# Patient Record
Sex: FEMALE | Race: BLACK OR AFRICAN AMERICAN | NOT HISPANIC OR LATINO
[De-identification: names, ages, dates, MRNs, and addresses within clinical notes are randomized per-mention and may not be internally consistent; named-entity substitution may affect disease eponyms.]

---

## 2021-10-21 ENCOUNTER — APPOINTMENT (OUTPATIENT)
Dept: GASTROENTEROLOGY | Facility: CLINIC | Age: 66
End: 2021-10-21
Payer: MEDICARE

## 2021-10-21 VITALS — DIASTOLIC BLOOD PRESSURE: 82 MMHG | TEMPERATURE: 97.3 F | SYSTOLIC BLOOD PRESSURE: 128 MMHG | HEIGHT: 67 IN

## 2021-10-21 DIAGNOSIS — Z87.39 PERSONAL HISTORY OF OTHER DISEASES OF THE MUSCULOSKELETAL SYSTEM AND CONNECTIVE TISSUE: ICD-10-CM

## 2021-10-21 DIAGNOSIS — K21.9 GASTRO-ESOPHAGEAL REFLUX DISEASE W/OUT ESOPHAGITIS: ICD-10-CM

## 2021-10-21 DIAGNOSIS — Z78.9 OTHER SPECIFIED HEALTH STATUS: ICD-10-CM

## 2021-10-21 DIAGNOSIS — E03.9 HYPOTHYROIDISM, UNSPECIFIED: ICD-10-CM

## 2021-10-21 PROCEDURE — 99213 OFFICE O/P EST LOW 20 MIN: CPT

## 2021-10-21 RX ORDER — PANTOPRAZOLE 40 MG/1
40 TABLET, DELAYED RELEASE ORAL
Qty: 90 | Refills: 3 | Status: ACTIVE | COMMUNITY
Start: 2021-10-21 | End: 1900-01-01

## 2021-10-21 RX ORDER — IBANDRONATE SODIUM 150 MG/1
150 TABLET, FILM COATED ORAL
Refills: 0 | Status: ACTIVE | COMMUNITY

## 2021-10-21 RX ORDER — PANTOPRAZOLE 40 MG/1
40 TABLET, DELAYED RELEASE ORAL
Refills: 0 | Status: ACTIVE | COMMUNITY

## 2021-10-21 RX ORDER — LEVOTHYROXINE SODIUM 100 UG/1
100 TABLET ORAL
Refills: 0 | Status: ACTIVE | COMMUNITY

## 2021-10-21 NOTE — CONSULT LETTER
[Dear  ___] : Dear  [unfilled], [Consult Letter:] : I had the pleasure of evaluating your patient, [unfilled]. [( Thank you for referring [unfilled] for consultation for _____ )] : Thank you for referring [unfilled] for consultation for [unfilled] [Please see my note below.] : Please see my note below. [Consult Closing:] : Thank you very much for allowing me to participate in the care of this patient.  If you have any questions, please do not hesitate to contact me. [Sincerely,] : Sincerely, [FreeTextEntry3] : don\par \par Adelfo Swain MD\par

## 2021-10-21 NOTE — PHYSICAL EXAM
[FreeTextEntry1] : In a motorized wheelchair [Outer Ear] : the ears and nose were normal in appearance [Oropharynx] : the oropharynx was normal [Auscultation Breath Sounds / Voice Sounds] : lungs were clear to auscultation bilaterally [Heart Rate And Rhythm] : heart rate was normal and rhythm regular [Heart Sounds] : normal S1 and S2 [Heart Sounds Gallop] : no gallops [Murmurs] : no murmurs [Heart Sounds Pericardial Friction Rub] : no pericardial rub [Bowel Sounds] : normal bowel sounds [Abdomen Soft] : soft [Abdomen Tenderness] : non-tender [] : no hepato-splenomegaly [Abdomen Mass (___ Cm)] : no abdominal mass palpated

## 2021-10-21 NOTE — HISTORY OF PRESENT ILLNESS
[FreeTextEntry1] : She is a 66-year-old female who has been on pantoprazole for her heartburn with complete resolution of her symptoms.  When she skips a dose, her heartburn quickly recurs.  She is very low on her medications and just wants to come here for a checkup.  She denies abdominal pain. She denies difficulty swallowing. She denies NSAID use, Her last colonoscopy was in 2017 which was normal.

## 2021-10-21 NOTE — ASSESSMENT
[FreeTextEntry1] : Continue pantoprazole 40 mg daily 1 hour before breakfast\par \par \par Office follow-up as needed

## 2022-02-28 DIAGNOSIS — R14.0 ABDOMINAL DISTENSION (GASEOUS): ICD-10-CM

## 2022-03-23 ENCOUNTER — APPOINTMENT (OUTPATIENT)
Dept: GASTROENTEROLOGY | Facility: AMBULATORY SURGERY CENTER | Age: 67
End: 2022-03-23

## 2022-04-13 ENCOUNTER — APPOINTMENT (OUTPATIENT)
Dept: GASTROENTEROLOGY | Facility: CLINIC | Age: 67
End: 2022-04-13

## 2022-04-14 ENCOUNTER — NON-APPOINTMENT (OUTPATIENT)
Age: 67
End: 2022-04-14

## 2022-06-10 ENCOUNTER — APPOINTMENT (OUTPATIENT)
Dept: GASTROENTEROLOGY | Facility: AMBULATORY SURGERY CENTER | Age: 67
End: 2022-06-10

## 2022-06-10 ENCOUNTER — APPOINTMENT (OUTPATIENT)
Dept: GASTROENTEROLOGY | Facility: AMBULATORY SURGERY CENTER | Age: 67
End: 2022-06-10
Payer: MEDICARE

## 2022-06-10 PROCEDURE — 43239 EGD BIOPSY SINGLE/MULTIPLE: CPT

## 2022-06-11 RX ORDER — PANTOPRAZOLE 20 MG/1
20 TABLET, DELAYED RELEASE ORAL
Qty: 90 | Refills: 2 | Status: ACTIVE | COMMUNITY
Start: 2022-06-11 | End: 1900-01-01

## 2022-06-13 ENCOUNTER — RESULT REVIEW (OUTPATIENT)
Age: 67
End: 2022-06-13

## 2022-12-15 ENCOUNTER — INPATIENT (INPATIENT)
Facility: HOSPITAL | Age: 67
LOS: 7 days | Discharge: HOME HEALTH | End: 2022-12-23
Attending: INTERNAL MEDICINE | Admitting: INTERNAL MEDICINE
Payer: MEDICARE

## 2022-12-15 VITALS
OXYGEN SATURATION: 97 % | TEMPERATURE: 99 F | DIASTOLIC BLOOD PRESSURE: 70 MMHG | RESPIRATION RATE: 16 BRPM | SYSTOLIC BLOOD PRESSURE: 140 MMHG | HEART RATE: 90 BPM

## 2022-12-15 PROCEDURE — 99285 EMERGENCY DEPT VISIT HI MDM: CPT

## 2022-12-15 NOTE — ED ADULT TRIAGE NOTE - CHIEF COMPLAINT QUOTE
Pt w/ hx of MS hypothyroid frequent UTIs c/o constipation, abdominal bloating and mild nausea x 4 days

## 2022-12-16 ENCOUNTER — APPOINTMENT (OUTPATIENT)
Dept: GASTROENTEROLOGY | Facility: CLINIC | Age: 67
End: 2022-12-16

## 2022-12-16 DIAGNOSIS — A41.9 SEPSIS, UNSPECIFIED ORGANISM: ICD-10-CM

## 2022-12-16 DIAGNOSIS — R56.9 UNSPECIFIED CONVULSIONS: ICD-10-CM

## 2022-12-16 DIAGNOSIS — E03.9 HYPOTHYROIDISM, UNSPECIFIED: ICD-10-CM

## 2022-12-16 DIAGNOSIS — N39.0 URINARY TRACT INFECTION, SITE NOT SPECIFIED: ICD-10-CM

## 2022-12-16 DIAGNOSIS — G35 MULTIPLE SCLEROSIS: ICD-10-CM

## 2022-12-16 DIAGNOSIS — I10 ESSENTIAL (PRIMARY) HYPERTENSION: ICD-10-CM

## 2022-12-16 DIAGNOSIS — K59.00 CONSTIPATION, UNSPECIFIED: ICD-10-CM

## 2022-12-16 LAB
ALBUMIN SERPL ELPH-MCNC: 3.5 G/DL — SIGNIFICANT CHANGE UP (ref 3.3–5)
ALBUMIN SERPL ELPH-MCNC: 4.1 G/DL — SIGNIFICANT CHANGE UP (ref 3.3–5)
ALP SERPL-CCNC: 114 U/L — SIGNIFICANT CHANGE UP (ref 40–120)
ALP SERPL-CCNC: 128 U/L — HIGH (ref 40–120)
ALT FLD-CCNC: 40 U/L — HIGH (ref 4–33)
ALT FLD-CCNC: 49 U/L — HIGH (ref 4–33)
AMMONIA BLD-MCNC: 34 UMOL/L — SIGNIFICANT CHANGE UP (ref 11–55)
ANION GAP SERPL CALC-SCNC: 12 MMOL/L — SIGNIFICANT CHANGE UP (ref 7–14)
ANION GAP SERPL CALC-SCNC: 12 MMOL/L — SIGNIFICANT CHANGE UP (ref 7–14)
ANION GAP SERPL CALC-SCNC: 9 MMOL/L — SIGNIFICANT CHANGE UP (ref 7–14)
APPEARANCE UR: ABNORMAL
AST SERPL-CCNC: 49 U/L — HIGH (ref 4–32)
AST SERPL-CCNC: 55 U/L — HIGH (ref 4–32)
BACTERIA # UR AUTO: ABNORMAL
BASOPHILS # BLD AUTO: 0.01 K/UL — SIGNIFICANT CHANGE UP (ref 0–0.2)
BASOPHILS # BLD AUTO: 0.02 K/UL — SIGNIFICANT CHANGE UP (ref 0–0.2)
BASOPHILS NFR BLD AUTO: 0.2 % — SIGNIFICANT CHANGE UP (ref 0–2)
BASOPHILS NFR BLD AUTO: 0.3 % — SIGNIFICANT CHANGE UP (ref 0–2)
BILIRUB SERPL-MCNC: 0.2 MG/DL — SIGNIFICANT CHANGE UP (ref 0.2–1.2)
BILIRUB SERPL-MCNC: <0.2 MG/DL — SIGNIFICANT CHANGE UP (ref 0.2–1.2)
BILIRUB UR-MCNC: NEGATIVE — SIGNIFICANT CHANGE UP
BLOOD GAS VENOUS COMPREHENSIVE RESULT: SIGNIFICANT CHANGE UP
BUN SERPL-MCNC: 12 MG/DL — SIGNIFICANT CHANGE UP (ref 7–23)
BUN SERPL-MCNC: 12 MG/DL — SIGNIFICANT CHANGE UP (ref 7–23)
BUN SERPL-MCNC: 15 MG/DL — SIGNIFICANT CHANGE UP (ref 7–23)
CALCIUM SERPL-MCNC: 10.4 MG/DL — SIGNIFICANT CHANGE UP (ref 8.4–10.5)
CALCIUM SERPL-MCNC: 9.1 MG/DL — SIGNIFICANT CHANGE UP (ref 8.4–10.5)
CALCIUM SERPL-MCNC: 9.5 MG/DL — SIGNIFICANT CHANGE UP (ref 8.4–10.5)
CHLORIDE SERPL-SCNC: 92 MMOL/L — LOW (ref 98–107)
CHLORIDE SERPL-SCNC: 92 MMOL/L — LOW (ref 98–107)
CHLORIDE SERPL-SCNC: 96 MMOL/L — LOW (ref 98–107)
CK SERPL-CCNC: 158 U/L — SIGNIFICANT CHANGE UP (ref 25–170)
CO2 SERPL-SCNC: 26 MMOL/L — SIGNIFICANT CHANGE UP (ref 22–31)
CO2 SERPL-SCNC: 26 MMOL/L — SIGNIFICANT CHANGE UP (ref 22–31)
CO2 SERPL-SCNC: 28 MMOL/L — SIGNIFICANT CHANGE UP (ref 22–31)
COLOR SPEC: SIGNIFICANT CHANGE UP
CREAT SERPL-MCNC: 0.74 MG/DL — SIGNIFICANT CHANGE UP (ref 0.5–1.3)
CREAT SERPL-MCNC: 0.78 MG/DL — SIGNIFICANT CHANGE UP (ref 0.5–1.3)
CREAT SERPL-MCNC: 0.79 MG/DL — SIGNIFICANT CHANGE UP (ref 0.5–1.3)
DIFF PNL FLD: ABNORMAL
EGFR: 83 ML/MIN/1.73M2 — SIGNIFICANT CHANGE UP
EGFR: 84 ML/MIN/1.73M2 — SIGNIFICANT CHANGE UP
EGFR: 90 ML/MIN/1.73M2 — SIGNIFICANT CHANGE UP
EOSINOPHIL # BLD AUTO: 0.01 K/UL — SIGNIFICANT CHANGE UP (ref 0–0.5)
EOSINOPHIL # BLD AUTO: 0.01 K/UL — SIGNIFICANT CHANGE UP (ref 0–0.5)
EOSINOPHIL NFR BLD AUTO: 0.2 % — SIGNIFICANT CHANGE UP (ref 0–6)
EOSINOPHIL NFR BLD AUTO: 0.2 % — SIGNIFICANT CHANGE UP (ref 0–6)
EPI CELLS # UR: 3 /HPF — SIGNIFICANT CHANGE UP (ref 0–5)
FLUAV AG NPH QL: SIGNIFICANT CHANGE UP
FLUBV AG NPH QL: SIGNIFICANT CHANGE UP
GLUCOSE BLDC GLUCOMTR-MCNC: 104 MG/DL — HIGH (ref 70–99)
GLUCOSE BLDC GLUCOMTR-MCNC: 115 MG/DL — HIGH (ref 70–99)
GLUCOSE BLDC GLUCOMTR-MCNC: 233 MG/DL — HIGH (ref 70–99)
GLUCOSE BLDC GLUCOMTR-MCNC: 78 MG/DL — SIGNIFICANT CHANGE UP (ref 70–99)
GLUCOSE SERPL-MCNC: 117 MG/DL — HIGH (ref 70–99)
GLUCOSE SERPL-MCNC: 226 MG/DL — HIGH (ref 70–99)
GLUCOSE SERPL-MCNC: 69 MG/DL — LOW (ref 70–99)
GLUCOSE UR QL: NEGATIVE — SIGNIFICANT CHANGE UP
HCT VFR BLD CALC: 33.4 % — LOW (ref 34.5–45)
HCT VFR BLD CALC: 36.4 % — SIGNIFICANT CHANGE UP (ref 34.5–45)
HGB BLD-MCNC: 10.6 G/DL — LOW (ref 11.5–15.5)
HGB BLD-MCNC: 11.6 G/DL — SIGNIFICANT CHANGE UP (ref 11.5–15.5)
HYALINE CASTS # UR AUTO: 5 /LPF — SIGNIFICANT CHANGE UP (ref 0–7)
IANC: 3.25 K/UL — SIGNIFICANT CHANGE UP (ref 1.8–7.4)
IANC: 4.83 K/UL — SIGNIFICANT CHANGE UP (ref 1.8–7.4)
IMM GRANULOCYTES NFR BLD AUTO: 0.3 % — SIGNIFICANT CHANGE UP (ref 0–0.9)
IMM GRANULOCYTES NFR BLD AUTO: 0.5 % — SIGNIFICANT CHANGE UP (ref 0–0.9)
KETONES UR-MCNC: ABNORMAL
LEUKOCYTE ESTERASE UR-ACNC: ABNORMAL
LIDOCAIN IGE QN: 22 U/L — SIGNIFICANT CHANGE UP (ref 7–60)
LYMPHOCYTES # BLD AUTO: 0.91 K/UL — LOW (ref 1–3.3)
LYMPHOCYTES # BLD AUTO: 1.3 K/UL — SIGNIFICANT CHANGE UP (ref 1–3.3)
LYMPHOCYTES # BLD AUTO: 19.7 % — SIGNIFICANT CHANGE UP (ref 13–44)
LYMPHOCYTES # BLD AUTO: 21.2 % — SIGNIFICANT CHANGE UP (ref 13–44)
MAGNESIUM SERPL-MCNC: 1.5 MG/DL — LOW (ref 1.6–2.6)
MAGNESIUM SERPL-MCNC: 1.5 MG/DL — LOW (ref 1.6–2.6)
MCHC RBC-ENTMCNC: 28.5 PG — SIGNIFICANT CHANGE UP (ref 27–34)
MCHC RBC-ENTMCNC: 28.6 PG — SIGNIFICANT CHANGE UP (ref 27–34)
MCHC RBC-ENTMCNC: 31.7 GM/DL — LOW (ref 32–36)
MCHC RBC-ENTMCNC: 31.9 GM/DL — LOW (ref 32–36)
MCV RBC AUTO: 89.4 FL — SIGNIFICANT CHANGE UP (ref 80–100)
MCV RBC AUTO: 90 FL — SIGNIFICANT CHANGE UP (ref 80–100)
MONOCYTES # BLD AUTO: 0.1 K/UL — SIGNIFICANT CHANGE UP (ref 0–0.9)
MONOCYTES # BLD AUTO: 0.41 K/UL — SIGNIFICANT CHANGE UP (ref 0–0.9)
MONOCYTES NFR BLD AUTO: 2.3 % — SIGNIFICANT CHANGE UP (ref 2–14)
MONOCYTES NFR BLD AUTO: 6.2 % — SIGNIFICANT CHANGE UP (ref 2–14)
NEUTROPHILS # BLD AUTO: 3.25 K/UL — SIGNIFICANT CHANGE UP (ref 1.8–7.4)
NEUTROPHILS # BLD AUTO: 4.83 K/UL — SIGNIFICANT CHANGE UP (ref 1.8–7.4)
NEUTROPHILS NFR BLD AUTO: 73.3 % — SIGNIFICANT CHANGE UP (ref 43–77)
NEUTROPHILS NFR BLD AUTO: 75.6 % — SIGNIFICANT CHANGE UP (ref 43–77)
NITRITE UR-MCNC: POSITIVE
NRBC # BLD: 0 /100 WBCS — SIGNIFICANT CHANGE UP (ref 0–0)
NRBC # BLD: 0 /100 WBCS — SIGNIFICANT CHANGE UP (ref 0–0)
NRBC # FLD: 0 K/UL — SIGNIFICANT CHANGE UP (ref 0–0)
NRBC # FLD: 0 K/UL — SIGNIFICANT CHANGE UP (ref 0–0)
PH UR: 8 — SIGNIFICANT CHANGE UP (ref 5–8)
PHOSPHATE SERPL-MCNC: 3.3 MG/DL — SIGNIFICANT CHANGE UP (ref 2.5–4.5)
PHOSPHATE SERPL-MCNC: 3.8 MG/DL — SIGNIFICANT CHANGE UP (ref 2.5–4.5)
PLATELET # BLD AUTO: 164 K/UL — SIGNIFICANT CHANGE UP (ref 150–400)
PLATELET # BLD AUTO: 188 K/UL — SIGNIFICANT CHANGE UP (ref 150–400)
POTASSIUM SERPL-MCNC: 3.9 MMOL/L — SIGNIFICANT CHANGE UP (ref 3.5–5.3)
POTASSIUM SERPL-MCNC: 4.1 MMOL/L — SIGNIFICANT CHANGE UP (ref 3.5–5.3)
POTASSIUM SERPL-MCNC: 4.9 MMOL/L — SIGNIFICANT CHANGE UP (ref 3.5–5.3)
POTASSIUM SERPL-SCNC: 3.9 MMOL/L — SIGNIFICANT CHANGE UP (ref 3.5–5.3)
POTASSIUM SERPL-SCNC: 4.1 MMOL/L — SIGNIFICANT CHANGE UP (ref 3.5–5.3)
POTASSIUM SERPL-SCNC: 4.9 MMOL/L — SIGNIFICANT CHANGE UP (ref 3.5–5.3)
PROT SERPL-MCNC: 6.3 G/DL — SIGNIFICANT CHANGE UP (ref 6–8.3)
PROT SERPL-MCNC: 7.6 G/DL — SIGNIFICANT CHANGE UP (ref 6–8.3)
PROT UR-MCNC: ABNORMAL
RBC # BLD: 3.71 M/UL — LOW (ref 3.8–5.2)
RBC # BLD: 4.07 M/UL — SIGNIFICANT CHANGE UP (ref 3.8–5.2)
RBC # FLD: 17.3 % — HIGH (ref 10.3–14.5)
RBC # FLD: 17.4 % — HIGH (ref 10.3–14.5)
RBC CASTS # UR COMP ASSIST: 6 /HPF — HIGH (ref 0–4)
RSV RNA NPH QL NAA+NON-PROBE: SIGNIFICANT CHANGE UP
SARS-COV-2 RNA SPEC QL NAA+PROBE: SIGNIFICANT CHANGE UP
SODIUM SERPL-SCNC: 130 MMOL/L — LOW (ref 135–145)
SODIUM SERPL-SCNC: 130 MMOL/L — LOW (ref 135–145)
SODIUM SERPL-SCNC: 133 MMOL/L — LOW (ref 135–145)
SP GR SPEC: 1.01 — SIGNIFICANT CHANGE UP (ref 1.01–1.05)
TSH SERPL-MCNC: 2.37 UIU/ML — SIGNIFICANT CHANGE UP (ref 0.27–4.2)
UROBILINOGEN FLD QL: SIGNIFICANT CHANGE UP
WBC # BLD: 4.3 K/UL — SIGNIFICANT CHANGE UP (ref 3.8–10.5)
WBC # BLD: 6.59 K/UL — SIGNIFICANT CHANGE UP (ref 3.8–10.5)
WBC # FLD AUTO: 4.3 K/UL — SIGNIFICANT CHANGE UP (ref 3.8–10.5)
WBC # FLD AUTO: 6.59 K/UL — SIGNIFICANT CHANGE UP (ref 3.8–10.5)
WBC UR QL: >720 /HPF — HIGH (ref 0–5)

## 2022-12-16 PROCEDURE — 74177 CT ABD & PELVIS W/CONTRAST: CPT | Mod: 26,MA

## 2022-12-16 PROCEDURE — 70450 CT HEAD/BRAIN W/O DYE: CPT | Mod: 26

## 2022-12-16 PROCEDURE — 99223 1ST HOSP IP/OBS HIGH 75: CPT

## 2022-12-16 RX ORDER — SENNA PLUS 8.6 MG/1
2 TABLET ORAL AT BEDTIME
Refills: 0 | Status: DISCONTINUED | OUTPATIENT
Start: 2022-12-16 | End: 2022-12-23

## 2022-12-16 RX ORDER — ENOXAPARIN SODIUM 100 MG/ML
40 INJECTION SUBCUTANEOUS EVERY 24 HOURS
Refills: 0 | Status: DISCONTINUED | OUTPATIENT
Start: 2022-12-16 | End: 2022-12-23

## 2022-12-16 RX ORDER — SODIUM CHLORIDE 9 MG/ML
1000 INJECTION INTRAMUSCULAR; INTRAVENOUS; SUBCUTANEOUS ONCE
Refills: 0 | Status: COMPLETED | OUTPATIENT
Start: 2022-12-16 | End: 2022-12-16

## 2022-12-16 RX ORDER — SODIUM CHLORIDE 9 MG/ML
1000 INJECTION, SOLUTION INTRAVENOUS
Refills: 0 | Status: DISCONTINUED | OUTPATIENT
Start: 2022-12-16 | End: 2022-12-19

## 2022-12-16 RX ORDER — LEVOTHYROXINE SODIUM 125 MCG
75 TABLET ORAL DAILY
Refills: 0 | Status: DISCONTINUED | OUTPATIENT
Start: 2022-12-16 | End: 2022-12-18

## 2022-12-16 RX ORDER — MAGNESIUM SULFATE 500 MG/ML
1 VIAL (ML) INJECTION ONCE
Refills: 0 | Status: COMPLETED | OUTPATIENT
Start: 2022-12-16 | End: 2022-12-16

## 2022-12-16 RX ORDER — PIPERACILLIN AND TAZOBACTAM 4; .5 G/20ML; G/20ML
3.38 INJECTION, POWDER, LYOPHILIZED, FOR SOLUTION INTRAVENOUS ONCE
Refills: 0 | Status: COMPLETED | OUTPATIENT
Start: 2022-12-16 | End: 2022-12-16

## 2022-12-16 RX ORDER — ACETAMINOPHEN 500 MG
1000 TABLET ORAL ONCE
Refills: 0 | Status: COMPLETED | OUTPATIENT
Start: 2022-12-16 | End: 2022-12-16

## 2022-12-16 RX ORDER — ATORVASTATIN CALCIUM 80 MG/1
1 TABLET, FILM COATED ORAL
Qty: 0 | Refills: 0 | DISCHARGE

## 2022-12-16 RX ORDER — ONDANSETRON 8 MG/1
4 TABLET, FILM COATED ORAL EVERY 8 HOURS
Refills: 0 | Status: DISCONTINUED | OUTPATIENT
Start: 2022-12-16 | End: 2022-12-23

## 2022-12-16 RX ORDER — POLYETHYLENE GLYCOL 3350 17 G/17G
17 POWDER, FOR SOLUTION ORAL DAILY
Refills: 0 | Status: DISCONTINUED | OUTPATIENT
Start: 2022-12-16 | End: 2022-12-23

## 2022-12-16 RX ORDER — ZOLPIDEM TARTRATE 10 MG/1
5 TABLET ORAL AT BEDTIME
Refills: 0 | Status: DISCONTINUED | OUTPATIENT
Start: 2022-12-16 | End: 2022-12-22

## 2022-12-16 RX ORDER — AMLODIPINE BESYLATE 2.5 MG/1
1 TABLET ORAL
Qty: 0 | Refills: 0 | DISCHARGE

## 2022-12-16 RX ORDER — CEFTRIAXONE 500 MG/1
1000 INJECTION, POWDER, FOR SOLUTION INTRAMUSCULAR; INTRAVENOUS ONCE
Refills: 0 | Status: COMPLETED | OUTPATIENT
Start: 2022-12-16 | End: 2022-12-16

## 2022-12-16 RX ORDER — LEVETIRACETAM 250 MG/1
2000 TABLET, FILM COATED ORAL ONCE
Refills: 0 | Status: COMPLETED | OUTPATIENT
Start: 2022-12-16 | End: 2022-12-16

## 2022-12-16 RX ORDER — LEVETIRACETAM 250 MG/1
750 TABLET, FILM COATED ORAL EVERY 12 HOURS
Refills: 0 | Status: DISCONTINUED | OUTPATIENT
Start: 2022-12-17 | End: 2022-12-17

## 2022-12-16 RX ORDER — LANOLIN ALCOHOL/MO/W.PET/CERES
3 CREAM (GRAM) TOPICAL AT BEDTIME
Refills: 0 | Status: DISCONTINUED | OUTPATIENT
Start: 2022-12-16 | End: 2022-12-23

## 2022-12-16 RX ORDER — ATORVASTATIN CALCIUM 80 MG/1
10 TABLET, FILM COATED ORAL AT BEDTIME
Refills: 0 | Status: DISCONTINUED | OUTPATIENT
Start: 2022-12-16 | End: 2022-12-23

## 2022-12-16 RX ORDER — PANTOPRAZOLE SODIUM 20 MG/1
40 TABLET, DELAYED RELEASE ORAL
Refills: 0 | Status: DISCONTINUED | OUTPATIENT
Start: 2022-12-16 | End: 2022-12-18

## 2022-12-16 RX ORDER — ACETAMINOPHEN 500 MG
650 TABLET ORAL EVERY 6 HOURS
Refills: 0 | Status: DISCONTINUED | OUTPATIENT
Start: 2022-12-16 | End: 2022-12-23

## 2022-12-16 RX ORDER — PIPERACILLIN AND TAZOBACTAM 4; .5 G/20ML; G/20ML
3.38 INJECTION, POWDER, LYOPHILIZED, FOR SOLUTION INTRAVENOUS EVERY 8 HOURS
Refills: 0 | Status: DISCONTINUED | OUTPATIENT
Start: 2022-12-16 | End: 2022-12-18

## 2022-12-16 RX ADMIN — SODIUM CHLORIDE 1000 MILLILITER(S): 9 INJECTION INTRAMUSCULAR; INTRAVENOUS; SUBCUTANEOUS at 13:34

## 2022-12-16 RX ADMIN — CEFTRIAXONE 100 MILLIGRAM(S): 500 INJECTION, POWDER, FOR SOLUTION INTRAMUSCULAR; INTRAVENOUS at 02:34

## 2022-12-16 RX ADMIN — PIPERACILLIN AND TAZOBACTAM 25 GRAM(S): 4; .5 INJECTION, POWDER, LYOPHILIZED, FOR SOLUTION INTRAVENOUS at 16:54

## 2022-12-16 RX ADMIN — PIPERACILLIN AND TAZOBACTAM 200 GRAM(S): 4; .5 INJECTION, POWDER, LYOPHILIZED, FOR SOLUTION INTRAVENOUS at 13:34

## 2022-12-16 RX ADMIN — ENOXAPARIN SODIUM 40 MILLIGRAM(S): 100 INJECTION SUBCUTANEOUS at 16:59

## 2022-12-16 RX ADMIN — LEVETIRACETAM 600 MILLIGRAM(S): 250 TABLET, FILM COATED ORAL at 19:32

## 2022-12-16 RX ADMIN — Medication 400 MILLIGRAM(S): at 01:46

## 2022-12-16 RX ADMIN — Medication 100 GRAM(S): at 15:29

## 2022-12-16 RX ADMIN — SODIUM CHLORIDE 75 MILLILITER(S): 9 INJECTION, SOLUTION INTRAVENOUS at 16:57

## 2022-12-16 NOTE — H&P ADULT - PROBLEM SELECTOR PLAN 1
hypothermic, tachycardiac, ua positive. hypothermic, tachycardiac, ua positive. CT AP without acute pathology. nos tone or obstruction.,  -s/p 2L Bolus. one dose of ceftriaxone  -switch ceftriaxone to zosyn q8hr   -fu blood and urine culture  -monitor lactate  -cw D51/2NS at 75cc/hr given poor po intake   -monitor vitals, bear hugger as needed, trend wbc  -bladder scan to rule out retention

## 2022-12-16 NOTE — H&P ADULT - NSHPREVIEWOFSYSTEMS_GEN_ALL_CORE
ROS limited given AMS. Terbinafine Pregnancy And Lactation Text: This medication is Pregnancy Category B and is considered safe during pregnancy. It is also excreted in breast milk and breast feeding isn't recommended.

## 2022-12-16 NOTE — CHART NOTE - NSCHARTNOTEFT_GEN_A_CORE
RRT called by nursing staff 2/2 seizure activity.  Per nursing staff, patient had approximately 40 second seizure that terminated spontaneously, no medications administered.  Patient's O2 sat noted by staff to desaturate to 75%, ED Attending at bedside, nasal trumpet inserted.      Upon arrival to bedside, patient no longer with seizure activity, O2 sat 100%.  RRT team at bedside.  Dr. Redding updated.    Will closely monitor      Xiao Xavier NP-BC  Department of Medicine  In House Pager #[phone]

## 2022-12-16 NOTE — ED ADULT NURSE REASSESSMENT NOTE - NS ED NURSE REASSESS COMMENT FT1
While re-vitaling pt began having seizure like activity lasting approximately 40 seconds, no ativan given, pt immediately brought into room, airway protected, secretions suctioned. FS obtained (78). As per MD Caba  Dextrose 50 given. Pt oxygen desating to 75%, non-rebreather placed. tongue obstructing airway, manual de-obstruction provided by MD via nasal trumpet. Pt sO2 sat 100%. Repeat FS (233)  Rapid response called once pt was stable. Refer to rapid response team documentation for further information. Rectal temp obtained (97.1). While re-vitaling pt began having seizure like activity (Pt rigid, full body shaking, foaming at mouth) lasting approximately 40 seconds, no ativan given, pt immediately brought into room, unresponsive, airway protected, secretions suctioned. FS obtained (78). As per MD Rosales  Dextrose 50 given. Pt  hypoxic to 75%, non-rebreather placed. tongue obstructing airway, manual de-obstruction provided by MD via nasal trumpet. Pt sO2 sat 100%. Repeat FS (233)  Rapid response called once pt was stable. Refer to rapid response team documentation for further information. Rectal temp obtained (97.1).

## 2022-12-16 NOTE — CHART NOTE - NSCHARTNOTEFT_GEN_A_CORE
Baclofen pump interrogated    Concentration: 500 mcg/mL  Rate: 5.72 mcg/hr  Daily dose: 104.06 mcg/day Baclofen pump interrogated    Concentration: 500 mcg/mL  Rate: 5.72 mcg/hr  Daily dose: 104.06 mcg/day  Last refill date 12/5/2022  Alarm date 03/01/2023

## 2022-12-16 NOTE — CONSULT NOTE ADULT - SUBJECTIVE AND OBJECTIVE BOX
Neurology Consultation     HPI: Patient YS is a 68 y/o F hx MS (nonambulatory, has baclofen pump - refilled 3 weeks ago), frequent UTIs, hospitalized for ?urosepsis this month at [facility], brought in by family member for complaints of abd pain, constipation and lethargy x4 days per chart review. Of note, patient had recent hospitalization at [facility] for urosepsis with seizure like event that was witnessed. Was discharged on keppra 500mg BID but appears patient may not have been taking it. Patient here had RRT  for 40 seconds of seizure like activity described as stiffening of b/l UE, foaming at the mouth, hypoxic during event. Started zosyn and IVF for concern of urosepsis potentiating a seizure event. Also with baclofen pump.        PAST MEDICAL & SURGICAL HISTORY:  Hypothyroid    Multiple sclerosis    No significant past surgical history    FAMILY HISTORY:  No pertinent family history in first degree relatives    MEDICATIONS (HOME):  Home Medications:  acetaminophen 325 mg oral tablet: 2 tab(s) orally every 6 hours, As needed, For Temp over 38.3 C (100.94 F) (2015 13:43)  amLODIPine 10 mg oral tablet: 1 tab(s) orally once a day (16 Dec 2022 14:02)  atorvastatin 10 mg oral tablet: 1 tab(s) orally once a day (16 Dec 2022 14:03)  docusate sodium 100 mg oral capsule: 1 cap(s) orally 3 times a day (2015 13:34)  fluticasone 50 mcg/inh nasal spray: 1 spray(s) nasal 2 times a day (2015 13:34)  guaiFENesin 100 mg/5 mL oral liquid: 5 milliliter(s) orally every 6 hours, As needed, Cough (2015 13:34)  Multiple Vitamins oral tablet: 1 tab(s) orally once a day (2015 13:34)  senna oral tablet: 2 tab(s) orally once a day (at bedtime) (2015 13:34)  Synthroid 75 mcg (0.075 mg) oral tablet: 1 tab(s) orally once a day (2015 19:07)    MEDICATIONS  (STANDING):  atorvastatin 10 milliGRAM(s) Oral at bedtime  bisacodyl Suppository 10 milliGRAM(s) Rectal once  dextrose 5% + sodium chloride 0.45%. 1000 milliLiter(s) (75 mL/Hr) IV Continuous <Continuous>  levothyroxine 75 MICROGram(s) Oral daily  pantoprazole    Tablet 20 milliGRAM(s) Oral before breakfast  piperacillin/tazobactam IVPB.- 3.375 Gram(s) IV Intermittent once  piperacillin/tazobactam IVPB.. 3.375 Gram(s) IV Intermittent every 8 hours  polyethylene glycol 3350 17 Gram(s) Oral daily  senna 2 Tablet(s) Oral at bedtime    MEDICATIONS  (PRN):  acetaminophen     Tablet .. 650 milliGRAM(s) Oral every 6 hours PRN Temp greater or equal to 38C (100.4F), Mild Pain (1 - 3)  aluminum hydroxide/magnesium hydroxide/simethicone Suspension 30 milliLiter(s) Oral every 4 hours PRN Dyspepsia  melatonin 3 milliGRAM(s) Oral at bedtime PRN Insomnia  ondansetron Injectable 4 milliGRAM(s) IV Push every 8 hours PRN Nausea and/or Vomiting  zolpidem 5 milliGRAM(s) Oral at bedtime PRN Insomnia    ALLERGIES/INTOLERANCES:  Allergies  No Known Allergies    Intolerances    VITALS & EXAMINATION:  Vital Signs Last 24 Hrs  T(C): 36.2 (16 Dec 2022 12:25), Max: 37.1 (15 Dec 2022 22:35)  T(F): 97.1 (16 Dec 2022 12:25), Max: 98.8 (15 Dec 2022 22:35)  HR: 99 (16 Dec 2022 13:13) (81 - 103)  BP: 108/65 (16 Dec 2022 13:13) (94/50 - 152/61)  BP(mean): --  RR: 15 (16 Dec 2022 13:13) (10 - 18)  SpO2: 100% (16 Dec 2022 13:13) (72% - 100%)    Parameters below as of 16 Dec 2022 13:13  Patient On (Oxygen Delivery Method): Nasal trumpet      LABORATORY:  CBC                       10.6   4.30  )-----------( 164      ( 16 Dec 2022 13:27 )             33.4     Chem 12-16    130<L>  |  92<L>  |  12  ----------------------------<  226<H>  4.1   |  26  |  0.79    Ca    9.5      16 Dec 2022 13:27  Phos  3.8     12-16  Mg     1.50     12-    TPro  6.3  /  Alb  3.5  /  TBili  <0.2  /  DBili  x   /  AST  49<H>  /  ALT  40<H>  /  AlkPhos  114  12-16    LFTs LIVER FUNCTIONS - ( 16 Dec 2022 13:27 )  Alb: 3.5 g/dL / Pro: 6.3 g/dL / ALK PHOS: 114 U/L / ALT: 40 U/L / AST: 49 U/L / GGT: x           Coagulopathy   Lipid Panel   A1c   Cardiac enzymes CARDIAC MARKERS ( 16 Dec 2022 13:27 )  x     / x     / 158 U/L / x     / x          U/A Urinalysis Basic - ( 16 Dec 2022 01:26 )    Color: Light Yellow / Appearance: Turbid / S.013 / pH: x  Gluc: x / Ketone: Small  / Bili: Negative / Urobili: <2 mg/dL   Blood: x / Protein: 30 mg/dL / Nitrite: Positive   Leuk Esterase: Large / RBC: 6 /HPF / WBC >720 /HPF   Sq Epi: x / Non Sq Epi: 3 /HPF / Bacteria: Many      CSF  Other    STUDIES & IMAGING: (EEG, CT, MR, U/S, TTE/EDGAR): Neurology Consultation     HPI: Patient YS is a 66 y/o F hx MS (nonambulatory, has baclofen pump - refilled 3 weeks ago), frequent UTIs, hospitalized for ?urosepsis this month at [facility] hospital, brought in by family member for complaints of abd pain, constipation and lethargy x4 days per chart review. At baseline able to communicate, interact. Of note, patient had recent hospitalization at [facility] for urosepsis with seizure like event that was witnessed. Was discharged on keppra 500mg BID but appears patient may not have been consistently taking it. Patient here had RRT  for 40 seconds of seizure like activity described as stiffening of b/l UE, foaming at the mouth, hypoxic during event. Started zosyn and IVF for concern of urosepsis potentiating a seizure event. Also with baclofen pump. Patient on evaluation appears post ictal. Unable to obtain history.     PAST MEDICAL & SURGICAL HISTORY:  Hypothyroid    Multiple sclerosis    No significant past surgical history    FAMILY HISTORY:  No pertinent family history in first degree relatives    MEDICATIONS (HOME):  Home Medications:  acetaminophen 325 mg oral tablet: 2 tab(s) orally every 6 hours, As needed, For Temp over 38.3 C (100.94 F) (2015 13:43)  amLODIPine 10 mg oral tablet: 1 tab(s) orally once a day (16 Dec 2022 14:02)  atorvastatin 10 mg oral tablet: 1 tab(s) orally once a day (16 Dec 2022 14:03)  docusate sodium 100 mg oral capsule: 1 cap(s) orally 3 times a day (2015 13:34)  fluticasone 50 mcg/inh nasal spray: 1 spray(s) nasal 2 times a day (2015 13:34)  guaiFENesin 100 mg/5 mL oral liquid: 5 milliliter(s) orally every 6 hours, As needed, Cough (2015 13:34)  Multiple Vitamins oral tablet: 1 tab(s) orally once a day (2015 13:34)  senna oral tablet: 2 tab(s) orally once a day (at bedtime) (2015 13:34)  Synthroid 75 mcg (0.075 mg) oral tablet: 1 tab(s) orally once a day (2015 19:07)    MEDICATIONS  (STANDING):  atorvastatin 10 milliGRAM(s) Oral at bedtime  bisacodyl Suppository 10 milliGRAM(s) Rectal once  dextrose 5% + sodium chloride 0.45%. 1000 milliLiter(s) (75 mL/Hr) IV Continuous <Continuous>  levothyroxine 75 MICROGram(s) Oral daily  pantoprazole    Tablet 20 milliGRAM(s) Oral before breakfast  piperacillin/tazobactam IVPB.- 3.375 Gram(s) IV Intermittent once  piperacillin/tazobactam IVPB.. 3.375 Gram(s) IV Intermittent every 8 hours  polyethylene glycol 3350 17 Gram(s) Oral daily  senna 2 Tablet(s) Oral at bedtime    MEDICATIONS  (PRN):  acetaminophen     Tablet .. 650 milliGRAM(s) Oral every 6 hours PRN Temp greater or equal to 38C (100.4F), Mild Pain (1 - 3)  aluminum hydroxide/magnesium hydroxide/simethicone Suspension 30 milliLiter(s) Oral every 4 hours PRN Dyspepsia  melatonin 3 milliGRAM(s) Oral at bedtime PRN Insomnia  ondansetron Injectable 4 milliGRAM(s) IV Push every 8 hours PRN Nausea and/or Vomiting  zolpidem 5 milliGRAM(s) Oral at bedtime PRN Insomnia    ALLERGIES/INTOLERANCES:  Allergies  No Known Allergies    Intolerances    VITALS & EXAMINATION:  Vital Signs Last 24 Hrs  T(C): 36.2 (16 Dec 2022 12:25), Max: 37.1 (15 Dec 2022 22:35)  T(F): 97.1 (16 Dec 2022 12:25), Max: 98.8 (15 Dec 2022 22:35)  HR: 99 (16 Dec 2022 13:13) (81 - 103)  BP: 108/65 (16 Dec 2022 13:13) (94/50 - 152/61)  BP(mean): --  RR: 15 (16 Dec 2022 13:13) (10 - 18)  SpO2: 100% (16 Dec 2022 13:13) (72% - 100%)    Parameters below as of 16 Dec 2022 13:13  Patient On (Oxygen Delivery Method): Nasal trumpet    General: Nonresponsive patient, lying in bed, mouth open  Eyes: clear sclera  Resp: breathing regularly     Neurologic:  - Mental Status: briefly opens eyes to sternal rub but then closes. Not following verbal commands at all.   - Cranial Nerves II-XII:  pupils 4 mm, equal, round, nonreactive to light bilaterally. OCR intact. No BTT. No roopa facial asymmetry. Unable to test CNV, hearing, tongue protrusion.   - Motor: increased tone x4 extremities. Keeps b/l UE  closed, elbows flexed.   - Reflexes: decreased reflexes throughout  - upgoing toes bilaterally  - Sensory: Grimaces and withdraws to pain in all extremities.  - Coordination & Gait: Unable to obtain.    LABORATORY:  CBC                       10.6   4.30  )-----------( 164      ( 16 Dec 2022 13:27 )             33.4     Chem 12-16    130<L>  |  92<L>  |  12  ----------------------------<  226<H>  4.1   |  26  |  0.79    Ca    9.5      16 Dec 2022 13:27  Phos  3.8     12-16  Mg     1.50     12-16    TPro  6.3  /  Alb  3.5  /  TBili  <0.2  /  DBili  x   /  AST  49<H>  /  ALT  40<H>  /  AlkPhos  114  12-16    LFTs LIVER FUNCTIONS - ( 16 Dec 2022 13:27 )  Alb: 3.5 g/dL / Pro: 6.3 g/dL / ALK PHOS: 114 U/L / ALT: 40 U/L / AST: 49 U/L / GGT: x           Coagulopathy   Lipid Panel   A1c   Cardiac enzymes CARDIAC MARKERS ( 16 Dec 2022 13:27 )  x     / x     / 158 U/L / x     / x          U/A Urinalysis Basic - ( 16 Dec 2022 01:26 )    Color: Light Yellow / Appearance: Turbid / S.013 / pH: x  Gluc: x / Ketone: Small  / Bili: Negative / Urobili: <2 mg/dL   Blood: x / Protein: 30 mg/dL / Nitrite: Positive   Leuk Esterase: Large / RBC: 6 /HPF / WBC >720 /HPF   Sq Epi: x / Non Sq Epi: 3 /HPF / Bacteria: Many      CSF  Other    STUDIES & IMAGING: (EEG, CT, MR, U/S, TTE/EDGAR):

## 2022-12-16 NOTE — ED ADULT NURSE REASSESSMENT NOTE - NS ED NURSE REASSESS COMMENT FT1
Pt resting comfortably in bed, at baseline mental status A&Ox2. VS stable. RR equal and unlabored. Comfort measures provided. safety maintained. care plan continued.

## 2022-12-16 NOTE — ED PROVIDER NOTE - OBJECTIVE STATEMENT
66 y/o F hx MS (nonambulatory, has baclofen pump - refilled 3 weeks ago), frequent UTIs, hospitalized for ?urosepsis this month at [facility], presents now with abd pain and constipation x4 days, worse since yesterday, now not passing gas. No similar previous. No fevers, cp, sob, syncope. Is more confused than her baseline which aide states happens when she gets sick.

## 2022-12-16 NOTE — H&P ADULT - HISTORY OF PRESENT ILLNESS
66 y/o F hx MS (nonambulatory, has baclofen pump - refilled 3 weeks ago), frequent UTIs, hospitalized for ?urosepsis this month at [facility], presents now with abd pain and constipation x4 days, worse since yesterday, now not passing gas. No similar previous. No fevers, cp, sob, syncope. Is more confused than her baseline which aide states happens when she gets sick. 66 y/o F hx MS (nonambulatory, has baclofen pump - refilled 3 weeks ago), frequent UTIs, hospitalized for ?urosepsis this month at [facility], brought in by family member for complaints of abd pain, constipation and lethargy. Patient is lethargic and only answers in yes/no, history limited.   per chart, reports complaints of abd pain and constipation x4 days. Aide also reports worsening mental status than baseline.    68 y/o F hx MS (nonambulatory, has baclofen pump - refilled 3 weeks ago), frequent UTIs, hospitalized for ?urosepsis this month at [facility], brought in by aide for complaints of abd pain, constipation and lethargy. Patient is lethargic and only answers with yes/no, history limited. History obtained from patient's spouse and Aide. Patient had been complaining of abd pain, indigestion and constipation x3-4 days. was noted to have worsening mental status than baseline. at baseline ANO4, communicative and interactive. reports recent hx of UTI for which her PCP prescribed cefdinir which she completed for 5d but despite that urine appeared dark and foul smelling. Patient has multiple hx of UTI and was [facility]. Additionally reports hx of seizure at previous hospitalization and was prescribed keppra 500mg bid which pt only took one dose.     In the ed, initial vitals showed hypothermia 33.9, tachycardiac. satting well on room air. work up cf urosepsis. was given ceftriaxone once. course complicated by one episode of witnessed seizure described as nurse by bilateral arm rigidity with mouth foaming which self resolved.

## 2022-12-16 NOTE — CONSULT NOTE ADULT - ASSESSMENT
Vs: 97.5F, HR97, /66 > 94/50, RR15, 97% RA. NOw w/ nasal trumpet  lactate 1.9, , na 130, mag 1.5, wbc 4.3  UA >720 WBC, turbid, + LEC and nitrites  CT a/p: moderate stool, A few scattered high density foci within the large bowel may represent ingested tablets.   Started on zosyn for concern of urosepsis.    INCOMPLETE    [ ] replete mag, correct electrolytes (hyponatremia)   [ ]  Patient YS is a 68 y/o F hx MS (nonambulatory, has baclofen pump - refilled 3 weeks ago), frequent UTIs, hospitalized for ?urosepsis this month at [facility] hospital, brought in by family member for complaints of abd pain, constipation and lethargy x4 days per chart review. At baseline able to communicate, interact. Of note, patient had recent hospitalization at [facility] for urosepsis with seizure like event that was witnessed. Was discharged on keppra 500mg BID but appears patient may not have been consistently taking it. Patient here had RRT 12/16 for 40 seconds of seizure like activity described as stiffening of b/l UE, foaming at the mouth, hypoxic during event. Started zosyn and IVF for concern of urosepsis potentiating a seizure event. Also with baclofen pump. Patient on evaluation appears post ictal. Unable to obtain history.     Vs: 97.5F, HR97, /66 > 94/50, RR15, 97% RA. Now w/ nasal trumpet. Had an episode of hypothermia.   lactate 1.9, , na 130, mag 1.5, wbc 4.3  UA >720 WBC, turbid, + LEC and nitrites  CT a/p: moderate stool, A few scattered high density foci within the large bowel may represent ingested tablets.   Started on zosyn for concern of urosepsis.    Impression:  Suspicious for seizures secondary to infection ( source).      Recommendations:  [ ] obtain blood levels of keppra to determine if patient was taking keppra at home.    [ ] replete mag, correct electrolytes (hyponatremia) as needed  [ ] CBC, CMP, Mg, P, CpK, UA, Utox, ammonia, troponin, VBG/lactate, infectious workup per primary team  [ ] Head CT given unsure if contraindicated for MRI given baclofen pump. If able to obtain MRI, please obtain MRI brain w/ w/o con  [ ] Please find out status of baclofen pump (?functioning) for concern of withdrawal. May need to discuss with pain service vs neurosurgery?  [ ] Can hold off on seizure medications for now. If patient has another seizure event, will consider loading with keppra 2000mg x1 IV then maintenance 750mg BID. Please page neurology if another event is witnessed  [ ] video 24hr EEG    [ ] neuro checks, NPO until swallow evaluation/ improvement in mentation, seizure, fall & aspiration precautions  [ ] tele  [ ] DVT ppx as per primary team   [ ] Given concern for seizure, advise patient to not drive, operate heavy machinery, avoid heights, pools, bathtubs, locked doors until cleared by further follow up outpatient by neurology.   [ ] Please note: if patient has a convulsion, please document length of episode, specifically what patient was doing paying attention to eye opening vs closure, gaze deviation, shaking of extremities, tongue bite, urinary incontinence, any derangement of vital signs     To be discussed with neurology attending and seen on morning rounds. Recommendations finalized once signed by attending.

## 2022-12-16 NOTE — ED ADULT NURSE REASSESSMENT NOTE - NS ED NURSE REASSESS COMMENT FT1
Pt resting comfortably in bed, A&Ox4 RR equal and unlabored. HR slightly elevated. Comfort measures provided. Call bell in reach.

## 2022-12-16 NOTE — H&P ADULT - NSHPPHYSICALEXAM_GEN_ALL_CORE
VITAL SIGNS:  T(C): 36.4 (12-16-22 @ 10:28), Max: 37.1 (12-15-22 @ 22:35)  T(F): 97.5 (12-16-22 @ 10:28), Max: 98.8 (12-15-22 @ 22:35)  HR: 103 (12-16-22 @ 08:40) (81 - 103)  BP: 152/61 (12-16-22 @ 08:40) (140/62 - 152/61)  BP(mean): --  RR: 14 (12-16-22 @ 08:40) (14 - 18)  SpO2: 98% (12-16-22 @ 08:40) (97% - 100%)  Wt(kg): --    PHYSICAL EXAM:  Constitutional: WDWN resting comfortably in bed; NAD  Head: NC/AT  Eyes: PERRL, EOMI, anicteric sclera  ENT: no nasal discharge; MMM  Neck: supple; no JVD  Respiratory: CTA B/L; no W/R/R  Cardiac: +S1/S2; RRR; no M/R/G  Gastrointestinal: soft, NT/ND; no rebound or guarding; +BSx4  Extremities: WWP, no clubbing or cyanosis; no peripheral edema  Musculoskeletal: NROM x4; no joint swelling, tenderness or erythema  Vascular: 2+ radial, DP/PT pulses B/L  Dermatologic: skin warm, dry and intact; no rashes, wounds, or scars  Neurologic: AAOx3; CNII-XII grossly intact; no focal deficits  Psychiatric: affect and characteristics of appearance, verbalizations, behaviors are appropriate VITAL SIGNS:  T(C): 36.4 (12-16-22 @ 10:28), Max: 37.1 (12-15-22 @ 22:35)  T(F): 97.5 (12-16-22 @ 10:28), Max: 98.8 (12-15-22 @ 22:35)  HR: 103 (12-16-22 @ 08:40) (81 - 103)  BP: 152/61 (12-16-22 @ 08:40) (140/62 - 152/61)  BP(mean): --  RR: 14 (12-16-22 @ 08:40) (14 - 18)  SpO2: 98% (12-16-22 @ 08:40) (97% - 100%)  Wt(kg): --    PHYSICAL EXAM:  Constitutional: lethargic, ill-appearing   Head: NC/AT  Eyes: PERRL, anicteric sclera  Neck: supple; no JVD  Respiratory: CTA B/L; no W/R/R  Cardiac: +S1/S2; RRR; no M/R/G  Gastrointestinal: soft, tender to palpation, no rebound or guarding; +BSx4  Extremities: WWP, no clubbing or cyanosis; no peripheral edema  Musculoskeletal: NROM x4; no joint swelling, tenderness or erythema  Vascular: 2+ radial, DP/PT pulses B/L  Neurologic: AAOx1 to self;  no focal deficits

## 2022-12-16 NOTE — H&P ADULT - ASSESSMENT
66 y/o F hx MS (nonambulatory, has baclofen pump - refilled 3 weeks ago), frequent UTIs, hospitalized for ?urosepsis this month at [facility], brought in by family member for complaints of abd pain, constipation and lethargy. admitted for cf urosepsis course complicated by witnessed seizure in the ed.

## 2022-12-16 NOTE — PHARMACOTHERAPY INTERVENTION NOTE - COMMENTS
Medication history is complete. Medication list updated in Outpatient Medication Record ([facility]). Please call spectra [id number] if you have any questions.   [facility] home aid[facility]

## 2022-12-16 NOTE — ED ADULT NURSE REASSESSMENT NOTE - NS ED NURSE REASSESS COMMENT FT1
Pt off bear hugger for 1 hour. Pt able to maintain temperature at 97.5. Pt given warm blankets. comfort measures provided. safety maintained. care plan continued as ordered.

## 2022-12-16 NOTE — RAPID RESPONSE TEAM SUMMARY - NSSITUATIONBACKGROUNDRRT_GEN_ALL_CORE
Since last rapid, neuro was consulted, CTH negative, labs wnl including VBG and lactate   RRT called for possible postictal state, patient found with "tongue out of her mouth" and mildly hypoxic, which improved "sitting up".   Vitals wnl, afebrile, not hypoglycemic  Per neuro note, advised keppra load and maintenance keppra.   Neuro came to bedside and corroborated plan    Plan:   -  Since last rapid, neuro was consulted, CTH negative, labs wnl including VBG and lactate   Per NSG, patient on baclofen pump   RRT called for possible postictal state, patient found with "tongue out of her mouth" and mildly hypoxic, which improved "sitting up".   Vitals wnl, afebrile, not hypoglycemic  Per neuro note, advised keppra load and maintenance keppra.   Neuro came to bedside and corroborated plan    Plan:   - Keppra load 2g  - Maintenance Keppra 750mg bid   - video EEG   - primary team will continue to follow

## 2022-12-16 NOTE — ED ADULT NURSE REASSESSMENT NOTE - NS ED NURSE REASSESS COMMENT FT1
Received report from Day RN Vale De Leon: Pt appears to be resting comfortably, NAD, no complaints at this moment, CM in progress, VS noted, Safety precautions implemented as per protocol, awaiting further MD orders, will continue to monitor.

## 2022-12-16 NOTE — CONSULT NOTE ADULT - ATTENDING COMMENTS
Patient YS is a 68 y/o F hx MS (nonambulatory, has baclofen pump - refilled 3 weeks ago), frequent UTIs, hospitalized for ?urosepsis this month at [facility], brought in by family member for complaints of abd pain, constipation and lethargy x4 days per chart review.    keppra levels    will determine dose

## 2022-12-16 NOTE — ED PROVIDER NOTE - PROGRESS NOTE DETAILS
Karie Little, PGY-1 DO:  Patient signed out. Patient labs WNL, Patient UA concerning for UTI, Patient treated with 1g ceftriaxone. Patient pending CT abd and Pelvis to r/o SBO. Will admit patient pending CT scan read. Leo: pt has sz while lying in hallway. Patient shaking and then stopped. FS -70 given glucose IV, O2 sat dropped to 77%. airway repositioned and then nasal trumpet placed. O2 sat now 100%.

## 2022-12-16 NOTE — ED PROVIDER NOTE - CLINICAL SUMMARY MEDICAL DECISION MAKING FREE TEXT BOX
This is a 67-year-old female with a history of MS who is incontinent at baseline nonambulatory presenting with her home health aide who states that for the last 4 days she has been constipated, now obstipated since yesterday.  But this is normal for her whenever she gets sick.  She was hospitalized recently for urosepsis, though has no fevers today.  Her abdomen is generally tender, baclofen pump noted in left lower quadrant with no appearance of infection over the site.  Given her baseline incontinence will need straight cath check urine, CT abdomen to rule out SBO, toxic metabolic work-up dispo pending results.

## 2022-12-16 NOTE — H&P ADULT - PROBLEM SELECTOR PLAN 4
on baclofen pump  - fu with pain management regarding baclofen pump on baclofen pump  - fu with nsgy/pain management regarding baclofen pump interrogation

## 2022-12-16 NOTE — ED ADULT NURSE REASSESSMENT NOTE - NS ED NURSE REASSESS COMMENT FT1
Pt lethargic, post ictal, unresponsive, unable to swallow medications at this time. ACP made aware of pt status and will change some medications to IV.

## 2022-12-16 NOTE — RAPID RESPONSE TEAM SUMMARY - NSSITUATIONBACKGROUNDRRT_GEN_ALL_CORE
66 y/o F hx MS (nonambulatory, has baclofen pump - refilled 3 weeks ago), frequent UTIs, hospitalized for ?urosepsis this month at [facility], brought in by family member for complaints of abd pain, constipation and lethargy. Patient is lethargic and only answers in yes/no, history limited.   per chart, reports complaints of abd pain and constipation x4 days. Aide also reports worsening mental status than baseline.     Vitals were stable except hypothermia, labs significant for positive UA   Patient given a dose of Ceftriaxone and 1L     RRT called for 40s of seizure like activity, patient was tonic and then post ictal, during this time was hypoxic placed on NRB  On arrival, patient lethargic   Vitals show SBP~90s, MAP> 65, rectal temp 97, SatO2 100 % on NRB   Blood and urine cx already in lab   Patient given Zosyn and 1L IVF     Primary team at bedside   Concerned for baclofen pump, if active, maybe withdrawing if pump turned off     Differentials for seizures include withdrawal versus sepsis   - CBC, CMP, VBG lactate, CPK, and ammonia ordered  - [facility]   - neuro consult   - Primary team will follow up

## 2022-12-16 NOTE — ED ADULT NURSE REASSESSMENT NOTE - NS ED NURSE REASSESS COMMENT FT1
Spoke to ACP MD about trialing pt off nasal trumpet. Trumpet removed as per verbal order by MD. Pt sating at 100% NC 6L. RR equal and unlabored, airway patent. Pt more alert, mildly lethargic, more responsive to verbal stimuli.  Comfort measures provided. call bell in reach. safety maintained. awaiting bed assignment.

## 2022-12-16 NOTE — ED PROVIDER NOTE - ATTENDING CONTRIBUTION TO CARE
67-year-old female with history of multiple sclerosis (mostly wheelchair-bound), hypothyroidism, frequent UTIs, baclofen pump, prior tubal ligation, presenting with about 4 days of generalized abdominal discomfort and bloating, nausea, and constipation.  No vomiting or fevers.    Exam  Rectally hypothermic–93 Fahrenheit  Somnolent but arousable and able to follow commands  Lungs clear bilateral  Abdomen soft, mild LLQ tender, nondistended    Assessment/plan  Concern for UTI/other infectious or metabolic derangement, intra-abdominal pathology, hypothyroid  Will get CT abdomen/pelvis, UA, chest x-ray, labs, TSH  Will need admission pending results

## 2022-12-16 NOTE — CHART NOTE - NSCHARTNOTEFT_GEN_A_CORE
RRT called by nursing staff after patient appeared more lethargic, concern for unwitnessed seizure and post-ictal state, concern for ability to protect airway.  Per nursing staff, after prior witnessed seizure patient was post-ictal however had been returning towards baseline, able to answer yes/no questions, opening eyes.  Currently patient now unresponsive again.    Patient seen/examined at bedside with RRT Team.  Patient vitals stable, O2 100% on nasal cannula.   Neurology called, vEEG pending, Keppra load as recommended by Neurology.  Prior CTH negative      Xiao Xavier NP-BC  Department of Medicine  In House Pager #[phone]

## 2022-12-16 NOTE — ED ADULT NURSE REASSESSMENT NOTE - NS ED NURSE REASSESS COMMENT FT1
Pt believed to have unwitnessed seizure, when checking on patient, patient was obtunded and unresponsive to stimuli when she previously was. pt oxygen saturation began to decrease to 70% on NC 6L, tongue obstructing airway. pt repositioned, airway maintained on 6L NC. FS completed as per flowsheet. Rapid response called once pt was stable. Pt O2 saturation increased back to 100% on 6L NC. Awaiting further orders at this time.

## 2022-12-16 NOTE — H&P ADULT - PROBLEM SELECTOR PLAN 2
pt had seizure at previous hospitalization. was on keppra but stopped. witnessed one episode of seizure in the ed. broke without intervention. possible 2/2 sepsis, also on baclofen pump unclear if functioning.   - seizure precaution, suction precaution and continuous pulse ox for now  - ativan prn   - CT head non-con  - fu neuro consult recs  - fu urine tox   - fu lacate, CPK and prolactin pt had seizure at previous hospitalization. was on keppra but stopped. witnessed one episode of seizure in the ed. broke without intervention. possible 2/2 sepsis, also on baclofen pump unclear if functioning.   - seizure precaution, suction precaution and continuous pulse ox for now  - ativan prn   - CT head non-con  - fu neuro consult recs  - fu urine tox   - fu lactate, CPK and prolactin

## 2022-12-16 NOTE — H&P ADULT - NSHPLABSRESULTS_GEN_ALL_CORE
LABS:                        11.6   6.59  )-----------( 188      ( 16 Dec 2022 01:02 )             36.4     12-16    130<L>  |  92<L>  |  15  ----------------------------<  69<L>  4.9   |  26  |  0.74    Ca    10.4      16 Dec 2022 01:02    TPro  7.6  /  Alb  4.1  /  TBili  0.2  /  DBili  x   /  AST  55<H>  /  ALT  49<H>  /  AlkPhos  128<H>  12-16      Urinalysis Basic - ( 16 Dec 2022 01:26 )    Color: Light Yellow / Appearance: Turbid / S.013 / pH: x  Gluc: x / Ketone: Small  / Bili: Negative / Urobili: <2 mg/dL   Blood: x / Protein: 30 mg/dL / Nitrite: Positive   Leuk Esterase: Large / RBC: 6 /HPF / WBC >720 /HPF   Sq Epi: x / Non Sq Epi: 3 /HPF / Bacteria: Many      CAPILLARY BLOOD GLUCOSE          RADIOLOGY & ADDITIONAL TESTS: Reviewed. LABS:                        11.6   6.59  )-----------( 188      ( 16 Dec 2022 01:02 )             36.4     12-16    130<L>  |  92<L>  |  15  ----------------------------<  69<L>  4.9   |  26  |  0.74    Ca    10.4      16 Dec 2022 01:02    TPro  7.6  /  Alb  4.1  /  TBili  0.2  /  DBili  x   /  AST  55<H>  /  ALT  49<H>  /  AlkPhos  128<H>  12-16      Urinalysis Basic - ( 16 Dec 2022 01:26 )    Color: Light Yellow / Appearance: Turbid / S.013 / pH: x  Gluc: x / Ketone: Small  / Bili: Negative / Urobili: <2 mg/dL   Blood: x / Protein: 30 mg/dL / Nitrite: Positive   Leuk Esterase: Large / RBC: 6 /HPF / WBC >720 /HPF   Sq Epi: x / Non Sq Epi: 3 /HPF / Bacteria: Many      CAPILLARY BLOOD GLUCOSE          RADIOLOGY & ADDITIONAL TESTS: Reviewed.    CT ABD/PELVIS:   IMPRESSION:  Moderate stool. No acute abnormality in the abdomen and pelvis.

## 2022-12-16 NOTE — ED ADULT NURSE REASSESSMENT NOTE - NS ED NURSE REASSESS COMMENT FT1
Pt resting comfortably in bed, bear hugger on and in place, accompanied by sister at the bedside. Temperature has increased to 97.7 orally. Pt A&Ox3, answering questions with yes/no answers. Pt denies chest pain, SOB, abdominal pain, N/V at this time. RR equal and unlabored, Sinus tachycardia on the monitor, abdomen soft, non-tender, non-distended. Awaiting CT scan and results. Comfort measures provided. Call bell in reach. Safety maintained. Care plan continued.

## 2022-12-16 NOTE — ED PROVIDER NOTE - PHYSICAL EXAMINATION
Vitals: I have reviewed the patients vital signs  General: nontoxic appearing  HEENT: Atraumatic, normocephalic, airway patent  Eyes: EOMI, tracking appropriately  Neck: no tracheal deviation, no JVD  Chest/Lungs: no trauma, symmetric chest rise, speaking in complete sentences, no WOB  Heart: skin and extremities well perfused, regular rate and rhythm  Neuro: A+Ox2-3, slow to respond moving UE at baseline, LE paresis  MSK: flaccid LE  Skin: no cyanosis, no jaundice, no new emergent lesions   Abd: baclofen pump in LLQ noted, generally tender

## 2022-12-16 NOTE — ED ADULT NURSE NOTE - OBJECTIVE STATEMENT
Pt arrives to ED rm 14 A&Ox2 and nonambulatory c/o abd pain, and constipation. PMHx MS, and frequent UTI's. Pt is noted to be poor historian, but HHA at bedside. Pt abd has been bloating for the last 3 days and pt has not had a BM. Abd is soft, distended and tender in B/LQ on palpation. Pt was rectally hypothermic MD notified and placed on bearhugger warming blanket. Skin intact. Pt straight cath'd for urine, drawn and sent. Pt arrives to ED with 20G to LAC from EMS labs drawn and sent. Medicated as ordered.

## 2022-12-17 LAB
AMPHET UR-MCNC: NEGATIVE — SIGNIFICANT CHANGE UP
ANION GAP SERPL CALC-SCNC: 12 MMOL/L — SIGNIFICANT CHANGE UP (ref 7–14)
BARBITURATES UR SCN-MCNC: NEGATIVE — SIGNIFICANT CHANGE UP
BENZODIAZ UR-MCNC: NEGATIVE — SIGNIFICANT CHANGE UP
BUN SERPL-MCNC: 9 MG/DL — SIGNIFICANT CHANGE UP (ref 7–23)
CALCIUM SERPL-MCNC: 9.8 MG/DL — SIGNIFICANT CHANGE UP (ref 8.4–10.5)
CHLORIDE SERPL-SCNC: 96 MMOL/L — LOW (ref 98–107)
CO2 SERPL-SCNC: 30 MMOL/L — SIGNIFICANT CHANGE UP (ref 22–31)
COCAINE METAB.OTHER UR-MCNC: NEGATIVE — SIGNIFICANT CHANGE UP
CREAT SERPL-MCNC: 0.75 MG/DL — SIGNIFICANT CHANGE UP (ref 0.5–1.3)
CREATININE URINE RESULT, DAU: 12 MG/DL — SIGNIFICANT CHANGE UP
EGFR: 88 ML/MIN/1.73M2 — SIGNIFICANT CHANGE UP
GLUCOSE BLDC GLUCOMTR-MCNC: 70 MG/DL — SIGNIFICANT CHANGE UP (ref 70–99)
GLUCOSE BLDC GLUCOMTR-MCNC: 74 MG/DL — SIGNIFICANT CHANGE UP (ref 70–99)
GLUCOSE BLDC GLUCOMTR-MCNC: 83 MG/DL — SIGNIFICANT CHANGE UP (ref 70–99)
GLUCOSE BLDC GLUCOMTR-MCNC: 91 MG/DL — SIGNIFICANT CHANGE UP (ref 70–99)
GLUCOSE SERPL-MCNC: 197 MG/DL — HIGH (ref 70–99)
HCT VFR BLD CALC: 32.3 % — LOW (ref 34.5–45)
HCV AB S/CO SERPL IA: 0.11 S/CO — SIGNIFICANT CHANGE UP (ref 0–0.99)
HCV AB SERPL-IMP: SIGNIFICANT CHANGE UP
HGB BLD-MCNC: 10 G/DL — LOW (ref 11.5–15.5)
MAGNESIUM SERPL-MCNC: 2 MG/DL — SIGNIFICANT CHANGE UP (ref 1.6–2.6)
MCHC RBC-ENTMCNC: 28.2 PG — SIGNIFICANT CHANGE UP (ref 27–34)
MCHC RBC-ENTMCNC: 31 GM/DL — LOW (ref 32–36)
MCV RBC AUTO: 91 FL — SIGNIFICANT CHANGE UP (ref 80–100)
METHADONE UR-MCNC: NEGATIVE — SIGNIFICANT CHANGE UP
NRBC # BLD: 0 /100 WBCS — SIGNIFICANT CHANGE UP (ref 0–0)
NRBC # FLD: 0 K/UL — SIGNIFICANT CHANGE UP (ref 0–0)
OPIATES UR-MCNC: NEGATIVE — SIGNIFICANT CHANGE UP
OXYCODONE UR-MCNC: NEGATIVE — SIGNIFICANT CHANGE UP
PCP SPEC-MCNC: SIGNIFICANT CHANGE UP
PCP UR-MCNC: NEGATIVE — SIGNIFICANT CHANGE UP
PHOSPHATE SERPL-MCNC: 2.7 MG/DL — SIGNIFICANT CHANGE UP (ref 2.5–4.5)
PLATELET # BLD AUTO: 155 K/UL — SIGNIFICANT CHANGE UP (ref 150–400)
POTASSIUM SERPL-MCNC: 3.9 MMOL/L — SIGNIFICANT CHANGE UP (ref 3.5–5.3)
POTASSIUM SERPL-SCNC: 3.9 MMOL/L — SIGNIFICANT CHANGE UP (ref 3.5–5.3)
RBC # BLD: 3.55 M/UL — LOW (ref 3.8–5.2)
RBC # FLD: 17.8 % — HIGH (ref 10.3–14.5)
SODIUM SERPL-SCNC: 138 MMOL/L — SIGNIFICANT CHANGE UP (ref 135–145)
THC UR QL: NEGATIVE — SIGNIFICANT CHANGE UP
WBC # BLD: 11.9 K/UL — HIGH (ref 3.8–10.5)
WBC # FLD AUTO: 11.9 K/UL — HIGH (ref 3.8–10.5)

## 2022-12-17 PROCEDURE — 95720 EEG PHY/QHP EA INCR W/VEEG: CPT

## 2022-12-17 RX ORDER — SODIUM CHLORIDE 9 MG/ML
1000 INJECTION, SOLUTION INTRAVENOUS
Refills: 0 | Status: DISCONTINUED | OUTPATIENT
Start: 2022-12-17 | End: 2022-12-19

## 2022-12-17 RX ORDER — LACOSAMIDE 50 MG/1
100 TABLET ORAL EVERY 12 HOURS
Refills: 0 | Status: DISCONTINUED | OUTPATIENT
Start: 2022-12-17 | End: 2022-12-22

## 2022-12-17 RX ORDER — LACOSAMIDE 50 MG/1
100 TABLET ORAL
Refills: 0 | Status: DISCONTINUED | OUTPATIENT
Start: 2022-12-17 | End: 2022-12-17

## 2022-12-17 RX ADMIN — ENOXAPARIN SODIUM 40 MILLIGRAM(S): 100 INJECTION SUBCUTANEOUS at 16:42

## 2022-12-17 RX ADMIN — PIPERACILLIN AND TAZOBACTAM 25 GRAM(S): 4; .5 INJECTION, POWDER, LYOPHILIZED, FOR SOLUTION INTRAVENOUS at 08:42

## 2022-12-17 RX ADMIN — PIPERACILLIN AND TAZOBACTAM 25 GRAM(S): 4; .5 INJECTION, POWDER, LYOPHILIZED, FOR SOLUTION INTRAVENOUS at 16:42

## 2022-12-17 RX ADMIN — SODIUM CHLORIDE 75 MILLILITER(S): 9 INJECTION, SOLUTION INTRAVENOUS at 23:15

## 2022-12-17 RX ADMIN — LEVETIRACETAM 400 MILLIGRAM(S): 250 TABLET, FILM COATED ORAL at 06:13

## 2022-12-17 RX ADMIN — PIPERACILLIN AND TAZOBACTAM 25 GRAM(S): 4; .5 INJECTION, POWDER, LYOPHILIZED, FOR SOLUTION INTRAVENOUS at 01:49

## 2022-12-17 RX ADMIN — LACOSAMIDE 120 MILLIGRAM(S): 50 TABLET ORAL at 22:18

## 2022-12-17 NOTE — CHART NOTE - NSCHARTNOTEFT_GEN_A_CORE
Spoke with patient's sister over the phone.  As per patient's sister, patient stopped taking Keppra due to hallucinations. She was placed on another medication, but does not remember the name.  Patient's sister to find out the name of the medication and notify RN/medical team tonight. As of right now, refusing Keppra. Follow up with neurology once it is determined what patient currently takes for seizures

## 2022-12-17 NOTE — PATIENT PROFILE ADULT - FUNCTIONAL ASSESSMENT - BASIC MOBILITY 6.
1-calculated by average/Not able to assess (calculate score using [facility] averaging method)

## 2022-12-18 LAB
-  AMIKACIN: SIGNIFICANT CHANGE UP
-  AMOXICILLIN/CLAVULANIC ACID: SIGNIFICANT CHANGE UP
-  AMPICILLIN/SULBACTAM: SIGNIFICANT CHANGE UP
-  AMPICILLIN: SIGNIFICANT CHANGE UP
-  AZTREONAM: SIGNIFICANT CHANGE UP
-  CEFAZOLIN: SIGNIFICANT CHANGE UP
-  CEFEPIME: SIGNIFICANT CHANGE UP
-  CEFOXITIN: SIGNIFICANT CHANGE UP
-  CEFTRIAXONE: SIGNIFICANT CHANGE UP
-  CIPROFLOXACIN: SIGNIFICANT CHANGE UP
-  ERTAPENEM: SIGNIFICANT CHANGE UP
-  GENTAMICIN: SIGNIFICANT CHANGE UP
-  IMIPENEM: SIGNIFICANT CHANGE UP
-  LEVOFLOXACIN: SIGNIFICANT CHANGE UP
-  MEROPENEM: SIGNIFICANT CHANGE UP
-  NITROFURANTOIN: SIGNIFICANT CHANGE UP
-  PIPERACILLIN/TAZOBACTAM: SIGNIFICANT CHANGE UP
-  TOBRAMYCIN: SIGNIFICANT CHANGE UP
-  TRIMETHOPRIM/SULFAMETHOXAZOLE: SIGNIFICANT CHANGE UP
ALBUMIN SERPL ELPH-MCNC: 3.1 G/DL — LOW (ref 3.3–5)
ALP SERPL-CCNC: 95 U/L — SIGNIFICANT CHANGE UP (ref 40–120)
ALT FLD-CCNC: 31 U/L — SIGNIFICANT CHANGE UP (ref 4–33)
ANION GAP SERPL CALC-SCNC: 11 MMOL/L — SIGNIFICANT CHANGE UP (ref 7–14)
AST SERPL-CCNC: 38 U/L — HIGH (ref 4–32)
BILIRUB SERPL-MCNC: 0.3 MG/DL — SIGNIFICANT CHANGE UP (ref 0.2–1.2)
BUN SERPL-MCNC: 8 MG/DL — SIGNIFICANT CHANGE UP (ref 7–23)
CALCIUM SERPL-MCNC: 9.2 MG/DL — SIGNIFICANT CHANGE UP (ref 8.4–10.5)
CHLORIDE SERPL-SCNC: 105 MMOL/L — SIGNIFICANT CHANGE UP (ref 98–107)
CO2 SERPL-SCNC: 25 MMOL/L — SIGNIFICANT CHANGE UP (ref 22–31)
CREAT SERPL-MCNC: 0.85 MG/DL — SIGNIFICANT CHANGE UP (ref 0.5–1.3)
CULTURE RESULTS: SIGNIFICANT CHANGE UP
EGFR: 76 ML/MIN/1.73M2 — SIGNIFICANT CHANGE UP
GLUCOSE BLDC GLUCOMTR-MCNC: 75 MG/DL — SIGNIFICANT CHANGE UP (ref 70–99)
GLUCOSE BLDC GLUCOMTR-MCNC: 75 MG/DL — SIGNIFICANT CHANGE UP (ref 70–99)
GLUCOSE BLDC GLUCOMTR-MCNC: 76 MG/DL — SIGNIFICANT CHANGE UP (ref 70–99)
GLUCOSE BLDC GLUCOMTR-MCNC: 95 MG/DL — SIGNIFICANT CHANGE UP (ref 70–99)
GLUCOSE SERPL-MCNC: 78 MG/DL — SIGNIFICANT CHANGE UP (ref 70–99)
HCT VFR BLD CALC: 32.1 % — LOW (ref 34.5–45)
HGB BLD-MCNC: 9.8 G/DL — LOW (ref 11.5–15.5)
MAGNESIUM SERPL-MCNC: 1.9 MG/DL — SIGNIFICANT CHANGE UP (ref 1.6–2.6)
MCHC RBC-ENTMCNC: 28.5 PG — SIGNIFICANT CHANGE UP (ref 27–34)
MCHC RBC-ENTMCNC: 30.5 GM/DL — LOW (ref 32–36)
MCV RBC AUTO: 93.3 FL — SIGNIFICANT CHANGE UP (ref 80–100)
METHOD TYPE: SIGNIFICANT CHANGE UP
NRBC # BLD: 0 /100 WBCS — SIGNIFICANT CHANGE UP (ref 0–0)
NRBC # FLD: 0 K/UL — SIGNIFICANT CHANGE UP (ref 0–0)
ORGANISM # SPEC MICROSCOPIC CNT: SIGNIFICANT CHANGE UP
ORGANISM # SPEC MICROSCOPIC CNT: SIGNIFICANT CHANGE UP
PHOSPHATE SERPL-MCNC: 3 MG/DL — SIGNIFICANT CHANGE UP (ref 2.5–4.5)
PLATELET # BLD AUTO: 141 K/UL — LOW (ref 150–400)
POTASSIUM SERPL-MCNC: 3.3 MMOL/L — LOW (ref 3.5–5.3)
POTASSIUM SERPL-SCNC: 3.3 MMOL/L — LOW (ref 3.5–5.3)
PROT SERPL-MCNC: 6.3 G/DL — SIGNIFICANT CHANGE UP (ref 6–8.3)
RBC # BLD: 3.44 M/UL — LOW (ref 3.8–5.2)
RBC # FLD: 18.2 % — HIGH (ref 10.3–14.5)
SODIUM SERPL-SCNC: 141 MMOL/L — SIGNIFICANT CHANGE UP (ref 135–145)
SPECIMEN SOURCE: SIGNIFICANT CHANGE UP
WBC # BLD: 5.98 K/UL — SIGNIFICANT CHANGE UP (ref 3.8–10.5)
WBC # FLD AUTO: 5.98 K/UL — SIGNIFICANT CHANGE UP (ref 3.8–10.5)

## 2022-12-18 PROCEDURE — 95720 EEG PHY/QHP EA INCR W/VEEG: CPT

## 2022-12-18 PROCEDURE — 99222 1ST HOSP IP/OBS MODERATE 55: CPT

## 2022-12-18 RX ORDER — LEVOTHYROXINE SODIUM 125 MCG
75 TABLET ORAL AT BEDTIME
Refills: 0 | Status: DISCONTINUED | OUTPATIENT
Start: 2022-12-18 | End: 2022-12-18

## 2022-12-18 RX ORDER — SODIUM CHLORIDE 9 MG/ML
1000 INJECTION INTRAMUSCULAR; INTRAVENOUS; SUBCUTANEOUS ONCE
Refills: 0 | Status: COMPLETED | OUTPATIENT
Start: 2022-12-18 | End: 2022-12-18

## 2022-12-18 RX ORDER — CIPROFLOXACIN LACTATE 400MG/40ML
400 VIAL (ML) INTRAVENOUS EVERY 12 HOURS
Refills: 0 | Status: DISCONTINUED | OUTPATIENT
Start: 2022-12-18 | End: 2022-12-23

## 2022-12-18 RX ORDER — SODIUM CHLORIDE 9 MG/ML
1000 INJECTION, SOLUTION INTRAVENOUS
Refills: 0 | Status: DISCONTINUED | OUTPATIENT
Start: 2022-12-18 | End: 2022-12-19

## 2022-12-18 RX ORDER — POTASSIUM CHLORIDE 20 MEQ
10 PACKET (EA) ORAL
Refills: 0 | Status: COMPLETED | OUTPATIENT
Start: 2022-12-18 | End: 2022-12-18

## 2022-12-18 RX ORDER — PANTOPRAZOLE SODIUM 20 MG/1
40 TABLET, DELAYED RELEASE ORAL DAILY
Refills: 0 | Status: DISCONTINUED | OUTPATIENT
Start: 2022-12-18 | End: 2022-12-23

## 2022-12-18 RX ORDER — LEVOTHYROXINE SODIUM 125 MCG
60 TABLET ORAL AT BEDTIME
Refills: 0 | Status: DISCONTINUED | OUTPATIENT
Start: 2022-12-18 | End: 2022-12-23

## 2022-12-18 RX ADMIN — Medication 200 MILLIGRAM(S): at 17:54

## 2022-12-18 RX ADMIN — PIPERACILLIN AND TAZOBACTAM 25 GRAM(S): 4; .5 INJECTION, POWDER, LYOPHILIZED, FOR SOLUTION INTRAVENOUS at 01:05

## 2022-12-18 RX ADMIN — LACOSAMIDE 120 MILLIGRAM(S): 50 TABLET ORAL at 09:27

## 2022-12-18 RX ADMIN — Medication 60 MICROGRAM(S): at 21:18

## 2022-12-18 RX ADMIN — SODIUM CHLORIDE 1000 MILLILITER(S): 9 INJECTION INTRAMUSCULAR; INTRAVENOUS; SUBCUTANEOUS at 02:31

## 2022-12-18 RX ADMIN — ENOXAPARIN SODIUM 40 MILLIGRAM(S): 100 INJECTION SUBCUTANEOUS at 17:53

## 2022-12-18 RX ADMIN — Medication 100 MILLIEQUIVALENT(S): at 09:07

## 2022-12-18 RX ADMIN — LACOSAMIDE 120 MILLIGRAM(S): 50 TABLET ORAL at 20:21

## 2022-12-18 RX ADMIN — PIPERACILLIN AND TAZOBACTAM 25 GRAM(S): 4; .5 INJECTION, POWDER, LYOPHILIZED, FOR SOLUTION INTRAVENOUS at 09:28

## 2022-12-18 RX ADMIN — PANTOPRAZOLE SODIUM 40 MILLIGRAM(S): 20 TABLET, DELAYED RELEASE ORAL at 13:04

## 2022-12-18 NOTE — PROVIDER CONTACT NOTE (MEDICATION) - SITUATION
Patient unable to tolerate IV KCL Patient unable to tolerate IV KCL. States, "IV is hurting me." KCL IV was diluted with existing IV fluids and infused @50cc/hr rate.

## 2022-12-18 NOTE — CONSULT NOTE ADULT - SUBJECTIVE AND OBJECTIVE BOX
Patient is a 67y old  Female who presents with a chief complaint of Adnominal bloating/pain, constipation and UTI (17 Dec 2022 18:29)    HPI:  66 y/o F hx MS (nonambulatory, has baclofen pump - refilled 3 weeks ago), frequent UTIs, hospitalized for ?urosepsis this month at [facility], brought in by aide for complaints of abd pain, constipation and lethargy. On admission - Patient is lethargic and only answers with yes/no, history limited. History obtained from patient's spouse and Aide. Patient had been complaining of abd pain, indigestion and constipation x3-4 days. was noted to have worsening mental status than baseline. at baseline A&O4, communicative and interactive. reports recent hx of UTI for which her PCP prescribed cefdinir which she completed for 5d but despite that urine appeared dark and foul smelling. Patient has multiple hx of UTI and was recently hospitalized at [facility] for urose[facility]. Additionally reports hx of seizure at previous hospitalization and was prescribed keppra 500mg bid which pt only took one dose.     In the ed, initial vitals showed hypothermia, tachycardiac. satting well on room air. work up cf urosepsis. was given ceftriaxone once. course complicated by one episode of witnessed seizure described as nurse by bilateral arm rigidity with mouth foaming which self resolved.    ID asked to evaluate b/c urine culture with citrobacter resistant to zosyn .  Pt allergic to sulfa  causing rash.  EKG with normal QTc     other ROS negative         PAST MEDICAL & SURGICAL HISTORY:  Hypothyroid      Multiple sclerosis      No significant past surgical history          Social history:    FAMILY HISTORY:  No pertinent family history in first degree relatives      Allergies    sulfa drugs (Hives; Rash)    Intolerances        Antimicrobials:    ciprofloxacin   IVPB 400 milliGRAM(s) IV Intermittent every 12 hours        Vital Signs Last 24 Hrs  T(C): 33.9 (18 Dec 2022 14:50), Max: 36.3 (17 Dec 2022 21:14)  T(F): 93.1 (18 Dec 2022 14:50), Max: 97.4 (18 Dec 2022 03:48)  HR: 74 (18 Dec 2022 14:59) (53 - 76)  BP: 149/64 (18 Dec 2022 14:59) (79/42 - 149/64)  BP(mean): --  RR: 18 (18 Dec 2022 14:59) (18 - 18)  SpO2: 100% (18 Dec 2022 14:59) (96% - 100%)    Parameters below as of 18 Dec 2022 14:59  Patient On (Oxygen Delivery Method): room air      GEN: in bed, EEG ongoing  HEENT: EOMI, MMM  Neck: Supple  CV: S1S2 RRR  Resp: CTA b/l  ABD: + BS, Soft, NT/ND  : + moreno  Ext: no c/c/e                            9.8    5.98  )-----------( 141      ( 18 Dec 2022 05:51 )             32.1         12-18    141  |  105  |  8   ----------------------------<  78  3.3<L>   |  25  |  0.85    Ca    9.2      18 Dec 2022 05:51  Phos  3.0     12-18  Mg     1.90     12-18    TPro  6.3  /  Alb  3.1<L>  /  TBili  0.3  /  DBili  x   /  AST  38<H>  /  ALT  31  /  AlkPhos  95  12-18      RECENT CULTURES:    Culture - Blood (collected 16 Dec 2022 01:45)  Source: .Blood Blood-Peripheral  Preliminary Report (17 Dec 2022 07:02):    No growth to date.    Culture - Blood (collected 16 Dec 2022 01:30)  Source: .Blood Blood-Venous  Preliminary Report (17 Dec 2022 07:02):    No growth to date.    Culture - Urine (collected 16 Dec 2022 01:26)  Source: Clean Catch Clean Catch (Midstream)  Final Report (18 Dec 2022 15:31):    >100,000 CFU/ml Citrobacter freundii  Organism: Citrobacter freundii (18 Dec 2022 15:31)  Organism: Citrobacter freundii (18 Dec 2022 15:31)    < from: CT Head No Cont (12.16.22 @ 15:16) >  IMPRESSION:  No acute intracranial hemorrhage, mass effect or midline shift.    < end of copied text >  < from: CT Abdomen and Pelvis w/ IV Cont (12.16.22 @ 09:11) >    IMPRESSION:  Moderate stool. No acute abnormality in the abdomen and pelvis.    < end of copied text >      Radiology:  r

## 2022-12-18 NOTE — CONSULT NOTE ADULT - ASSESSMENT
66 y/o F hx MS (nonambulatory, has baclofen pump - refilled 3 weeks ago), frequent UTIs, hospitalized for ?urosepsis this month at [facility], brought in by family member for complaints of abd pain, constipation and lethargy, admitted for cf urosepsis course complicated by witnessed seizure in the ed.     UA positive and culture with >100,000 citrobacter resistant to zosyn, intermediate to cefepime, but sensitive to quinolones.  No leukocytosis  +hypothermia.    Will switch zosyn to cipro 400 mg IV q12.  Plan to transition to po cipro on discharge.  Pt has moreno but was placed in hospital for retention.   Plan for 5-7 days of antibiotic.  May benefit from hiprex/vit c on completion of antibiotic to prevent UTIs.    d/w NP and family at bedside.     Narda Guillen MD  [phone] (pager)  [phone] (office)

## 2022-12-18 NOTE — PROVIDER CONTACT NOTE (MEDICATION) - ASSESSMENT
AAOx2/3. NAD noted. EEG monitoring in progress. States, "IV is hurting me." KCL IV was diluted with existing IV fluids and infused @50cc/hr rate. AAOx2/3. NAD noted. EEG monitoring in progress.

## 2022-12-18 NOTE — EEG REPORT - NS EEG TEXT BOX
[facility]   COMPREHENSIVE EPILEPSY CENTER   REPORT OF LONG-TERM VIDEO EEG     [facility]: [address], [place] [address], Ph#: [phone]  [facility]: [address] [place] [address], Ph#: [phone]  [facility]: [address] [place] [address], Ph#: [phone]    Patient Name: RAMIRO CASTAÑEDA  Age and : 67y ([birth year])  MRN #: [id number]  Location: [address]  Referring Physician: Linda Veloz    Start Time/Date: 15:14 on 22  End Time/Date: 08:00 on 22  Duration: 16h 24m    _____________________________________________________________  STUDY INFORMATION    EEG Recording Technique:  The patient underwent continuous Video-EEG monitoring, using Telemetry System hardware on the XLTek Digital System. EEG and video data were stored on a computer hard drive with important events saved in digital archive files. The material was reviewed by a physician (electroencephalographer / epileptologist) on a daily basis. Rito and seizure detection algorithms were utilized and reviewed. An EEG Technician attended to the patient, and was available throughout daytime work hours.  The epilepsy center neurologist was available in person or on call 24-hours per day.    EEG Placement and Labeling of Electrodes:  The EEG was performed utilizing 20 channel referential EEG connections (coronal over temporal over parasagittal montage) using all standard 10-20 electrode placements with EKG, with additional electrodes placed in the inferior temporal region using the modified 10-10 montage electrode placements for elective admissions, or if deemed necessary. Recording was at a sampling rate of 256 samples per second per channel. Time synchronized digital video recording was done simultaneously with EEG recording. A low light infrared camera was used for low light recording.     _____________________________________________________________  HISTORY    Patient is a 67y old  Female who presents with a chief complaint of Adnominal bloating/pain, constipation and UTI (17 Dec 2022 18:29)      PERTINENT MEDICATION:  lacosamide IVPB 100 milliGRAM(s) IV Intermittent every 12 hours    _____________________________________________________________  STUDY INTERPRETATION    Findings: The background was continuous and reactive. During wakefulness, no posterior dominant rhythm seen.    Background Slowing:  Diffuse theta and polymorphic delta slowing.    Focal Slowing:   None were present.    Sleep Background:  Drowsiness was characterized by fragmentation, attenuation, and slowing of the background activity.    Sleep was characterized by the presence of rudimentary K-complexes.    Other Non-Epileptiform Findings:  None were present.    Interictal Epileptiform Activity:   Abundant, multifocal sharp wave discharges with triphasic morphology in all regions of the brain.    Events:  No event or seizure recorded.    Activation Procedures:   Hyperventilation was not performed.    Photic stimulation was not performed.     Artifacts:  Intermittent myogenic and movement artifacts were noted.    ECG:  The heart rate on single channel ECG was predominantly between 70-80 BPM.    _____________________________________________________________  EEG SUMMARY/CLASSIFICATION    bnormal EEG in the awake, drowsy and asleep states.  - Abundant, multifocal sharp wave discharges with triphasic morphology  - Moderate to severe generalized slowing.    _____________________________________________________________  EEG IMPRESSION/CLINICAL CORRELATE    Abnormal EEG study.  1. Multifocal sharp wave discharges with triphasic morphology, which are nonspecific in etiology and may be seen in diverse settings including toxic, metabolic, infectious and epileptic encephalopathies. Please correlate clinically.  2. Moderate to severe nonspecific diffuse or multifocal cerebral dysfunction.   3. No seizure seen.    _____________________________________________________________    Eva Spencer MD  Director, Epilepsy/EMU - [facility]  80F sent in by PCP for afib.  EKG here shows NSR w/o evidence of any arrhythmia.  Pt. feels well here.  Spoke w/ Dr. Bolivar Lomeli, who is agreeable to seeing pt. in his office so long as the INR is therapeutic.  Given recent comprehensive w/u, will obtain PTT to eval for INR level.  If wnl, will dc.

## 2022-12-19 LAB
ANION GAP SERPL CALC-SCNC: 10 MMOL/L — SIGNIFICANT CHANGE UP (ref 7–14)
BASOPHILS # BLD AUTO: 0.03 K/UL — SIGNIFICANT CHANGE UP (ref 0–0.2)
BASOPHILS NFR BLD AUTO: 0.5 % — SIGNIFICANT CHANGE UP (ref 0–2)
BUN SERPL-MCNC: 6 MG/DL — LOW (ref 7–23)
CALCIUM SERPL-MCNC: 9.5 MG/DL — SIGNIFICANT CHANGE UP (ref 8.4–10.5)
CHLORIDE SERPL-SCNC: 108 MMOL/L — HIGH (ref 98–107)
CO2 SERPL-SCNC: 26 MMOL/L — SIGNIFICANT CHANGE UP (ref 22–31)
CREAT SERPL-MCNC: 0.81 MG/DL — SIGNIFICANT CHANGE UP (ref 0.5–1.3)
EGFR: 81 ML/MIN/1.73M2 — SIGNIFICANT CHANGE UP
EOSINOPHIL # BLD AUTO: 0.03 K/UL — SIGNIFICANT CHANGE UP (ref 0–0.5)
EOSINOPHIL NFR BLD AUTO: 0.5 % — SIGNIFICANT CHANGE UP (ref 0–6)
GLUCOSE BLDC GLUCOMTR-MCNC: 103 MG/DL — HIGH (ref 70–99)
GLUCOSE BLDC GLUCOMTR-MCNC: 106 MG/DL — HIGH (ref 70–99)
GLUCOSE BLDC GLUCOMTR-MCNC: 85 MG/DL — SIGNIFICANT CHANGE UP (ref 70–99)
GLUCOSE BLDC GLUCOMTR-MCNC: 96 MG/DL — SIGNIFICANT CHANGE UP (ref 70–99)
GLUCOSE SERPL-MCNC: 79 MG/DL — SIGNIFICANT CHANGE UP (ref 70–99)
HCT VFR BLD CALC: 31 % — LOW (ref 34.5–45)
HGB BLD-MCNC: 9.4 G/DL — LOW (ref 11.5–15.5)
IANC: 3.9 K/UL — SIGNIFICANT CHANGE UP (ref 1.8–7.4)
IMM GRANULOCYTES NFR BLD AUTO: 0.3 % — SIGNIFICANT CHANGE UP (ref 0–0.9)
LYMPHOCYTES # BLD AUTO: 1.47 K/UL — SIGNIFICANT CHANGE UP (ref 1–3.3)
LYMPHOCYTES # BLD AUTO: 24.8 % — SIGNIFICANT CHANGE UP (ref 13–44)
MAGNESIUM SERPL-MCNC: 1.8 MG/DL — SIGNIFICANT CHANGE UP (ref 1.6–2.6)
MCHC RBC-ENTMCNC: 28.2 PG — SIGNIFICANT CHANGE UP (ref 27–34)
MCHC RBC-ENTMCNC: 30.3 GM/DL — LOW (ref 32–36)
MCV RBC AUTO: 93.1 FL — SIGNIFICANT CHANGE UP (ref 80–100)
MONOCYTES # BLD AUTO: 0.48 K/UL — SIGNIFICANT CHANGE UP (ref 0–0.9)
MONOCYTES NFR BLD AUTO: 8.1 % — SIGNIFICANT CHANGE UP (ref 2–14)
NEUTROPHILS # BLD AUTO: 3.9 K/UL — SIGNIFICANT CHANGE UP (ref 1.8–7.4)
NEUTROPHILS NFR BLD AUTO: 65.8 % — SIGNIFICANT CHANGE UP (ref 43–77)
NRBC # BLD: 0 /100 WBCS — SIGNIFICANT CHANGE UP (ref 0–0)
NRBC # FLD: 0 K/UL — SIGNIFICANT CHANGE UP (ref 0–0)
PHOSPHATE SERPL-MCNC: 2.7 MG/DL — SIGNIFICANT CHANGE UP (ref 2.5–4.5)
PLATELET # BLD AUTO: 155 K/UL — SIGNIFICANT CHANGE UP (ref 150–400)
POTASSIUM SERPL-MCNC: 3.6 MMOL/L — SIGNIFICANT CHANGE UP (ref 3.5–5.3)
POTASSIUM SERPL-SCNC: 3.6 MMOL/L — SIGNIFICANT CHANGE UP (ref 3.5–5.3)
RBC # BLD: 3.33 M/UL — LOW (ref 3.8–5.2)
RBC # FLD: 18.2 % — HIGH (ref 10.3–14.5)
SODIUM SERPL-SCNC: 144 MMOL/L — SIGNIFICANT CHANGE UP (ref 135–145)
WBC # BLD: 5.93 K/UL — SIGNIFICANT CHANGE UP (ref 3.8–10.5)
WBC # FLD AUTO: 5.93 K/UL — SIGNIFICANT CHANGE UP (ref 3.8–10.5)

## 2022-12-19 PROCEDURE — 99232 SBSQ HOSP IP/OBS MODERATE 35: CPT

## 2022-12-19 PROCEDURE — 95812 EEG 41-60 MINUTES: CPT | Mod: 26

## 2022-12-19 RX ORDER — SODIUM CHLORIDE 9 MG/ML
1000 INJECTION, SOLUTION INTRAVENOUS
Refills: 0 | Status: DISCONTINUED | OUTPATIENT
Start: 2022-12-19 | End: 2022-12-23

## 2022-12-19 RX ADMIN — ATORVASTATIN CALCIUM 10 MILLIGRAM(S): 80 TABLET, FILM COATED ORAL at 21:49

## 2022-12-19 RX ADMIN — SODIUM CHLORIDE 75 MILLILITER(S): 9 INJECTION, SOLUTION INTRAVENOUS at 08:07

## 2022-12-19 RX ADMIN — LACOSAMIDE 120 MILLIGRAM(S): 50 TABLET ORAL at 08:01

## 2022-12-19 RX ADMIN — Medication 200 MILLIGRAM(S): at 05:03

## 2022-12-19 RX ADMIN — Medication 200 MILLIGRAM(S): at 17:07

## 2022-12-19 RX ADMIN — ENOXAPARIN SODIUM 40 MILLIGRAM(S): 100 INJECTION SUBCUTANEOUS at 16:07

## 2022-12-19 RX ADMIN — PANTOPRAZOLE SODIUM 40 MILLIGRAM(S): 20 TABLET, DELAYED RELEASE ORAL at 11:26

## 2022-12-19 RX ADMIN — POLYETHYLENE GLYCOL 3350 17 GRAM(S): 17 POWDER, FOR SOLUTION ORAL at 11:25

## 2022-12-19 RX ADMIN — Medication 60 MICROGRAM(S): at 21:49

## 2022-12-19 RX ADMIN — SODIUM CHLORIDE 75 MILLILITER(S): 9 INJECTION, SOLUTION INTRAVENOUS at 02:04

## 2022-12-19 RX ADMIN — SENNA PLUS 2 TABLET(S): 8.6 TABLET ORAL at 21:48

## 2022-12-19 RX ADMIN — LACOSAMIDE 120 MILLIGRAM(S): 50 TABLET ORAL at 20:36

## 2022-12-19 NOTE — EEG REPORT - NS EEG TEXT BOX
[facility]   COMPREHENSIVE EPILEPSY CENTER   REPORT OF LONG-TERM VIDEO EEG     [facility]: [address], [place] [address], Ph#: [phone]  [facility]: [address] [place] [address], Ph#: [phone]  [facility]: [address] [place] [address], Ph#: [phone]    Patient Name: RAMIRO CASTAÑEDA  Age and : 67y ([birth year])  MRN #: [id number]  Location: [address]  Referring Physician: Linda Veloz    Start Time/Date: 0800 on 22  End Time/Date: 08:00 on 22  Duration: 24h    _____________________________________________________________  STUDY INFORMATION    EEG Recording Technique:  The patient underwent continuous Video-EEG monitoring, using Telemetry System hardware on the XLTek Digital System. EEG and video data were stored on a computer hard drive with important events saved in digital archive files. The material was reviewed by a physician (electroencephalographer / epileptologist) on a daily basis. Rito and seizure detection algorithms were utilized and reviewed. An EEG Technician attended to the patient, and was available throughout daytime work hours.  The epilepsy center neurologist was available in person or on call 24-hours per day.    EEG Placement and Labeling of Electrodes:  The EEG was performed utilizing 20 channel referential EEG connections (coronal over temporal over parasagittal montage) using all standard 10-20 electrode placements with EKG, with additional electrodes placed in the inferior temporal region using the modified 10-10 montage electrode placements for elective admissions, or if deemed necessary. Recording was at a sampling rate of 256 samples per second per channel. Time synchronized digital video recording was done simultaneously with EEG recording. A low light infrared camera was used for low light recording.     _____________________________________________________________  HISTORY    Patient is a 67y old  Female who presents with a chief complaint of Adnominal bloating/pain, constipation and UTI (17 Dec 2022 18:29)      PERTINENT MEDICATION:  lacosamide IVPB 100 milliGRAM(s) IV Intermittent every 12 hours    _____________________________________________________________  STUDY INTERPRETATION    Findings: The background was continuous and reactive. During wakefulness, no posterior dominant rhythm seen.    Background Slowing:  Diffuse theta and polymorphic delta slowing.    Focal Slowing:   None were present.    Sleep Background:  Drowsiness was characterized by fragmentation, attenuation, and slowing of the background activity.    Sleep was characterized by the presence of rudimentary K-complexes.    Other Non-Epileptiform Findings:  None were present.    Interictal Epileptiform Activity:   Generalized periodic discharges with triphasic morphology.    Events:  No event or seizure recorded.    Activation Procedures:   Hyperventilation was not performed.    Photic stimulation was not performed.     Artifacts:  Intermittent myogenic and movement artifacts were noted.    ECG:  The heart rate on single channel ECG was predominantly between 70-80 BPM.    _____________________________________________________________  EEG SUMMARY/CLASSIFICATION    bnormal EEG in the awake, drowsy and asleep states.  - Generalized periodic discharges with triphasic morphology (GPDs)  - Moderate generalized slowing.    _____________________________________________________________  EEG IMPRESSION/CLINICAL CORRELATE    Abnormal EEG study.  1. GPDs with triphasic morphology can be seen in diverse settings including toxic, metabolic, infectious and epileptic encephalopathies. Please correlate clinically.  2. Moderate nonspecific diffuse or multifocal cerebral dysfunction.   3. No seizure seen.    Please consider discontinuation of EEG unless clinical concern persists.    _____________________________________________________________  Kemal Brown MD  EEG/Epilepsy Attending  [facility]   COMPREHENSIVE EPILEPSY CENTER   REPORT OF LONG-TERM VIDEO EEG     [facility]: [address], [place] [address], Ph#: [phone]  [facility]: [address] [place] [address], Ph#: [phone]  [facility]: [address] [place] [address], Ph#: [phone]    Patient Name: RAMIRO CASTAÑEDA  Age and : 67y (59)  MRN #: [id number]  Location: [address]  Referring Physician: Linda Veloz    Start Time/Date: 0800 on 22  End Time/Date: 11:00 on 22  Duration: 27h    _____________________________________________________________  STUDY INFORMATION    EEG Recording Technique:  The patient underwent continuous Video-EEG monitoring, using Telemetry System hardware on the XLTek Digital System. EEG and video data were stored on a computer hard drive with important events saved in digital archive files. The material was reviewed by a physician (electroencephalographer / epileptologist) on a daily basis. Rito and seizure detection algorithms were utilized and reviewed. An EEG Technician attended to the patient, and was available throughout daytime work hours.  The epilepsy center neurologist was available in person or on call 24-hours per day.    EEG Placement and Labeling of Electrodes:  The EEG was performed utilizing 20 channel referential EEG connections (coronal over temporal over parasagittal montage) using all standard 10-20 electrode placements with EKG, with additional electrodes placed in the inferior temporal region using the modified 10-10 montage electrode placements for elective admissions, or if deemed necessary. Recording was at a sampling rate of 256 samples per second per channel. Time synchronized digital video recording was done simultaneously with EEG recording. A low light infrared camera was used for low light recording.     _____________________________________________________________  HISTORY    Patient is a 67y old  Female who presents with a chief complaint of Adnominal bloating/pain, constipation and UTI (17 Dec 2022 18:29)      PERTINENT MEDICATION:  lacosamide IVPB 100 milliGRAM(s) IV Intermittent every 12 hours    _____________________________________________________________  STUDY INTERPRETATION    Findings: The background was continuous and reactive. During wakefulness, no posterior dominant rhythm seen.    Background Slowing:  Diffuse theta and polymorphic delta slowing.    Focal Slowing:   None were present.    Sleep Background:  Drowsiness was characterized by fragmentation, attenuation, and slowing of the background activity.    Sleep was characterized by the presence of rudimentary K-complexes.    Other Non-Epileptiform Findings:  None were present.    Interictal Epileptiform Activity:   Generalized periodic discharges with triphasic morphology.    Events:  No event or seizure recorded.    Activation Procedures:   Hyperventilation was not performed.    Photic stimulation was not performed.     Artifacts:  Intermittent myogenic and movement artifacts were noted.    ECG:  The heart rate on single channel ECG was predominantly between 70-80 BPM.    _____________________________________________________________  EEG SUMMARY/CLASSIFICATION    bnormal EEG in the awake, drowsy and asleep states.  - Generalized periodic discharges with triphasic morphology (GPDs)  - Moderate generalized slowing.    _____________________________________________________________  EEG IMPRESSION/CLINICAL CORRELATE    Abnormal EEG study.  1. GPDs with triphasic morphology can be seen in diverse settings including toxic, metabolic, infectious and epileptic encephalopathies. Please correlate clinically.  2. Moderate nonspecific diffuse or multifocal cerebral dysfunction.   3. No seizure seen.    Please consider discontinuation of EEG unless clinical concern persists.    _____________________________________________________________  Kemal Brown MD  EEG/Epilepsy Attending

## 2022-12-20 LAB
GLUCOSE BLDC GLUCOMTR-MCNC: 103 MG/DL — HIGH (ref 70–99)
GLUCOSE BLDC GLUCOMTR-MCNC: 96 MG/DL — SIGNIFICANT CHANGE UP (ref 70–99)

## 2022-12-20 PROCEDURE — 99232 SBSQ HOSP IP/OBS MODERATE 35: CPT

## 2022-12-20 RX ADMIN — Medication 200 MILLIGRAM(S): at 05:35

## 2022-12-20 RX ADMIN — Medication 60 MICROGRAM(S): at 22:27

## 2022-12-20 RX ADMIN — Medication 200 MILLIGRAM(S): at 17:13

## 2022-12-20 RX ADMIN — ENOXAPARIN SODIUM 40 MILLIGRAM(S): 100 INJECTION SUBCUTANEOUS at 16:58

## 2022-12-20 RX ADMIN — PANTOPRAZOLE SODIUM 40 MILLIGRAM(S): 20 TABLET, DELAYED RELEASE ORAL at 11:30

## 2022-12-20 RX ADMIN — ATORVASTATIN CALCIUM 10 MILLIGRAM(S): 80 TABLET, FILM COATED ORAL at 22:28

## 2022-12-20 RX ADMIN — LACOSAMIDE 120 MILLIGRAM(S): 50 TABLET ORAL at 08:00

## 2022-12-20 RX ADMIN — LACOSAMIDE 120 MILLIGRAM(S): 50 TABLET ORAL at 20:25

## 2022-12-20 RX ADMIN — POLYETHYLENE GLYCOL 3350 17 GRAM(S): 17 POWDER, FOR SOLUTION ORAL at 11:31

## 2022-12-20 RX ADMIN — SENNA PLUS 2 TABLET(S): 8.6 TABLET ORAL at 22:28

## 2022-12-21 LAB
ANION GAP SERPL CALC-SCNC: 10 MMOL/L — SIGNIFICANT CHANGE UP (ref 7–14)
APPEARANCE UR: CLEAR — SIGNIFICANT CHANGE UP
BILIRUB UR-MCNC: NEGATIVE — SIGNIFICANT CHANGE UP
BUN SERPL-MCNC: 7 MG/DL — SIGNIFICANT CHANGE UP (ref 7–23)
CALCIUM SERPL-MCNC: 9.8 MG/DL — SIGNIFICANT CHANGE UP (ref 8.4–10.5)
CHLORIDE SERPL-SCNC: 106 MMOL/L — SIGNIFICANT CHANGE UP (ref 98–107)
CO2 SERPL-SCNC: 26 MMOL/L — SIGNIFICANT CHANGE UP (ref 22–31)
COLOR SPEC: COLORLESS — SIGNIFICANT CHANGE UP
CREAT SERPL-MCNC: 0.76 MG/DL — SIGNIFICANT CHANGE UP (ref 0.5–1.3)
CULTURE RESULTS: SIGNIFICANT CHANGE UP
CULTURE RESULTS: SIGNIFICANT CHANGE UP
DIFF PNL FLD: NEGATIVE — SIGNIFICANT CHANGE UP
EGFR: 87 ML/MIN/1.73M2 — SIGNIFICANT CHANGE UP
GLUCOSE BLDC GLUCOMTR-MCNC: 83 MG/DL — SIGNIFICANT CHANGE UP (ref 70–99)
GLUCOSE BLDC GLUCOMTR-MCNC: 87 MG/DL — SIGNIFICANT CHANGE UP (ref 70–99)
GLUCOSE BLDC GLUCOMTR-MCNC: 89 MG/DL — SIGNIFICANT CHANGE UP (ref 70–99)
GLUCOSE BLDC GLUCOMTR-MCNC: 96 MG/DL — SIGNIFICANT CHANGE UP (ref 70–99)
GLUCOSE SERPL-MCNC: 82 MG/DL — SIGNIFICANT CHANGE UP (ref 70–99)
GLUCOSE UR QL: NEGATIVE — SIGNIFICANT CHANGE UP
HCT VFR BLD CALC: 31.7 % — LOW (ref 34.5–45)
HGB BLD-MCNC: 9.9 G/DL — LOW (ref 11.5–15.5)
KETONES UR-MCNC: NEGATIVE — SIGNIFICANT CHANGE UP
LEUKOCYTE ESTERASE UR-ACNC: NEGATIVE — SIGNIFICANT CHANGE UP
MAGNESIUM SERPL-MCNC: 1.7 MG/DL — SIGNIFICANT CHANGE UP (ref 1.6–2.6)
MCHC RBC-ENTMCNC: 28.9 PG — SIGNIFICANT CHANGE UP (ref 27–34)
MCHC RBC-ENTMCNC: 31.2 GM/DL — LOW (ref 32–36)
MCV RBC AUTO: 92.7 FL — SIGNIFICANT CHANGE UP (ref 80–100)
NITRITE UR-MCNC: NEGATIVE — SIGNIFICANT CHANGE UP
NRBC # BLD: 0 /100 WBCS — SIGNIFICANT CHANGE UP (ref 0–0)
NRBC # FLD: 0 K/UL — SIGNIFICANT CHANGE UP (ref 0–0)
PH UR: 7 — SIGNIFICANT CHANGE UP (ref 5–8)
PHOSPHATE SERPL-MCNC: 3.5 MG/DL — SIGNIFICANT CHANGE UP (ref 2.5–4.5)
PLATELET # BLD AUTO: 186 K/UL — SIGNIFICANT CHANGE UP (ref 150–400)
POTASSIUM SERPL-MCNC: 3.8 MMOL/L — SIGNIFICANT CHANGE UP (ref 3.5–5.3)
POTASSIUM SERPL-SCNC: 3.8 MMOL/L — SIGNIFICANT CHANGE UP (ref 3.5–5.3)
PROT UR-MCNC: NEGATIVE — SIGNIFICANT CHANGE UP
RBC # BLD: 3.42 M/UL — LOW (ref 3.8–5.2)
RBC # FLD: 18 % — HIGH (ref 10.3–14.5)
SODIUM SERPL-SCNC: 142 MMOL/L — SIGNIFICANT CHANGE UP (ref 135–145)
SP GR SPEC: 1.01 — LOW (ref 1.01–1.05)
SPECIMEN SOURCE: SIGNIFICANT CHANGE UP
SPECIMEN SOURCE: SIGNIFICANT CHANGE UP
UROBILINOGEN FLD QL: SIGNIFICANT CHANGE UP
WBC # BLD: 7.17 K/UL — SIGNIFICANT CHANGE UP (ref 3.8–10.5)
WBC # FLD AUTO: 7.17 K/UL — SIGNIFICANT CHANGE UP (ref 3.8–10.5)

## 2022-12-21 PROCEDURE — 99232 SBSQ HOSP IP/OBS MODERATE 35: CPT

## 2022-12-21 PROCEDURE — 71045 X-RAY EXAM CHEST 1 VIEW: CPT | Mod: 26

## 2022-12-21 RX ADMIN — Medication 200 MILLIGRAM(S): at 06:00

## 2022-12-21 RX ADMIN — Medication 10 MILLIGRAM(S): at 16:39

## 2022-12-21 RX ADMIN — Medication 60 MICROGRAM(S): at 22:02

## 2022-12-21 RX ADMIN — ENOXAPARIN SODIUM 40 MILLIGRAM(S): 100 INJECTION SUBCUTANEOUS at 18:13

## 2022-12-21 RX ADMIN — PANTOPRAZOLE SODIUM 40 MILLIGRAM(S): 20 TABLET, DELAYED RELEASE ORAL at 08:53

## 2022-12-21 RX ADMIN — ATORVASTATIN CALCIUM 10 MILLIGRAM(S): 80 TABLET, FILM COATED ORAL at 21:41

## 2022-12-21 RX ADMIN — SENNA PLUS 2 TABLET(S): 8.6 TABLET ORAL at 21:41

## 2022-12-21 RX ADMIN — LACOSAMIDE 120 MILLIGRAM(S): 50 TABLET ORAL at 20:08

## 2022-12-21 RX ADMIN — POLYETHYLENE GLYCOL 3350 17 GRAM(S): 17 POWDER, FOR SOLUTION ORAL at 08:53

## 2022-12-21 RX ADMIN — Medication 200 MILLIGRAM(S): at 18:13

## 2022-12-21 RX ADMIN — LACOSAMIDE 120 MILLIGRAM(S): 50 TABLET ORAL at 08:52

## 2022-12-21 NOTE — PROVIDER CONTACT NOTE (OTHER) - SITUATION
Rectal temp of 94.1F after initiating bear hugger warming blanket @1630
Bladder scan q8hrs done showing retention greater than 495ml. Pt was previously straight cath three times; as per protocol, Slater to be inserted.
Rectal temp of 93.1F; other VSS
Pt voided approx 450ml, post void bladder scan shows 319ml retention.
BP 79/42, HR 53, O2 sat 97% on RA.
Family is starting that the pt no longer takes keppra; pt takes another seizure medication but they can not recall the name
Pt /47
Temp 94.5  repeat 97.5 after b ear hugger use

## 2022-12-21 NOTE — PROVIDER CONTACT NOTE (OTHER) - REASON
Family is starting that the pt no longer takes keppra
Rectal temp of 94.1F
Bladder scan q8hrs done showing retention greater than 495ml
Pt /47
Pt voided, post void bladder scan shows 319ml retention.
Temp 94.5
BP 79/42, HR 53, O2 sat 97% on RA.
Rectal temp of 93.1F

## 2022-12-21 NOTE — PROVIDER CONTACT NOTE (OTHER) - ASSESSMENT
AA&Ox3-4
AAOx2/3. NAD noted. Patient states she, "does not feel cold." Resting comfortably in bed
Patient skin is cool
Sleeping, arousable to tactile stimuli
Pt responds to pain and touch but is lethargic. Pt agitated easily. No acute signs of distress noted.
Pt is calm and sleeping. EEG in progress; Pt sister understands what the medication is for and states that the pt no longer takes that medication because it causes hallucinations. She stated she has the name at home and will call to provide the name when she gets home.
AAOx2/3. NAD noted. Resting comfortably in bed
Arousable to tactile stimuli, oriented to self only.

## 2022-12-21 NOTE — PHYSICAL THERAPY INITIAL EVALUATION ADULT - ADDITIONAL COMMENTS
Pt states she lives in a house with family with 14 steps to enter, has elevator access and a chair lift. Pt is nonambulatory, uses a motorized wheelchair, requires assistance and a sliding board for transfers, requires assistance for ADLs. Pt owns a hospital bed. Pt sister is 24/7 home health aide.    Following evaluation, pt was left semireclined in bed in no distress, all lines in tact, call hung in reach. KIKI ratliff.

## 2022-12-21 NOTE — PHYSICAL THERAPY INITIAL EVALUATION ADULT - NSPTDISCHREC_GEN_A_CORE
to optimize safety in the home environment and promote improvement of strength and functional deficits./Home PT

## 2022-12-21 NOTE — PROVIDER CONTACT NOTE (OTHER) - ACTION/TREATMENT ORDERED:
Slater 16Fr placed under sterile technique. Clear yellow urine noted.
Will speak with neurology and see if there is an alternative
Patient is kept warm  all labs juan
Bolus 1L NS over 60mins ordered and administered. S/p Bolus, /67, HR 73. Continue to monitor.
Provider order straight cath as per protocol. Pt straight cath under sterile technique, 400ml urine discarded. Continue TOV.

## 2022-12-21 NOTE — PHYSICAL THERAPY INITIAL EVALUATION ADULT - PERTINENT HX OF CURRENT PROBLEM, REHAB EVAL
67 year old female brought in by family member for complaints of abdominal pain, constipation and lethargy, admitted for cf urosepsis course complicated by witnessed seizure in the ED. CT Head - No acute intracranial hemorrhage, mass effect or midline shift.

## 2022-12-21 NOTE — PROVIDER CONTACT NOTE (OTHER) - RECOMMENDATIONS
Continue to monitor
Initiate bear hugger warming blanket and apply warm packs to the body
UA & Blood culture  Keep bear hugger on patient
Will continue to monitor.

## 2022-12-22 LAB
ANION GAP SERPL CALC-SCNC: 10 MMOL/L — SIGNIFICANT CHANGE UP (ref 7–14)
APPEARANCE UR: CLEAR — SIGNIFICANT CHANGE UP
BILIRUB UR-MCNC: NEGATIVE — SIGNIFICANT CHANGE UP
BUN SERPL-MCNC: 10 MG/DL — SIGNIFICANT CHANGE UP (ref 7–23)
CALCIUM SERPL-MCNC: 9.7 MG/DL — SIGNIFICANT CHANGE UP (ref 8.4–10.5)
CHLORIDE SERPL-SCNC: 102 MMOL/L — SIGNIFICANT CHANGE UP (ref 98–107)
CO2 SERPL-SCNC: 27 MMOL/L — SIGNIFICANT CHANGE UP (ref 22–31)
COLOR SPEC: COLORLESS — SIGNIFICANT CHANGE UP
CREAT SERPL-MCNC: 0.76 MG/DL — SIGNIFICANT CHANGE UP (ref 0.5–1.3)
DIFF PNL FLD: NEGATIVE — SIGNIFICANT CHANGE UP
EGFR: 87 ML/MIN/1.73M2 — SIGNIFICANT CHANGE UP
GLUCOSE SERPL-MCNC: 108 MG/DL — HIGH (ref 70–99)
GLUCOSE UR QL: NEGATIVE — SIGNIFICANT CHANGE UP
HCT VFR BLD CALC: 31.2 % — LOW (ref 34.5–45)
HGB BLD-MCNC: 9.8 G/DL — LOW (ref 11.5–15.5)
KETONES UR-MCNC: NEGATIVE — SIGNIFICANT CHANGE UP
LEUKOCYTE ESTERASE UR-ACNC: NEGATIVE — SIGNIFICANT CHANGE UP
MAGNESIUM SERPL-MCNC: 1.6 MG/DL — SIGNIFICANT CHANGE UP (ref 1.6–2.6)
MCHC RBC-ENTMCNC: 28.5 PG — SIGNIFICANT CHANGE UP (ref 27–34)
MCHC RBC-ENTMCNC: 31.4 GM/DL — LOW (ref 32–36)
MCV RBC AUTO: 90.7 FL — SIGNIFICANT CHANGE UP (ref 80–100)
NITRITE UR-MCNC: NEGATIVE — SIGNIFICANT CHANGE UP
NRBC # BLD: 0 /100 WBCS — SIGNIFICANT CHANGE UP (ref 0–0)
NRBC # FLD: 0 K/UL — SIGNIFICANT CHANGE UP (ref 0–0)
PH UR: 7.5 — SIGNIFICANT CHANGE UP (ref 5–8)
PHOSPHATE SERPL-MCNC: 3.4 MG/DL — SIGNIFICANT CHANGE UP (ref 2.5–4.5)
PLATELET # BLD AUTO: 189 K/UL — SIGNIFICANT CHANGE UP (ref 150–400)
POTASSIUM SERPL-MCNC: 3.5 MMOL/L — SIGNIFICANT CHANGE UP (ref 3.5–5.3)
POTASSIUM SERPL-SCNC: 3.5 MMOL/L — SIGNIFICANT CHANGE UP (ref 3.5–5.3)
PROT UR-MCNC: NEGATIVE — SIGNIFICANT CHANGE UP
RBC # BLD: 3.44 M/UL — LOW (ref 3.8–5.2)
RBC # FLD: 17.6 % — HIGH (ref 10.3–14.5)
SODIUM SERPL-SCNC: 139 MMOL/L — SIGNIFICANT CHANGE UP (ref 135–145)
SP GR SPEC: 1 — LOW (ref 1.01–1.05)
UROBILINOGEN FLD QL: SIGNIFICANT CHANGE UP
WBC # BLD: 6.54 K/UL — SIGNIFICANT CHANGE UP (ref 3.8–10.5)
WBC # FLD AUTO: 6.54 K/UL — SIGNIFICANT CHANGE UP (ref 3.8–10.5)

## 2022-12-22 RX ORDER — LACOSAMIDE 50 MG/1
100 TABLET ORAL EVERY 12 HOURS
Refills: 0 | Status: DISCONTINUED | OUTPATIENT
Start: 2022-12-22 | End: 2022-12-23

## 2022-12-22 RX ORDER — ZOLPIDEM TARTRATE 10 MG/1
5 TABLET ORAL AT BEDTIME
Refills: 0 | Status: DISCONTINUED | OUTPATIENT
Start: 2022-12-22 | End: 2022-12-23

## 2022-12-22 RX ADMIN — PANTOPRAZOLE SODIUM 40 MILLIGRAM(S): 20 TABLET, DELAYED RELEASE ORAL at 16:38

## 2022-12-22 RX ADMIN — Medication 200 MILLIGRAM(S): at 05:41

## 2022-12-22 RX ADMIN — SENNA PLUS 2 TABLET(S): 8.6 TABLET ORAL at 22:11

## 2022-12-22 RX ADMIN — LACOSAMIDE 120 MILLIGRAM(S): 50 TABLET ORAL at 17:20

## 2022-12-22 RX ADMIN — Medication 200 MILLIGRAM(S): at 17:21

## 2022-12-22 RX ADMIN — ENOXAPARIN SODIUM 40 MILLIGRAM(S): 100 INJECTION SUBCUTANEOUS at 16:38

## 2022-12-22 RX ADMIN — Medication 60 MICROGRAM(S): at 23:12

## 2022-12-22 RX ADMIN — ATORVASTATIN CALCIUM 10 MILLIGRAM(S): 80 TABLET, FILM COATED ORAL at 22:11

## 2022-12-22 RX ADMIN — POLYETHYLENE GLYCOL 3350 17 GRAM(S): 17 POWDER, FOR SOLUTION ORAL at 16:37

## 2022-12-22 NOTE — SWALLOW BEDSIDE ASSESSMENT ADULT - SWALLOW EVAL: RECOMMENDED DIET
soft and bite sized and mildly thick liquids with aspiration precautions and full assistance during meals
regular/thin liquids

## 2022-12-22 NOTE — PROGRESS NOTE ADULT - PROVIDER SPECIALTY LIST ADULT
Infectious Disease
Hospitalist
Infectious Disease
Infectious Disease
Hospitalist
Neurology

## 2022-12-22 NOTE — PROGRESS NOTE ADULT - NUTRITIONAL ASSESSMENT
This patient has been assessed with a concern for Malnutrition and has been determined to have a diagnosis/diagnoses of Severe protein-calorie malnutrition.    This patient is being managed with:   Diet Soft and Bite Sized-  Mildly Thick Liquids (MILDTHICKLIQS)  Entered: Dec 19 2022  9:37AM

## 2022-12-22 NOTE — SWALLOW BEDSIDE ASSESSMENT ADULT - SWALLOW EVAL: DIAGNOSIS
THe patient demonstrates functional oropharyngeal swallow for all textures presented. Oral stage marked by adequate bolus acceptance/containment, slightly prolonged but functional mastication of solids, present and functional posterior transfer with functional oral clearance. Pharyngeal stage marked by appearance of timely swallow initiation, present and suspected adequate hyolaryngeal movement and no clinical signs of aspiration/penetration.
1. Functional oral phase for pureed, mildly thick, and thin liquids marked by adequate oral acceptance and timely collection and transport. 2. Mild oral dysphagia for regular solids marked by prolonged mastication resulting in delayed collection and transport with trace stasis observed post swallow, which reduced with liquid wash. 3. Mild pharyngeal dysphagia for pureed, solids, and mildly thick liquids marked by suspected delayed pharyngeal swallow trigger and hyolaryngeal elevation noted by digital palpation without evidence of airway penetration/aspiration. 4. Moderate-severe pharyngeal dysphagia for thin liquids marked by suspected delayed pharyngeal swallow trigger and reflexive coughing observed post swallow. 5. Recommend soft & bite-sized and mildly thick liquids with aspiration precautions and full assistance during meals. Slow pacing, single/small bites/sips, and alternate solids with liquids. Continue to monitor and notify SLP if changes occur.

## 2022-12-22 NOTE — DIETITIAN INITIAL EVALUATION ADULT - ADD RECOMMEND
1) Recommend Swallow re-eval for possible diet advancement to regular as pt dislike current food consistency noted with poor PO intake. Will provide Magic Cup 2x daily (580kcal, 18gm pro) via kitchen to promote optimal nutrition.   2) Monitor PO intake, Labs, weights, BMs, and skin integrity.   3) RD to remain available for further nutritional interventions as indicated.

## 2022-12-22 NOTE — CHART NOTE - NSCHARTNOTEFT_GEN_A_CORE
Contacted by RN due to patient experiencing repeated episode of hypothermia. Per ID recommendations blood cultures, urinalysis ordered STAT. Patient placed on warming blanket. Will continue to monitor. Contacted by RN due to patient experiencing repeated episode of hypothermia. Per ID recommendations blood cultures, urinalysis ordered STAT. Patient placed on warming blanket. AM cortisol level ordered. Will continue to monitor.

## 2022-12-22 NOTE — SWALLOW BEDSIDE ASSESSMENT ADULT - SWALLOW EVAL: RECOMMENDED FEEDING/EATING TECHNIQUES
position upright (90 degrees)/small sips/bites
allow for swallow between intakes/alternate food with liquid/check mouth frequently for oral residue/pocketing/maintain upright posture during/after eating for 30 mins/no straws/oral hygiene/position upright (90 degrees)/small sips/bites

## 2022-12-22 NOTE — PROGRESS NOTE ADULT - ASSESSMENT
66 y/o F hx MS (nonambulatory, has baclofen pump - refilled 3 weeks ago), frequent UTIs, hospitalized for ?urosepsis this month at [facility], brought in by family member for complaints of abd pain, constipation and lethargy, admitted for cf urosepsis course complicated by witnessed seizure in the ed.     UA positive and culture with >100,000 citrobacter resistant to zosyn, intermediate to cefepime, but sensitive to quinolones.  No leukocytosis  +hypothermia.    Continue cipro 400 mg IV q12 while in hospital.  Plan to transition to po cipro 500 mg po bid bid on discharge. Can complete 7 days of cipro. Today is day 4/7.  Slater now removed.  May benefit from hiprex 1  gram po BID plus vit c 500 mg po daily on completion of antibiotic to prevent UTIs if no contraindication.  To see urology as outpt.  Had episode of hypothermia but pt feeling fine now.  Xray w/o pneumonia.  Blood cultures not sent .  Pt seems nontoxic and now is off warming blanket. Seems reasonable to observe for now. If hypothermic again or has fever, please check ua, urine culture, blood culture x 2.    Possible d/c tomorrow if pt stable.  ID service available if needed.   ID will sign off at this time but please reconsult if change in status or questions.    d/w medical team.     Narda Guillen MD  [phone] (pager)  [phone] (office)     
6 y/o F hx MS (nonambulatory, has baclofen pump - refilled 3 weeks ago), frequent UTIs, hospitalized for ?urosepsis this month at [facility], brought in by aide for complaints of abd pain, constipation and lethargy.   check levels
66 y/o F hx MS (nonambulatory, has baclofen pump - refilled 3 weeks ago), frequent UTIs, hospitalized for ?urosepsis this month at [facility], brought in by family member for complaints of abd pain, constipation and lethargy. admitted for cf urosepsis course complicated by witnessed seizure in the ed.     Problem/Plan - 1:  ·  Problem: Sepsis secondary to UTI.   ·  Plan: hypothermic, tachycardiac, ua positive. CT AP without acute pathology. nos tone or obstruction.,  -s/p 2L Bolus.  - cw abx as per ID   - id consult     Problem/Plan - 2:  ·  Problem: Seizure.   ·  Plan: pt had seizure at previous hospitalization. was on keppra but stopped. witnessed one episode of seizure in the ed. broke without intervention. possible 2/2 sepsis, also on baclofen pump unclear if functioning.   - seizure precaution, suction precaution and continuous pulse ox for now  - ativan prn   - CT head non-con  - fu neuro consult recs  - sp  EEG     Problem/Plan - 3:  ·  Problem: Acute constipation.   ·  Plan: -senna, miralax and dulcolax suppository once.    Problem/Plan - 4:  ·  Problem: Multiple sclerosis.   ·  Plan: on baclofen pump  - fu with nsgy/pain management regarding baclofen pump interrogation.    Problem/Plan - 5:  ·  Problem: Hypothyroidism.   ·  Plan: - cont with synthroid.    Problem/Plan - 6:  ·  Problem: Hypertension.   ·  Plan: hold amlodipine given soft bp.  
68 y/o F hx MS (nonambulatory, has baclofen pump - refilled 3 weeks ago), frequent UTIs, hospitalized for ?urosepsis this month at [facility], brought in by family member for complaints of abd pain, constipation and lethargy. admitted for cf urosepsis course complicated by witnessed seizure in the ed.     Problem/Plan - 1:  ·  Problem: Sepsis secondary to UTI.   ·  Plan: hypothermic, tachycardiac, ua positive. CT AP without acute pathology. nos tone or obstruction.,  -s/p 2L Bolus.  - cw abx  - id consult     Problem/Plan - 2:  ·  Problem: Seizure.   ·  Plan: pt had seizure at previous hospitalization. was on keppra but stopped. witnessed one episode of seizure in the ed. broke without intervention. possible 2/2 sepsis, also on baclofen pump unclear if functioning.   - seizure precaution, suction precaution and continuous pulse ox for now  - ativan prn   - CT head non-con  - fu neuro consult recs      Problem/Plan - 3:  ·  Problem: Acute constipation.   ·  Plan: -senna, miralax and dulcolax suppository once.    Problem/Plan - 4:  ·  Problem: Multiple sclerosis.   ·  Plan: on baclofen pump  - fu with nsgy/pain management regarding baclofen pump interrogation.    Problem/Plan - 5:  ·  Problem: Hypothyroidism.   ·  Plan: TSH wnl  - cont with synthroid.    Problem/Plan - 6:  ·  Problem: Hypertension.   ·  Plan: hold amlodipine given soft bp.    
68 y/o F hx MS (nonambulatory, has baclofen pump - refilled 3 weeks ago), frequent UTIs, hospitalized for ?urosepsis this month at [facility], brought in by family member for complaints of abd pain, constipation and lethargy. admitted for cf urosepsis course complicated by witnessed seizure in the ed.     Problem/Plan - 1:  ·  Problem: Sepsis secondary to UTI, now with fever again  ·  Plan: - cw abx as per ID   - id reconsult   - fu cultures    Problem/Plan - 2:  ·  Problem: Seizure.   ·  Plan: pt had seizure at previous hospitalization. was on keppra but stopped. witnessed one episode of seizure in the ed. broke without intervention. possible 2/2 sepsis, also on baclofen pump unclear if functioning.   - seizure precaution, suction precaution and continuous pulse ox for now  - ativan prn   - CT head non-con  - fu neuro consult recs  - sp  EEG     Problem/Plan - 3:  ·  Problem: Acute constipation.   ·  Plan: -senna, miralax and dulcolax suppository once.    Problem/Plan - 4:  ·  Problem: Multiple sclerosis.   ·  Plan: on baclofen pump  - fu with nsgy/pain management regarding baclofen pump interrogation.    Problem/Plan - 5:  ·  Problem: Hypothyroidism.   ·  Plan: - cont with synthroid.    Problem/Plan - 6:  ·  Problem: Hypertension.   ·  Plan: hold amlodipine given soft bp.        
68 y/o F hx MS (nonambulatory, has baclofen pump - refilled 3 weeks ago), frequent UTIs, hospitalized for ?urosepsis this month at [facility], brought in by family member for complaints of abd pain, constipation and lethargy. admitted for cf urosepsis course complicated by witnessed seizure in the ed.     Problem/Plan - 1:  ·  Problem: Sepsis secondary to UTI.   ·  Plan: hypothermic, tachycardiac, ua positive. CT AP without acute pathology. nos tone or obstruction.,  -s/p 2L Bolus.  - cw abx as per ID   - id consult     Problem/Plan - 2:  ·  Problem: Seizure.   ·  Plan: pt had seizure at previous hospitalization. was on keppra but stopped. witnessed one episode of seizure in the ed. broke without intervention. possible 2/2 sepsis, also on baclofen pump unclear if functioning.   - seizure precaution, suction precaution and continuous pulse ox for now  - ativan prn   - CT head non-con  - fu neuro consult recs  - sp  EEG     Problem/Plan - 3:  ·  Problem: Acute constipation.   ·  Plan: -senna, miralax and dulcolax suppository once.    Problem/Plan - 4:  ·  Problem: Multiple sclerosis.   ·  Plan: on baclofen pump  - fu with nsgy/pain management regarding baclofen pump interrogation.    Problem/Plan - 5:  ·  Problem: Hypothyroidism.   ·  Plan: - cont with synthroid.    Problem/Plan - 6:  ·  Problem: Hypertension.   ·  Plan: hold amlodipine given soft bp.      
68 y/o F hx MS (nonambulatory, has baclofen pump - refilled 3 weeks ago), frequent UTIs, hospitalized for ?urosepsis this month at [facility], brought in by family member for complaints of abd pain, constipation and lethargy. admitted for cf urosepsis course complicated by witnessed seizure in the ed.     Problem/Plan - 1:  ·  Problem: Sepsis secondary to UTI.   ·  Plan: hypothermic, tachycardiac, ua positive. CT AP without acute pathology. nos tone or obstruction.,  -s/p 2L Bolus.  - cw abx  - id consult     Problem/Plan - 2:  ·  Problem: Seizure.   ·  Plan: pt had seizure at previous hospitalization. was on keppra but stopped. witnessed one episode of seizure in the ed. broke without intervention. possible 2/2 sepsis, also on baclofen pump unclear if functioning.   - seizure precaution, suction precaution and continuous pulse ox for now  - ativan prn   - CT head non-con  - fu neuro consult recs  - EEG on going     Problem/Plan - 3:  ·  Problem: Acute constipation.   ·  Plan: -senna, miralax and dulcolax suppository once.    Problem/Plan - 4:  ·  Problem: Multiple sclerosis.   ·  Plan: on baclofen pump  - fu with nsgy/pain management regarding baclofen pump interrogation.    Problem/Plan - 5:  ·  Problem: Hypothyroidism.   ·  Plan: TSH wnl  - cont with synthroid.    Problem/Plan - 6:  ·  Problem: Hypertension.   ·  Plan: hold amlodipine given soft bp.  
	  66 y/o F hx MS (nonambulatory, has baclofen pump - refilled 3 weeks ago), frequent UTIs, hospitalized for ?urosepsis this month at [facility], brought in by family member for complaints of abd pain, constipation and lethargy, admitted for cf urosepsis course complicated by witnessed seizure in the ed.     UA positive and culture with >100,000 citrobacter resistant to zosyn, intermediate to cefepime, but sensitive to quinolones.  No leukocytosis  +hypothermia.    Continue cipro 400 mg IV q12 while in hospital.  Plan to transition to po cipro 500 mg po bid bid on discharge. Can complete 7 days of cipro. Today is day 3/7.  Slater now removed.  May benefit from hiprex 1  gram po BID plus vit c 500 mg po daily on completion of antibiotic to prevent UTIs if no contraindication.    ID will sign off at this time but please reconsult if change in status or questions.    Narda Guillen MD  [phone] (pager)  [phone] (office)     
66 y/o F hx MS (nonambulatory, has baclofen pump - refilled 3 weeks ago), frequent UTIs, hospitalized for ?urosepsis this month at [facility], brought in by family member for complaints of abd pain, constipation and lethargy. admitted for cf urosepsis course complicated by witnessed seizure in the ed.     Problem/Plan - 1:  ·  Problem: Sepsis secondary to UTI.   ·  Plan: hypothermic, tachycardiac, ua positive. CT AP without acute pathology. nos tone or obstruction.,  -s/p 2L Bolus.  - cw abx  - id consult     Problem/Plan - 2:  ·  Problem: Seizure.   ·  Plan: pt had seizure at previous hospitalization. was on keppra but stopped. witnessed one episode of seizure in the ed. broke without intervention. possible 2/2 sepsis, also on baclofen pump unclear if functioning.   - seizure precaution, suction precaution and continuous pulse ox for now  - ativan prn   - CT head non-con  - fu neuro consult recs  - fu urine tox   - fu lactate, CPK and prolactin.    Problem/Plan - 3:  ·  Problem: Acute constipation.   ·  Plan: -senna, miralax and dulcolax suppository once.    Problem/Plan - 4:  ·  Problem: Multiple sclerosis.   ·  Plan: on baclofen pump  - fu with nsgy/pain management regarding baclofen pump interrogation.    Problem/Plan - 5:  ·  Problem: Hypothyroidism.   ·  Plan: TSH wnl  - cont with synthroid.    Problem/Plan - 6:  ·  Problem: Hypertension.   ·  Plan: hold amlodipine given soft bp.    
	  66 y/o F hx MS (nonambulatory, has baclofen pump - refilled 3 weeks ago), frequent UTIs, hospitalized for ?urosepsis this month at [facility], brought in by family member for complaints of abd pain, constipation and lethargy, admitted for cf urosepsis course complicated by witnessed seizure in the ed.     UA positive and culture with >100,000 citrobacter resistant to zosyn, intermediate to cefepime, but sensitive to quinolones.  No leukocytosis  +hypothermia.    Continue cipro 400 mg IV q12.  Plan to transition to po cipro 500 mg po bid bid on discharge. Can complete 7 days of cipro. Today is day 2.   Pt has moreno but was placed in hospital for retention.   May benefit from hiprex/vit c on completion of antibiotic to prevent UTIs if no contraindication.    Narda Guillen MD  [phone] (pager)  [phone] (office)

## 2022-12-22 NOTE — PROGRESS NOTE ADULT - SUBJECTIVE AND OBJECTIVE BOX
Patient is a 67y old  Female who presents with a chief complaint of Adnominal bloating/pain, constipation and UTI (19 Dec 2022 13:51)    Date of servie : 12-19-22 @ 15:30  INTERVAL HPI/OVERNIGHT EVENTS:  T(C): 36.7 (12-19-22 @ 05:00), Max: 36.7 (12-19-22 @ 01:03)  HR: 91 (12-19-22 @ 05:00) (73 - 91)  BP: 125/59 (12-19-22 @ 05:00) (125/59 - 141/65)  RR: 18 (12-19-22 @ 05:00) (18 - 18)  SpO2: 94% (12-19-22 @ 05:00) (94% - 97%)  Wt(kg): --  I&O's Summary    18 Dec 2022 07:01  -  19 Dec 2022 07:00  --------------------------------------------------------  IN: 0 mL / OUT: 1800 mL / NET: -1800 mL        LABS:                        9.4    5.93  )-----------( 155      ( 19 Dec 2022 04:50 )             31.0     12-19    144  |  108<H>  |  6<L>  ----------------------------<  79  3.6   |  26  |  0.81    Ca    9.5      19 Dec 2022 04:50  Phos  2.7     12-19  Mg     1.80     12-19    TPro  6.3  /  Alb  3.1<L>  /  TBili  0.3  /  DBili  x   /  AST  38<H>  /  ALT  31  /  AlkPhos  95  12-18        CAPILLARY BLOOD GLUCOSE      POCT Blood Glucose.: 96 mg/dL (19 Dec 2022 12:17)  POCT Blood Glucose.: 85 mg/dL (19 Dec 2022 06:26)  POCT Blood Glucose.: 76 mg/dL (18 Dec 2022 23:52)  POCT Blood Glucose.: 95 mg/dL (18 Dec 2022 17:57)            MEDICATIONS  (STANDING):  atorvastatin 10 milliGRAM(s) Oral at bedtime  ciprofloxacin   IVPB 400 milliGRAM(s) IV Intermittent every 12 hours  dextrose 5% + sodium chloride 0.45%. 1000 milliLiter(s) (75 mL/Hr) IV Continuous <Continuous>  dextrose 5% + sodium chloride 0.45%. 1000 milliLiter(s) (75 mL/Hr) IV Continuous <Continuous>  enoxaparin Injectable 40 milliGRAM(s) SubCutaneous every 24 hours  lacosamide IVPB 100 milliGRAM(s) IV Intermittent every 12 hours  levothyroxine Injectable 60 MICROGram(s) IV Push at bedtime  pantoprazole  Injectable 40 milliGRAM(s) IV Push daily  polyethylene glycol 3350 17 Gram(s) Oral daily  senna 2 Tablet(s) Oral at bedtime    MEDICATIONS  (PRN):  acetaminophen     Tablet .. 650 milliGRAM(s) Oral every 6 hours PRN Temp greater or equal to 38C (100.4F), Mild Pain (1 - 3)  aluminum hydroxide/magnesium hydroxide/simethicone Suspension 30 milliLiter(s) Oral every 4 hours PRN Dyspepsia  bisacodyl Suppository 10 milliGRAM(s) Rectal at bedtime PRN Constipation  LORazepam   Injectable 2 milliGRAM(s) IV Push every 2 hours PRN seizure  melatonin 3 milliGRAM(s) Oral at bedtime PRN Insomnia  ondansetron Injectable 4 milliGRAM(s) IV Push every 8 hours PRN Nausea and/or Vomiting  zolpidem 5 milliGRAM(s) Oral at bedtime PRN Insomnia          PHYSICAL EXAM:  GENERAL: NAD, well-groomed, well-developed  HEAD:  Atraumatic, Normocephalic  CHEST/LUNG: Clear to percussion bilaterally; No rales, rhonchi, wheezing, or rubs  HEART: Regular rate and rhythm; No murmurs, rubs, or gallops  ABDOMEN: Soft, Nontender, Nondistended; Bowel sounds present  EXTREMITIES:  2+ Peripheral Pulses, No clubbing, cyanosis, or edema  LYMPH: No lymphadenopathy noted  SKIN: No rashes or lesions    Care Discussed with Consultants/Other Providers [ ] YES  [ ] NO
67yPatient is a 67y old  Female who presents with a chief complaint of Adnominal bloating/pain, constipation and UTI (18 Dec 2022 17:14)      Interval history:  No complaints.  No new events.      Antimicrobials:    ciprofloxacin   IVPB 400 milliGRAM(s) IV Intermittent every 12 hours      Vital Signs Last 24 Hrs  T(C): 36.7 (12-19-22 @ 05:00), Max: 36.7 (12-19-22 @ 01:03)  T(F): 98.1 (12-19-22 @ 05:00), Max: 98.1 (12-19-22 @ 01:03)  HR: 91 (12-19-22 @ 05:00) (73 - 91)  BP: 125/59 (12-19-22 @ 05:00) (125/59 - 149/64)  BP(mean): --  RR: 18 (12-19-22 @ 05:00) (18 - 18)  SpO2: 94% (12-19-22 @ 05:00) (94% - 100%)    GEN: in bed. awake and alert  HEENT: EOMI, MMM  Neck: Supple  CV: S1S2 RRR  Resp: CTA b/l  ABD: + BS, Soft, NT/ND  : + moreno  Ext: no c/c/e                            9.4    5.93  )-----------( 155      ( 19 Dec 2022 04:50 )             31.0   12-19    144  |  108<H>  |  6<L>  ----------------------------<  79  3.6   |  26  |  0.81    Ca    9.5      19 Dec 2022 04:50  Phos  2.7     12-19  Mg     1.80     12-19    TPro  6.3  /  Alb  3.1<L>  /  TBili  0.3  /  DBili  x   /  AST  38<H>  /  ALT  31  /  AlkPhos  95  12-18      LIVER FUNCTIONS - ( 18 Dec 2022 05:51 )  Alb: 3.1 g/dL / Pro: 6.3 g/dL / ALK PHOS: 95 U/L / ALT: 31 U/L / AST: 38 U/L / GGT: x             RECENT CULTURES:      12-16 @ 01:45  .Blood Blood-Peripheral  --  --  --    No growth to date.  --  12-16 @ 01:30  .Blood Blood-Venous  --  --  --    No growth to date.  --  12-16 @ 01:26  Clean Catch Clean Catch (Midstream)  Citrobacter freundii  Citrobacter freundii  KIRK    >100,000 CFU/ml Citrobacter freundii  --      Radiology:  < from: CT Head No Cont (12.16.22 @ 15:16) >  IMPRESSION:  No acute intracranial hemorrhage, mass effect or midline shift.    < end of copied text >      < from: CT Abdomen and Pelvis w/ IV Cont (12.16.22 @ 09:11) >  IMPRESSION:  Moderate stool. No acute abnormality in the abdomen and pelvis.    < end of copied text >  
67yPatient is a 67y old  Female who presents with a chief complaint of Adnominal bloating/pain, constipation and UTI (18 Dec 2022 17:14)      Interval history:  No complaints.  No new events.  Slater removed      Antimicrobials:    ciprofloxacin   IVPB 400 every 12 hours (12/18-->)      Vital Signs Last 24 Hrs  T(F): 97.5 (12-20-22 @ 12:53), Max: 98.2 (12-19-22 @ 15:30)  HR: 57 (12-20-22 @ 12:53)  BP: 151/76 (12-20-22 @ 12:53)  RR: 18 (12-20-22 @ 12:53)  SpO2: 100% (12-20-22 @ 12:53) (94% - 100%)  Wt(kg): --      GEN: in bed. awake and alert  HEENT: EOMI, MMM  Neck: Supple  CV: S1S2 RRR  Resp: CTA b/l  ABD: + BS, Soft, NT/ND  Ext: no c/c/e                          9.4    5.93  )-----------( 155      ( 19 Dec 2022 04:50 )             31.0 12-19    144  |  108  |  6   ----------------------------<  79  3.6   |  26  |  0.81  Ca    9.5      19 Dec 2022 04:50Phos  2.7     12-19Mg     1.80     12-19                  RECENT CULTURES:        12-16 @ 01:45  .Blood Blood-Peripheral  --  --  --    No growth to date.  --  12-16 @ 01:30  .Blood Blood-Venous  --  --  --    No growth to date.  --  12-16 @ 01:26  Clean Catch Clean Catch (Midstream)  Citrobacter freundii  Citrobacter freundii  KIRK    >100,000 CFU/ml Citrobacter freundii  --      Radiology:  < from: CT Head No Cont (12.16.22 @ 15:16) >  IMPRESSION:  No acute intracranial hemorrhage, mass effect or midline shift.    < end of copied text >      < from: CT Abdomen and Pelvis w/ IV Cont (12.16.22 @ 09:11) >  IMPRESSION:  Moderate stool. No acute abnormality in the abdomen and pelvis.    < end of copied text >  
Neurology Progress    RAMIRO [id number]    HPI:  68 y/o F hx MS (nonambulatory, has baclofen pump - refilled 3 weeks ago), frequent UTIs, hospitalized for ?urosepsis this month at [facility], brought in by aide for complaints of abd pain, constipation and lethargy. Patient is lethargic and only answers with yes/no, history limited. History obtained from patient's spouse and Aide. Patient had been complaining of abd pain, indigestion and constipation x3-4 days. was noted to have worsening mental status than baseline. at baseline ANO4, communicative and interactive. reports recent hx of UTI for which her PCP prescribed cefdinir which she completed for 5d but despite that urine appeared dark and foul smelling. Patient has multiple hx of UTI and was recently hospitalized at [facility] for urosepsis. Additionally reports hx of seizure at previous hospitalization and was prescribed keppra 500mg bid which pt only took one dose.     In the ed, initial vitals showed hypothermia 33.9, tachycardiac. satting well on room air. work up cf urosepsis. was given ceftriaxone once. course complicated by one episode of witnessed seizure described as nurse by bilateral arm rigidity with mouth foaming which self resolved.         (16 Dec 2022 11:34)      Past Medical History  Hypothyroid    Multiple sclerosis        Past Surgical History  No significant past surgical history        MEDICATIONS    acetaminophen     Tablet .. 650 milliGRAM(s) Oral every 6 hours PRN  aluminum hydroxide/magnesium hydroxide/simethicone Suspension 30 milliLiter(s) Oral every 4 hours PRN  atorvastatin 10 milliGRAM(s) Oral at bedtime  bisacodyl Suppository 10 milliGRAM(s) Rectal once  dextrose 5% + sodium chloride 0.45%. 1000 milliLiter(s) IV Continuous <Continuous>  enoxaparin Injectable 40 milliGRAM(s) SubCutaneous every 24 hours  levETIRAcetam  IVPB 750 milliGRAM(s) IV Intermittent every 12 hours  levothyroxine 75 MICROGram(s) Oral daily  LORazepam   Injectable 2 milliGRAM(s) IV Push every 2 hours PRN  melatonin 3 milliGRAM(s) Oral at bedtime PRN  ondansetron Injectable 4 milliGRAM(s) IV Push every 8 hours PRN  pantoprazole    Tablet 40 milliGRAM(s) Oral before breakfast  piperacillin/tazobactam IVPB.. 3.375 Gram(s) IV Intermittent every 8 hours  polyethylene glycol 3350 17 Gram(s) Oral daily  senna 2 Tablet(s) Oral at bedtime  zolpidem 5 milliGRAM(s) Oral at bedtime PRN         Family history: No history of dementia, strokes, or seizures   FAMILY HISTORY:  No pertinent family history in first degree relatives      SOCIAL HISTORY -- No history of tobacco or alcohol use     Allergies    No Known Allergies    Intolerances        Height (cm): 167.6 ( @ 23:20)  Weight (kg): 68.5 (- @ 23:20)  BMI (kg/m2): 24.4 (- @ 23:20)    Vital Signs Last 24 Hrs  T(C): 36.3 (17 Dec 2022 05:57), Max: 36.4 (16 Dec 2022 12:00)  T(F): 97.4 (17 Dec 2022 05:57), Max: 97.6 (16 Dec 2022 12:00)  HR: 78 (17 Dec 2022 05:57) (71 - 105)  BP: 108/47 (17 Dec 2022 05:57) (93/60 - 144/87)  BP(mean): --  RR: 15 (17 Dec 2022 05:57) (10 - 16)  SpO2: 99% (17 Dec 2022 05:57) (72% - 100%)    Parameters below as of 17 Dec 2022 05:57  Patient On (Oxygen Delivery Method): room air            On Neurological Examination:    Mental Status - Patient is alert, awake, oriented X3. .   Follows commands well and able to answer questions appropriately. Mood and affect  normal  Follow simple commands able to repeat  able to name.  Speech - Fluent no Dysarthria  no  Aphasia                              Cranial Nerves - Extraocular muscle intact  KARLEY Facial symmetry Tongue midline, CnV1to V3 intact gross hearing intact      Motor Exam -   Right upper  5/5 throughout  Left upper 5/5 throughtout  Right lower- 5/5 throughout  Left lower 5/5 throughout  Coordination -finger to nose intact  Muscle tone - is normal all over. No asymmetry is seen.      Sensory    Bilateral intact to light touch    Gait -  normal  no ataxia     GENERAL Exam:     Nontoxic , No Acute Distress   	  HEENT:  normocephalic, atraumatic  		  LUNGS:	Clear bilaterally  No Wheeze      VASCULAR: no carotid brui  	  HEART:	 Normal S1S2   No murmur RRR        	  MUSCULOSKELETAL: Normal Range of Motion  	   SKIN:      Normal   No Ecchymosis               LABS:  CBC Full  -  ( 17 Dec 2022 05:30 )  WBC Count : 11.90 K/uL  RBC Count : 3.55 M/uL  Hemoglobin : 10.0 g/dL  Hematocrit : 32.3 %  Platelet Count - Automated : 155 K/uL  Mean Cell Volume : 91.0 fL  Mean Cell Hemoglobin : 28.2 pg  Mean Cell Hemoglobin Concentration : 31.0 gm/dL  Auto Neutrophil # : x  Auto Lymphocyte # : x  Auto Monocyte # : x  Auto Eosinophil # : x  Auto Basophil # : x  Auto Neutrophil % : x  Auto Lymphocyte % : x  Auto Monocyte % : x  Auto Eosinophil % : x  Auto Basophil % : x    Urinalysis Basic - ( 16 Dec 2022 01:26 )    Color: Light Yellow / Appearance: Turbid / S.013 / pH: x  Gluc: x / Ketone: Small  / Bili: Negative / Urobili: <2 mg/dL   Blood: x / Protein: 30 mg/dL / Nitrite: Positive   Leuk Esterase: Large / RBC: 6 /HPF / WBC >720 /HPF   Sq Epi: x / Non Sq Epi: 3 /HPF / Bacteria: Many          138  |  96<L>  |  9   ----------------------------<  197<H>  3.9   |  30  |  0.75    Ca    9.8      17 Dec 2022 05:30  Phos  2.7       Mg     2.00         TPro  6.3  /  Alb  3.5  /  TBili  <0.2  /  DBili  x   /  AST  49<H>  /  ALT  40<H>  /  AlkPhos  114  -    Hemoglobin A1C:     LIVER FUNCTIONS - ( 16 Dec 2022 13:27 )  Alb: 3.5 g/dL / Pro: 6.3 g/dL / ALK PHOS: 114 U/L / ALT: 40 U/L / AST: 49 U/L / GGT: x           Vitamin B12         RADIOLOGY    EKG      IMPRESSION  This is a  year old           schoenberg 
Patient is a 67y old  Female who presents with a chief complaint of Adnominal bloating/pain, constipation and UTI (17 Dec 2022 11:39)    Date of servie : 22 @ 18:30  INTERVAL HPI/OVERNIGHT EVENTS:  T(C): 36.3 (22 @ 05:57), Max: 36.4 (22 @ 22:02)  HR: 62 (22 @ 15:04) (62 - 85)  BP: 133/59 (22 @ 15:04) (108/47 - 141/79)  RR: 18 (22 @ 15:04) (12 - 18)  SpO2: 99% (22 @ 15:04) (97% - 100%)  Wt(kg): --  I&O's Summary      LABS:                        10.0   11.90 )-----------( 155      ( 17 Dec 2022 05:30 )             32.3         138  |  96<L>  |  9   ----------------------------<  197<H>  3.9   |  30  |  0.75    Ca    9.8      17 Dec 2022 05:30  Phos  2.7       Mg     2.00         TPro  6.3  /  Alb  3.5  /  TBili  <0.2  /  DBili  x   /  AST  49<H>  /  ALT  40<H>  /  AlkPhos  114        Urinalysis Basic - ( 16 Dec 2022 01:26 )    Color: Light Yellow / Appearance: Turbid / S.013 / pH: x  Gluc: x / Ketone: Small  / Bili: Negative / Urobili: <2 mg/dL   Blood: x / Protein: 30 mg/dL / Nitrite: Positive   Leuk Esterase: Large / RBC: 6 /HPF / WBC >720 /HPF   Sq Epi: x / Non Sq Epi: 3 /HPF / Bacteria: Many      CAPILLARY BLOOD GLUCOSE      POCT Blood Glucose.: 83 mg/dL (17 Dec 2022 16:52)  POCT Blood Glucose.: 91 mg/dL (17 Dec 2022 12:07)  POCT Blood Glucose.: 74 mg/dL (17 Dec 2022 08:42)  POCT Blood Glucose.: 104 mg/dL (16 Dec 2022 22:59)        Urinalysis Basic - ( 16 Dec 2022 01:26 )    Color: Light Yellow / Appearance: Turbid / S.013 / pH: x  Gluc: x / Ketone: Small  / Bili: Negative / Urobili: <2 mg/dL   Blood: x / Protein: 30 mg/dL / Nitrite: Positive   Leuk Esterase: Large / RBC: 6 /HPF / WBC >720 /HPF   Sq Epi: x / Non Sq Epi: 3 /HPF / Bacteria: Many        MEDICATIONS  (STANDING):  atorvastatin 10 milliGRAM(s) Oral at bedtime  bisacodyl Suppository 10 milliGRAM(s) Rectal once  dextrose 5% + sodium chloride 0.45%. 1000 milliLiter(s) (75 mL/Hr) IV Continuous <Continuous>  enoxaparin Injectable 40 milliGRAM(s) SubCutaneous every 24 hours  levETIRAcetam  IVPB 750 milliGRAM(s) IV Intermittent every 12 hours  levothyroxine 75 MICROGram(s) Oral daily  pantoprazole    Tablet 40 milliGRAM(s) Oral before breakfast  piperacillin/tazobactam IVPB.. 3.375 Gram(s) IV Intermittent every 8 hours  polyethylene glycol 3350 17 Gram(s) Oral daily  senna 2 Tablet(s) Oral at bedtime    MEDICATIONS  (PRN):  acetaminophen     Tablet .. 650 milliGRAM(s) Oral every 6 hours PRN Temp greater or equal to 38C (100.4F), Mild Pain (1 - 3)  aluminum hydroxide/magnesium hydroxide/simethicone Suspension 30 milliLiter(s) Oral every 4 hours PRN Dyspepsia  LORazepam   Injectable 2 milliGRAM(s) IV Push every 2 hours PRN seizure  melatonin 3 milliGRAM(s) Oral at bedtime PRN Insomnia  ondansetron Injectable 4 milliGRAM(s) IV Push every 8 hours PRN Nausea and/or Vomiting  zolpidem 5 milliGRAM(s) Oral at bedtime PRN Insomnia          PHYSICAL EXAM:  GENERAL: frail  CHEST/LUNG: Clear to percussion bilaterally; No rales, rhonchi, wheezing, or rubs  HEART: Regular rate and rhythm; No murmurs, rubs, or gallops  ABDOMEN: Soft, Nontender, Nondistended; Bowel sounds present  EXTREMITIES: contracted     Care Discussed with Consultants/Other Providers [ ] YES  [ ] NO
Patient is a 67y old  Female who presents with a chief complaint of Urinary tract infection     (22 Dec 2022 12:16)    Date of servie : 22 @ 15:12  INTERVAL HPI/OVERNIGHT EVENTS:  T(C): 36.6 (22 @ 05:46), Max: 36.6 (22 @ 05:46)  HR: 89 (22 @ 05:46) (84 - 89)  BP: 153/79 (22 @ 05:46) (153/79 - 157/66)  RR: 18 (22 @ 05:46) (18 - 18)  SpO2: 95% (22 @ 05:46) (95% - 96%)  Wt(kg): --  I&O's Summary      LABS:                        9.8    6.54  )-----------( 189      ( 22 Dec 2022 05:58 )             31.2         139  |  102  |  10  ----------------------------<  108<H>  3.5   |  27  |  0.76    Ca    9.7      22 Dec 2022 05:58  Phos  3.4       Mg     1.60             Urinalysis Basic - ( 22 Dec 2022 00:50 )    Color: Colorless / Appearance: Clear / S.005 / pH: x  Gluc: x / Ketone: Negative  / Bili: Negative / Urobili: <2 mg/dL   Blood: x / Protein: Negative / Nitrite: Negative   Leuk Esterase: Negative / RBC: x / WBC x   Sq Epi: x / Non Sq Epi: x / Bacteria: x      CAPILLARY BLOOD GLUCOSE      POCT Blood Glucose.: 87 mg/dL (21 Dec 2022 22:52)  POCT Blood Glucose.: 89 mg/dL (21 Dec 2022 17:30)        Urinalysis Basic - ( 22 Dec 2022 00:50 )    Color: Colorless / Appearance: Clear / S.005 / pH: x  Gluc: x / Ketone: Negative  / Bili: Negative / Urobili: <2 mg/dL   Blood: x / Protein: Negative / Nitrite: Negative   Leuk Esterase: Negative / RBC: x / WBC x   Sq Epi: x / Non Sq Epi: x / Bacteria: x        MEDICATIONS  (STANDING):  atorvastatin 10 milliGRAM(s) Oral at bedtime  ciprofloxacin   IVPB 400 milliGRAM(s) IV Intermittent every 12 hours  dextrose 5% + sodium chloride 0.45%. 1000 milliLiter(s) (75 mL/Hr) IV Continuous <Continuous>  dextrose 5% + sodium chloride 0.45%. 1000 milliLiter(s) (75 mL/Hr) IV Continuous <Continuous>  enoxaparin Injectable 40 milliGRAM(s) SubCutaneous every 24 hours  lacosamide IVPB 100 milliGRAM(s) IV Intermittent every 12 hours  levothyroxine Injectable 60 MICROGram(s) IV Push at bedtime  pantoprazole  Injectable 40 milliGRAM(s) IV Push daily  polyethylene glycol 3350 17 Gram(s) Oral daily  senna 2 Tablet(s) Oral at bedtime    MEDICATIONS  (PRN):  acetaminophen     Tablet .. 650 milliGRAM(s) Oral every 6 hours PRN Temp greater or equal to 38C (100.4F), Mild Pain (1 - 3)  aluminum hydroxide/magnesium hydroxide/simethicone Suspension 30 milliLiter(s) Oral every 4 hours PRN Dyspepsia  bisacodyl Suppository 10 milliGRAM(s) Rectal at bedtime PRN Constipation  LORazepam   Injectable 2 milliGRAM(s) IV Push every 2 hours PRN seizure  melatonin 3 milliGRAM(s) Oral at bedtime PRN Insomnia  ondansetron Injectable 4 milliGRAM(s) IV Push every 8 hours PRN Nausea and/or Vomiting  zolpidem 5 milliGRAM(s) Oral at bedtime PRN Insomnia          PHYSICAL EXAM:  GENERAL: alert  CHEST/LUNG: Clear to percussion bilaterally; No rales, rhonchi, wheezing, or rubs  HEART: Regular rate and rhythm; No murmurs, rubs, or gallops  ABDOMEN: Soft, Nontender, Nondistended; Bowel sounds present  EXTREMITIES: edema +    Care Discussed with Consultants/Other Providers [ ] YES  [ ] NO
67yPatient is a 67y old  Female who presents with a chief complaint of Adnominal bloating/pain, constipation and UTI (18 Dec 2022 17:14)      Interval history:  No complaints.  Slater removed  Had episode of hypothermia last night. Pt says that this happens at times. Warming blanket placed and she feels fine now. Wants to go home. No blanket in place now.  Chest xray done and w/o pna.        Antimicrobials:    ciprofloxacin   IVPB 400 every 12 hours (12/18-->)      Vital Signs Last 24 Hrs  T(F): 97.5 (12-21-22 @ 05:11), Max: 97.5 (12-21-22 @ 00:30)  HR: 85 (12-21-22 @ 05:11)  BP: 145/61 (12-21-22 @ 05:11)  RR: 18 (12-21-22 @ 05:11)  SpO2: 95% (12-21-22 @ 05:11) (95% - 97%)  Wt(kg): --      GEN: in bed. awake and alert  HEENT: EOMI, MMM  Neck: Supple  CV: S1S2 RRR  Resp: CTA b/l  ABD: + BS, Soft, NT/ND  Ext: no c/c/e                          9.9    7.17  )-----------( 186      ( 21 Dec 2022 06:20 )             31.7 12-21    142  |  106  |  7   ----------------------------<  82  3.8   |  26  |  0.76  Ca    9.8      21 Dec 2022 06:20Phos  3.5     12-21Mg     1.70     12-21                          9.4    5.93  )-----------( 155      ( 19 Dec 2022 04:50 )             31.0 12-19    144  |  108  |  6   ----------------------------<  79  3.6   |  26  |  0.81  Ca    9.5      19 Dec 2022 04:50Phos  2.7     12-19Mg     1.80     12-19                  RECENT CULTURES:    Culture - Blood (collected 19 Dec 2022 22:39)  Source: .Blood Blood-Venous  Preliminary Report (21 Dec 2022 03:02):    No growth to date.    Culture - Blood (collected 19 Dec 2022 22:30)  Source: .Blood Blood-Peripheral  Preliminary Report (21 Dec 2022 03:02):    No growth to date.          12-16 @ 01:45  .Blood Blood-Peripheral  --  --  --    No growth to date.  --  12-16 @ 01:30  .Blood Blood-Venous  --  --  --    No growth to date.  --  12-16 @ 01:26  Clean Catch Clean Catch (Midstream)  Citrobacter freundii  Citrobacter freundii  KIRK    >100,000 CFU/ml Citrobacter freundii  --      Radiology:  < from: CT Head No Cont (12.16.22 @ 15:16) >  IMPRESSION:  No acute intracranial hemorrhage, mass effect or midline shift.    < end of copied text >      < from: CT Abdomen and Pelvis w/ IV Cont (12.16.22 @ 09:11) >  IMPRESSION:  Moderate stool. No acute abnormality in the abdomen and pelvis.    < end of copied text >      < from: Xray Chest 1 View- PORTABLE-Routine (Xray Chest 1 View- PORTABLE-Routine .) (12.21.22 @ 11:17) >  FINDINGS:  Small layering effusions. Visible lungs are clear. The heart is not   enlarged and there is no pneumothorax.    < end of copied text >  
Patient is a 67y old  Female who presents with a chief complaint of Adnominal bloating/pain, constipation and UTI (18 Dec 2022 16:50)    Date of servie : 12-18-22 @ 17:14  INTERVAL HPI/OVERNIGHT EVENTS:  T(C): 33.9 (12-18-22 @ 14:50), Max: 36.3 (12-17-22 @ 21:14)  HR: 74 (12-18-22 @ 14:59) (53 - 76)  BP: 149/64 (12-18-22 @ 14:59) (79/42 - 149/64)  RR: 18 (12-18-22 @ 14:59) (18 - 18)  SpO2: 100% (12-18-22 @ 14:59) (96% - 100%)  Wt(kg): --  I&O's Summary    17 Dec 2022 07:01  -  18 Dec 2022 07:00  --------------------------------------------------------  IN: 0 mL / OUT: 1100 mL / NET: -1100 mL        LABS:                        9.8    5.98  )-----------( 141      ( 18 Dec 2022 05:51 )             32.1     12-18    141  |  105  |  8   ----------------------------<  78  3.3<L>   |  25  |  0.85    Ca    9.2      18 Dec 2022 05:51  Phos  3.0     12-18  Mg     1.90     12-18    TPro  6.3  /  Alb  3.1<L>  /  TBili  0.3  /  DBili  x   /  AST  38<H>  /  ALT  31  /  AlkPhos  95  12-18        CAPILLARY BLOOD GLUCOSE      POCT Blood Glucose.: 75 mg/dL (18 Dec 2022 12:10)  POCT Blood Glucose.: 75 mg/dL (18 Dec 2022 05:58)  POCT Blood Glucose.: 70 mg/dL (17 Dec 2022 23:43)            MEDICATIONS  (STANDING):  atorvastatin 10 milliGRAM(s) Oral at bedtime  bisacodyl Suppository 10 milliGRAM(s) Rectal once  ciprofloxacin   IVPB 400 milliGRAM(s) IV Intermittent every 12 hours  dextrose 5% + sodium chloride 0.45%. 1000 milliLiter(s) (75 mL/Hr) IV Continuous <Continuous>  dextrose 5% + sodium chloride 0.45%. 1000 milliLiter(s) (75 mL/Hr) IV Continuous <Continuous>  dextrose 5% + sodium chloride 0.45%. 1000 milliLiter(s) (75 mL/Hr) IV Continuous <Continuous>  enoxaparin Injectable 40 milliGRAM(s) SubCutaneous every 24 hours  lacosamide IVPB 100 milliGRAM(s) IV Intermittent every 12 hours  levothyroxine Injectable 60 MICROGram(s) IV Push at bedtime  pantoprazole  Injectable 40 milliGRAM(s) IV Push daily  polyethylene glycol 3350 17 Gram(s) Oral daily  senna 2 Tablet(s) Oral at bedtime    MEDICATIONS  (PRN):  acetaminophen     Tablet .. 650 milliGRAM(s) Oral every 6 hours PRN Temp greater or equal to 38C (100.4F), Mild Pain (1 - 3)  aluminum hydroxide/magnesium hydroxide/simethicone Suspension 30 milliLiter(s) Oral every 4 hours PRN Dyspepsia  LORazepam   Injectable 2 milliGRAM(s) IV Push every 2 hours PRN seizure  melatonin 3 milliGRAM(s) Oral at bedtime PRN Insomnia  ondansetron Injectable 4 milliGRAM(s) IV Push every 8 hours PRN Nausea and/or Vomiting  zolpidem 5 milliGRAM(s) Oral at bedtime PRN Insomnia          PHYSICAL EXAM:  GENERAL: NAD, well-groomed, well-developed  HEAD:  Atraumatic, Normocephalic  CHEST/LUNG: Clear to percussion bilaterally; No rales, rhonchi, wheezing, or rubs  HEART: Regular rate and rhythm; No murmurs, rubs, or gallops  ABDOMEN: Soft, Nontender, Nondistended; Bowel sounds present  EXTREMITIES:  2+ Peripheral Pulses, No clubbing, cyanosis, or edema  LYMPH: No lymphadenopathy noted  SKIN: No rashes or lesions    Care Discussed with Consultants/Other Providers [ ] YES  [ ] NO
Patient is a 67y old  Female who presents with a chief complaint of Adnominal bloating/pain, constipation and UTI (20 Dec 2022 15:15)    Date of servie : 12-20-22 @ 18:26  INTERVAL HPI/OVERNIGHT EVENTS:  T(C): 36.4 (12-20-22 @ 12:53), Max: 36.6 (12-20-22 @ 02:11)  HR: 57 (12-20-22 @ 12:53) (57 - 84)  BP: 151/76 (12-20-22 @ 12:53) (138/77 - 156/83)  RR: 18 (12-20-22 @ 12:53) (18 - 18)  SpO2: 100% (12-20-22 @ 12:53) (94% - 100%)  Wt(kg): --  I&O's Summary    19 Dec 2022 07:01  -  20 Dec 2022 07:00  --------------------------------------------------------  IN: 0 mL / OUT: 2050 mL / NET: -2050 mL    20 Dec 2022 07:01  -  20 Dec 2022 18:26  --------------------------------------------------------  IN: 320 mL / OUT: 450 mL / NET: -130 mL        LABS:                        9.4    5.93  )-----------( 155      ( 19 Dec 2022 04:50 )             31.0     12-19    144  |  108<H>  |  6<L>  ----------------------------<  79  3.6   |  26  |  0.81    Ca    9.5      19 Dec 2022 04:50  Phos  2.7     12-19  Mg     1.80     12-19          CAPILLARY BLOOD GLUCOSE      POCT Blood Glucose.: 96 mg/dL (20 Dec 2022 17:59)  POCT Blood Glucose.: 103 mg/dL (20 Dec 2022 12:19)  POCT Blood Glucose.: 106 mg/dL (19 Dec 2022 22:08)            MEDICATIONS  (STANDING):  atorvastatin 10 milliGRAM(s) Oral at bedtime  ciprofloxacin   IVPB 400 milliGRAM(s) IV Intermittent every 12 hours  dextrose 5% + sodium chloride 0.45%. 1000 milliLiter(s) (75 mL/Hr) IV Continuous <Continuous>  dextrose 5% + sodium chloride 0.45%. 1000 milliLiter(s) (75 mL/Hr) IV Continuous <Continuous>  enoxaparin Injectable 40 milliGRAM(s) SubCutaneous every 24 hours  lacosamide IVPB 100 milliGRAM(s) IV Intermittent every 12 hours  levothyroxine Injectable 60 MICROGram(s) IV Push at bedtime  pantoprazole  Injectable 40 milliGRAM(s) IV Push daily  polyethylene glycol 3350 17 Gram(s) Oral daily  senna 2 Tablet(s) Oral at bedtime    MEDICATIONS  (PRN):  acetaminophen     Tablet .. 650 milliGRAM(s) Oral every 6 hours PRN Temp greater or equal to 38C (100.4F), Mild Pain (1 - 3)  aluminum hydroxide/magnesium hydroxide/simethicone Suspension 30 milliLiter(s) Oral every 4 hours PRN Dyspepsia  bisacodyl Suppository 10 milliGRAM(s) Rectal at bedtime PRN Constipation  LORazepam   Injectable 2 milliGRAM(s) IV Push every 2 hours PRN seizure  melatonin 3 milliGRAM(s) Oral at bedtime PRN Insomnia  ondansetron Injectable 4 milliGRAM(s) IV Push every 8 hours PRN Nausea and/or Vomiting  zolpidem 5 milliGRAM(s) Oral at bedtime PRN Insomnia          PHYSICAL EXAM:  GENERAL: frail  CHEST/LUNG: Clear to percussion bilaterally; No rales, rhonchi, wheezing, or rubs  HEART: Regular rate and rhythm; No murmurs, rubs, or gallops  ABDOMEN: Soft, Nontender, Nondistended; Bowel sounds present  EXTREMITIES:  edema +    Care Discussed with Consultants/Other Providers [x ] YES  [ ] NO
Patient is a 67y old  Female who presents with a chief complaint of Adnominal bloating/pain, constipation and UTI (21 Dec 2022 13:44)    Date of servie : 22 @ 15:16  INTERVAL HPI/OVERNIGHT EVENTS:  T(C): 36.4 (22 @ 05:11), Max: 36.4 (22 @ 00:30)  HR: 85 (22 @ 05:11) (66 - 85)  BP: 145/61 (22 @ 05:11) (139/74 - 145/61)  RR: 18 (22 @ 05:11) (18 - 18)  SpO2: 95% (22 @ 05:11) (95% - 97%)  Wt(kg): --  I&O's Summary    20 Dec 2022 07:01  -  21 Dec 2022 07:00  --------------------------------------------------------  IN: 320 mL / OUT: 450 mL / NET: -130 mL        LABS:                        9.9    7.17  )-----------( 186      ( 21 Dec 2022 06:20 )             31.7         142  |  106  |  7   ----------------------------<  82  3.8   |  26  |  0.76    Ca    9.8      21 Dec 2022 06:20  Phos  3.5       Mg     1.70             Urinalysis Basic - ( 21 Dec 2022 06:00 )    Color: Colorless / Appearance: Clear / S.009 / pH: x  Gluc: x / Ketone: Negative  / Bili: Negative / Urobili: <2 mg/dL   Blood: x / Protein: Negative / Nitrite: Negative   Leuk Esterase: Negative / RBC: x / WBC x   Sq Epi: x / Non Sq Epi: x / Bacteria: x      CAPILLARY BLOOD GLUCOSE      POCT Blood Glucose.: 96 mg/dL (21 Dec 2022 12:20)  POCT Blood Glucose.: 83 mg/dL (21 Dec 2022 08:28)  POCT Blood Glucose.: 96 mg/dL (20 Dec 2022 17:59)        Urinalysis Basic - ( 21 Dec 2022 06:00 )    Color: Colorless / Appearance: Clear / S.009 / pH: x  Gluc: x / Ketone: Negative  / Bili: Negative / Urobili: <2 mg/dL   Blood: x / Protein: Negative / Nitrite: Negative   Leuk Esterase: Negative / RBC: x / WBC x   Sq Epi: x / Non Sq Epi: x / Bacteria: x        MEDICATIONS  (STANDING):  atorvastatin 10 milliGRAM(s) Oral at bedtime  ciprofloxacin   IVPB 400 milliGRAM(s) IV Intermittent every 12 hours  dextrose 5% + sodium chloride 0.45%. 1000 milliLiter(s) (75 mL/Hr) IV Continuous <Continuous>  dextrose 5% + sodium chloride 0.45%. 1000 milliLiter(s) (75 mL/Hr) IV Continuous <Continuous>  enoxaparin Injectable 40 milliGRAM(s) SubCutaneous every 24 hours  lacosamide IVPB 100 milliGRAM(s) IV Intermittent every 12 hours  levothyroxine Injectable 60 MICROGram(s) IV Push at bedtime  pantoprazole  Injectable 40 milliGRAM(s) IV Push daily  polyethylene glycol 3350 17 Gram(s) Oral daily  senna 2 Tablet(s) Oral at bedtime    MEDICATIONS  (PRN):  acetaminophen     Tablet .. 650 milliGRAM(s) Oral every 6 hours PRN Temp greater or equal to 38C (100.4F), Mild Pain (1 - 3)  aluminum hydroxide/magnesium hydroxide/simethicone Suspension 30 milliLiter(s) Oral every 4 hours PRN Dyspepsia  bisacodyl Suppository 10 milliGRAM(s) Rectal at bedtime PRN Constipation  LORazepam   Injectable 2 milliGRAM(s) IV Push every 2 hours PRN seizure  melatonin 3 milliGRAM(s) Oral at bedtime PRN Insomnia  ondansetron Injectable 4 milliGRAM(s) IV Push every 8 hours PRN Nausea and/or Vomiting  zolpidem 5 milliGRAM(s) Oral at bedtime PRN Insomnia          PHYSICAL EXAM:  GENERAL: NAD, well-groomed, well-developed  HEAD:  Atraumatic, Normocephalic  CHEST/LUNG: Clear to percussion bilaterally; No rales, rhonchi, wheezing, or rubs  HEART: Regular rate and rhythm; No murmurs, rubs, or gallops  ABDOMEN: Soft, Nontender, Nondistended; Bowel sounds present  EXTREMITIES:  2+ Peripheral Pulses, No clubbing, cyanosis, or edema  LYMPH: No lymphadenopathy noted  SKIN: No rashes or lesions    Care Discussed with Consultants/Other Providers [ ] YES  [ ] NO

## 2022-12-22 NOTE — DIETITIAN INITIAL EVALUATION ADULT - OTHER INFO
Per chart review 66 y/o F hx MS (nonambulatory, has baclofen pump - refilled 3 weeks ago), frequent UTIs, hospitalized for ?urosepsis this month at [facility], brought in by family member for complaints of abd pain, constipation and lethargy. admitted for cf urosepsis course complicated by witnessed seizure in the ed.    Patient seen at bedside. Reports poor PO intake in setting of lack of appetite and dislike Soft and Bite Sized food items. Food preferences explored and honored to encourage PO intake. Patient is amenable to Magic Cup 2x daily (580kcal, 18gm pro). Offered nutrition supplement, patient is not interested at this time.  Patient previously noted on regular diet, s/p Swallow Bedside Assessment 12/19, recommended Soft and Bite Sized with mildly thick liquids. Patient would like her diet advance to regular. Recommend Swallow re-evaul for possible diet advancement to maximize food choices. Denies chewing and swallowing difficulties. Pt states she hasn't had any BM since admission. Denies any other GI distress (nausea/vomiting/diarrhea). Ordered bowel regimen.

## 2022-12-22 NOTE — SWALLOW BEDSIDE ASSESSMENT ADULT - ASR SWALLOW RECOMMEND DIAG
objective testing not indicated at this time due to overt signs noted on thin liquids; will continue to follow at bedside for diet tolerance/advancement and need for objective testing
Not indicated as patient demonstrates functional pharyngeal swallow per this assessment

## 2022-12-22 NOTE — SWALLOW BEDSIDE ASSESSMENT ADULT - ORAL PREPARATORY PHASE
Within functional limits mildly increased time required for complete mastication, however judged to be functional/Within functional limits

## 2022-12-22 NOTE — SWALLOW BEDSIDE ASSESSMENT ADULT - ADDITIONAL RECOMMENDATIONS
1. Continue to monitor patient and reconsult SLP as indicated  2. SLP follow up not warranted at this time as the patient is demonstrating functional oropharyngeal swallow for regular diet/thin liquids
This department will continue to follow the patient for diet tolerance/advancement during this admission, as schedule permits

## 2022-12-22 NOTE — PROGRESS NOTE ADULT - REASON FOR ADMISSION
Adnominal bloating/pain, constipation and UTI

## 2022-12-22 NOTE — DIETITIAN INITIAL EVALUATION ADULT - PERTINENT MEDS FT
MEDICATIONS  (STANDING):  atorvastatin 10 milliGRAM(s) Oral at bedtime  ciprofloxacin   IVPB 400 milliGRAM(s) IV Intermittent every 12 hours  dextrose 5% + sodium chloride 0.45%. 1000 milliLiter(s) (75 mL/Hr) IV Continuous <Continuous>  dextrose 5% + sodium chloride 0.45%. 1000 milliLiter(s) (75 mL/Hr) IV Continuous <Continuous>  enoxaparin Injectable 40 milliGRAM(s) SubCutaneous every 24 hours  lacosamide IVPB 100 milliGRAM(s) IV Intermittent every 12 hours  levothyroxine Injectable 60 MICROGram(s) IV Push at bedtime  pantoprazole  Injectable 40 milliGRAM(s) IV Push daily  polyethylene glycol 3350 17 Gram(s) Oral daily  senna 2 Tablet(s) Oral at bedtime    MEDICATIONS  (PRN):  acetaminophen     Tablet .. 650 milliGRAM(s) Oral every 6 hours PRN Temp greater or equal to 38C (100.4F), Mild Pain (1 - 3)  aluminum hydroxide/magnesium hydroxide/simethicone Suspension 30 milliLiter(s) Oral every 4 hours PRN Dyspepsia  bisacodyl Suppository 10 milliGRAM(s) Rectal at bedtime PRN Constipation  LORazepam   Injectable 2 milliGRAM(s) IV Push every 2 hours PRN seizure  melatonin 3 milliGRAM(s) Oral at bedtime PRN Insomnia  ondansetron Injectable 4 milliGRAM(s) IV Push every 8 hours PRN Nausea and/or Vomiting  zolpidem 5 milliGRAM(s) Oral at bedtime PRN Insomnia

## 2022-12-22 NOTE — DIETITIAN INITIAL EVALUATION ADULT - NS FNS WEIGHT CHANGE REASON
Patient unable to provide her UBW, however she believes she has lost some weight. Pt unable to quantify the amount./unintentional

## 2022-12-22 NOTE — DIETITIAN INITIAL EVALUATION ADULT - PERTINENT LABORATORY DATA
12-22    139  |  102  |  10  ----------------------------<  108<H>  3.5   |  27  |  0.76    Ca    9.7      22 Dec 2022 05:58  Phos  3.4     12-22  Mg     1.60     12-22    POCT Blood Glucose.: 87 mg/dL (12-21-22 @ 22:52)

## 2022-12-22 NOTE — DIETITIAN INITIAL EVALUATION ADULT - ORAL INTAKE PTA/DIET HISTORY
Patient reports appetite has been decreasing 1 wk PTA due to pt was too lethargic, and lack of appetite. Per diet recall, patient's diet lacks of iron. Patient only eats chicken and fish few times a week. Patient reports of having difficulty chewing her food if she is in a rush, pt states she is taking her "time to chew food". Patient denies constipation PTA, reports of having BM every other day. 
WDL

## 2022-12-22 NOTE — SWALLOW BEDSIDE ASSESSMENT ADULT - COMMENTS
Consult received and chart reviewed. Patient seen at bedside this AM for initial assessment of swallow function, at which time she was alert, oriented x2, cooperative, and denied pain. Patient on supplemental O2 via NC, .5 L. Patient with EEG monitor in place throughout assessment. Patient demonstrated ability to follow low level commands, with prompting. Vocal quality and speech production WFL.    Per charting, the patient is a "66 y/o F hx MS (nonambulatory, has baclofen pump - refilled 3 weeks ago), frequent UTIs, hospitalized for ?urosepsis this month at [facility], brought in by family member for complaints of abd pain, constipation and lethargy. admitted for cf urosepsis course complicated by witnessed seizure in the ed. "    WBC WFL.  No CXR administered  CT Head 12/16/22 revealed "No acute intracranial hemorrhage, mass effect or midline shift."    Discussed results and recommendations with the patient, RN, and called out to ACP.
per charting - 68 y/o F hx MS (nonambulatory, has baclofen pump - refilled 3 weeks ago), frequent UTIs, hospitalized for ?urosepsis this month at [facility], brought in by family member for complaints of abd pain, constipation and lethargy. admitted for cf urosepsis course complicated by witnessed seizure in the ed.   WBC WFL.  CXR IMPRESSION: Small bilateral pleural effusions  CT Head 12/16/22 revealed "No acute intracranial hemorrhage, mass effect or midline shift."    Orders for repeat evaluation received as patient is unhappy with currently orderd diet. Pt is known to this department and was seen for swallow evaluation on 12/19 at which time soft and bite sized with mildly thick liquid was recommended. Please see full report for details. Pt is received in bed, AAOX3, follows all commands and is cooperative with assessment.  She reports intermittent difficulty chewing at home, but compensatory strategies such as small bites and slow rate of intake are effective. Speech is intelligible and vocal quality is WFL.

## 2022-12-23 ENCOUNTER — TRANSCRIPTION ENCOUNTER (OUTPATIENT)
Age: 67
End: 2022-12-23

## 2022-12-23 VITALS
DIASTOLIC BLOOD PRESSURE: 69 MMHG | RESPIRATION RATE: 18 BRPM | TEMPERATURE: 98 F | OXYGEN SATURATION: 95 % | HEART RATE: 81 BPM | SYSTOLIC BLOOD PRESSURE: 144 MMHG

## 2022-12-23 LAB
ANION GAP SERPL CALC-SCNC: 10 MMOL/L — SIGNIFICANT CHANGE UP (ref 7–14)
BUN SERPL-MCNC: 16 MG/DL — SIGNIFICANT CHANGE UP (ref 7–23)
CALCIUM SERPL-MCNC: 9.8 MG/DL — SIGNIFICANT CHANGE UP (ref 8.4–10.5)
CHLORIDE SERPL-SCNC: 101 MMOL/L — SIGNIFICANT CHANGE UP (ref 98–107)
CO2 SERPL-SCNC: 27 MMOL/L — SIGNIFICANT CHANGE UP (ref 22–31)
CREAT SERPL-MCNC: 0.77 MG/DL — SIGNIFICANT CHANGE UP (ref 0.5–1.3)
EGFR: 86 ML/MIN/1.73M2 — SIGNIFICANT CHANGE UP
GLUCOSE SERPL-MCNC: 72 MG/DL — SIGNIFICANT CHANGE UP (ref 70–99)
HCT VFR BLD CALC: 32.5 % — LOW (ref 34.5–45)
HGB BLD-MCNC: 10.1 G/DL — LOW (ref 11.5–15.5)
MAGNESIUM SERPL-MCNC: 1.8 MG/DL — SIGNIFICANT CHANGE UP (ref 1.6–2.6)
MCHC RBC-ENTMCNC: 28.5 PG — SIGNIFICANT CHANGE UP (ref 27–34)
MCHC RBC-ENTMCNC: 31.1 GM/DL — LOW (ref 32–36)
MCV RBC AUTO: 91.8 FL — SIGNIFICANT CHANGE UP (ref 80–100)
NRBC # BLD: 0 /100 WBCS — SIGNIFICANT CHANGE UP (ref 0–0)
NRBC # FLD: 0 K/UL — SIGNIFICANT CHANGE UP (ref 0–0)
PHOSPHATE SERPL-MCNC: 4.1 MG/DL — SIGNIFICANT CHANGE UP (ref 2.5–4.5)
PLATELET # BLD AUTO: 183 K/UL — SIGNIFICANT CHANGE UP (ref 150–400)
POTASSIUM SERPL-MCNC: 4.2 MMOL/L — SIGNIFICANT CHANGE UP (ref 3.5–5.3)
POTASSIUM SERPL-SCNC: 4.2 MMOL/L — SIGNIFICANT CHANGE UP (ref 3.5–5.3)
RBC # BLD: 3.54 M/UL — LOW (ref 3.8–5.2)
RBC # FLD: 17.6 % — HIGH (ref 10.3–14.5)
SODIUM SERPL-SCNC: 138 MMOL/L — SIGNIFICANT CHANGE UP (ref 135–145)
WBC # BLD: 7.03 K/UL — SIGNIFICANT CHANGE UP (ref 3.8–10.5)
WBC # FLD AUTO: 7.03 K/UL — SIGNIFICANT CHANGE UP (ref 3.8–10.5)

## 2022-12-23 RX ORDER — ACETAMINOPHEN 500 MG
2 TABLET ORAL
Qty: 0 | Refills: 0 | DISCHARGE
Start: 2022-12-23

## 2022-12-23 RX ORDER — CIPROFLOXACIN LACTATE 400MG/40ML
1 VIAL (ML) INTRAVENOUS
Qty: 2 | Refills: 0
Start: 2022-12-23 | End: 2022-12-23

## 2022-12-23 RX ORDER — LACOSAMIDE 50 MG/1
1 TABLET ORAL
Qty: 60 | Refills: 0
Start: 2022-12-23 | End: 2023-01-21

## 2022-12-23 RX ORDER — TRAZODONE HCL 50 MG
0.5 TABLET ORAL
Qty: 0 | Refills: 0 | DISCHARGE

## 2022-12-23 RX ORDER — LACOSAMIDE 50 MG/1
1 TABLET ORAL
Qty: 0 | Refills: 0 | DISCHARGE

## 2022-12-23 RX ORDER — TRIAMTERENE/HYDROCHLOROTHIAZID 75 MG-50MG
1 TABLET ORAL
Qty: 0 | Refills: 0 | DISCHARGE

## 2022-12-23 RX ORDER — LEVETIRACETAM 250 MG/1
1 TABLET, FILM COATED ORAL
Qty: 0 | Refills: 0 | DISCHARGE

## 2022-12-23 RX ADMIN — Medication 200 MILLIGRAM(S): at 05:36

## 2022-12-23 RX ADMIN — LACOSAMIDE 120 MILLIGRAM(S): 50 TABLET ORAL at 05:38

## 2022-12-23 NOTE — CHART NOTE - NSCHARTNOTESELECT_GEN_ALL_CORE
Event Note
Hypothermia/Event Note
Event Note
Event Note
Neurology/Event Note
Neurosurgery/Event Note

## 2022-12-23 NOTE — DISCHARGE NOTE PROVIDER - NSDCCPCAREPLAN_GEN_ALL_CORE_FT
PRINCIPAL DISCHARGE DIAGNOSIS  Diagnosis: Acute UTI  Assessment and Plan of Treatment: You were found to have systemic inflammatory reaction due to a bacterial infection of your bladder. You were treated with antibiotics, which improved and ultiumately resolved your infection. please continue one more day of antibiotics after discharge from the hospital, as prescribed. Please follow up with your primary care provider in 1-2 weeks following discharge from the hospital for continued monitoring and management.      SECONDARY DISCHARGE DIAGNOSES  Diagnosis: Seizure  Assessment and Plan of Treatment: You had a seizure in the emergency room. You were evaluated by neurology and found to have no life threatening cause of your seizure. It is very likely you had this seizure as a result of infection. Please continue to take Vimpat 100mg twice a day as directed. Please follow up wiht your neurologist as an outpatient. If you do not have a neurologist, please call the number listed under the follow up section to schedule an appointment for follow up.    Diagnosis: Multiple sclerosis  Assessment and Plan of Treatment: Please continue your current medication regimen and follow up with your primary care provider for continued monitoring and care.    Diagnosis: Hypertension  Assessment and Plan of Treatment: Continue blood pressure medication regimen as directed. Monitor for any visual changes, headaches or dizziness.  Monitor blood pressure regularly.  Follow up with your PCP for further management for high blood pressure.    Diagnosis: Hypothyroidism  Assessment and Plan of Treatment: Please continue your current medication regimen and follow up with your primary care provider for continued monitoring and care.

## 2022-12-23 NOTE — DISCHARGE NOTE NURSING/CASE MANAGEMENT/SOCIAL WORK - PATIENT PORTAL LINK FT
You can access the FollowMyHealth Patient Portal offered by [facility] by registering at the following website: [email or web address]. By joining [facility]’s FollowMyHealth portal, you will also be able to view your health information using other applications (apps) compatible with our system.

## 2022-12-23 NOTE — DISCHARGE NOTE NURSING/CASE MANAGEMENT/SOCIAL WORK - NSDCPEFALRISK_GEN_ALL_CORE
For information on Fall & Injury Prevention, visit: [email or web address] OR  [email or web address] OR  https://www.cdc.gov/steadi/patient.html

## 2022-12-23 NOTE — DISCHARGE NOTE NURSING/CASE MANAGEMENT/SOCIAL WORK - NSDCFUADDAPPT_GEN_ALL_CORE_FT
Please follow up with Dr Magaly Hopson ([phone]) within 1-2 weeks of discharge from the hospital for continued monitoring and care. Please follow up with your neurologist in 1-2 weeks following discharge from the hospital for continued monitoring and management. If you do not have one please refer to the information for the neurology clinic to establish yourself as a patient. ([address] [place] [address] [phone])

## 2022-12-23 NOTE — DISCHARGE NOTE PROVIDER - HOSPITAL COURSE
68 y/o F hx MS (nonambulatory, has baclofen pump - refilled 3 weeks ago), frequent UTIs, hospitalized for ?urosepsis this month at [facility], brought in by family member for complaints of abd pain, constipation and lethargy. Admitted for Urosepsis c/b witnessed seizure in ED.    Sepsis secondary to UTI.   - Hypothermic, tachycardiac, ua positive. CT AP without acute pathology. no stone or obstruction.  - s/p CTX + zosyn; transitioned to Cipro 400mg IV q12 x 7d   - Passed TOV 12/19   - Discharge on Cipro PO 500mg BID   - Hypothermic event 12/20 +12/21 - improved s/p bear hugger   - Repeat urine culture NGTD / UA clear    Seizure  - History seizure during previous hospitalization  - previously on Keppra, stopped outpaitnet  - One episode of seizure witnessed in the ED --> terminated without intervention  - seizure precaution, suction precaution and continuous pulse ox for now  - NeuroSx Baclofen pump interrogation 12/16 -->  - CT Head neg  - EEG- Moderate nonspecific diffuse or multifocal cerebral dysfunction. No seizure seen.   - Cleared for discharge    Multiple sclerosis  - on baclofen pump    Hypothyroidism  - TSH wnl, c/w Synthroid    Hypertension  - Hold amlodipine given soft bp    Dispo: Home with Home PT    On 12/23/22, discussed with Dr. Veloz, patient is medically cleared and optimized for discharge today. All medications were reviewed with attending, and sent to mutually agreed upon pharmacy.  Reviewed discharge medications with patient; All new medications requiring new prescription sent to pharmacy of patients choice. Reviewed need for prescription for previous home medications and new prescriptions sent if requested. Patient in agreement and understands.        66 y/o F hx MS (nonambulatory, has baclofen pump - refilled 3 weeks ago), frequent UTIs, hospitalized for ?urosepsis this month at [facility], brought in by family member for complaints of abd pain, constipation and lethargy. Admitted for Urosepsis c/b witnessed seizure in ED.    Sepsis secondary to UTI.   - Hypothermic, tachycardiac, ua positive. CT AP without acute pathology. no stone or obstruction.  - s/p CTX + zosyn; transitioned to Cipro 400mg IV q12 x 7d   - Passed TOV 12/19   - Discharge on Cipro PO 500mg BID   - Hypothermic event 12/20 +12/21 - improved s/p bear hugger   - Repeat urine culture NGTD / UA clear - ID cleared     Seizure  - History seizure during previous hospitalization  - previously on Keppra, stopped outpaitnet  - One episode of seizure witnessed in the ED --> terminated without intervention  - seizure precaution, suction precaution and continuous pulse ox for now  - NeuroSx Baclofen pump interrogation 12/16 -->  - CT Head neg  - EEG- Moderate nonspecific diffuse or multifocal cerebral dysfunction. No seizure seen.   - Cleared for discharge    Multiple sclerosis  - on baclofen pump    Hypothyroidism  - TSH wnl, c/w Synthroid    Hypertension  - Hold amlodipine given soft bp    Dispo: Home with Home PT    On 12/23/22, discussed with Dr. Veloz, patient is medically cleared and optimized for discharge today. All medications were reviewed with attending, and sent to mutually agreed upon pharmacy.  Reviewed discharge medications with patient; All new medications requiring new prescription sent to pharmacy of patients choice. Reviewed need for prescription for previous home medications and new prescriptions sent if requested. Patient in agreement and understands.

## 2022-12-23 NOTE — DISCHARGE NOTE PROVIDER - NSFOLLOWUPCLINICS_GEN_ALL_ED_FT
Neurology Epilepsy Clinic  Neurology Epilepsy  [address] [place] [address], [address] [place] [id number]  Phone: [phone]  Fax: [phone]

## 2022-12-23 NOTE — CHART NOTE - NSCHARTNOTEFT_GEN_A_CORE
Discussed with primary team, seizure likely in the setting of sepsis and UTI. Continue Vimpat 100mg BID. Continue rest of care per primary team. Patient should follow up with neurology outpatient, either her own neurologist or we can recommend: [facility] [phone]. Neurology to sign off, please call as needed Discussed with primary team, seizure likely in the setting of sepsis and UTI. Continue Vimpat 100mg BID. Continue rest of care per primary team. Patient should follow up with neurology outpatient, either her own neurologist or we can recommend: [facility] [phone]. Neurology to sign off, please call as needed.  Thank you

## 2022-12-23 NOTE — DISCHARGE NOTE PROVIDER - NSDCMRMEDTOKEN_GEN_ALL_CORE_FT
ammonium lactate 12% topical cream: Apply topically to affected area once a day, As Needed  atorvastatin 10 mg oral tablet: 1 tab(s) orally once a day  clonazePAM 0.5 mg oral tablet: 1 tab(s) orally once a day (at bedtime), As Needed - for anxiety    ISTOP Reference #: [id number]  Quantity of 30 tablets for a 30-day supply dispensed 11/27/2022  Fleet Enema 19 g-7 g rectal enema: 133 milliliter(s) rectal every other day, to go to the bathroom  fluticasone 50 mcg/inh nasal spray: 1 spray(s) in each nostril once a day  ibandronate 150 mg oral tablet: 1 tab(s) orally once a month  lacosamide 100 mg oral tablet: 1 tab(s) orally 2 times a day    Patient&#x27;s aid states that patient stopped taking this medication  ISTOP Reference #: [id number]  Quantity of 60 tablets for a 30-day supply dispensed 11/11/2022  levETIRAcetam 500 mg oral tablet: 1 tab(s) orally 2 times a day    Patient&#x27;s aid states that patient stopped taking this medication  Per pharmacy, 90-day supply last dispensed 10/28/22  Magnesium OTC pain relief spray: Apply topically to affected area (knee) once a day, As Needed  pantoprazole 40 mg oral delayed release tablet: 1 tab(s) orally once a day  Synthroid 75 mcg (0.075 mg) oral tablet: 1 tab(s) orally once a day  traZODone 50 mg oral tablet: 0.5 tab(s) orally once a day (at bedtime)    Patient&#x27;s aid unable to verify if this is a current medication  triamterene-hydrochlorothiazide 37.5 mg-25 mg oral capsule: 1 cap(s) orally once a day  zolpidem 5 mg oral tablet: 1 tab(s) orally once a day (at bedtime)    ISTOP Reference #: [id number]  Quantity of 30 tablets for a 30-day supply dispensed 11/27/2022   acetaminophen 325 mg oral tablet: 2 tab(s) orally every 6 hours, As needed, Temp greater or equal to 38C (100.4F), Mild Pain (1 - 3)  ammonium lactate 12% topical cream: Apply topically to affected area once a day, As Needed  atorvastatin 10 mg oral tablet: 1 tab(s) orally once a day  ciprofloxacin 500 mg oral tablet: 1 tab(s) orally 2 times a day ( Dosin tab in PM , 1 tab AM , last dose  PM) MDD:2 tablets - 1000mg  clonazePAM 0.5 mg oral tablet: 1 tab(s) orally once a day (at bedtime), As Needed - for anxiety    ISTOP Reference #: [id number]  Quantity of 30 tablets for a 30-day supply dispensed 2022  Fleet Enema 19 g-7 g rectal enema: 133 milliliter(s) rectal every other day, to go to the bathroom  fluticasone 50 mcg/inh nasal spray: 1 spray(s) in each nostril once a day  ibandronate 150 mg oral tablet: 1 tab(s) orally once a month  lacosamide 100 mg oral tablet: 1 tab(s) orally 2 times a day MDD:2 tabs (200mg)  Magnesium OTC pain relief spray: Apply topically to affected area (knee) once a day, As Needed  pantoprazole 40 mg oral delayed release tablet: 1 tab(s) orally once a day  Synthroid 75 mcg (0.075 mg) oral tablet: 1 tab(s) orally once a day  zolpidem 5 mg oral tablet: 1 tab(s) orally once a day (at bedtime)    ISTOP Reference #: [id number]  Quantity of 30 tablets for a 30-day supply dispensed 2022

## 2022-12-23 NOTE — DISCHARGE NOTE PROVIDER - NSDCFUADDAPPT_GEN_ALL_CORE_FT
Please follow up with Dr Magaly Hopson ([phone]) within 1-2 weeks of discharge from the hospital for continued monitoring and care. Please follow up with your neurologist in 1-2 weeks following discharge from the hospital for continued monitoring and management. If you do not have one please refer to the information for the neurology clinic to establish yourself as a patient. ([address] [place] [address] [phone])

## 2022-12-23 NOTE — DISCHARGE NOTE PROVIDER - CARE PROVIDER_API CALL
Magaly Hopson  INTERNAL MEDICINE  [address] [place] [address]  Phone: [phone]  Fax: [phone]  Follow Up Time:

## 2022-12-25 LAB
CULTURE RESULTS: SIGNIFICANT CHANGE UP
CULTURE RESULTS: SIGNIFICANT CHANGE UP
SPECIMEN SOURCE: SIGNIFICANT CHANGE UP
SPECIMEN SOURCE: SIGNIFICANT CHANGE UP

## 2023-02-13 ENCOUNTER — INPATIENT (INPATIENT)
Facility: HOSPITAL | Age: 68
LOS: 5 days | Discharge: HOME | End: 2023-02-19
Attending: INTERNAL MEDICINE | Admitting: INTERNAL MEDICINE
Payer: MEDICARE

## 2023-02-13 VITALS
HEART RATE: 88 BPM | DIASTOLIC BLOOD PRESSURE: 88 MMHG | OXYGEN SATURATION: 97 % | TEMPERATURE: 97 F | SYSTOLIC BLOOD PRESSURE: 173 MMHG | HEIGHT: 66 IN | RESPIRATION RATE: 18 BRPM

## 2023-02-13 LAB
ALBUMIN SERPL ELPH-MCNC: 4 G/DL — SIGNIFICANT CHANGE UP (ref 3.3–5)
ALP SERPL-CCNC: 137 U/L — HIGH (ref 40–120)
ALT FLD-CCNC: 37 U/L — HIGH (ref 4–33)
ANION GAP SERPL CALC-SCNC: 10 MMOL/L — SIGNIFICANT CHANGE UP (ref 7–14)
APPEARANCE UR: CLEAR — SIGNIFICANT CHANGE UP
APTT BLD: 43.1 SEC — HIGH (ref 27–36.3)
AST SERPL-CCNC: 40 U/L — HIGH (ref 4–32)
BASOPHILS # BLD AUTO: 0.05 K/UL — SIGNIFICANT CHANGE UP (ref 0–0.2)
BASOPHILS NFR BLD AUTO: 0.8 % — SIGNIFICANT CHANGE UP (ref 0–2)
BILIRUB SERPL-MCNC: 0.2 MG/DL — SIGNIFICANT CHANGE UP (ref 0.2–1.2)
BILIRUB UR-MCNC: NEGATIVE — SIGNIFICANT CHANGE UP
BLOOD GAS VENOUS COMPREHENSIVE RESULT: SIGNIFICANT CHANGE UP
BUN SERPL-MCNC: 14 MG/DL — SIGNIFICANT CHANGE UP (ref 7–23)
CALCIUM SERPL-MCNC: 10.3 MG/DL — SIGNIFICANT CHANGE UP (ref 8.4–10.5)
CHLORIDE SERPL-SCNC: 101 MMOL/L — SIGNIFICANT CHANGE UP (ref 98–107)
CO2 SERPL-SCNC: 29 MMOL/L — SIGNIFICANT CHANGE UP (ref 22–31)
COLOR SPEC: SIGNIFICANT CHANGE UP
CREAT SERPL-MCNC: 0.74 MG/DL — SIGNIFICANT CHANGE UP (ref 0.5–1.3)
DIFF PNL FLD: NEGATIVE — SIGNIFICANT CHANGE UP
EGFR: 90 ML/MIN/1.73M2 — SIGNIFICANT CHANGE UP
EOSINOPHIL # BLD AUTO: 0.04 K/UL — SIGNIFICANT CHANGE UP (ref 0–0.5)
EOSINOPHIL NFR BLD AUTO: 0.7 % — SIGNIFICANT CHANGE UP (ref 0–6)
FLUAV AG NPH QL: SIGNIFICANT CHANGE UP
FLUBV AG NPH QL: SIGNIFICANT CHANGE UP
GLUCOSE SERPL-MCNC: 80 MG/DL — SIGNIFICANT CHANGE UP (ref 70–99)
GLUCOSE UR QL: NEGATIVE — SIGNIFICANT CHANGE UP
HCT VFR BLD CALC: 36.8 % — SIGNIFICANT CHANGE UP (ref 34.5–45)
HGB BLD-MCNC: 11.3 G/DL — LOW (ref 11.5–15.5)
IANC: 3.78 K/UL — SIGNIFICANT CHANGE UP (ref 1.8–7.4)
IMM GRANULOCYTES NFR BLD AUTO: 0.3 % — SIGNIFICANT CHANGE UP (ref 0–0.9)
INR BLD: 0.96 RATIO — SIGNIFICANT CHANGE UP (ref 0.88–1.16)
KETONES UR-MCNC: NEGATIVE — SIGNIFICANT CHANGE UP
LEUKOCYTE ESTERASE UR-ACNC: NEGATIVE — SIGNIFICANT CHANGE UP
LYMPHOCYTES # BLD AUTO: 1.62 K/UL — SIGNIFICANT CHANGE UP (ref 1–3.3)
LYMPHOCYTES # BLD AUTO: 27 % — SIGNIFICANT CHANGE UP (ref 13–44)
MAGNESIUM SERPL-MCNC: 1.7 MG/DL — SIGNIFICANT CHANGE UP (ref 1.6–2.6)
MCHC RBC-ENTMCNC: 28.8 PG — SIGNIFICANT CHANGE UP (ref 27–34)
MCHC RBC-ENTMCNC: 30.7 GM/DL — LOW (ref 32–36)
MCV RBC AUTO: 93.6 FL — SIGNIFICANT CHANGE UP (ref 80–100)
MONOCYTES # BLD AUTO: 0.5 K/UL — SIGNIFICANT CHANGE UP (ref 0–0.9)
MONOCYTES NFR BLD AUTO: 8.3 % — SIGNIFICANT CHANGE UP (ref 2–14)
NEUTROPHILS # BLD AUTO: 3.78 K/UL — SIGNIFICANT CHANGE UP (ref 1.8–7.4)
NEUTROPHILS NFR BLD AUTO: 62.9 % — SIGNIFICANT CHANGE UP (ref 43–77)
NITRITE UR-MCNC: NEGATIVE — SIGNIFICANT CHANGE UP
NRBC # BLD: 0 /100 WBCS — SIGNIFICANT CHANGE UP (ref 0–0)
NRBC # FLD: 0 K/UL — SIGNIFICANT CHANGE UP (ref 0–0)
PH UR: 7.5 — SIGNIFICANT CHANGE UP (ref 5–8)
PHOSPHATE SERPL-MCNC: 2.9 MG/DL — SIGNIFICANT CHANGE UP (ref 2.5–4.5)
PLATELET # BLD AUTO: 248 K/UL — SIGNIFICANT CHANGE UP (ref 150–400)
POTASSIUM SERPL-MCNC: 5 MMOL/L — SIGNIFICANT CHANGE UP (ref 3.5–5.3)
POTASSIUM SERPL-SCNC: 5 MMOL/L — SIGNIFICANT CHANGE UP (ref 3.5–5.3)
PROT SERPL-MCNC: 7.4 G/DL — SIGNIFICANT CHANGE UP (ref 6–8.3)
PROT UR-MCNC: NEGATIVE — SIGNIFICANT CHANGE UP
PROTHROM AB SERPL-ACNC: 11.1 SEC — SIGNIFICANT CHANGE UP (ref 10.5–13.4)
RBC # BLD: 3.93 M/UL — SIGNIFICANT CHANGE UP (ref 3.8–5.2)
RBC # FLD: 17.9 % — HIGH (ref 10.3–14.5)
RBC CASTS # UR COMP ASSIST: 1 /HPF — SIGNIFICANT CHANGE UP (ref 0–4)
RSV RNA NPH QL NAA+NON-PROBE: SIGNIFICANT CHANGE UP
SARS-COV-2 RNA SPEC QL NAA+PROBE: SIGNIFICANT CHANGE UP
SODIUM SERPL-SCNC: 140 MMOL/L — SIGNIFICANT CHANGE UP (ref 135–145)
SP GR SPEC: 1.01 — SIGNIFICANT CHANGE UP (ref 1.01–1.05)
TSH SERPL-MCNC: 2 UIU/ML — SIGNIFICANT CHANGE UP (ref 0.27–4.2)
UROBILINOGEN FLD QL: SIGNIFICANT CHANGE UP
WBC # BLD: 6.01 K/UL — SIGNIFICANT CHANGE UP (ref 3.8–10.5)
WBC # FLD AUTO: 6.01 K/UL — SIGNIFICANT CHANGE UP (ref 3.8–10.5)
WBC UR QL: 1 /HPF — SIGNIFICANT CHANGE UP (ref 0–5)

## 2023-02-13 PROCEDURE — 99285 EMERGENCY DEPT VISIT HI MDM: CPT

## 2023-02-13 PROCEDURE — 71045 X-RAY EXAM CHEST 1 VIEW: CPT | Mod: 26

## 2023-02-13 RX ORDER — SODIUM CHLORIDE 9 MG/ML
1000 INJECTION INTRAMUSCULAR; INTRAVENOUS; SUBCUTANEOUS ONCE
Refills: 0 | Status: COMPLETED | OUTPATIENT
Start: 2023-02-13 | End: 2023-02-13

## 2023-02-13 RX ORDER — LACOSAMIDE 50 MG/1
50 TABLET ORAL ONCE
Refills: 0 | Status: DISCONTINUED | OUTPATIENT
Start: 2023-02-13 | End: 2023-02-13

## 2023-02-13 RX ADMIN — LACOSAMIDE 50 MILLIGRAM(S): 50 TABLET ORAL at 20:43

## 2023-02-13 RX ADMIN — SODIUM CHLORIDE 1000 MILLILITER(S): 9 INJECTION INTRAMUSCULAR; INTRAVENOUS; SUBCUTANEOUS at 20:43

## 2023-02-13 NOTE — ED ADULT TRIAGE NOTE - CHIEF COMPLAINT QUOTE
Pt c/o altered mental status x 1 day. Sister is caretaker for patient, noted for past 1 day that she has been increasingly altered, also noticed cloudy urine. Denies fevers/chills, abd pain, weakness, n/v. PMHx MS, Hypothyroidism

## 2023-02-13 NOTE — ED PROVIDER NOTE - ATTENDING CONTRIBUTION TO CARE
I have personally performed a face to face medical and diagnostic evaluation of the patient. I have discussed with and reviewed the Resident's note and agree with the History, ROS, Physical Exam and MDM unless otherwise indicated. A brief summary of my personal evaluation and impression can be found below.    Terra KANG:  67-year-old female history of MS hypothyroidism seizures, presents with a chief complaint of altered mental status, sister at bedside is providing most of the history, sister reports last time that this happened it was a urinary tract infection, she has been confused over the last day, is normally A&Ox3 and is independent.  Patient's sister reports her temperature is normally low however today was running a little higher around 9798 which is unusual for her, no recent falls no trauma no rashes no physical complaints per patient no nausea no vomiting no chest pain no shortness of breath no sick contacts at home.  Patient is altered at initial assessment and cannot provide comprehensive history.    All other ROS negative, except as above and as per HPI and ROS section.    VITALS: Initial triage and subsequent vitals have been reviewed by me.  GEN APPEARANCE: WDWN, alert, non-toxic, NAD  HEAD: Atraumatic.  EYES: PERRLa, EOMI, vision grossly intact.   NECK: Supple  CV: RRR, S1S2, no c/r/m/g. Cap refill <2 seconds. No bruits.   LUNGS: CTAB. No abnormal breath sounds.  ABDOMEN: Soft, NTND. No guarding or rebound.   MSK/EXT: No spinal or extremity point tenderness. No CVA ttp. Pelvis stable. No peripheral edema.  NEURO: Alert, follows commands. Weight bearing normal. Speech normal. Sensation and motor normal x4 extremities.   SKIN: Warm, dry and intact. No rash.  PSYCH: confused     Plan/MDM: 67-year-old female history of MS hypothyroidism seizures, presents with a chief complaint of altered mental status, sister at bedside is providing most of the history, sister reports last time that this happened it was a urinary tract infection, she has been confused over the last day, is normally A&Ox3 and is independent.  Patient's sister reports her temperature is normally low however today was running a little higher around 9798 which is unusual for her, no recent falls no trauma no rashes no physical complaints per patient no nausea no vomiting no chest pain no shortness of breath no sick contacts at home.  Patient is altered at initial assessment and cannot provide comprehensive history. Exam vital signs stable nontoxic-appearing with physical exam as above, DDx concern for likely infectious versus metabolic etiology of patient's altered mental status, will check basic labs urine chest x-ray give meds as needed and reassess, ultrasound thyroid, postictal from possible seizure is possible given history however would have expected return to some mental status at this time, roopa CVA or bleed is less likely given similar history with infectious etiology in the past, however if initial work-up is negative we will consider getting a CT of the head, baclofen toxicity is also possibility in setting of possible failure, however would be a diagnosis of exclusion, anticipate admission after work-up in ED.  Patient does not have a fever here, less c/f meningitis

## 2023-02-13 NOTE — ED PROVIDER NOTE - OBJECTIVE STATEMENT
67Y F PMH MS, hypothyroid, seizure d/o presenting with AMS. Patient brought in by sister whom she lives with who reports increased confusion today - refusing PO including medications, saying "I want to go home" despite being at home. Typically A&Ox3-4 per sister. No recent vomiting, fevers, cough. No recent falls or other trauma. Pt admitted in December 2022 for UTI, sister is concerned for same today. Sister also notes patient typically runs low for temperature, typically 95-96F, was up to 97-98F today.

## 2023-02-13 NOTE — ED PROVIDER NOTE - CLINICAL SUMMARY MEDICAL DECISION MAKING FREE TEXT BOX
67Y F PMH MS, hypothyroid brought in by sister for evaluation of AMS x1 day. Patient pleasantly confused on initial evaluation, oriented to self and sister but not to place, event. VSS although sister notes h/o low core temps. Recent UTI treated here in December 2022, concern for same today. Will start broad sepsis work up including UA, cultures, CXR, RVP and plan for likely admit for suspected UTI. If no findings on infectious work up would consider head CT to pursue possible neurologic pathology as cause of AMS. Evening dose of Vimpat ordered as patient has not taken seizure meds today per sister.

## 2023-02-13 NOTE — ED ADULT NURSE NOTE - INTERVENTIONS DEFINITIONS
[place] to call system/Physically safe environment: no spills, clutter or unnecessary equipment/Stretcher in lowest position, wheels locked, appropriate side rails in place/Monitor for mental status changes and reorient to person, place, and time

## 2023-02-13 NOTE — ED ADULT NURSE NOTE - OBJECTIVE STATEMENT
Jason RN: Patient received to room 6, brought in by family for AMS. Per sister at bedside, pt. usually A&Ox3-4, today more confused/lethargic. Pt. only able to tell her name/birthday. Per sister, this is how pt. presented with previous UTIs. 18G IV placed to L forearm, labs drawn and sent. Medicated as per orders. NSR on cardiac monitor. Respirations even and unlabored.

## 2023-02-13 NOTE — ED PROVIDER NOTE - PRINCIPAL DIAGNOSIS
Chief Complaint: abdominal pain.    · Chief Complaint: The patient is a 68y Male complaining of abdominal pain.  · HPI Objective Statement: 68 year old male PMH HTN, A- FIb, Prostate Cancer, Splenic Infarct, H/O chronic cholecystitis,  S/P cholecystostomy tube and then Laparoscopic Cholecystectomy with Dr Kevin Meza on 10/18/021. Pt is experiencing LUQ pain, sudden onset this evening. He stopped taking his Xarelto for the past four days because he had a dental implant yesterday morning. no CP, no SOB, no fever, no chills, no urinary symptoms.    splenic infarc acute on ct after he stopped xarelto for dental implant , on IV heparin , and follow up labs , consider restarting xarelto
Altered mental status

## 2023-02-14 DIAGNOSIS — R56.9 UNSPECIFIED CONVULSIONS: ICD-10-CM

## 2023-02-14 DIAGNOSIS — R41.82 ALTERED MENTAL STATUS, UNSPECIFIED: ICD-10-CM

## 2023-02-14 DIAGNOSIS — E03.9 HYPOTHYROIDISM, UNSPECIFIED: ICD-10-CM

## 2023-02-14 DIAGNOSIS — G35 MULTIPLE SCLEROSIS: ICD-10-CM

## 2023-02-14 LAB
ANION GAP SERPL CALC-SCNC: 13 MMOL/L — SIGNIFICANT CHANGE UP (ref 7–14)
APAP SERPL-MCNC: <10 UG/ML — LOW (ref 15–25)
BUN SERPL-MCNC: 13 MG/DL — SIGNIFICANT CHANGE UP (ref 7–23)
CALCIUM SERPL-MCNC: 10.3 MG/DL — SIGNIFICANT CHANGE UP (ref 8.4–10.5)
CHLORIDE SERPL-SCNC: 101 MMOL/L — SIGNIFICANT CHANGE UP (ref 98–107)
CO2 SERPL-SCNC: 24 MMOL/L — SIGNIFICANT CHANGE UP (ref 22–31)
CREAT SERPL-MCNC: 0.69 MG/DL — SIGNIFICANT CHANGE UP (ref 0.5–1.3)
EGFR: 96 ML/MIN/1.73M2 — SIGNIFICANT CHANGE UP
ETHANOL SERPL-MCNC: <10 MG/DL — SIGNIFICANT CHANGE UP
GLUCOSE SERPL-MCNC: 78 MG/DL — SIGNIFICANT CHANGE UP (ref 70–99)
HCT VFR BLD CALC: 35.9 % — SIGNIFICANT CHANGE UP (ref 34.5–45)
HGB BLD-MCNC: 11.1 G/DL — LOW (ref 11.5–15.5)
MAGNESIUM SERPL-MCNC: 1.6 MG/DL — SIGNIFICANT CHANGE UP (ref 1.6–2.6)
MCHC RBC-ENTMCNC: 28.4 PG — SIGNIFICANT CHANGE UP (ref 27–34)
MCHC RBC-ENTMCNC: 30.9 GM/DL — LOW (ref 32–36)
MCV RBC AUTO: 91.8 FL — SIGNIFICANT CHANGE UP (ref 80–100)
NRBC # BLD: 0 /100 WBCS — SIGNIFICANT CHANGE UP (ref 0–0)
NRBC # FLD: 0 K/UL — SIGNIFICANT CHANGE UP (ref 0–0)
PHOSPHATE SERPL-MCNC: 3 MG/DL — SIGNIFICANT CHANGE UP (ref 2.5–4.5)
PLATELET # BLD AUTO: 258 K/UL — SIGNIFICANT CHANGE UP (ref 150–400)
POTASSIUM SERPL-MCNC: 4.3 MMOL/L — SIGNIFICANT CHANGE UP (ref 3.5–5.3)
POTASSIUM SERPL-SCNC: 4.3 MMOL/L — SIGNIFICANT CHANGE UP (ref 3.5–5.3)
RBC # BLD: 3.91 M/UL — SIGNIFICANT CHANGE UP (ref 3.8–5.2)
RBC # FLD: 17.7 % — HIGH (ref 10.3–14.5)
SALICYLATES SERPL-MCNC: <0.3 MG/DL — LOW (ref 15–30)
SODIUM SERPL-SCNC: 138 MMOL/L — SIGNIFICANT CHANGE UP (ref 135–145)
TOXICOLOGY SCREEN, DRUGS OF ABUSE, SERUM RESULT: SIGNIFICANT CHANGE UP
WBC # BLD: 9.05 K/UL — SIGNIFICANT CHANGE UP (ref 3.8–10.5)
WBC # FLD AUTO: 9.05 K/UL — SIGNIFICANT CHANGE UP (ref 3.8–10.5)

## 2023-02-14 PROCEDURE — 99223 1ST HOSP IP/OBS HIGH 75: CPT

## 2023-02-14 PROCEDURE — 70450 CT HEAD/BRAIN W/O DYE: CPT | Mod: 26,MA

## 2023-02-14 RX ORDER — ZOLPIDEM TARTRATE 10 MG/1
1 TABLET ORAL
Qty: 0 | Refills: 0 | DISCHARGE

## 2023-02-14 RX ORDER — SODIUM CHLORIDE 9 MG/ML
1000 INJECTION, SOLUTION INTRAVENOUS
Refills: 0 | Status: DISCONTINUED | OUTPATIENT
Start: 2023-02-14 | End: 2023-02-15

## 2023-02-14 RX ORDER — CEFTRIAXONE 500 MG/1
2000 INJECTION, POWDER, FOR SOLUTION INTRAMUSCULAR; INTRAVENOUS EVERY 12 HOURS
Refills: 0 | Status: DISCONTINUED | OUTPATIENT
Start: 2023-02-14 | End: 2023-02-16

## 2023-02-14 RX ORDER — CLONAZEPAM 1 MG
1 TABLET ORAL
Qty: 0 | Refills: 0 | DISCHARGE

## 2023-02-14 RX ORDER — VANCOMYCIN HCL 1 G
1000 VIAL (EA) INTRAVENOUS EVERY 12 HOURS
Refills: 0 | Status: DISCONTINUED | OUTPATIENT
Start: 2023-02-14 | End: 2023-02-16

## 2023-02-14 RX ORDER — ENOXAPARIN SODIUM 100 MG/ML
40 INJECTION SUBCUTANEOUS EVERY 24 HOURS
Refills: 0 | Status: DISCONTINUED | OUTPATIENT
Start: 2023-02-14 | End: 2023-02-15

## 2023-02-14 RX ORDER — LEVOTHYROXINE SODIUM 125 MCG
75 TABLET ORAL DAILY
Refills: 0 | Status: DISCONTINUED | OUTPATIENT
Start: 2023-02-14 | End: 2023-02-16

## 2023-02-14 RX ORDER — PANTOPRAZOLE SODIUM 20 MG/1
40 TABLET, DELAYED RELEASE ORAL
Refills: 0 | Status: DISCONTINUED | OUTPATIENT
Start: 2023-02-14 | End: 2023-02-19

## 2023-02-14 RX ORDER — ATORVASTATIN CALCIUM 80 MG/1
10 TABLET, FILM COATED ORAL AT BEDTIME
Refills: 0 | Status: DISCONTINUED | OUTPATIENT
Start: 2023-02-14 | End: 2023-02-19

## 2023-02-14 RX ORDER — AMPICILLIN TRIHYDRATE 250 MG
2000 CAPSULE ORAL EVERY 4 HOURS
Refills: 0 | Status: DISCONTINUED | OUTPATIENT
Start: 2023-02-14 | End: 2023-02-16

## 2023-02-14 RX ORDER — LACOSAMIDE 50 MG/1
50 TABLET ORAL
Refills: 0 | Status: DISCONTINUED | OUTPATIENT
Start: 2023-02-14 | End: 2023-02-15

## 2023-02-14 RX ORDER — HYDRALAZINE HCL 50 MG
5 TABLET ORAL ONCE
Refills: 0 | Status: COMPLETED | OUTPATIENT
Start: 2023-02-14 | End: 2023-02-14

## 2023-02-14 RX ORDER — LACOSAMIDE 50 MG/1
100 TABLET ORAL
Refills: 0 | Status: DISCONTINUED | OUTPATIENT
Start: 2023-02-14 | End: 2023-02-14

## 2023-02-14 RX ADMIN — CEFTRIAXONE 100 MILLIGRAM(S): 500 INJECTION, POWDER, FOR SOLUTION INTRAMUSCULAR; INTRAVENOUS at 18:27

## 2023-02-14 RX ADMIN — LACOSAMIDE 50 MILLIGRAM(S): 50 TABLET ORAL at 17:12

## 2023-02-14 RX ADMIN — SODIUM CHLORIDE 75 MILLILITER(S): 9 INJECTION, SOLUTION INTRAVENOUS at 14:46

## 2023-02-14 RX ADMIN — ENOXAPARIN SODIUM 40 MILLIGRAM(S): 100 INJECTION SUBCUTANEOUS at 18:28

## 2023-02-14 RX ADMIN — Medication 250 MILLIGRAM(S): at 18:28

## 2023-02-14 RX ADMIN — Medication 200 MILLIGRAM(S): at 20:05

## 2023-02-14 RX ADMIN — Medication 5 MILLIGRAM(S): at 23:23

## 2023-02-14 NOTE — H&P ADULT - NSHPLABSRESULTS_GEN_ALL_CORE
LABS:                        11.3   6.01  )-----------( 248      ( 2023 20:40 )             36.8     02-    140  |  101  |  14  ----------------------------<  80  5.0   |  29  |  0.74    Ca    10.3      2023 20:40  Phos  2.9     02-  Mg     1.70         TPro  7.4  /  Alb  4.0  /  TBili  0.2  /  DBili  x   /  AST  40<H>  /  ALT  37<H>  /  AlkPhos  137<H>  02-    PT/INR - ( 2023 20:40 )   PT: 11.1 sec;   INR: 0.96 ratio         PTT - ( 2023 20:40 )  PTT:43.1 sec  Urinalysis Basic - ( 2023 21:00 )    Color: Light Yellow / Appearance: Clear / S.014 / pH: x  Gluc: x / Ketone: Negative  / Bili: Negative / Urobili: <2 mg/dL   Blood: x / Protein: Negative / Nitrite: Negative   Leuk Esterase: Negative / RBC: 1 /HPF / WBC 1 /HPF   Sq Epi: x / Non Sq Epi: x / Bacteria: x      CAPILLARY BLOOD GLUCOSE      POCT Blood Glucose.: 79 mg/dL (2023 15:59)      RADIOLOGY & ADDITIONAL TESTS: Reviewed. LABS:                        11.3   6.01  )-----------( 248      ( 2023 20:40 )             36.8     02-    140  |  101  |  14  ----------------------------<  80  5.0   |  29  |  0.74    Ca    10.3      2023 20:40  Phos  2.9     02-  Mg     1.70         TPro  7.4  /  Alb  4.0  /  TBili  0.2  /  DBili  x   /  AST  40<H>  /  ALT  37<H>  /  AlkPhos  137<H>  02-13    PT/INR - ( 2023 20:40 )   PT: 11.1 sec;   INR: 0.96 ratio         PTT - ( 2023 20:40 )  PTT:43.1 sec  Urinalysis Basic - ( 2023 21:00 )    Color: Light Yellow / Appearance: Clear / S.014 / pH: x  Gluc: x / Ketone: Negative  / Bili: Negative / Urobili: <2 mg/dL   Blood: x / Protein: Negative / Nitrite: Negative   Leuk Esterase: Negative / RBC: 1 /HPF / WBC 1 /HPF   Sq Epi: x / Non Sq Epi: x / Bacteria: x      CAPILLARY BLOOD GLUCOSE      POCT Blood Glucose.: 79 mg/dL (2023 15:59)      RADIOLOGY & ADDITIONAL TESTS: Reviewed.    CT Head wo contrast: Left lacunar infarct

## 2023-02-14 NOTE — CONSULT NOTE ADULT - SUBJECTIVE AND OBJECTIVE BOX
HPI:  67 y.o. F with PMH of MS on baclofen pump (nonambulatory at baseline), seizure on vimpat 50 bid, hypothyroidism, hx of frequent UTIs presented to [facility] ED, brought in by sister for altered mental status. Neurology consulted for further evaluation. At the time of the interview, patient was not able to give history. Pt does repeat no, but when given answer choices for questions, pt was able to say yes/no appropriately Per chart review, pt is A&O x4 at baseline and has not been taking any medications for about a day. Of note, pt was admitted in 2022 for urosepsis and course was complicated by seizure. At that time pt was noted to be on Keppra 500 mg bid but was not consistently taking. Per sister, who is the patient's primary care taker, pt noted to be on vimpat 50 mg bid per outpatient neurologist. Pt currently denies any fever, chills, cough, runny nose, back pain, numbness/tingling/weakness throughout extremities but unclear if perseverating.     Seen by neurology during 2022 admission for seizure episode, semiology included stiffening of b/l UE, foaming at mouth, and hypoxia with post ictal confusion. Urosepsis was presumed to be the cause of breakthrough seizure. Was placed on Vimpat 100 mg bid, and was recommended to fu at outpatient seizure clinic at 611 however no allscripts information available.     REVIEW OF SYSTEMS  A 10-system ROS was performed and is negative except for those items noted above and/or in the HPI.    PAST MEDICAL & SURGICAL HISTORY:  Hypothyroid      Multiple sclerosis      No significant past surgical history        FAMILY HISTORY:    SOCIAL HISTORY: as noted in HPI    MEDICATIONS (HOME):  Home Medications:  acetaminophen 325 mg oral tablet: 2 tab(s) orally every 6 hours, As needed, Temp greater or equal to 38C (100.4F), Mild Pain (1 - 3) (23 Dec 2022 14:10)  ammonium lactate 12% topical cream: Apply topically to affected area once a day, As Needed (16 Dec 2022 15:57)  atorvastatin 10 mg oral tablet: 1 tab(s) orally once a day (16 Dec 2022 15:57)  Fleet Enema 19 g-7 g rectal enema: 133 milliliter(s) rectal every other day, to go to the bathroom (16 Dec 2022 15:57)  fluticasone 50 mcg/inh nasal spray: 1 spray(s) in each nostril once a day (16 Dec 2022 15:57)  ibandronate 150 mg oral tablet: 1 tab(s) orally once a month (16 Dec 2022 15:57)  Magnesium OTC pain relief spray: Apply topically to affected area (knee) once a day, As Needed (16 Dec 2022 15:57)  pantoprazole 40 mg oral delayed release tablet: 1 tab(s) orally once a day (16 Dec 2022 15:57)  Synthroid 75 mcg (0.075 mg) oral tablet: 1 tab(s) orally once a day (16 Dec 2022 15:57)    MEDICATIONS  (STANDING):  atorvastatin 10 milliGRAM(s) Oral at bedtime  enoxaparin Injectable 40 milliGRAM(s) SubCutaneous every 24 hours  lacosamide 50 milliGRAM(s) Oral two times a day  lactated ringers. 1000 milliLiter(s) (75 mL/Hr) IV Continuous <Continuous>  levothyroxine 75 MICROGram(s) Oral daily  pantoprazole    Tablet 40 milliGRAM(s) Oral before breakfast    MEDICATIONS  (PRN):    ALLERGIES/INTOLERANCES:  Allergies  sulfa drugs (Hives; Rash)    Intolerances    VITALS & EXAMINATION:  Vital Signs Last 24 Hrs  T(C): 36.4 (2023 13:10), Max: 36.7 (2023 17:32)  T(F): 97.5 (2023 13:10), Max: 98 (2023 17:32)  HR: 113 (2023 13:10) (72 - 113)  BP: 174/78 (2023 13:10) (142/95 - 176/88)  BP(mean): --  RR: 19 (2023 13:10) (16 - 19)  SpO2: 95% (2023 13:10) (95% - 100%)    Parameters below as of 2023 13:10  Patient On (Oxygen Delivery Method): room air        General:  Constitutional: Obese Female, appears stated age, in no apparent distress including pain  Head: Normocephalic & atraumatic.    Neurological (>12):  MS: Eyes open. Drowsy but awake upon prompting. Pt perseverates no, but when given choices for where pt is, says yes only to hospital. When asked if Grace is the FIRST NAME of the patient, she says "no" and when asked if Angel is the FIRST NAME, she says "yes". Pt does not know what year and month even with choices. Intermittently follows simple one step commands    CNs: PERRL (R = 3mm, L = 3mm). VFF. EOMI no nystagmus, no diplopia. V1-3 intact to LT, well developed masseter muscles b/l. No facial asymmetry b/l, full eye closure strength b/l.  Head turning & shoulder shrug intact b/l. Tongue midline, normal movements, no atrophy.    Motor: No noticeable tremor or seizure. No pronator drift. Increased tone in RUE, flexed toward the chest. But unclear if paratonia as well since DERIK is holding the bedside rail and not letting go. Able to squeeze fingers on R hand only     Sensation: Intact to LT throughout and grimaces to noxious throughout    Reflexes: Mute throughout, likely due to inability to relax     Plantar Resp  R	Up  L	Up    Coordination: Unable to follow commands    Gait: Deferred    LABORATORY:  CBC                       11.1   9.05  )-----------( 258      ( 2023 13:50 )             35.9     Chem 02-14    138  |  101  |  13  ----------------------------<  78  4.3   |  24  |  0.69    Ca    10.3      2023 13:50  Phos  3.0     -  Mg     1.60     -    TPro  7.4  /  Alb  4.0  /  TBili  0.2  /  DBili  x   /  AST  40<H>  /  ALT  37<H>  /  AlkPhos  137<H>  -13    LFTs LIVER FUNCTIONS - ( 2023 20:40 )  Alb: 4.0 g/dL / Pro: 7.4 g/dL / ALK PHOS: 137 U/L / ALT: 37 U/L / AST: 40 U/L / GGT: x           Coagulopathy PT/INR - ( 2023 20:40 )   PT: 11.1 sec;   INR: 0.96 ratio         PTT - ( 2023 20:40 )  PTT:43.1 sec  Lipid Panel   A1c   Cardiac enzymes     U/A Urinalysis Basic - ( 2023 21:00 )    Color: Light Yellow / Appearance: Clear / S.014 / pH: x  Gluc: x / Ketone: Negative  / Bili: Negative / Urobili: <2 mg/dL   Blood: x / Protein: Negative / Nitrite: Negative   Leuk Esterase: Negative / RBC: 1 /HPF / WBC 1 /HPF   Sq Epi: x / Non Sq Epi: x / Bacteria: x      CSF  Immunological  Other    STUDIES & IMAGING:  Studies (EKG, EEG, EMG, etc):     Radiology (XR, CT, MR, U/S, TTE/EDGAR):  < from: CT Head No Cont (23 @ 01:52) >  IMPRESSION:  No CT evidence of acute intracranial hemorrhage, brain edema, or mass   effect.    Age-indeterminate lacunar infarct just superior to the body of the left   lateral ventricle. MRI can be considered for further evaluation.    --- End of Report ---      < end of copied text >

## 2023-02-14 NOTE — H&P ADULT - PROBLEM SELECTOR PLAN 1
pw symptoms of increased confusion, baseline ano4. vitals afebrile, labs wnl. TSH wnl. ua negative. CT head wo acute findings except left lacunar infarct. no seizure noted at home.   unclear etiology of AMS, possible sepsis, seizure (given recent dose reduction of lamictal) or medication induced.     -neuro checks   -cont to monitor signs/symptoms of sepsis, fu blood and urine culture   - neuro consulted, recommends video eeg, hold off on MRI for now  - neurosgy consulted for baclofen interrogation   - cont with lamictal 50mg bid  - istop done, only meds was lamictal  - fu B12, TSH wnl

## 2023-02-14 NOTE — ED ADULT NURSE REASSESSMENT NOTE - NS ED NURSE REASSESS COMMENT FT1
Pt received in the stretcher resting comfortably ,b stattes she had some relief with nausea medication .  breathing easy and unlabored -  family  at the bedside aware of plan of care .  Pt awaiting CT. Aware of urine sample need . VSS . Will continue to monitor Pt received in the stretcher resting comfortably .  breathing easy and unlabored -  pt pleasantly confused asking the same repetitive questions . m Pt awaiting bed assignment VSS . Will continue to monitor

## 2023-02-14 NOTE — H&P ADULT - NSHPPHYSICALEXAM_GEN_ALL_CORE
VITAL SIGNS:  T(C): 36.4 (02-14-23 @ 08:57), Max: 36.7 (02-13-23 @ 17:32)  T(F): 97.5 (02-14-23 @ 08:57), Max: 98 (02-13-23 @ 17:32)  HR: 99 (02-14-23 @ 08:57) (72 - 99)  BP: 142/95 (02-14-23 @ 08:57) (142/95 - 176/88)  BP(mean): --  RR: 19 (02-14-23 @ 08:57) (16 - 19)  SpO2: 99% (02-14-23 @ 08:57) (97% - 100%)  Wt(kg): --    PHYSICAL EXAM:  Constitutional: WDWN resting comfortably in bed; NAD  Head: NC/AT  Eyes: PERRL, EOMI, anicteric sclera  ENT: no nasal discharge; MMM  Neck: supple; no JVD  Respiratory: CTA B/L; no W/R/R  Cardiac: +S1/S2; RRR; no M/R/G  Gastrointestinal: soft, NT/ND; no rebound or guarding; +BSx4  Extremities: WWP, no clubbing or cyanosis; no peripheral edema  Musculoskeletal: NROM x4; no joint swelling, tenderness or erythema  Vascular: 2+ radial, DP/PT pulses B/L  Dermatologic: skin warm, dry and intact; no rashes, wounds, or scars  Neurologic: AAOx3; CNII-XII grossly intact; no focal deficits VITAL SIGNS:  T(C): 36.4 (02-14-23 @ 08:57), Max: 36.7 (02-13-23 @ 17:32)  T(F): 97.5 (02-14-23 @ 08:57), Max: 98 (02-13-23 @ 17:32)  HR: 99 (02-14-23 @ 08:57) (72 - 99)  BP: 142/95 (02-14-23 @ 08:57) (142/95 - 176/88)  BP(mean): --  RR: 19 (02-14-23 @ 08:57) (16 - 19)  SpO2: 99% (02-14-23 @ 08:57) (97% - 100%)  Wt(kg): --    PHYSICAL EXAM:  Constitutional: NAD  Eyes: PERRL, anicteric sclera  Neck: supple  Respiratory: CTA B/L; no W/R/R  Cardiac: +S1/S2; RRR; no M/R/G  Gastrointestinal: soft, NT/ND; no rebound or guarding; +BSx4, baclofen pump on left lower quadrant under skin  Extremities: UE/LE rigidity   Vascular: 2+ radial, DP/PT pulses B/L  Neurologic: alert, oriented x0, unable to engage in conversation,

## 2023-02-14 NOTE — ED ADULT NURSE REASSESSMENT NOTE - NS ED NURSE REASSESS COMMENT FT1
Received report from Janice SWANSON: Pt appears to be resting, alert and awake, responsive to verbal stimuli, A&Ox1, NAD, respirations are even and unlabored, report given to [facility] reva Coyle transport to [facility].

## 2023-02-14 NOTE — ED ADULT NURSE REASSESSMENT NOTE - NS ED NURSE REASSESS COMMENT FT1
Patient received from TASHA griffin.  Patient resting in bed comfortably and offers no complaints. Patient is alert and oriented to place, person and her  Follows commands. Patient stated she is feeling better and denies any pain at this time. No distress noted. Nursing care continues

## 2023-02-14 NOTE — H&P ADULT - ASSESSMENT
68 yo female with hx of MS (on balcofen pump), seizure, hypothyroidism, urosepsis is brought in by sister for AMS.

## 2023-02-14 NOTE — H&P ADULT - HISTORY OF PRESENT ILLNESS
68 yo female with hx of MS (on balcofen pump), seizure, hypothyroidism, urosepsis is brought in by sister for increased confusion and refusing medications.     of note, last admission in 12/22 with AMS in s.o urosepsis course cb seizure.  68 yo female with hx of MS (on balcofen pump), seizure, hypothyroidism, urosepsis is brought in by sister for increased confusion since yesterday and refusing medications at home.   In the ed, Patient is alert but not oriented and unable to engage in conversation, repeatedly states, "I need help." Follows simple commands. unable to provide any history.   Per sister, Jenise Bella, who is the primary caretaker, at baseline patient is interactive, oriented X4, cheerful but yesterday confused and asks to go home when she is already at home. Also refusing to take any medications. Sister denies any seizure like activity at home, also reports that patient has been taking all the medications as prescribed. recently,     of note, last admission in 12/22 with AMS in s.o urosepsis course cb seizure.     sister Jenise Bella, [phone]  AMS   68 yo female with hx of MS (on balcofen pump), seizure, hypothyroidism, urosepsis is brought in by sister for increased confusion since yesterday and refusing medications at home.   In the ed, Patient is alert but not oriented and unable to engage in conversation, repeatedly states, "I need help." Follows simple commands. unable to provide any history.   Per sister, Jenise Bella, who is the primary caretaker, at baseline patient is interactive, oriented X4, cheerful but yesterday confused and asks to go home when she is already at home. Also refusing to take any medications. also reports cloudy urine. Sister denies any seizure like activity at home, denies fever/chills, n/v/d. no diarrhea/constipation. patient has been taking all the medications as prescribed. recently, the lamictal dose has been reduced to 50mg bid by outpatient neurologist.     of note, last admission in 12/22 with AMS in s.o urosepsis course cb seizure.     In the ed, VSS. afebrile. Labs wnl. ua negative. CT head with age indeterminate left lacunar infarct otherwise no other abnormalities

## 2023-02-14 NOTE — ED ADULT NURSE REASSESSMENT NOTE - COMFORT CARE
plan of care explained/po fluids offered/repositioned/treatment delay explained/wait time explained/warm blanket provided

## 2023-02-14 NOTE — H&P ADULT - NSHPADDITIONALINFOADULT_GEN_ALL_CORE
dvt; lovenox  diet: DASH  dispo: pending medical optimization dvt; lovenox  diet: npo for now pending improvement in mental status, cont with LR infusion 75cc/hr for 12 hrs   dispo: pending medical optimization

## 2023-02-14 NOTE — CONSULT NOTE ADULT - ATTENDING COMMENTS
Hx from sister who care for her.  H/O multiple sclerosis for 37 years.  Over one month of worsening cognitive function.  Much worse in the past 2 days.  She thought it was caused by the lacosamide so this was decreased from 100 mg to 50 mg BID with no change.     Exam:  Says year is 2000; Says she is in the ER at [facility]; She continually repeating phrase - "Oh, god"; "Here Deloris, take this."  This is her sister's name who is present during the exam.  4Q in $1. She follows simple commands.   Pupils 3-->2, B.  Face - symmetric.  Severe increased tone in the right arm.  Mild increased tone in the left arm.  Flaccid in the legs.    Reflexes absent.  B Babainski.      A/P  Ms. Edwards is a 68 yo woman with a h/o multiple sclerosis with worsening encephalopathy.   DDx:   - toxic metabolic septic encephalopathy but no etiology found thus far.    - focal nonmotor with impaired awareness seizure  - CNS infection    She has had MRI in the past with this baclofen IT pump.     I agree with work up and management as above.   Thank you.

## 2023-02-14 NOTE — CONSULT NOTE ADULT - ASSESSMENT
67 y.o. F with PMH of MS on baclofen pump (nonambulatory at baseline), seizure on vimpat 50 bid, hypothyroidism, hx of frequent UTIs presented to [facility] ED, brought in by sister for altered mental status. Neurology consulted for further evaluation. Neuro exam showing RUE increased tone, unable to move b/l LE, mute reflex on b/l patellar but toes upgoing on plantar response. No obvious signs of meningitis but feels warm to touch. No documented fever. Labs unremarkable, neg UA. CTH neg    Impression: Acute onset AMS of unclear etiology. Ddx include infectious (possible meningitis given no other obvious signs of infection, BCx still pending) vs r/o non convulsive seizure     Recommendations:  [] Would treat empirically for meninigitis  [] MRI brain w/o  [] C/w home lacosamide 50 mg bid  [] Would see if lumbar puncture is possible with Baclofen pump team, if possible, would obtain infectious csf studies including cell count and differential, protein, glucose, cytology, CSF ACE, lyme,  gram stain, culture, PCR, bacterial culture, fungal culture, AFB)  [] Neurocheck and vital per unit protocol  [] Seizure precaution     Case seen and discussed with general neurology attending Dr. Ramos 67 y.o. F with PMH of MS on baclofen pump (nonambulatory at baseline), seizure on vimpat 50 bid, hypothyroidism, hx of frequent UTIs presented to [facility] ED, brought in by sister for altered mental status. Neurology consulted for further evaluation. Neuro exam showing RUE increased tone, unable to move b/l LE, mute reflex on b/l patellar but toes upgoing on plantar response. No obvious signs of meningitis but feels warm to touch. No documented fever. Labs unremarkable, neg UA. CTH neg    Impression: Acute onset AMS of unclear etiology. Ddx include infectious (possible meningitis given no other obvious signs of infection, BCx still pending) vs r/o non convulsive seizure     Recommendations:  [] Would treat empirically for viral and bacterial meninigitis   [] MRI brain w/o  [] C/w home lacosamide 50 mg bid  [] Would see if lumbar puncture is possible with Baclofen pump team, if possible, would obtain infectious csf studies including cell count and differential, protein, glucose, cytology, CSF ACE, lyme,  gram stain, culture, PCR, bacterial culture, fungal culture, AFB)  [] F/u BCx  [] Recheck vital for any fever  [] Neurocheck and vital per unit protocol  [] Seizure precaution     Case seen and discussed with general neurology attending Dr. Ramos 67 y.o. F with PMH of MS on baclofen pump (nonambulatory at baseline), seizure on vimpat 50 bid, hypothyroidism, hx of frequent UTIs presented to [facility] ED, brought in by sister for altered mental status. Neurology consulted for further evaluation. Neuro exam showing RUE increased tone, unable to move b/l LE, mute reflex on b/l patellar but toes upgoing on plantar response. No obvious signs of meningitis but feels warm to touch. No documented fever. Labs unremarkable, neg UA. CTH neg    Impression: Acute onset AMS of unclear etiology. Ddx include infectious (possible meningitis given no other obvious signs of infection, BCx still pending) vs r/o non convulsive seizure     Recommendations:  [] Would treat empirically for viral and bacterial meninigitis   [] MRI brain w/o  [] vEEG  [] C/w home lacosamide 50 mg bid  [] Would see if lumbar puncture is possible with Baclofen pump team, if possible, would obtain infectious csf studies including cell count and differential, protein, glucose, cytology, CSF ACE, lyme,  gram stain, culture, PCR, bacterial culture, fungal culture, AFB)  [] F/u BCx  [] Recheck vital for any fever  [] Neurocheck and vital per unit protocol  [] Seizure precaution     Case seen and discussed with general neurology attending Dr. Ramos 67 y.o. F with PMH of MS on baclofen pump (nonambulatory at baseline), seizure on vimpat 50 bid, hypothyroidism, hx of frequent UTIs presented to [facility] ED, brought in by sister for altered mental status. Neurology consulted for further evaluation. Neuro exam showing RUE increased tone, unable to move b/l LE, mute reflex on b/l patellar but toes upgoing on plantar response. No obvious signs of meningitis but feels warm to touch. No documented fever. Labs unremarkable, neg UA. CTH neg    Impression: Acute onset AMS of unclear etiology. Ddx include infectious (possible meningitis given no other obvious signs of infection, BCx still pending) vs r/o non convulsive seizure     Recommendations:  [] Would treat empirically for viral encephalitis and bacterial meninigitis   [] MRI brain w/o  [] vEEG  [] C/w home lacosamide 50 mg bid  [] Would see if lumbar puncture is possible with Baclofen pump team, if possible, would obtain infectious csf studies including cell count and differential, protein, glucose, cytology, CSF ACE, lyme,  gram stain, culture, PCR, bacterial culture, fungal culture, AFB)  [] F/u BCx  [] Recheck vital for any fever  [] Neurocheck and vital per unit protocol  [] Seizure precaution     Case seen and discussed with general neurology attending Dr. Ramos 67 y.o. F with PMH of MS on baclofen pump (nonambulatory at baseline), seizure on vimpat 50 bid, hypothyroidism, hx of frequent UTIs presented to [facility] ED, brought in by sister for altered mental status. Neurology consulted for further evaluation. Neuro exam showing RUE increased tone, unable to move b/l LE, mute reflex on b/l patellar but toes upgoing on plantar response. No obvious signs of meningitis but feels warm to touch. No documented fever. Labs unremarkable, neg UA. CTH neg    Impression: Acute onset AMS of unclear etiology. Ddx include infectious (possible meningitis given no other obvious signs of infection, BCx still pending) vs r/o non convulsive seizure     Recommendations:  [] Would treat empirically for viral encephalitis and bacterial meninigitis   [] MRI brain w/wo  [] vEEG  [] C/w home lacosamide 50 mg bid  [] Would see if lumbar puncture is possible with Baclofen pump team, if possible, would obtain infectious csf studies including cell count and differential, protein, glucose, cytology, CSF ACE, lyme,  gram stain, culture, PCR, bacterial culture, fungal culture, AFB)  [] F/u BCx  [] Recheck vital for any fever  [] Neurocheck and vital per unit protocol  [] Seizure precaution     Case seen and discussed with general neurology attending Dr. Ramos

## 2023-02-15 DIAGNOSIS — G93.41 METABOLIC ENCEPHALOPATHY: ICD-10-CM

## 2023-02-15 LAB
ALBUMIN SERPL ELPH-MCNC: 3.8 G/DL — SIGNIFICANT CHANGE UP (ref 3.3–5)
ALP SERPL-CCNC: 133 U/L — HIGH (ref 40–120)
ALT FLD-CCNC: 32 U/L — SIGNIFICANT CHANGE UP (ref 4–33)
ANION GAP SERPL CALC-SCNC: 15 MMOL/L — HIGH (ref 7–14)
AST SERPL-CCNC: 36 U/L — HIGH (ref 4–32)
BILIRUB SERPL-MCNC: <0.2 MG/DL — SIGNIFICANT CHANGE UP (ref 0.2–1.2)
BUN SERPL-MCNC: 13 MG/DL — SIGNIFICANT CHANGE UP (ref 7–23)
CALCIUM SERPL-MCNC: 10.1 MG/DL — SIGNIFICANT CHANGE UP (ref 8.4–10.5)
CHLORIDE SERPL-SCNC: 96 MMOL/L — LOW (ref 98–107)
CO2 SERPL-SCNC: 24 MMOL/L — SIGNIFICANT CHANGE UP (ref 22–31)
CREAT SERPL-MCNC: 0.68 MG/DL — SIGNIFICANT CHANGE UP (ref 0.5–1.3)
CULTURE RESULTS: NO GROWTH — SIGNIFICANT CHANGE UP
EGFR: 97 ML/MIN/1.73M2 — SIGNIFICANT CHANGE UP
GLUCOSE BLDC GLUCOMTR-MCNC: 109 MG/DL — HIGH (ref 70–99)
GLUCOSE BLDC GLUCOMTR-MCNC: 129 MG/DL — HIGH (ref 70–99)
GLUCOSE SERPL-MCNC: 111 MG/DL — HIGH (ref 70–99)
HCT VFR BLD CALC: 35 % — SIGNIFICANT CHANGE UP (ref 34.5–45)
HGB BLD-MCNC: 10.9 G/DL — LOW (ref 11.5–15.5)
MCHC RBC-ENTMCNC: 28.5 PG — SIGNIFICANT CHANGE UP (ref 27–34)
MCHC RBC-ENTMCNC: 31.1 GM/DL — LOW (ref 32–36)
MCV RBC AUTO: 91.4 FL — SIGNIFICANT CHANGE UP (ref 80–100)
NRBC # BLD: 0 /100 WBCS — SIGNIFICANT CHANGE UP (ref 0–0)
NRBC # FLD: 0 K/UL — SIGNIFICANT CHANGE UP (ref 0–0)
PLATELET # BLD AUTO: 248 K/UL — SIGNIFICANT CHANGE UP (ref 150–400)
POTASSIUM SERPL-MCNC: 3.9 MMOL/L — SIGNIFICANT CHANGE UP (ref 3.5–5.3)
POTASSIUM SERPL-SCNC: 3.9 MMOL/L — SIGNIFICANT CHANGE UP (ref 3.5–5.3)
PROT SERPL-MCNC: 7.2 G/DL — SIGNIFICANT CHANGE UP (ref 6–8.3)
RBC # BLD: 3.83 M/UL — SIGNIFICANT CHANGE UP (ref 3.8–5.2)
RBC # FLD: 17.4 % — HIGH (ref 10.3–14.5)
SODIUM SERPL-SCNC: 135 MMOL/L — SIGNIFICANT CHANGE UP (ref 135–145)
SPECIMEN SOURCE: SIGNIFICANT CHANGE UP
VIT B12 SERPL-MCNC: 661 PG/ML — SIGNIFICANT CHANGE UP (ref 200–900)
WBC # BLD: 8.44 K/UL — SIGNIFICANT CHANGE UP (ref 3.8–10.5)
WBC # FLD AUTO: 8.44 K/UL — SIGNIFICANT CHANGE UP (ref 3.8–10.5)

## 2023-02-15 PROCEDURE — 95720 EEG PHY/QHP EA INCR W/VEEG: CPT

## 2023-02-15 PROCEDURE — 99233 SBSQ HOSP IP/OBS HIGH 50: CPT

## 2023-02-15 PROCEDURE — 99222 1ST HOSP IP/OBS MODERATE 55: CPT

## 2023-02-15 RX ORDER — SODIUM CHLORIDE 9 MG/ML
1000 INJECTION, SOLUTION INTRAVENOUS
Refills: 0 | Status: DISCONTINUED | OUTPATIENT
Start: 2023-02-15 | End: 2023-02-17

## 2023-02-15 RX ORDER — HYDRALAZINE HCL 50 MG
2.5 TABLET ORAL ONCE
Refills: 0 | Status: DISCONTINUED | OUTPATIENT
Start: 2023-02-15 | End: 2023-02-15

## 2023-02-15 RX ORDER — LACOSAMIDE 50 MG/1
50 TABLET ORAL EVERY 12 HOURS
Refills: 0 | Status: DISCONTINUED | OUTPATIENT
Start: 2023-02-15 | End: 2023-02-19

## 2023-02-15 RX ADMIN — CEFTRIAXONE 100 MILLIGRAM(S): 500 INJECTION, POWDER, FOR SOLUTION INTRAMUSCULAR; INTRAVENOUS at 17:15

## 2023-02-15 RX ADMIN — Medication 250 MILLIGRAM(S): at 06:09

## 2023-02-15 RX ADMIN — Medication 200 MILLIGRAM(S): at 22:01

## 2023-02-15 RX ADMIN — Medication 200 MILLIGRAM(S): at 06:00

## 2023-02-15 RX ADMIN — Medication 200 MILLIGRAM(S): at 14:39

## 2023-02-15 RX ADMIN — LACOSAMIDE 50 MILLIGRAM(S): 50 TABLET ORAL at 06:46

## 2023-02-15 RX ADMIN — Medication 200 MILLIGRAM(S): at 10:28

## 2023-02-15 RX ADMIN — Medication 0.5 MILLIGRAM(S): at 03:55

## 2023-02-15 RX ADMIN — LACOSAMIDE 110 MILLIGRAM(S): 50 TABLET ORAL at 19:34

## 2023-02-15 RX ADMIN — Medication 0.5 MILLIGRAM(S): at 03:27

## 2023-02-15 RX ADMIN — Medication 200 MILLIGRAM(S): at 17:49

## 2023-02-15 RX ADMIN — Medication 250 MILLIGRAM(S): at 20:08

## 2023-02-15 RX ADMIN — Medication 0.5 MILLIGRAM(S): at 06:28

## 2023-02-15 RX ADMIN — SODIUM CHLORIDE 75 MILLILITER(S): 9 INJECTION, SOLUTION INTRAVENOUS at 10:28

## 2023-02-15 RX ADMIN — CEFTRIAXONE 100 MILLIGRAM(S): 500 INJECTION, POWDER, FOR SOLUTION INTRAMUSCULAR; INTRAVENOUS at 06:00

## 2023-02-15 RX ADMIN — Medication 200 MILLIGRAM(S): at 02:20

## 2023-02-15 NOTE — CONSULT NOTE ADULT - ASSESSMENT
67F with MS (on balcofen pump), seizures, hypothyroidism, hx urosepsis presented 2/13 with confusion since 2/12 and refusing medications at home. No fever.  No leukocytosis.  CXR (-).  CT head negative for acute findings.  BC and UC sent are negative.  Empirically started on IV ampicillin, CTX, acyclovir.     Altered MS  - need CSF evaluation  - cell count, chemistries, cultures and HSV PCR  - empiric coverage amp/ctx/acv okay for now  - need IVF to prevent HARJIT on IV acyclovir  - no need for isolation    I have discussed plan of care as detailed above with hospitalist    I will be away, returning 2/17/2023.  My associates to cover.  Please call ID as needed [phone].

## 2023-02-15 NOTE — PROVIDER CONTACT NOTE (CHANGE IN STATUS NOTIFICATION) - SITUATION
Patient hypertensive at 190/90, HR at 101, states "You are trying to kill me".
Patient screaming "Theyre gonna hit me on my head.", "I have blood dripping from my finger to my mouth." Agitated and blood pressure elevated.

## 2023-02-15 NOTE — PROVIDER CONTACT NOTE (CHANGE IN STATUS NOTIFICATION) - BACKGROUND
Patient with altered mental status and  at bedside assisting in calming her down
Altered mental status

## 2023-02-15 NOTE — PROGRESS NOTE ADULT - SUBJECTIVE AND OBJECTIVE BOX
[facility] Division of Hospital Medicine  Lulu Velazco DO  Pager (M-F, 8A-5P): [phone]      Patient is a 67y old  Female who presents with a chief complaint of AMS (2023 15:16)      SUBJECTIVE / OVERNIGHT EVENTS: Pt received 0.5 Ativan IV overnight for agitation. Pt seen at bedside, alert, states her name, but then goes on to say "Lucrecia's vengence"  ADDITIONAL REVIEW OF SYSTEMS: unable to assess    MEDICATIONS  (STANDING):  ampicillin  IVPB 2000 milliGRAM(s) IV Intermittent every 4 hours  atorvastatin 10 milliGRAM(s) Oral at bedtime  cefTRIAXone   IVPB 2000 milliGRAM(s) IV Intermittent every 12 hours  dextrose 5% + sodium chloride 0.45%. 1000 milliLiter(s) (75 mL/Hr) IV Continuous <Continuous>  enoxaparin Injectable 40 milliGRAM(s) SubCutaneous every 24 hours  lacosamide 50 milliGRAM(s) Oral two times a day  levothyroxine 75 MICROGram(s) Oral daily  pantoprazole    Tablet 40 milliGRAM(s) Oral before breakfast  vancomycin  IVPB 1000 milliGRAM(s) IV Intermittent every 12 hours    MEDICATIONS  (PRN):      CAPILLARY BLOOD GLUCOSE      POCT Blood Glucose.: 89 mg/dL (15 Feb 2023 09:06)    I&O's Summary    2023 07:01  -  15 Feb 2023 07:00  --------------------------------------------------------  IN: 0 mL / OUT: 450 mL / NET: -450 mL        PHYSICAL EXAM:  Vital Signs Last 24 Hrs  T(C): 36.6 (15 Feb 2023 04:41), Max: 36.7 (2023 17:03)  T(F): 97.9 (15 Feb 2023 04:41), Max: 98.1 (2023 17:03)  HR: 89 (15 Feb 2023 04:41) (89 - 113)  BP: 152/88 (15 Feb 2023 04:41) (152/88 - 193/93)  BP(mean): --  RR: 20 (15 Feb 2023 04:41) (17 - 20)  SpO2: 100% (15 Feb 2023 04:41) (95% - 100%)    Parameters below as of 15 Feb 2023 04:41  Patient On (Oxygen Delivery Method): room air      CONSTITUTIONAL: NAD, well-developed, well-groomed  EYES: EOMI; conjunctiva and sclera clear  ENMT: Moist oral mucosa, no pharyngeal injection or exudates; normal dentition  NECK: Supple, no palpable masses; no thyromegaly  RESPIRATORY: Normal respiratory effort; lungs are clear to auscultation bilaterally  CARDIOVASCULAR: Regular rate and rhythm, normal S1 and S2, no murmur/rub/gallop; No lower extremity edema; Peripheral pulses are 2+ bilaterally  ABDOMEN: Nontender to palpation, normoactive bowel sounds, no rebound/guarding; No hepatosplenomegaly  MUSKULOSKELETAL:  no clubbing or cyanosis of digits; no joint swelling or tenderness to palpation  PSYCH: A+O to person, place, and time; affect appropriate  NEUROLOGY: CN 2-12 are intact and symmetric; no gross sensory deficits;   SKIN: No rashes; no palpable lesions    LABS:                        10.9   8.44  )-----------( 248      ( 15 Feb 2023 06:41 )             35.0     02-15    135  |  96<L>  |  13  ----------------------------<  111<H>  3.9   |  24  |  0.68    Ca    10.1      15 Feb 2023 06:41  Phos  3.0     02-14  Mg     1.60     02-14    TPro  7.2  /  Alb  3.8  /  TBili  <0.2  /  DBili  x   /  AST  36<H>  /  ALT  32  /  AlkPhos  133<H>  02-15    PT/INR - ( 2023 20:40 )   PT: 11.1 sec;   INR: 0.96 ratio         PTT - ( 2023 20:40 )  PTT:43.1 sec      Urinalysis Basic - ( 2023 21:00 )    Color: Light Yellow / Appearance: Clear / S.014 / pH: x  Gluc: x / Ketone: Negative  / Bili: Negative / Urobili: <2 mg/dL   Blood: x / Protein: Negative / Nitrite: Negative   Leuk Esterase: Negative / RBC: 1 /HPF / WBC 1 /HPF   Sq Epi: x / Non Sq Epi: x / Bacteria: x        Culture - Urine (collected 2023 21:54)  Source: Clean Catch Clean Catch (Midstream)  Final Report (15 Feb 2023 07:37):    No growth    Culture - Blood (collected 2023 20:50)  Source: .Blood Blood-Peripheral  Preliminary Report (15 Feb 2023 02:02):    No growth to date.    Culture - Blood (collected 2023 20:40)  Source: .Blood Blood-Peripheral  Preliminary Report (15 Feb 2023 02:02):    No growth to date.        RADIOLOGY & ADDITIONAL TESTS:  Results Reviewed:   Imaging Personally Reviewed:  Electrocardiogram Personally Reviewed:    COORDINATION OF CARE:  Care Discussed with Consultants/Other Providers [Y/N]: Y  Prior or Outpatient Records Reviewed [Y/N]: Y   [facility] Division of Hospital Medicine  Lulu Velazco DO  Pager (M-F, 8A-5P): [phone]      Patient is a 67y old  Female who presents with a chief complaint of AMS (2023 15:16)      SUBJECTIVE / OVERNIGHT EVENTS: Pt received 0.5 Ativan IV overnight for agitation. Pt seen at bedside, alert, states her name, but then goes on to say "Lucrecia's vengence"  ADDITIONAL REVIEW OF SYSTEMS: unable to assess    MEDICATIONS  (STANDING):  ampicillin  IVPB 2000 milliGRAM(s) IV Intermittent every 4 hours  atorvastatin 10 milliGRAM(s) Oral at bedtime  cefTRIAXone   IVPB 2000 milliGRAM(s) IV Intermittent every 12 hours  dextrose 5% + sodium chloride 0.45%. 1000 milliLiter(s) (75 mL/Hr) IV Continuous <Continuous>  enoxaparin Injectable 40 milliGRAM(s) SubCutaneous every 24 hours  lacosamide 50 milliGRAM(s) Oral two times a day  levothyroxine 75 MICROGram(s) Oral daily  pantoprazole    Tablet 40 milliGRAM(s) Oral before breakfast  vancomycin  IVPB 1000 milliGRAM(s) IV Intermittent every 12 hours    MEDICATIONS  (PRN):      CAPILLARY BLOOD GLUCOSE      POCT Blood Glucose.: 89 mg/dL (15 Feb 2023 09:06)    I&O's Summary    2023 07:01  -  15 Feb 2023 07:00  --------------------------------------------------------  IN: 0 mL / OUT: 450 mL / NET: -450 mL        PHYSICAL EXAM:  Vital Signs Last 24 Hrs  T(C): 36.6 (15 Feb 2023 04:41), Max: 36.7 (2023 17:03)  T(F): 97.9 (15 Feb 2023 04:41), Max: 98.1 (2023 17:03)  HR: 89 (15 Feb 2023 04:41) (89 - 113)  BP: 152/88 (15 Feb 2023 04:41) (152/88 - 193/93)  BP(mean): --  RR: 20 (15 Feb 2023 04:41) (17 - 20)  SpO2: 100% (15 Feb 2023 04:41) (95% - 100%)    Parameters below as of 15 Feb 2023 04:41  Patient On (Oxygen Delivery Method): room air      Constitutional: NAD, sitting in bed, alert, but not following commands   Eyes: EOMI, anicteric sclera  Neck: supple  Respiratory: CTA B/L; no W/R/R  Cardiac: +S1/S2; RRR; no M/R/G  Gastrointestinal: soft, NT/ND; no rebound or guarding; +BSx4, baclofen pump  Extremities: UE/LE rigidity   Neurologic: alert, oriented x1, not following commands, just speaking random thoughts     LABS:                        10.9   8.44  )-----------( 248      ( 15 Feb 2023 06:41 )             35.0     02-15    135  |  96<L>  |  13  ----------------------------<  111<H>  3.9   |  24  |  0.68    Ca    10.1      15 Feb 2023 06:41  Phos  3.0     02-14  Mg     1.60     02-14    TPro  7.2  /  Alb  3.8  /  TBili  <0.2  /  DBili  x   /  AST  36<H>  /  ALT  32  /  AlkPhos  133<H>  02-15    PT/INR - ( 2023 20:40 )   PT: 11.1 sec;   INR: 0.96 ratio         PTT - ( 2023 20:40 )  PTT:43.1 sec      Urinalysis Basic - ( 2023 21:00 )    Color: Light Yellow / Appearance: Clear / S.014 / pH: x  Gluc: x / Ketone: Negative  / Bili: Negative / Urobili: <2 mg/dL   Blood: x / Protein: Negative / Nitrite: Negative   Leuk Esterase: Negative / RBC: 1 /HPF / WBC 1 /HPF   Sq Epi: x / Non Sq Epi: x / Bacteria: x        Culture - Urine (collected 2023 21:54)  Source: Clean Catch Clean Catch (Midstream)  Final Report (15 Feb 2023 07:37):    No growth    Culture - Blood (collected 2023 20:50)  Source: .Blood Blood-Peripheral  Preliminary Report (15 Feb 2023 02:02):    No growth to date.    Culture - Blood (collected 2023 20:40)  Source: .Blood Blood-Peripheral  Preliminary Report (15 Feb 2023 02:02):    No growth to date.        RADIOLOGY & ADDITIONAL TESTS:  Results Reviewed:   Imaging Personally Reviewed:  Electrocardiogram Personally Reviewed:    COORDINATION OF CARE:  Care Discussed with Consultants/Other Providers [Y/N]: Y  Prior or Outpatient Records Reviewed [Y/N]: Y

## 2023-02-15 NOTE — PROGRESS NOTE ADULT - PROBLEM SELECTOR PLAN 1
symptoms of increased confusion x1-2 weeks, baseline ano4. Afebrile here, but family reports temp 92-94 at home at times. Per family, mental status changes similar to Dec hospitalization when pt had UTI.  -CTH w/old lacunar infarct   -Labs wnl, TSH wnl, blood cultures and UCx NGTD  -C/w abx as ordered  -ID consulted, f/u recs  -Neuro following, EEG and possible LP   -NSG interrogated baclofen pump on 2/14   -c/w lamictal 50mg bid  -istop done, only meds was lamictal  -fu B12, TSH wnl

## 2023-02-15 NOTE — PROVIDER CONTACT NOTE (CHANGE IN STATUS NOTIFICATION) - ASSESSMENT
Patient is more erratic and aggressive once  is not nearby. Attempted to reorient patient. Non redirectable.
Bladder scan at 400ml and QTc at 423ms

## 2023-02-15 NOTE — PATIENT PROFILE ADULT - FALL HARM RISK - HARM RISK INTERVENTIONS
Assistance with ambulation/Assistance OOB with selected safe patient handling equipment/Communicate Risk of Fall with Harm to all staff/Discuss with provider need for PT consult/Monitor gait and stability/Reinforce activity limits and safety measures with patient and family/Tailored Fall Risk Interventions/Visual Cue: Yellow wristband and red socks/Bed in lowest position, wheels locked, appropriate side rails in place/Call bell, personal items and telephone in reach/Instruct patient to call for assistance before getting out of bed or chair/Non-slip footwear when patient is out of bed/[place] to call system/Physically safe environment - no spills, clutter or unnecessary equipment/Purposeful Proactive Rounding/Room/bathroom lighting operational, light cord in reach

## 2023-02-15 NOTE — CONSULT NOTE ADULT - SUBJECTIVE AND OBJECTIVE BOX
Patient is a 67y old  Female who presents with a chief complaint of AMS (15 Feb 2023 11:57)    HPI:  67F with MS (on balcofen pump), seizures, hypothyroidism, hx urosepsis presented  with confusion since  and refusing medications at home. No fever.  No leukocytosis.  CXR (-).  CT head negative for acute findings.  BC and UC sent are negative.  Empirically started on IV ampicillin, CTX, acyclovir.     ID asked to help management    prior hospital charts reviewed [ x ]  primary team notes reviewed [x  ]  other consultant notes reviewed [  ]    PAST MEDICAL & SURGICAL HISTORY:  Hypothyroid  UTI  Multiple sclerosis    Allergies  sulfa drugs (Hives; Rash)    ANTIMICROBIALS:  ampicillin  IVPB 2000 every 4 hours (-)  cefTRIAXone   IVPB 2000 every 12 hours (-)  vancomycin  IVPB 1000 every 12 hours (-)    MEDICATIONS  (STANDING):  atorvastatin 10 at bedtime  enoxaparin Injectable 40 every 24 hours  lacosamide 50 two times a day  levothyroxine 75 daily  pantoprazole    Tablet 40 before breakfast    SOCIAL HISTORY:       FAMILY HISTORY:    REVIEW OF SYSTEMS  [  ] ROS unobtainable because:    [  ] All other systems negative except as noted below:	    Constitutional:  [ ] fever [ ] chills  [ ] weight loss  [ ] weakness  Skin:  [ ] rash [ ] phlebitis	  Eyes: [ ] icterus [ ] pain  [ ] discharge	  ENMT: [ ] sore throat  [ ] thrush [ ] ulcers [ ] exudates  Respiratory: [ ] dyspnea [ ] hemoptysis [ ] cough [ ] sputum	  Cardiovascular:  [ ] chest pain [ ] palpitations [ ] edema	  Gastrointestinal:  [ ] nausea [ ] vomiting [ ] diarrhea [ ] constipation [ ] pain	  Genitourinary:  [ ] dysuria [ ] frequency [ ] hematuria [ ] discharge [ ] flank pain  [ ] incontinence  Musculoskeletal:  [ ] myalgias [ ] arthralgias [ ] arthritis  [ ] back pain  Neurological:  [ ] headache [ ] seizures  [ ] confusion/altered mental status  Psychiatric:  [ ] anxiety [ ] depression	  Hematology/Lymphatics:  [ ] lymphadenopathy  Endocrine:  [ ] adrenal [ ] thyroid  Allergic/Immunologic:	 [ ] transplant [ ] seasonal    Vital Signs Last 24 Hrs  T(F): 97.9 (02-15-23 @ 04:41), Max: 98.1 (23 @ 17:03)  Vital Signs Last 24 Hrs  HR: 89 (02-15-23 @ 04:41) (89 - 113)  BP: 152/88 (02-15-23 @ 04:41) (152/88 - 193/93)  RR: 20 (02-15-23 @ 04:41)  SpO2: 100% (02-15-23 @ 04:41) (95% - 100%)  Wt(kg): --    PHYSICAL EXAM:        WBC Count: 8.44 (02-15-23 @ 06:41)  WBC Count: 9.05 (23 @ 13:50)  WBC Count: 6.01 (23 @ 20:40)               10.9   8.44  )-----------( 248      ( 15 Feb 2023 06:41 )             35.0   135  |  96<L>  |  13  ----------------------------<  111<H>  3.9   |  24  |  0.68    Ca    10.1      15 Feb 2023 06:41  Phos  3.0       Mg     1.60         TPro  7.2  /  Alb  3.8  /  TBili  <0.2  /  DBili  x   /  AST  36<H>  /  ALT  32  /  AlkPhos  133<H>  02-15    Urinalysis Basic - ( 2023 21:00 )  Color: Light Yellow / Appearance: Clear / S.014 / pH: x  Gluc: x / Ketone: Negative  / Bili: Negative / Urobili: <2 mg/dL   Blood: x / Protein: Negative / Nitrite: Negative   Leuk Esterase: Negative / RBC: 1 /HPF / WBC 1 /HPF   Sq Epi: x / Non Sq Epi: x / Bacteria: x    MICROBIOLOGY:   UC (-)   BC (-)   BC (-)   BC (-)   BC (-)   UC (+) >100,000 CFU/ml Citrobacter freundii (S-amik, cefepime, cipro, gent, imi, levo, gonzález, nitrofurantoin, tobra, bactrim; I-erta; R-ampi, amox, cefazolin, cefox, ctx, zosyn)    RADIOLOGY:  imaging below personally reviewed and agree with findings    CT Head No Cont (23 @ 01:52) >  IMPRESSION:  No CT evidence of acute intracranial hemorrhage, brain edema, or mass effect.  Age-indeterminate lacunar infarct just superior to the body of the left lateral ventricle. MRI can be considered for further evaluation.    Xray Chest 1 View- PORTABLE-Urgent (23 @ 19:58) >  IMPRESSION:  Clear lungs.    CT Head No Cont (22 @ 15:16) >  IMPRESSION:  No acute intracranial hemorrhage, mass effect or midline shift.    CT Abdomen and Pelvis w/ IV Cont (22 @ 09:11) >  BONES: Osteopenia. Spinal stimulator device in the left anterior abdominal wall with catheter entering the spinal canal at L3-L4.  IMPRESSION:  Moderate stool. No acute abnormality in the abdomen and pelvis.   Patient is a 67y old  Female who presents with a chief complaint of AMS (15 Feb 2023 11:57)    HPI:  67F with MS (on balcofen pump), seizures, hypothyroidism, hx urosepsis presented  with confusion since  and refusing medications at home. No fever.  No leukocytosis.  CXR (-).  CT head negative for acute findings.  BC and UC sent are negative.  Empirically started on IV ampicillin, CTX, acyclovir.     ID asked to help management    prior hospital charts reviewed [ x ]  primary team notes reviewed [x  ]  other consultant notes reviewed [  ]    PAST MEDICAL & SURGICAL HISTORY:  Hypothyroid  UTI  Multiple sclerosis    Allergies  sulfa drugs (Hives; Rash)    ANTIMICROBIALS:  ampicillin  IVPB 2000 every 4 hours (-)  cefTRIAXone   IVPB 2000 every 12 hours (-)  vancomycin  IVPB 1000 every 12 hours (-)    MEDICATIONS  (STANDING):  atorvastatin 10 at bedtime  enoxaparin Injectable 40 every 24 hours  lacosamide 50 two times a day  levothyroxine 75 daily  pantoprazole    Tablet 40 before breakfast    SOCIAL HISTORY:   does not drink or smoke    FAMILY HISTORY:  unable to obtain as she is confused    REVIEW OF SYSTEMS  [  x] ROS unobtainable because:  she is confused  [  ] All other systems negative except as noted below:	    Constitutional:  [ ] fever [ ] chills  [ ] weight loss  [ ] weakness  Skin:  [ ] rash [ ] phlebitis	  Eyes: [ ] icterus [ ] pain  [ ] discharge	  ENMT: [ ] sore throat  [ ] thrush [ ] ulcers [ ] exudates  Respiratory: [ ] dyspnea [ ] hemoptysis [ ] cough [ ] sputum	  Cardiovascular:  [ ] chest pain [ ] palpitations [ ] edema	  Gastrointestinal:  [ ] nausea [ ] vomiting [ ] diarrhea [ ] constipation [ ] pain	  Genitourinary:  [ ] dysuria [ ] frequency [ ] hematuria [ ] discharge [ ] flank pain  [ ] incontinence  Musculoskeletal:  [ ] myalgias [ ] arthralgias [ ] arthritis  [ ] back pain  Neurological:  [ ] headache [ ] seizures  [ ] confusion/altered mental status  Psychiatric:  [ ] anxiety [ ] depression	  Hematology/Lymphatics:  [ ] lymphadenopathy  Endocrine:  [ ] adrenal [ ] thyroid  Allergic/Immunologic:	 [ ] transplant [ ] seasonal    Vital Signs Last 24 Hrs  T(F): 97.9 (02-15-23 @ 04:41), Max: 98.1 (23 @ 17:03)  Vital Signs Last 24 Hrs  HR: 89 (02-15-23 @ 04:41) (89 - 113)  BP: 152/88 (02-15-23 @ 04:41) (152/88 - 193/93)  RR: 20 (02-15-23 @ 04:41)  SpO2: 100% (02-15-23 @ 04:41) (95% - 100%)  Wt(kg): --    PHYSICAL EXAM:  Constitutional: non-toxic  HEAD/EYES: anicteric  ENT:  supple  Cardiovascular:   normal S1, S2  Respiratory:  clear BS bilaterally  GI:  soft, non-tender, normal bowel sounds  :  no moreno  Musculoskeletal:  no synovitis, contracted arms  Neurologic: awake, confused, not oriented, saying "[employer]" repeatedly  Skin:  no rash   Psychiatric:  awake, confused    WBC Count: 8.44 (02-15-23 @ 06:41)  WBC Count: 9.05 (23 @ 13:50)  WBC Count: 6.01 (23 @ 20:40)               10.9   8.44  )-----------( 248      ( 15 Feb 2023 06:41 )             35.0   135  |  96<L>  |  13  ----------------------------<  111<H>  3.9   |  24  |  0.68    Ca    10.1      15 Feb 2023 06:41  Phos  3.0     -  Mg     1.60         TPro  7.2  /  Alb  3.8  /  TBili  <0.2  /  DBili  x   /  AST  36<H>  /  ALT  32  /  AlkPhos  133<H>  02-15    Urinalysis Basic - ( 2023 21:00 )  Color: Light Yellow / Appearance: Clear / S.014 / pH: x  Gluc: x / Ketone: Negative  / Bili: Negative / Urobili: <2 mg/dL   Blood: x / Protein: Negative / Nitrite: Negative   Leuk Esterase: Negative / RBC: 1 /HPF / WBC 1 /HPF   Sq Epi: x / Non Sq Epi: x / Bacteria: x    MICROBIOLOGY:   UC (-)   BC (-)   BC (-)   BC (-)   BC (-)   UC (+) >100,000 CFU/ml Citrobacter freundii (S-amik, cefepime, cipro, gent, imi, levo, gonzález, nitrofurantoin, tobra, bactrim; I-erta; R-ampi, amox, cefazolin, cefox, ctx, zosyn)    RADIOLOGY:  imaging below personally reviewed and agree with findings    CT Head No Cont (23 @ 01:52) >  IMPRESSION:  No CT evidence of acute intracranial hemorrhage, brain edema, or mass effect.  Age-indeterminate lacunar infarct just superior to the body of the left lateral ventricle. MRI can be considered for further evaluation.    Xray Chest 1 View- PORTABLE-Urgent (23 @ 19:58) >  IMPRESSION:  Clear lungs.    CT Head No Cont (22 @ 15:16) >  IMPRESSION:  No acute intracranial hemorrhage, mass effect or midline shift.    CT Abdomen and Pelvis w/ IV Cont (22 @ 09:11) >  BONES: Osteopenia. Spinal stimulator device in the left anterior abdominal wall with catheter entering the spinal canal at L3-L4.  IMPRESSION:  Moderate stool. No acute abnormality in the abdomen and pelvis.

## 2023-02-16 DIAGNOSIS — I16.0 HYPERTENSIVE URGENCY: ICD-10-CM

## 2023-02-16 LAB
ALBUMIN SERPL ELPH-MCNC: 3.9 G/DL — SIGNIFICANT CHANGE UP (ref 3.3–5)
ALP SERPL-CCNC: 125 U/L — HIGH (ref 40–120)
ALT FLD-CCNC: 30 U/L — SIGNIFICANT CHANGE UP (ref 4–33)
ANION GAP SERPL CALC-SCNC: 15 MMOL/L — HIGH (ref 7–14)
APPEARANCE CSF: CLEAR — SIGNIFICANT CHANGE UP
APPEARANCE SPUN FLD: COLORLESS — SIGNIFICANT CHANGE UP
APTT BLD: 35.9 SEC — SIGNIFICANT CHANGE UP (ref 27–36.3)
AST SERPL-CCNC: 36 U/L — HIGH (ref 4–32)
BILIRUB SERPL-MCNC: 0.2 MG/DL — SIGNIFICANT CHANGE UP (ref 0.2–1.2)
BUN SERPL-MCNC: 8 MG/DL — SIGNIFICANT CHANGE UP (ref 7–23)
CALCIUM SERPL-MCNC: 9.7 MG/DL — SIGNIFICANT CHANGE UP (ref 8.4–10.5)
CHLORIDE SERPL-SCNC: 93 MMOL/L — LOW (ref 98–107)
CO2 SERPL-SCNC: 25 MMOL/L — SIGNIFICANT CHANGE UP (ref 22–31)
COLOR CSF: COLORLESS — SIGNIFICANT CHANGE UP
CREAT SERPL-MCNC: 0.6 MG/DL — SIGNIFICANT CHANGE UP (ref 0.5–1.3)
CSF PCR RESULT: SIGNIFICANT CHANGE UP
EGFR: 99 ML/MIN/1.73M2 — SIGNIFICANT CHANGE UP
GLUCOSE BLDC GLUCOMTR-MCNC: 103 MG/DL — HIGH (ref 70–99)
GLUCOSE BLDC GLUCOMTR-MCNC: 108 MG/DL — HIGH (ref 70–99)
GLUCOSE BLDC GLUCOMTR-MCNC: 113 MG/DL — HIGH (ref 70–99)
GLUCOSE BLDC GLUCOMTR-MCNC: 114 MG/DL — HIGH (ref 70–99)
GLUCOSE BLDC GLUCOMTR-MCNC: 116 MG/DL — HIGH (ref 70–99)
GLUCOSE BLDC GLUCOMTR-MCNC: 129 MG/DL — HIGH (ref 70–99)
GLUCOSE CSF-MCNC: 74 MG/DL — HIGH (ref 40–70)
GLUCOSE SERPL-MCNC: 110 MG/DL — HIGH (ref 70–99)
GRAM STN FLD: SIGNIFICANT CHANGE UP
HCT VFR BLD CALC: 32.8 % — LOW (ref 34.5–45)
HGB BLD-MCNC: 10.4 G/DL — LOW (ref 11.5–15.5)
INR BLD: 0.96 RATIO — SIGNIFICANT CHANGE UP (ref 0.88–1.16)
LABORATORY COMMENT REPORT: SIGNIFICANT CHANGE UP
LDH CSF L TO P-CCNC: 17 U/L — SIGNIFICANT CHANGE UP
LDH FLD-CCNC: 17 U/L — SIGNIFICANT CHANGE UP
LYMPHOCYTES # CSF: 9 % — SIGNIFICANT CHANGE UP
MAGNESIUM SERPL-MCNC: 1.4 MG/DL — LOW (ref 1.6–2.6)
MCHC RBC-ENTMCNC: 28.1 PG — SIGNIFICANT CHANGE UP (ref 27–34)
MCHC RBC-ENTMCNC: 31.7 GM/DL — LOW (ref 32–36)
MCV RBC AUTO: 88.6 FL — SIGNIFICANT CHANGE UP (ref 80–100)
MONOS+MACROS NFR CSF: 3 % — SIGNIFICANT CHANGE UP
NEUTROPHILS # CSF: 0 % — SIGNIFICANT CHANGE UP
NIGHT BLUE STAIN TISS: SIGNIFICANT CHANGE UP
NRBC # BLD: 0 /100 WBCS — SIGNIFICANT CHANGE UP (ref 0–0)
NRBC # FLD: 0 K/UL — SIGNIFICANT CHANGE UP (ref 0–0)
NRBC NFR CSF: 3 CELLS/UL — SIGNIFICANT CHANGE UP (ref 0–5)
PHOSPHATE SERPL-MCNC: 2.8 MG/DL — SIGNIFICANT CHANGE UP (ref 2.5–4.5)
PLATELET # BLD AUTO: 240 K/UL — SIGNIFICANT CHANGE UP (ref 150–400)
POTASSIUM SERPL-MCNC: 3.3 MMOL/L — LOW (ref 3.5–5.3)
POTASSIUM SERPL-SCNC: 3.3 MMOL/L — LOW (ref 3.5–5.3)
PROT CSF-MCNC: 26 MG/DL — SIGNIFICANT CHANGE UP (ref 15–45)
PROT SERPL-MCNC: 7 G/DL — SIGNIFICANT CHANGE UP (ref 6–8.3)
PROTHROM AB SERPL-ACNC: 11.1 SEC — SIGNIFICANT CHANGE UP (ref 10.5–13.4)
RBC # BLD: 3.7 M/UL — LOW (ref 3.8–5.2)
RBC # CSF: 0 CELLS/UL — SIGNIFICANT CHANGE UP (ref 0–0)
RBC # FLD: 17.2 % — HIGH (ref 10.3–14.5)
SODIUM SERPL-SCNC: 133 MMOL/L — LOW (ref 135–145)
SOURCE HSV 1/2: SIGNIFICANT CHANGE UP
SPECIMEN SOURCE: SIGNIFICANT CHANGE UP
SPECIMEN SOURCE: SIGNIFICANT CHANGE UP
TOTAL CELLS COUNTED, SPINAL FLUID: 12 CELLS — SIGNIFICANT CHANGE UP
TUBE TYPE: SIGNIFICANT CHANGE UP
VANCOMYCIN TROUGH SERPL-MCNC: 17.5 UG/ML — SIGNIFICANT CHANGE UP (ref 10–20)
WBC # BLD: 8.12 K/UL — SIGNIFICANT CHANGE UP (ref 3.8–10.5)
WBC # FLD AUTO: 8.12 K/UL — SIGNIFICANT CHANGE UP (ref 3.8–10.5)

## 2023-02-16 PROCEDURE — 88189 FLOWCYTOMETRY/READ 16 & >: CPT

## 2023-02-16 PROCEDURE — 62328 DX LMBR SPI PNXR W/FLUOR/CT: CPT

## 2023-02-16 PROCEDURE — 99233 SBSQ HOSP IP/OBS HIGH 50: CPT

## 2023-02-16 RX ORDER — MAGNESIUM SULFATE 500 MG/ML
2 VIAL (ML) INJECTION ONCE
Refills: 0 | Status: COMPLETED | OUTPATIENT
Start: 2023-02-16 | End: 2023-02-16

## 2023-02-16 RX ORDER — ACYCLOVIR SODIUM 500 MG
600 VIAL (EA) INTRAVENOUS EVERY 8 HOURS
Refills: 0 | Status: DISCONTINUED | OUTPATIENT
Start: 2023-02-16 | End: 2023-02-16

## 2023-02-16 RX ORDER — HYDRALAZINE HCL 50 MG
5 TABLET ORAL ONCE
Refills: 0 | Status: COMPLETED | OUTPATIENT
Start: 2023-02-16 | End: 2023-02-16

## 2023-02-16 RX ORDER — MAGNESIUM SULFATE 500 MG/ML
1 VIAL (ML) INJECTION ONCE
Refills: 0 | Status: COMPLETED | OUTPATIENT
Start: 2023-02-16 | End: 2023-02-16

## 2023-02-16 RX ORDER — POTASSIUM CHLORIDE 20 MEQ
10 PACKET (EA) ORAL
Refills: 0 | Status: COMPLETED | OUTPATIENT
Start: 2023-02-16 | End: 2023-02-16

## 2023-02-16 RX ORDER — LEVOTHYROXINE SODIUM 125 MCG
37.5 TABLET ORAL AT BEDTIME
Refills: 0 | Status: DISCONTINUED | OUTPATIENT
Start: 2023-02-16 | End: 2023-02-18

## 2023-02-16 RX ORDER — HYDRALAZINE HCL 50 MG
5 TABLET ORAL EVERY 6 HOURS
Refills: 0 | Status: DISCONTINUED | OUTPATIENT
Start: 2023-02-16 | End: 2023-02-18

## 2023-02-16 RX ADMIN — Medication 200 MILLIGRAM(S): at 05:02

## 2023-02-16 RX ADMIN — LACOSAMIDE 110 MILLIGRAM(S): 50 TABLET ORAL at 05:04

## 2023-02-16 RX ADMIN — SODIUM CHLORIDE 75 MILLILITER(S): 9 INJECTION, SOLUTION INTRAVENOUS at 02:57

## 2023-02-16 RX ADMIN — Medication 200 MILLIGRAM(S): at 02:24

## 2023-02-16 RX ADMIN — Medication 100 MILLIEQUIVALENT(S): at 19:25

## 2023-02-16 RX ADMIN — Medication 37.5 MICROGRAM(S): at 23:45

## 2023-02-16 RX ADMIN — Medication 100 MILLIEQUIVALENT(S): at 07:03

## 2023-02-16 RX ADMIN — Medication 100 MILLIEQUIVALENT(S): at 06:00

## 2023-02-16 RX ADMIN — Medication 25 GRAM(S): at 15:02

## 2023-02-16 RX ADMIN — Medication 250 MILLIGRAM(S): at 06:22

## 2023-02-16 RX ADMIN — Medication 5 MILLIGRAM(S): at 13:04

## 2023-02-16 RX ADMIN — Medication 0.5 MILLIGRAM(S): at 04:09

## 2023-02-16 RX ADMIN — CEFTRIAXONE 100 MILLIGRAM(S): 500 INJECTION, POWDER, FOR SOLUTION INTRAMUSCULAR; INTRAVENOUS at 17:21

## 2023-02-16 RX ADMIN — LACOSAMIDE 110 MILLIGRAM(S): 50 TABLET ORAL at 18:42

## 2023-02-16 RX ADMIN — Medication 200 MILLIGRAM(S): at 12:22

## 2023-02-16 RX ADMIN — Medication 200 MILLIGRAM(S): at 17:07

## 2023-02-16 RX ADMIN — SODIUM CHLORIDE 75 MILLILITER(S): 9 INJECTION, SOLUTION INTRAVENOUS at 08:31

## 2023-02-16 RX ADMIN — Medication 5 MILLIGRAM(S): at 18:38

## 2023-02-16 RX ADMIN — Medication 100 MILLIEQUIVALENT(S): at 20:30

## 2023-02-16 RX ADMIN — Medication 100 GRAM(S): at 12:25

## 2023-02-16 RX ADMIN — Medication 5 MILLIGRAM(S): at 23:54

## 2023-02-16 RX ADMIN — CEFTRIAXONE 100 MILLIGRAM(S): 500 INJECTION, POWDER, FOR SOLUTION INTRAMUSCULAR; INTRAVENOUS at 05:30

## 2023-02-16 RX ADMIN — Medication 5 MILLIGRAM(S): at 02:43

## 2023-02-16 RX ADMIN — Medication 100 MILLIEQUIVALENT(S): at 08:25

## 2023-02-16 RX ADMIN — Medication 100 MILLIEQUIVALENT(S): at 18:02

## 2023-02-16 RX ADMIN — SODIUM CHLORIDE 75 MILLILITER(S): 9 INJECTION, SOLUTION INTRAVENOUS at 19:25

## 2023-02-16 NOTE — PROVIDER CONTACT NOTE (OTHER) - REASON
Patient noncompliant to morning medications
Patient hypertensive
Patients blood pressure 184/98
Patients blood pressure 175/95

## 2023-02-16 NOTE — PROVIDER CONTACT NOTE (OTHER) - SITUATION
Patient states "I don't want to take any medication.". Slaps my wrist and spills the water.
Patients blood pressure 184/98 other vitals are stable
Patient hypertensive
Patients blood pressure 175/95 other vitals are stable

## 2023-02-16 NOTE — PROVIDER CONTACT NOTE (OTHER) - BACKGROUND
Altered mental status
Patient admitted for AMS
Patient admitted for AMS has a history of MS and seizures
Patient admitted for AMS has a history of MS and seizures

## 2023-02-16 NOTE — PROGRESS NOTE ADULT - PROBLEM SELECTOR PLAN 1
symptoms of increased confusion x1-2 weeks, baseline ano4. Afebrile here, but family reports temp 92-94 at home at times. Per family, mental status changes similar to Dec hospitalization when pt had UTI.  -CTH w/old lacunar infarct   -EEG (2/15) Rare generalized epileptiform discharges indicate a risk of generalized-onset seizures.  -s/p LP with IR (2/16) - f/u studies   -Labs wnl, TSH wnl, blood cultures and UCx NGTD  -C/w epimeric amp, acyclovir (with continuous fluids) and CTX per ID  -Neuro following, appreciate recs   -NSG interrogated baclofen pump on 2/14   -c/w IV lamictal 50mg bid  -istop done, only meds was lamictal  -fu B12, TSH wnl

## 2023-02-16 NOTE — PROGRESS NOTE ADULT - TIME BILLING
Pt exam and care plan, updating family, reviewing labs and data, following up ID and neuro consults.

## 2023-02-16 NOTE — SWALLOW BEDSIDE ASSESSMENT ADULT - SWALLOW EVAL: DIAGNOSIS
1. Attempted to provide PO trials of pureed via teaspoon and thin liquids via cup; however, patient was not agreeable to PO trials. SLP presented pureed to the patient's labial surface and patient with poor oral acceptance and expectorated bolus. Upon reattemps to provide PO trials of pureed and thin liquids, the patient continued to decline marked by turning of head, tight labial seal, and pushing of utensil/therapist's hand away, despite max encouragement/education provided by SLP. Unable to assess oral or pharyngeal phases due to the patient not agreeable to PO trials at this time. 2. Cannot recommend a PO diet due to patient not agreeable to PO trials for swallow assessment and will defer diet level to MD.

## 2023-02-16 NOTE — PROGRESS NOTE ADULT - ASSESSMENT
66 yo female with hx of MS (on balcofen pump), seizure, hypothyroidism, urosepsis is brought in by sister for encephalopathy, suspecting viral or bacterial meningitis vs seizure.  Neuro workup ongoing, s/p LP on 2/16, and EEG w/Rare generalized epileptiform discharges indicate a risk of generalized-onset seizures.

## 2023-02-16 NOTE — SWALLOW BEDSIDE ASSESSMENT ADULT - SWALLOW EVAL: RECOMMENDED DIET
Cannot recommend a PO diet due to patient not agreeable to PO trials and will defer diet level to MD at this time

## 2023-02-16 NOTE — PROGRESS NOTE ADULT - PROBLEM SELECTOR PLAN 5
cont with baclofen, needs to be refilled 2/24  -interrogated by NSG on 2/14     Updated  and sister over the phone on 2/15, 2 sisters on 2/16 cont with baclofen, needs to be refilled 2/24  -interrogated by NSG on 2/14     Updated  and sister over the phone on 2/15.   Updated Deloris and other sister from [place] on 2/16.

## 2023-02-16 NOTE — GOALS OF CARE CONVERSATION - ADVANCED CARE PLANNING - CONVERSATION DETAILS
Called by pts sisters, Haily and Deloris stating pt was a DNR. Deloris is the caregiver at home.   Called , Anibal, to confirm that pt is a DNR/DNI per her wishes and multiple previous discussions. He does want to abide by her wishes, but does not want the medical team to "go easy" or "not treat" her b/c of code status. Agrees to escalation of care if needed and open to further GOC if pt deteriorates.

## 2023-02-16 NOTE — SWALLOW BEDSIDE ASSESSMENT ADULT - ADDITIONAL RECOMMENDATIONS
Patient presented with reduced cognition and was unable to follow low level commands and was not agreeable to PO trials for swallow assessment. Defer diet level to MD at this time and please reconsult this department as the patient's mentation status/willingness to participate improves.

## 2023-02-16 NOTE — SWALLOW BEDSIDE ASSESSMENT ADULT - COMMENTS
As per Hospitalist note dated 2/15/23 '68 yo female with hx of MS (on balcofen pump), seizure, hypothyroidism, urosepsis is brought in by sister for AMS. "    CXR 2/13/23 "IMPRESSION: Clear lungs."  CT Head 2/14/23 "IMPRESSION:  No CT evidence of acute intracranial hemorrhage, brain edema, or mass effect.  Age-indeterminate lacunar infarct just superior to the body of the left lateral ventricle. MRI can be considered for further evaluation."    Clinical swallow evaluation requested, however per RN patient off the unit for lumbar puncture. Will re-attempt as schedule permits.
Chart reviewed. Attempted to see patient for initial swallow assessment this AM; however, patient was off unit at the time. Patient was seen this PM for re-attempt to assess swallow function, at which time she was alert, oriented x0, cooperative, and denied pain. Patient was unable to follow low level commands, despite max cues. Patient presented with nonsensical verbalizations and vocal quality was WFL.     Per charting, the patient is a "66 yo female with hx of MS (on balcofen pump), seizure, hypothyroidism, urosepsis is brought in by sister for encephalopathy, suspecting viral or bacterial meningitis vs seizure.Neuro workup ongoing, s/p LP on 2/16, and EEG w/Rare generalized epileptiform discharges indicate a risk of generalized-onset seizures."     WBC WFL.   CXR 2/13/23 revealed: "Clear lungs."  CT Head 2/14/23 revealed "No CT evidence of acute intracranial hemorrhage, brain edema, or mass effect. Age-indeterminate lacunar infarct just superior to the body of the left lateral ventricle. MRI can be considered for further evaluation."   MR Head 2/14/23, results pending.     Discussed results and recommendations with the patient, RN, and called out to ACP.

## 2023-02-16 NOTE — SWALLOW BEDSIDE ASSESSMENT ADULT - SWALLOW EVAL: CURRENT DIET
NPO except meds AND NPO after midnight 2/15/23 @ 23:59 active orders
NPO except medications, as per MD order

## 2023-02-16 NOTE — PROVIDER CONTACT NOTE (OTHER) - ASSESSMENT
Patients blood pressure and other vitals stable. Patient is slightly anxious and contracted
Patient vitals per flowsheet. Patient in no acute distress
Patient confused and erratic
Patients blood pressure and other vitals stable. Patient is slightly anxious and contracted

## 2023-02-16 NOTE — PROGRESS NOTE ADULT - SUBJECTIVE AND OBJECTIVE BOX
[facility] Division of Hospital Medicine  Emtyler Velazco DO  Pager (M-F, 8A-5P): [phone]      Patient is a 68y old  Female who presents with a chief complaint of AMS (15 Feb 2023 12:10)      SUBJECTIVE / OVERNIGHT EVENTS: no overnight events, pt seen in recovery suite s/p LP. Awake, calm, responds to voice, but does not answer questions or follow commands  ADDITIONAL REVIEW OF SYSTEMS: unable to assess     MEDICATIONS  (STANDING):  acyclovir IVPB 600 milliGRAM(s) IV Intermittent every 8 hours  ampicillin  IVPB 2000 milliGRAM(s) IV Intermittent every 4 hours  atorvastatin 10 milliGRAM(s) Oral at bedtime  cefTRIAXone   IVPB 2000 milliGRAM(s) IV Intermittent every 12 hours  dextrose 5% + sodium chloride 0.45%. 1000 milliLiter(s) (75 mL/Hr) IV Continuous <Continuous>  hydrALAZINE Injectable 5 milliGRAM(s) IV Push every 6 hours  lacosamide IVPB 50 milliGRAM(s) IV Intermittent every 12 hours  levothyroxine Injectable 37.5 MICROGram(s) IV Push at bedtime  pantoprazole    Tablet 40 milliGRAM(s) Oral before breakfast    MEDICATIONS  (PRN):      CAPILLARY BLOOD GLUCOSE      POCT Blood Glucose.: 113 mg/dL (16 Feb 2023 08:27)  POCT Blood Glucose.: 103 mg/dL (16 Feb 2023 03:48)  POCT Blood Glucose.: 114 mg/dL (16 Feb 2023 00:38)  POCT Blood Glucose.: 129 mg/dL (15 Feb 2023 20:16)  POCT Blood Glucose.: 109 mg/dL (15 Feb 2023 15:09)    I&O's Summary    15 Feb 2023 07:01  -  16 Feb 2023 07:00  --------------------------------------------------------  IN: 0 mL / OUT: 900 mL / NET: -900 mL    16 Feb 2023 07:01  -  16 Feb 2023 12:40  --------------------------------------------------------  IN: 0 mL / OUT: 600 mL / NET: -600 mL        PHYSICAL EXAM:  Vital Signs Last 24 Hrs  T(C): 36.3 (16 Feb 2023 08:00), Max: 36.4 (15 Feb 2023 22:00)  T(F): 97.4 (16 Feb 2023 08:00), Max: 97.6 (16 Feb 2023 02:00)  HR: 92 (16 Feb 2023 08:00) (92 - 98)  BP: 171/87 (16 Feb 2023 08:00) (159/60 - 184/98)  BP(mean): --  RR: 19 (16 Feb 2023 08:00) (18 - 19)  SpO2: 100% (16 Feb 2023 08:00) (100% - 100%)    Parameters below as of 16 Feb 2023 08:00  Patient On (Oxygen Delivery Method): room air      Constitutional: NAD, sitting in bed, alert, but not following commands   Eyes: EOMI, anicteric sclera  Neck: supple  Respiratory: CTA B/L; no W/R/R  Cardiac: +S1/S2; RRR; no M/R/G  Gastrointestinal: soft, NT/ND; no rebound or guarding; +BSx4, baclofen pump  Extremities: UE/LE rigidity   Neurologic: awake, not following commands, turns head to voice, but does not answer questions.       LABS:                        10.4   8.12  )-----------( 240      ( 16 Feb 2023 03:50 )             32.8     02-16    133<L>  |  93<L>  |  8   ----------------------------<  110<H>  3.3<L>   |  25  |  0.60    Ca    9.7      16 Feb 2023 03:50  Phos  2.8     02-16  Mg     1.40     02-16    TPro  7.0  /  Alb  3.9  /  TBili  0.2  /  DBili  x   /  AST  36<H>  /  ALT  30  /  AlkPhos  125<H>  02-16    PT/INR - ( 16 Feb 2023 03:50 )   PT: 11.1 sec;   INR: 0.96 ratio         PTT - ( 16 Feb 2023 03:50 )  PTT:35.9 sec          Culture - CSF with Gram Stain (collected 16 Feb 2023 11:10)  Source: .CSF CSF.lumbar  Gram Stain (16 Feb 2023 12:29):    No polymorphonuclear cells seen    No organisms seen    by cytocentrifuge    Culture - Urine (collected 13 Feb 2023 21:54)  Source: Clean Catch Clean Catch (Midstream)  Final Report (15 Feb 2023 07:37):    No growth    Culture - Blood (collected 13 Feb 2023 20:50)  Source: .Blood Blood-Peripheral  Preliminary Report (15 Feb 2023 02:02):    No growth to date.    Culture - Blood (collected 13 Feb 2023 20:40)  Source: .Blood Blood-Peripheral  Preliminary Report (15 Feb 2023 02:02):    No growth to date.        RADIOLOGY & ADDITIONAL TESTS:  Results Reviewed:   Imaging Personally Reviewed:  Electrocardiogram Personally Reviewed:    COORDINATION OF CARE:  Care Discussed with Consultants/Other Providers [Y/N]: Y  Prior or Outpatient Records Reviewed [Y/N]: Y

## 2023-02-16 NOTE — EEG REPORT - NS EEG TEXT BOX
RAMIRO CASTAÑEDA [id number]     Study Date: 02/15/23 13:37 - 02/16/23 08:00  Duration: 18 h 22 min  --------------------------------------------------------------------------------------------------  History:  CC/ HPI Patient is a 68y old  Female who presents with a chief complaint of AMS (15 Feb 2023 12:10)    MEDICATIONS  (STANDING):  ampicillin  IVPB 2000 milliGRAM(s) IV Intermittent every 4 hours  atorvastatin 10 milliGRAM(s) Oral at bedtime  cefTRIAXone   IVPB 2000 milliGRAM(s) IV Intermittent every 12 hours  dextrose 5% + sodium chloride 0.45%. 1000 milliLiter(s) (75 mL/Hr) IV Continuous <Continuous>  lacosamide IVPB 50 milliGRAM(s) IV Intermittent every 12 hours  levothyroxine 75 MICROGram(s) Oral daily  magnesium sulfate  IVPB 1 Gram(s) IV Intermittent once  pantoprazole    Tablet 40 milliGRAM(s) Oral before breakfast  potassium chloride  10 mEq/100 mL IVPB 10 milliEquivalent(s) IV Intermittent every 1 hour  vancomycin  IVPB 1000 milliGRAM(s) IV Intermittent every 12 hours    --------------------------------------------------------------------------------------------------  Study Interpretation:    [[[Abbreviation Key:  PDR=alpha rhythm/posterior dominant rhythm. A-P=anterior posterior.  Amplitude: ‘very low’:<20; ‘low’:20-49; ‘medium’:; ‘high’:>150uV.  Persistence for periodic/rhythmic patterns (% of epoch) ‘rare’:<1%; ‘occasional’:1-10%; ‘frequent’:10-50%; ‘abundant’:50-90%; ‘continuous’:>90%.  Persistence for sporadic discharges: ‘rare’:<1/hr; ‘occasional’:1/min-1/hr; ‘frequent’:>1/min; ‘abundant’:>1/10 sec.  RPP=rhythmic and periodic patterns; GRDA=generalized rhythmic delta activity; FIRDA=frontal intermittent GRDA; LRDA=lateralized rhythmic delta activity; TIRDA=temporal intermittent rhythmic delta activity;  LPD=PLED=lateralized periodic discharges; GPD=generalized periodic discharges; BIPDs =bilateral independent periodic discharges; Mf=multifocal; SIRPDs=stimulus induced rhythmic, periodic, or ictal appearing discharges; BIRDs=brief potentially ictal rhythmic discharges >4 Hz, lasting .5-10s; PFA (paroxysmal bursts >13 Hz or =8 Hz <10s).  Modifiers: +F=with fast component; +S=with spike component; +R=with rhythmic component.  S-B=burst suppression pattern.  Max=maximal. N1-drowsy; N2-stage II sleep; N3-slow wave sleep. SSS/BETS=small sharp spikes/benign epileptiform transients of sleep. HV=hyperventilation; PS=photic stimulation]]]    Daily EEG Visual Analysis    FINDINGS:      Background:  Interpretation is limited by continuous muscle artifact, movement artifact, and electrical artifact. In the awake state, there is a posterior dominant rhythm of unclear frequency, possibly approximately 8 Hz.    Background Slowing:  Generalized slowing: Unclear  Focal slowing: Unclear    State Changes:   Drowsiness is characterized by fragmentation, attenuation, and slowing of the background frequencies, with the appearance of bursts of diffuse polymorphic delta slowing and decrease in artifact.  No clear stage 2 sleep is captured.    Sporadic Epileptiform Discharges:    Rare generalized frontally predominant sharp waves and spike-wave discharges.    Rhythmic and Periodic Patterns (RPPs):  None     Electrographic and Electroclinical seizures:  None    Other Clinical Events:  None    Activation Procedures:   Hyperventilation was not performed.    Photic stimulation was not performed.    Artifacts:  Continuous myogenic and movement and electrical artifacts are present, limiting interpretation of the study.    EKG:  Interpretation of single-lead EKG is limited by electrical artifact, but EKG appears to show regular rhythm at  bpm.    EEG Classification / Summary:  Abnormal EEG in the awake and drowsy states due to rare generalized frontally predominant epileptiform discharges. Interpretation of the EEG is limited by continuous myogenic, movement, and electrical artifacts.    Clinical Impression:  Rare generalized epileptiform discharges indicate a risk of generalized-onset seizures. Interpretation of the EEG is limited by continuous artifacts.        -------------------------------------------------------------------------------------------------------  [facility] EEG Reading Room Ph#: [phone]  Epilepsy Answering Service after 5PM and before 8:30AM: Ph#: [phone]    Ragini Cifuentes MD  Attending Physician, [facility]   RAMIRO CASTAÑEDA [id number]     Study Date: 02/15/23 13:37 - 02/16/23 09:27  Duration: 19 h 47 min  --------------------------------------------------------------------------------------------------  History:  CC/ HPI Patient is a 68y old  Female who presents with a chief complaint of AMS (15 Feb 2023 12:10)    MEDICATIONS  (STANDING):  ampicillin  IVPB 2000 milliGRAM(s) IV Intermittent every 4 hours  atorvastatin 10 milliGRAM(s) Oral at bedtime  cefTRIAXone   IVPB 2000 milliGRAM(s) IV Intermittent every 12 hours  dextrose 5% + sodium chloride 0.45%. 1000 milliLiter(s) (75 mL/Hr) IV Continuous <Continuous>  lacosamide IVPB 50 milliGRAM(s) IV Intermittent every 12 hours  levothyroxine 75 MICROGram(s) Oral daily  magnesium sulfate  IVPB 1 Gram(s) IV Intermittent once  pantoprazole    Tablet 40 milliGRAM(s) Oral before breakfast  potassium chloride  10 mEq/100 mL IVPB 10 milliEquivalent(s) IV Intermittent every 1 hour  vancomycin  IVPB 1000 milliGRAM(s) IV Intermittent every 12 hours    --------------------------------------------------------------------------------------------------  Study Interpretation:    [[[Abbreviation Key:  PDR=alpha rhythm/posterior dominant rhythm. A-P=anterior posterior.  Amplitude: ‘very low’:<20; ‘low’:20-49; ‘medium’:; ‘high’:>150uV.  Persistence for periodic/rhythmic patterns (% of epoch) ‘rare’:<1%; ‘occasional’:1-10%; ‘frequent’:10-50%; ‘abundant’:50-90%; ‘continuous’:>90%.  Persistence for sporadic discharges: ‘rare’:<1/hr; ‘occasional’:1/min-1/hr; ‘frequent’:>1/min; ‘abundant’:>1/10 sec.  RPP=rhythmic and periodic patterns; GRDA=generalized rhythmic delta activity; FIRDA=frontal intermittent GRDA; LRDA=lateralized rhythmic delta activity; TIRDA=temporal intermittent rhythmic delta activity;  LPD=PLED=lateralized periodic discharges; GPD=generalized periodic discharges; BIPDs =bilateral independent periodic discharges; Mf=multifocal; SIRPDs=stimulus induced rhythmic, periodic, or ictal appearing discharges; BIRDs=brief potentially ictal rhythmic discharges >4 Hz, lasting .5-10s; PFA (paroxysmal bursts >13 Hz or =8 Hz <10s).  Modifiers: +F=with fast component; +S=with spike component; +R=with rhythmic component.  S-B=burst suppression pattern.  Max=maximal. N1-drowsy; N2-stage II sleep; N3-slow wave sleep. SSS/BETS=small sharp spikes/benign epileptiform transients of sleep. HV=hyperventilation; PS=photic stimulation]]]    Daily EEG Visual Analysis    FINDINGS:      Background:  Interpretation is limited by continuous muscle artifact, movement artifact, and electrical artifact. In the awake state, there is a posterior dominant rhythm of unclear frequency, possibly approximately 8 Hz.    Background Slowing:  Generalized slowing: Unclear  Focal slowing: Unclear    State Changes:   Drowsiness is characterized by fragmentation, attenuation, and slowing of the background frequencies, with the appearance of bursts of diffuse polymorphic delta slowing and decrease in artifact.  No clear stage 2 sleep is captured.    Sporadic Epileptiform Discharges:    Rare generalized frontally predominant sharp waves and spike-wave discharges.    Rhythmic and Periodic Patterns (RPPs):  None     Electrographic and Electroclinical seizures:  None    Other Clinical Events:  None    Activation Procedures:   Hyperventilation was not performed.    Photic stimulation was not performed.    Artifacts:  Continuous myogenic and movement and electrical artifacts are present, limiting interpretation of the study.    EKG:  Interpretation of single-lead EKG is limited by electrical artifact, but EKG appears to show regular rhythm at  bpm.    EEG Classification / Summary:  Abnormal EEG in the awake and drowsy states due to rare generalized frontally predominant epileptiform discharges. Interpretation of the EEG is limited by continuous myogenic, movement, and electrical artifacts.    Clinical Impression:  Rare generalized epileptiform discharges indicate a risk of generalized-onset seizures. Interpretation of the EEG is limited by continuous artifacts. Consider repeat study if clinically indicated.      ADDENDUM 02/16/23 17:39:  Report updated to reflect completion of recording on 02/16/23 09:27  Originally reported up to 02/16/23 08:00  No significant change in findings.      -------------------------------------------------------------------------------------------------------  [facility] EEG Reading Room Ph#: [phone]  Epilepsy Answering Service after 5PM and before 8:30AM: Ph#: [phone]    Ragini Cifuentes MD  Attending Physician, [facility]

## 2023-02-17 DIAGNOSIS — E87.1 HYPO-OSMOLALITY AND HYPONATREMIA: ICD-10-CM

## 2023-02-17 DIAGNOSIS — Z29.9 ENCOUNTER FOR PROPHYLACTIC MEASURES, UNSPECIFIED: ICD-10-CM

## 2023-02-17 LAB
ALBUMIN SERPL ELPH-MCNC: 3.6 G/DL — SIGNIFICANT CHANGE UP (ref 3.3–5)
ALP SERPL-CCNC: 119 U/L — SIGNIFICANT CHANGE UP (ref 40–120)
ALT FLD-CCNC: 26 U/L — SIGNIFICANT CHANGE UP (ref 4–33)
ANION GAP SERPL CALC-SCNC: 11 MMOL/L — SIGNIFICANT CHANGE UP (ref 7–14)
AST SERPL-CCNC: 36 U/L — HIGH (ref 4–32)
BILIRUB SERPL-MCNC: 0.3 MG/DL — SIGNIFICANT CHANGE UP (ref 0.2–1.2)
BUN SERPL-MCNC: 6 MG/DL — LOW (ref 7–23)
CALCIUM SERPL-MCNC: 9.5 MG/DL — SIGNIFICANT CHANGE UP (ref 8.4–10.5)
CHLORIDE SERPL-SCNC: 91 MMOL/L — LOW (ref 98–107)
CO2 SERPL-SCNC: 24 MMOL/L — SIGNIFICANT CHANGE UP (ref 22–31)
CREAT SERPL-MCNC: 0.56 MG/DL — SIGNIFICANT CHANGE UP (ref 0.5–1.3)
EGFR: 101 ML/MIN/1.73M2 — SIGNIFICANT CHANGE UP
GLUCOSE BLDC GLUCOMTR-MCNC: 101 MG/DL — HIGH (ref 70–99)
GLUCOSE BLDC GLUCOMTR-MCNC: 86 MG/DL — SIGNIFICANT CHANGE UP (ref 70–99)
GLUCOSE BLDC GLUCOMTR-MCNC: 94 MG/DL — SIGNIFICANT CHANGE UP (ref 70–99)
GLUCOSE BLDC GLUCOMTR-MCNC: 97 MG/DL — SIGNIFICANT CHANGE UP (ref 70–99)
GLUCOSE SERPL-MCNC: 96 MG/DL — SIGNIFICANT CHANGE UP (ref 70–99)
HCT VFR BLD CALC: 30.9 % — LOW (ref 34.5–45)
HGB BLD-MCNC: 9.9 G/DL — LOW (ref 11.5–15.5)
MAGNESIUM SERPL-MCNC: 1.9 MG/DL — SIGNIFICANT CHANGE UP (ref 1.6–2.6)
MCHC RBC-ENTMCNC: 28.5 PG — SIGNIFICANT CHANGE UP (ref 27–34)
MCHC RBC-ENTMCNC: 32 GM/DL — SIGNIFICANT CHANGE UP (ref 32–36)
MCV RBC AUTO: 89 FL — SIGNIFICANT CHANGE UP (ref 80–100)
NRBC # BLD: 0 /100 WBCS — SIGNIFICANT CHANGE UP (ref 0–0)
NRBC # FLD: 0 K/UL — SIGNIFICANT CHANGE UP (ref 0–0)
PHOSPHATE SERPL-MCNC: 2.7 MG/DL — SIGNIFICANT CHANGE UP (ref 2.5–4.5)
PLATELET # BLD AUTO: 216 K/UL — SIGNIFICANT CHANGE UP (ref 150–400)
POTASSIUM SERPL-MCNC: 3.7 MMOL/L — SIGNIFICANT CHANGE UP (ref 3.5–5.3)
POTASSIUM SERPL-SCNC: 3.7 MMOL/L — SIGNIFICANT CHANGE UP (ref 3.5–5.3)
PROT SERPL-MCNC: 6.6 G/DL — SIGNIFICANT CHANGE UP (ref 6–8.3)
RBC # BLD: 3.47 M/UL — LOW (ref 3.8–5.2)
RBC # FLD: 17.4 % — HIGH (ref 10.3–14.5)
SODIUM SERPL-SCNC: 126 MMOL/L — LOW (ref 135–145)
WBC # BLD: 7.61 K/UL — SIGNIFICANT CHANGE UP (ref 3.8–10.5)
WBC # FLD AUTO: 7.61 K/UL — SIGNIFICANT CHANGE UP (ref 3.8–10.5)

## 2023-02-17 PROCEDURE — 99233 SBSQ HOSP IP/OBS HIGH 50: CPT

## 2023-02-17 PROCEDURE — 99232 SBSQ HOSP IP/OBS MODERATE 35: CPT

## 2023-02-17 PROCEDURE — 70553 MRI BRAIN STEM W/O & W/DYE: CPT | Mod: 26

## 2023-02-17 RX ORDER — SODIUM CHLORIDE 9 MG/ML
1000 INJECTION, SOLUTION INTRAVENOUS
Refills: 0 | Status: DISCONTINUED | OUTPATIENT
Start: 2023-02-17 | End: 2023-02-18

## 2023-02-17 RX ORDER — ENOXAPARIN SODIUM 100 MG/ML
40 INJECTION SUBCUTANEOUS EVERY 24 HOURS
Refills: 0 | Status: DISCONTINUED | OUTPATIENT
Start: 2023-02-17 | End: 2023-02-19

## 2023-02-17 RX ADMIN — ENOXAPARIN SODIUM 40 MILLIGRAM(S): 100 INJECTION SUBCUTANEOUS at 12:52

## 2023-02-17 RX ADMIN — Medication 5 MILLIGRAM(S): at 12:41

## 2023-02-17 RX ADMIN — LACOSAMIDE 110 MILLIGRAM(S): 50 TABLET ORAL at 18:41

## 2023-02-17 RX ADMIN — ATORVASTATIN CALCIUM 10 MILLIGRAM(S): 80 TABLET, FILM COATED ORAL at 23:56

## 2023-02-17 RX ADMIN — Medication 5 MILLIGRAM(S): at 06:50

## 2023-02-17 RX ADMIN — Medication 5 MILLIGRAM(S): at 18:38

## 2023-02-17 RX ADMIN — LACOSAMIDE 110 MILLIGRAM(S): 50 TABLET ORAL at 06:41

## 2023-02-17 RX ADMIN — SODIUM CHLORIDE 50 MILLILITER(S): 9 INJECTION, SOLUTION INTRAVENOUS at 09:49

## 2023-02-17 RX ADMIN — SODIUM CHLORIDE 75 MILLILITER(S): 9 INJECTION, SOLUTION INTRAVENOUS at 06:56

## 2023-02-17 NOTE — PROGRESS NOTE ADULT - PROBLEM SELECTOR PLAN 5
cont with baclofen, needs to be refilled 2/24  -interrogated by NSG on 2/14 Na = 126, suspect iatrogenic from IVF   - Will switch to D5LR and monitor BMP

## 2023-02-17 NOTE — PROGRESS NOTE ADULT - SUBJECTIVE AND OBJECTIVE BOX
[facility] Division of Hospital Medicine  Camille Ojeda MD  Pager: [phone]      Patient is a 68y old  Female who presents with a chief complaint of AMS (2023 11:44)      SUBJECTIVE / OVERNIGHT EVENTS: patient see and examined. Discussed with RN - more alert today. Patient is able to tell me name//location. States she is in hospital for urinary tract infection. Denies pain, chest pain, SOB or abdominal pain.     MEDICATIONS  (STANDING):  atorvastatin 10 milliGRAM(s) Oral at bedtime  dextrose 5% + lactated ringers. 1000 milliLiter(s) (50 mL/Hr) IV Continuous <Continuous>  enoxaparin Injectable 40 milliGRAM(s) SubCutaneous every 24 hours  hydrALAZINE Injectable 5 milliGRAM(s) IV Push every 6 hours  lacosamide IVPB 50 milliGRAM(s) IV Intermittent every 12 hours  levothyroxine Injectable 37.5 MICROGram(s) IV Push at bedtime  pantoprazole    Tablet 40 milliGRAM(s) Oral before breakfast    MEDICATIONS  (PRN):      CAPILLARY BLOOD GLUCOSE      POCT Blood Glucose.: 97 mg/dL (2023 07:03)  POCT Blood Glucose.: 101 mg/dL (2023 02:01)  POCT Blood Glucose.: 129 mg/dL (2023 22:25)  POCT Blood Glucose.: 116 mg/dL (2023 17:45)  POCT Blood Glucose.: 108 mg/dL (2023 13:48)    I&O's Summary    2023 07:01  -  2023 07:00  --------------------------------------------------------  IN: 675 mL / OUT: 1650 mL / NET: -975 mL        PHYSICAL EXAM:  Vital Signs Last 24 Hrs  T(C): 36.3 (2023 05:49), Max: 36.4 (2023 23:45)  T(F): 97.4 (2023 05:49), Max: 97.5 (2023 23:45)  HR: 88 (2023 05:49) (80 - 90)  BP: 156/81 (2023 05:49) (137/71 - 183/90)  BP(mean): --  RR: 17 (2023 05:49) (17 - 18)  SpO2: 99% (2023 05:49) (98% - 100%)    Parameters below as of 2023 23:45  Patient On (Oxygen Delivery Method): room air        CONSTITUTIONAL: NAD  ENMT: Moist oral mucosa  RESPIRATORY: Normal respiratory effort; lungs are clear to auscultation bilaterally  CARDIOVASCULAR:  S1/S2; No lower extremity edema  ABDOMEN: Nontender to palpation, normoactive bowel sounds, no rebound/guarding  MUSCULOSKELETAL:  no clubbing or cyanosis of digits; no joint swelling or tenderness to palpation  PSYCH: A+O to person, place, and time; affect appropriate  NEUROLOGY: not able to move legs at baseline; able to tell me name/[facility] and the month/year.     LABS:                        9.9    7.61  )-----------( 216      ( 2023 07:07 )             30.9     02-17    126<L>  |  91<L>  |  6<L>  ----------------------------<  96  3.7   |  24  |  0.56    Ca    9.5      2023 07:07  Phos  2.7     02-17  Mg     1.90     02-17    TPro  6.6  /  Alb  3.6  /  TBili  0.3  /  DBili  x   /  AST  36<H>  /  ALT  26  /  AlkPhos  119  02-17    PT/INR - ( 2023 03:50 )   PT: 11.1 sec;   INR: 0.96 ratio         PTT - ( 2023 03:50 )  PTT:35.9 sec          Culture - Fungal, CSF (collected 2023 11:10)  Source: .CSF CSF  Preliminary Report (2023 07:24):    Testing in progress    Culture - CSF with Gram Stain (collected 2023 11:10)  Source: .CSF CSF.lumbar  Gram Stain (2023 12:29):    No polymorphonuclear cells seen    No organisms seen    by cytocentrifuge  Preliminary Report (2023 07:21):    No growth to date.    Culture - Acid Fast - CSF (collected 2023 11:10)  Source: .CSF CSF

## 2023-02-17 NOTE — PROGRESS NOTE ADULT - SUBJECTIVE AND OBJECTIVE BOX
SUBJECTIVE/INTERVAL HISTORY:  -     PAST MEDICAL & SURGICAL HISTORY:  Hypothyroid      Multiple sclerosis      No significant past surgical history        MEDICATIONS (HOME):  Home Medications:  acetaminophen 325 mg oral tablet: 2 tab(s) orally every 6 hours, As needed, Temp greater or equal to 38C (100.4F), Mild Pain (1 - 3) (23 Dec 2022 14:10)  ammonium lactate 12% topical cream: Apply topically to affected area once a day, As Needed (16 Dec 2022 15:57)  atorvastatin 10 mg oral tablet: 1 tab(s) orally once a day (16 Dec 2022 15:57)  Fleet Enema 19 g-7 g rectal enema: 133 milliliter(s) rectal every other day, to go to the bathroom (16 Dec 2022 15:57)  fluticasone 50 mcg/inh nasal spray: 1 spray(s) in each nostril once a day (16 Dec 2022 15:57)  ibandronate 150 mg oral tablet: 1 tab(s) orally once a month (16 Dec 2022 15:57)  Magnesium OTC pain relief spray: Apply topically to affected area (knee) once a day, As Needed (16 Dec 2022 15:57)  pantoprazole 40 mg oral delayed release tablet: 1 tab(s) orally once a day (16 Dec 2022 15:57)  Synthroid 75 mcg (0.075 mg) oral tablet: 1 tab(s) orally once a day (16 Dec 2022 15:57)    MEDICATIONS  (STANDING):  atorvastatin 10 milliGRAM(s) Oral at bedtime  dextrose 5% + sodium chloride 0.45%. 1000 milliLiter(s) (75 mL/Hr) IV Continuous <Continuous>  hydrALAZINE Injectable 5 milliGRAM(s) IV Push every 6 hours  lacosamide IVPB 50 milliGRAM(s) IV Intermittent every 12 hours  levothyroxine Injectable 37.5 MICROGram(s) IV Push at bedtime  pantoprazole    Tablet 40 milliGRAM(s) Oral before breakfast    MEDICATIONS  (PRN):    ALLERGIES/INTOLERANCES:  Allergies  sulfa drugs (Hives; Rash)    Intolerances    VITALS & EXAMINATION:  Vital Signs Last 24 Hrs  T(C): 36.3 (17 Feb 2023 05:49), Max: 36.4 (16 Feb 2023 23:45)  T(F): 97.4 (17 Feb 2023 05:49), Max: 97.5 (16 Feb 2023 23:45)  HR: 88 (17 Feb 2023 05:49) (80 - 90)  BP: 156/81 (17 Feb 2023 05:49) (137/71 - 183/90)  BP(mean): --  RR: 17 (17 Feb 2023 05:49) (17 - 18)  SpO2: 99% (17 Feb 2023 05:49) (98% - 100%)    Parameters below as of 16 Feb 2023 23:45  Patient On (Oxygen Delivery Method): room air        General:  Constitutional: Obese Female, appears stated age, in no apparent distress including pain  Head: Normocephalic & atraumatic.    Neurological (>12):  MS: Awake, alert, oriented to person, place, situation, time. Normal affect. Follows all commands.    Language: Speech is clear, fluent with good repetition & comprehension (able to name objects___)    CNs: PERRL (R = 3mm, L = 3mm). VFF. EOMI no nystagmus, no diplopia. V1-3 intact to LT/pinprick, well developed masseter muscles b/l. No facial asymmetry b/l, full eye closure strength b/l. Hearing grossly normal (rubbing fingers) b/l. Symmetric palate elevation in midline. Gag reflex deferred. Head turning & shoulder shrug intact b/l. Tongue midline, normal movements, no atrophy.    Motor: Normal muscle bulk & tone. No noticeable tremor or seizure. No pronator drift.              Deltoid	Biceps	Triceps	Wrist	Finger ABd	   R	5	5	5	5	5		5 	  L	5	5	5	5	5		5    	H-Flex	H-Ext	H-ABd	H-ADd	K-Flex	K-Ext	D-Flex	P-Flex  R	5	5	5	5	5	5	5	5 	   L	5	5	5	5	5	5	5	5	     Sensation: Intact to LT/PP/Temp/Vibration/Position b/l throughout.     Cortical: Extinction on DSS (neglect): none    Reflexes:              Biceps(C5)       BR(C6)     Triceps(C7)               Patellar(L4)    Achilles(S1)    Plantar Resp  R	2	          2	             2		        2		    2		Down   L	2	          2	             2		        2		    2		Down     Coordination: intact rapid-alt movements. No dysmetria to FTN/HTS    Gait: Normal Romberg. No postural instability. Normal stance and tandem gait.     LABORATORY:  CBC                       9.9    7.61  )-----------( 216      ( 17 Feb 2023 07:07 )             30.9     Chem 02-16    133<L>  |  93<L>  |  8   ----------------------------<  110<H>  3.3<L>   |  25  |  0.60    Ca    9.7      16 Feb 2023 03:50  Phos  2.8     02-16  Mg     1.40     02-16    TPro  7.0  /  Alb  3.9  /  TBili  0.2  /  DBili  x   /  AST  36<H>  /  ALT  30  /  AlkPhos  125<H>  02-16    LFTs LIVER FUNCTIONS - ( 16 Feb 2023 03:50 )  Alb: 3.9 g/dL / Pro: 7.0 g/dL / ALK PHOS: 125 U/L / ALT: 30 U/L / AST: 36 U/L / GGT: x           Coagulopathy PT/INR - ( 16 Feb 2023 03:50 )   PT: 11.1 sec;   INR: 0.96 ratio         PTT - ( 16 Feb 2023 03:50 )  PTT:35.9 sec  Lipid Panel   A1c   Cardiac enzymes     U/A   CSF  Immunological  Other    STUDIES & IMAGING:  Studies (EKG, EEG, EMG, etc):     Radiology (XR, CT, MR, U/S, TTE/EDGAR): SUBJECTIVE/INTERVAL HISTORY:  -   Pt with multiple episodes of urinary retention; bladder scan with 366cc of urine this am- moreno placed at 4:28 am. EEG done (2/16/23). EEG shows rare generalized epileptiform discharges risk for generalized onset seizures.   Pt seen and examined at bedside.     PAST MEDICAL & SURGICAL HISTORY:  Hypothyroid      Multiple sclerosis      No significant past surgical history        MEDICATIONS (HOME):  Home Medications:  acetaminophen 325 mg oral tablet: 2 tab(s) orally every 6 hours, As needed, Temp greater or equal to 38C (100.4F), Mild Pain (1 - 3) (23 Dec 2022 14:10)  ammonium lactate 12% topical cream: Apply topically to affected area once a day, As Needed (16 Dec 2022 15:57)  atorvastatin 10 mg oral tablet: 1 tab(s) orally once a day (16 Dec 2022 15:57)  Fleet Enema 19 g-7 g rectal enema: 133 milliliter(s) rectal every other day, to go to the bathroom (16 Dec 2022 15:57)  fluticasone 50 mcg/inh nasal spray: 1 spray(s) in each nostril once a day (16 Dec 2022 15:57)  ibandronate 150 mg oral tablet: 1 tab(s) orally once a month (16 Dec 2022 15:57)  Magnesium OTC pain relief spray: Apply topically to affected area (knee) once a day, As Needed (16 Dec 2022 15:57)  pantoprazole 40 mg oral delayed release tablet: 1 tab(s) orally once a day (16 Dec 2022 15:57)  Synthroid 75 mcg (0.075 mg) oral tablet: 1 tab(s) orally once a day (16 Dec 2022 15:57)    MEDICATIONS  (STANDING):  atorvastatin 10 milliGRAM(s) Oral at bedtime  dextrose 5% + sodium chloride 0.45%. 1000 milliLiter(s) (75 mL/Hr) IV Continuous <Continuous>  hydrALAZINE Injectable 5 milliGRAM(s) IV Push every 6 hours  lacosamide IVPB 50 milliGRAM(s) IV Intermittent every 12 hours  levothyroxine Injectable 37.5 MICROGram(s) IV Push at bedtime  pantoprazole    Tablet 40 milliGRAM(s) Oral before breakfast    MEDICATIONS  (PRN):    ALLERGIES/INTOLERANCES:  Allergies  sulfa drugs (Hives; Rash)    Intolerances    VITALS & EXAMINATION:  Vital Signs Last 24 Hrs  T(C): 36.3 (17 Feb 2023 05:49), Max: 36.4 (16 Feb 2023 23:45)  T(F): 97.4 (17 Feb 2023 05:49), Max: 97.5 (16 Feb 2023 23:45)  HR: 88 (17 Feb 2023 05:49) (80 - 90)  BP: 156/81 (17 Feb 2023 05:49) (137/71 - 183/90)  BP(mean): --  RR: 17 (17 Feb 2023 05:49) (17 - 18)  SpO2: 99% (17 Feb 2023 05:49) (98% - 100%)    Parameters below as of 16 Feb 2023 23:45  Patient On (Oxygen Delivery Method): room air        General:  Constitutional: Obese Female, appears stated age, in no apparent distress including pain  Head: Normocephalic & atraumatic.    Neurological (>12):  MS: Lethargic, oriented to person, place, but not time (stated year is 1976). Unable to follow commands.     CNs: PERRL (R = 3mm, L = 3mm). VFF. EOMI no nystagmus, no diplopia. V1-3 intact to LT/pinprick, well developed masseter muscles b/l. No facial asymmetry b/l, full eye closure strength b/l. Hearing grossly normal to finger snapping b/l.     Motor: Increased tone in upper extremities bilaterally, both arms flexed to chest. Low tone in lower extremities bilaterally (bedbound at baseline). Bilateral feet inverted.      Sensation: Intact to LT; no grimaces to noxious stimuli on upper 4extremities.     Reflexes: areflexic throughout lower extremities bilaterally; Plantar reflexes up going bilaterally. Unable to elicit reflexes in upper extremities 2/2 positioning. 	     Coordination: Unable to follow commands.     Gait: Deferred    LABORATORY:  CBC                       9.9    7.61  )-----------( 216      ( 17 Feb 2023 07:07 )             30.9     Chem 02-16    133<L>  |  93<L>  |  8   ----------------------------<  110<H>  3.3<L>   |  25  |  0.60    Ca    9.7      16 Feb 2023 03:50  Phos  2.8     02-16  Mg     1.40     02-16    TPro  7.0  /  Alb  3.9  /  TBili  0.2  /  DBili  x   /  AST  36<H>  /  ALT  30  /  AlkPhos  125<H>  02-16    LFTs LIVER FUNCTIONS - ( 16 Feb 2023 03:50 )  Alb: 3.9 g/dL / Pro: 7.0 g/dL / ALK PHOS: 125 U/L / ALT: 30 U/L / AST: 36 U/L / GGT: x           Coagulopathy PT/INR - ( 16 Feb 2023 03:50 )   PT: 11.1 sec;   INR: 0.96 ratio         PTT - ( 16 Feb 2023 03:50 )  PTT:35.9 sec  Lipid Panel   A1c   Cardiac enzymes     U/A   CSF  Immunological  Other    STUDIES & IMAGING:  Studies (EKG, EEG, EMG, etc):     Radiology (XR, CT, MR, U/S, TTE/EDGAR):  SUBJECTIVE/INTERVAL HISTORY:  -   Pt with multiple episodes of urinary retention; bladder scan with 366cc of urine this am- moreno placed at 4:28 am. EEG done (2/16/23). EEG shows rare generalized epileptiform discharges risk for generalized onset seizures. No other acute events overnight.   Pt seen and examined at bedside. Able to open her eyes when her name was said; not able to follow commands.     PAST MEDICAL & SURGICAL HISTORY:  Hypothyroid      Multiple sclerosis      No significant past surgical history        MEDICATIONS (HOME):  Home Medications:  acetaminophen 325 mg oral tablet: 2 tab(s) orally every 6 hours, As needed, Temp greater or equal to 38C (100.4F), Mild Pain (1 - 3) (23 Dec 2022 14:10)  ammonium lactate 12% topical cream: Apply topically to affected area once a day, As Needed (16 Dec 2022 15:57)  atorvastatin 10 mg oral tablet: 1 tab(s) orally once a day (16 Dec 2022 15:57)  Fleet Enema 19 g-7 g rectal enema: 133 milliliter(s) rectal every other day, to go to the bathroom (16 Dec 2022 15:57)  fluticasone 50 mcg/inh nasal spray: 1 spray(s) in each nostril once a day (16 Dec 2022 15:57)  ibandronate 150 mg oral tablet: 1 tab(s) orally once a month (16 Dec 2022 15:57)  Magnesium OTC pain relief spray: Apply topically to affected area (knee) once a day, As Needed (16 Dec 2022 15:57)  pantoprazole 40 mg oral delayed release tablet: 1 tab(s) orally once a day (16 Dec 2022 15:57)  Synthroid 75 mcg (0.075 mg) oral tablet: 1 tab(s) orally once a day (16 Dec 2022 15:57)    MEDICATIONS  (STANDING):  atorvastatin 10 milliGRAM(s) Oral at bedtime  dextrose 5% + sodium chloride 0.45%. 1000 milliLiter(s) (75 mL/Hr) IV Continuous <Continuous>  hydrALAZINE Injectable 5 milliGRAM(s) IV Push every 6 hours  lacosamide IVPB 50 milliGRAM(s) IV Intermittent every 12 hours  levothyroxine Injectable 37.5 MICROGram(s) IV Push at bedtime  pantoprazole    Tablet 40 milliGRAM(s) Oral before breakfast    MEDICATIONS  (PRN):    ALLERGIES/INTOLERANCES:  Allergies  sulfa drugs (Hives; Rash)    Intolerances    VITALS & EXAMINATION:  Vital Signs Last 24 Hrs  T(C): 36.3 (17 Feb 2023 05:49), Max: 36.4 (16 Feb 2023 23:45)  T(F): 97.4 (17 Feb 2023 05:49), Max: 97.5 (16 Feb 2023 23:45)  HR: 88 (17 Feb 2023 05:49) (80 - 90)  BP: 156/81 (17 Feb 2023 05:49) (137/71 - 183/90)  BP(mean): --  RR: 17 (17 Feb 2023 05:49) (17 - 18)  SpO2: 99% (17 Feb 2023 05:49) (98% - 100%)    Parameters below as of 16 Feb 2023 23:45  Patient On (Oxygen Delivery Method): room air        General:  Constitutional: Obese Female, appears stated age, in no apparent distress including pain  Head: Normocephalic & atraumatic.    Neurological (>12):  MS: Lethargic, oriented to person, place, but not time (stated year is 1976). Unable to follow commands.     CNs: PERRL (R = 3mm, L = 3mm). VFF. EOMI no nystagmus, no diplopia. V1-3 intact to LT/pinprick, well developed masseter muscles b/l. No facial asymmetry b/l, full eye closure strength b/l. Hearing grossly normal to finger snapping b/l.     Motor: Increased tone in upper extremities bilaterally, both arms flexed to chest. Low tone in lower extremities bilaterally (bedbound at baseline). Bilateral feet inverted.      Sensation: Intact to LT; no grimaces to noxious stimuli on upper 4extremities.     Reflexes: areflexic throughout lower extremities bilaterally; Plantar reflexes up going bilaterally. Unable to elicit reflexes in upper extremities 2/2 positioning. 	     Coordination: Unable to follow commands.     Gait: Deferred    LABORATORY:  CBC                       9.9    7.61  )-----------( 216      ( 17 Feb 2023 07:07 )             30.9     Chem 02-16    133<L>  |  93<L>  |  8   ----------------------------<  110<H>  3.3<L>   |  25  |  0.60    Ca    9.7      16 Feb 2023 03:50  Phos  2.8     02-16  Mg     1.40     02-16    TPro  7.0  /  Alb  3.9  /  TBili  0.2  /  DBili  x   /  AST  36<H>  /  ALT  30  /  AlkPhos  125<H>  02-16    LFTs LIVER FUNCTIONS - ( 16 Feb 2023 03:50 )  Alb: 3.9 g/dL / Pro: 7.0 g/dL / ALK PHOS: 125 U/L / ALT: 30 U/L / AST: 36 U/L / GGT: x           Coagulopathy PT/INR - ( 16 Feb 2023 03:50 )   PT: 11.1 sec;   INR: 0.96 ratio         PTT - ( 16 Feb 2023 03:50 )  PTT:35.9 sec  Lipid Panel   A1c   Cardiac enzymes     U/A   CSF  Immunological  Other    STUDIES & IMAGING:  Studies (EKG, EEG, EMG, etc):     Radiology (XR, CT, MR, U/S, TTE/EDGAR):

## 2023-02-17 NOTE — PROGRESS NOTE ADULT - PROBLEM SELECTOR PLAN 1
symptoms of increased confusion x1-2 weeks, baseline A&Ox4. Afebrile here, but family reports temp 92-94 at home at times. Per family, mental status changes similar to Dec hospitalization when pt had UTI.  -Mental status is improving today, A&Ox4 but remains weak.   -Etiology remains unclear, infectious work-up is negative to date   -CTH w/old lacunar infarct   -EEG (2/15) Rare generalized epileptiform discharges indicate a risk of generalized-onset seizures.  -s/p LP with IR (2/16) - negative results to date   -Labs wnl, TSH wnl, blood cultures and UA - NGTD  -Discontinued empiric antibiotics, discussed w/ ID Dr. Oquendo, continue to monitor off antimicrobials   -Neuro following, appreciate recs   -NSG interrogated baclofen pump on 2/14   -c/w IV lamictal 50mg bid  -istop done, only meds was lamictal  -fu B12, TSH wnl  -MRI brain pending   -NPO for now, pending swallow eval

## 2023-02-17 NOTE — PROGRESS NOTE ADULT - ASSESSMENT
68 yo female with hx of MS (on balcofen pump), seizure, hypothyroidism, urosepsis is brought in by sister for encephalopathy, suspecting viral or bacterial meningitis vs seizure.  Neuro workup ongoing, s/p LP on 2/16, and EEG w/Rare generalized epileptiform discharges indicate a risk of generalized-onset seizures.

## 2023-02-17 NOTE — PROGRESS NOTE ADULT - ASSESSMENT
67 y.o. F with PMH of MS on baclofen pump (nonambulatory at baseline), seizure on vimpat 50 bid, hypothyroidism, hx of frequent UTIs presented to [facility] ED, brought in by sister for altered mental status. Interval Neuro exam worsening showing bilateral UE increased tone, unable to move b/l LE, mute reflex on b/l patellar but toes upgoing on plantar response. No documented fever. Labs unremarkable, sodium downtrending, CSF unremarkable, neg UA. CTH neg.    Impression: Acute onset AMS of unclear etiology. Ddx include infectious (possible meningitis given no other obvious signs of infection, BCx still pending) vs r/o non-convulsive seizure     Recommendations:  [] s/p empiric treatment with antibiotics and antivirals; no longer on any antibiotics or antivirals   [] MRI brain w/wo  [] s/p vEEG: at risk for generalized seizure   [] C/w home lacosamide 50 mg bid  [] s/p lumbar puncture on 2/16/2023; CSF unremarkable; CSF ACE, lyme, cytometry pending   [] F/u BCx; no growth to date; urine culture no growth to date   [] Recheck vital for any fever  [] Neurocheck and vital per unit protocol  [] Seizure precaution     Case seen and discussed with general neurology attending Dr. Ramos

## 2023-02-17 NOTE — PROGRESS NOTE ADULT - PROBLEM SELECTOR PLAN 6
DVT ppx: lovenox  Dispo: Pending clinical improvement cont with baclofen, needs to be refilled 2/24  -interrogated by NSG on 2/14

## 2023-02-17 NOTE — CHART NOTE - NSCHARTNOTESELECT_GEN_ALL_CORE
Event Note
Agitation Hypertension/Event Note
BACLOFEN INTERROGATION/Event Note
Event Note
Event Note
I stop/Event Note

## 2023-02-17 NOTE — PROGRESS NOTE ADULT - SUBJECTIVE AND OBJECTIVE BOX
Patient is a 67y old  Female who presents with a chief complaint of AMS (15 Feb 2023 11:57)    f/u altered mental status.     Interval History/ROS:  no fever. less confused.  tired this morning.  LP noted. negative for meningitis.  off antimicrobials.  More oriented today.  Remainder of ROS otherwise negative.    PAST MEDICAL & SURGICAL HISTORY:  Hypothyroid  UTI  Multiple sclerosis    Allergies  sulfa drugs (Hives; Rash)    ANTIMICROBIALS:  ampicillin  IVPB 2000 every 4 hours (-)  cefTRIAXone   IVPB 2000 every 12 hours (-)  vancomycin  IVPB 1000 every 12 hours (-)    MEDICATIONS  (STANDING):  atorvastatin 10 at bedtime  enoxaparin Injectable 40 every 24 hours  hydrALAZINE Injectable 5 every 6 hours  lacosamide IVPB 50 every 12 hours  levothyroxine Injectable 37.5 at bedtime  pantoprazole    Tablet 40 before breakfast    Vital Signs Last 24 Hrs  T(F): 97.4 (23 @ 05:49), Max: 97.5 (23 @ 23:45)  HR: 88 (23 @ 05:49)  BP: 156/81 (23 @ 05:49)  RR: 17 (23 @ 05:49)  SpO2: 99% (23 @ 05:49) (98% - 100%)    PHYSICAL EXAM:  Constitutional: non-toxic  HEAD/EYES: anicteric  ENT:  supple  Cardiovascular:   normal S1, S2  Respiratory:  clear BS bilaterally  GI:  soft, non-tender, normal bowel sounds  :  no moreno  Musculoskeletal:  no synovitis, contracted arms  Neurologic: awake, more alert  Skin:  no rash   Psychiatric:  awake, less confused                        9.9    7.61  )-----------( 216      ( 2023 07:07 )             30.9 -    126  |  91  |  6   ----------------------------<  96  3.7   |  24  |  0.56  Ca    9.5      2023 07:07Phos  2.7     02-17Mg     1.90     -  TPro  6.6  /  Alb  3.6  /  TBili  0.3  /  DBili  x   /  AST  36  /  ALT  26  /  AlkPhos  119      CSF:  Total Nucleated Cell Count, CSF: 3 cells/uL (23 @ 11:05)  RBC Count - Spinal Fluid: 0 cells/uL (23 @ 11:05)  Glucose, CSF: 74 mg/dL (23 @ 11:05)  Protein, CSF: 26 mg/dL (23 @ 11:05)    Urinalysis Basic - ( 2023 21:00 )  Color: Light Yellow / Appearance: Clear / S.014 / pH: x  Gluc: x / Ketone: Negative  / Bili: Negative / Urobili: <2 mg/dL   Blood: x / Protein: Negative / Nitrite: Negative   Leuk Esterase: Negative / RBC: 1 /HPF / WBC 1 /HPF   Sq Epi: x / Non Sq Epi: x / Bacteria: x    MICROBIOLOGY:  Culture - Fungal, CSF (collected 23 @ 11:10)  Source: .CSF CSF  Preliminary Report (23 @ 07:24):    Testing in progress    Culture - CSF with Gram Stain (collected 23 @ 11:10)  Source: .CSF CSF.lumbar  Gram Stain (23 @ 12:29):    No polymorphonuclear cells seen    No organisms seen    by cytocentrifuge  Preliminary Report (23 @ 07:21):    No growth to date.    Culture - Acid Fast - CSF (collected 23 @ 11:10)  Source: .CSF CSF     CSF PCR (-), HSV PCR (-)   UC (-)   BC (-)   BC (-)   BC (-)   BC (-)   UC (+) >100,000 CFU/ml Citrobacter freundii (S-amik, cefepime, cipro, gent, imi, levo, gonzález, nitrofurantoin, tobra, bactrim; I-erta; R-ampi, amox, cefazolin, cefox, ctx, zosyn)    RADIOLOGY:  imaging below personally reviewed and agree with findings    CT Head No Cont (23 @ 01:52) >  IMPRESSION:  No CT evidence of acute intracranial hemorrhage, brain edema, or mass effect.  Age-indeterminate lacunar infarct just superior to the body of the left lateral ventricle. MRI can be considered for further evaluation.    Xray Chest 1 View- PORTABLE-Urgent (23 @ 19:58) >  IMPRESSION:  Clear lungs.    CT Head No Cont (22 @ 15:16) >  IMPRESSION:  No acute intracranial hemorrhage, mass effect or midline shift.    CT Abdomen and Pelvis w/ IV Cont (22 @ 09:11) >  BONES: Osteopenia. Spinal stimulator device in the left anterior abdominal wall with catheter entering the spinal canal at L3-L4.  IMPRESSION:  Moderate stool. No acute abnormality in the abdomen and pelvis.

## 2023-02-17 NOTE — PROGRESS NOTE ADULT - ATTENDING COMMENTS
EEG Classification / Summary:  Abnormal EEG in the awake and drowsy states due to rare generalized frontally predominant epileptiform discharges. Interpretation of the EEG is limited by continuous myogenic, movement, and electrical artifacts.  Clinical Impression:  Rare generalized epileptiform discharges indicate a risk of generalized-onset seizures. Interpretation of the EEG is limited by continuous artifacts. Consider repeat study if clinically indicated.      A/P  Ms. Edwards is a 66 yo woman with a h/o multiple sclerosis with worsening encephalopathy.   DDx:   - toxic metabolic septic encephalopathy    - no evidence of focal nonmotor with impaired awareness seizures on EEG  - no evidence of CNS infection on CSF analysis     I agree with work up and management as above.

## 2023-02-17 NOTE — CHART NOTE - NSCHARTNOTEFT_GEN_A_CORE
Interventional Radiology Pre-Procedure Note    This is a 67y Female    Procedure: LP     IR Attending: Dr Drew Elliott     Medicine Attending: Dr Velazco    Diagnosis/Indication: Patient is a 67y old  Female who presents with a chief complaint of AMS (15 Feb 2023 12:10)      PAST MEDICAL & SURGICAL HISTORY:  Hypothyroid      Multiple sclerosis      No significant past surgical history           Female    Allergies: sulfa drugs (Hives; Rash)      LABS:  CBC Full  -  ( 15 Feb 2023 06:41 )  WBC Count : 8.44 K/uL  RBC Count : 3.83 M/uL  Hemoglobin : 10.9 g/dL  Hematocrit : 35.0 %  Platelet Count - Automated : 248 K/uL  Mean Cell Volume : 91.4 fL  Mean Cell Hemoglobin : 28.5 pg  Mean Cell Hemoglobin Concentration : 31.1 gm/dL  Auto Neutrophil # : x  Auto Lymphocyte # : x  Auto Monocyte # : x  Auto Eosinophil # : x  Auto Basophil # : x  Auto Neutrophil % : x  Auto Lymphocyte % : x  Auto Monocyte % : x  Auto Eosinophil % : x  Auto Basophil % : x    02-15    135  |  96<L>  |  13  ----------------------------<  111<H>  3.9   |  24  |  0.68    Ca    10.1      15 Feb 2023 06:41  Phos  3.0     02-14  Mg     1.60     02-14    TPro  7.2  /  Alb  3.8  /  TBili  <0.2  /  DBili  x   /  AST  36<H>  /  ALT  32  /  AlkPhos  133<H>  02-15    PT/INR - ( 13 Feb 2023 20:40 )   PT: 11.1 sec;   INR: 0.96 ratio         PTT - ( 13 Feb 2023 20:40 )  PTT:43.1 sec
Notified by RN patient agitated hypertensive and tachycardic. On initial assessment patient resting in bed comfortably given patient just transferred to the floor and patient ASx plan to hold IVF recheck blood pressure in one hour. After one hour patient still hypertensive and tachycardic now severely agitated yelling incoherently non-redirectable still hypertensive and tachy. Behavior blood pressure and tachycardia improved w/ 0.5 mg ativan IVP.    -Since BP improved RN notified to resume IVF  -Will continue to monitor     Goldy Blue PA-C  Department of Internal Medicine  In-House beeper number [id number]
Pump Interrogatted   No motor stalls noted.   On error with 9min delay with timer- spoke with [employer] rep. Not of significance     Patient appears to be receiving drug    BACLOFEN 500mcg/mL concentration  104.06mcg/day  5.2mL in pump  MUST BE REFILLED 3/1/23      PUMP PLACED AT [facility].   Patient has appointment to be refilled by neurologist on 2/24.
Spoke to neurology, recommends starting an empiric antibiotics for bacterial Meningitis. No emergent need for LP, will consult procedure team in the am for diagnostic LP.
istop Reference #: [id number]    12/23/22 lacosamide 100mg quantity 60 tabs for 30 day supply was written and it was dispensed on 1/30/23. d/w attending doing H&P.
Patient w/ multiple episodes of urinary retention during admission. Patient is s/p 3 straight caths. Bladder scan w/ 366cc of urine. Salter placed per protocol.

## 2023-02-17 NOTE — PROGRESS NOTE ADULT - ASSESSMENT
67F with MS (on balcofen pump), seizures, hypothyroidism, hx urosepsis presented 2/13 with confusion since 2/12 and refusing medications at home. No fever.  No leukocytosis.  CXR (-).  CT head negative for acute findings.  BC and UC sent are negative.  Empirically started on IV ampicillin, CTX, acyclovir.     Altered MS  - improved  - all cultures negative  - CSF (-)  - continue to monitor off antimicrobials  - no need for isolation    I have discussed plan of care as detailed above with hospitalist    Please call Infectious Diseases if there is a change in status.  Thank you.  [phone]. 67F with MS (on balcofen pump), seizures, hypothyroidism, hx urosepsis presented 2/13 with confusion since 2/12 and refusing medications at home. No fever.  No leukocytosis.  CXR (-).  CT head negative for acute findings.  BC and UC sent are negative.  Empirically started on IV antimicrobials.  CSF sent and negative.  Now off antibiotics.     Altered MS  - improved  - all cultures negative  - CSF (-)  - continue to monitor off antimicrobials  - no need for isolation    I have discussed plan of care as detailed above with hospitalist    Please call Infectious Diseases if there is a change in status.  Thank you.  [phone].

## 2023-02-18 DIAGNOSIS — R33.9 RETENTION OF URINE, UNSPECIFIED: ICD-10-CM

## 2023-02-18 LAB
ALBUMIN SERPL ELPH-MCNC: 3.3 G/DL — SIGNIFICANT CHANGE UP (ref 3.3–5)
ALP SERPL-CCNC: 112 U/L — SIGNIFICANT CHANGE UP (ref 40–120)
ALT FLD-CCNC: 24 U/L — SIGNIFICANT CHANGE UP (ref 4–33)
ANION GAP SERPL CALC-SCNC: 8 MMOL/L — SIGNIFICANT CHANGE UP (ref 7–14)
AST SERPL-CCNC: 30 U/L — SIGNIFICANT CHANGE UP (ref 4–32)
BILIRUB SERPL-MCNC: 0.3 MG/DL — SIGNIFICANT CHANGE UP (ref 0.2–1.2)
BUN SERPL-MCNC: 6 MG/DL — LOW (ref 7–23)
CALCIUM SERPL-MCNC: 9.2 MG/DL — SIGNIFICANT CHANGE UP (ref 8.4–10.5)
CHLORIDE SERPL-SCNC: 94 MMOL/L — LOW (ref 98–107)
CO2 SERPL-SCNC: 26 MMOL/L — SIGNIFICANT CHANGE UP (ref 22–31)
CREAT SERPL-MCNC: 0.57 MG/DL — SIGNIFICANT CHANGE UP (ref 0.5–1.3)
EGFR: 100 ML/MIN/1.73M2 — SIGNIFICANT CHANGE UP
GLUCOSE SERPL-MCNC: 83 MG/DL — SIGNIFICANT CHANGE UP (ref 70–99)
HCT VFR BLD CALC: 30.1 % — LOW (ref 34.5–45)
HGB BLD-MCNC: 9.5 G/DL — LOW (ref 11.5–15.5)
MAGNESIUM SERPL-MCNC: 1.8 MG/DL — SIGNIFICANT CHANGE UP (ref 1.6–2.6)
MCHC RBC-ENTMCNC: 28.2 PG — SIGNIFICANT CHANGE UP (ref 27–34)
MCHC RBC-ENTMCNC: 31.6 GM/DL — LOW (ref 32–36)
MCV RBC AUTO: 89.3 FL — SIGNIFICANT CHANGE UP (ref 80–100)
NRBC # BLD: 0 /100 WBCS — SIGNIFICANT CHANGE UP (ref 0–0)
NRBC # FLD: 0 K/UL — SIGNIFICANT CHANGE UP (ref 0–0)
PHOSPHATE SERPL-MCNC: 3.1 MG/DL — SIGNIFICANT CHANGE UP (ref 2.5–4.5)
PLATELET # BLD AUTO: 195 K/UL — SIGNIFICANT CHANGE UP (ref 150–400)
POTASSIUM SERPL-MCNC: 3.3 MMOL/L — LOW (ref 3.5–5.3)
POTASSIUM SERPL-SCNC: 3.3 MMOL/L — LOW (ref 3.5–5.3)
PROT SERPL-MCNC: 5.9 G/DL — LOW (ref 6–8.3)
RBC # BLD: 3.37 M/UL — LOW (ref 3.8–5.2)
RBC # FLD: 17 % — HIGH (ref 10.3–14.5)
SODIUM SERPL-SCNC: 128 MMOL/L — LOW (ref 135–145)
WBC # BLD: 4.83 K/UL — SIGNIFICANT CHANGE UP (ref 3.8–10.5)
WBC # FLD AUTO: 4.83 K/UL — SIGNIFICANT CHANGE UP (ref 3.8–10.5)

## 2023-02-18 PROCEDURE — 99232 SBSQ HOSP IP/OBS MODERATE 35: CPT

## 2023-02-18 RX ORDER — POLYETHYLENE GLYCOL 3350 17 G/17G
17 POWDER, FOR SOLUTION ORAL DAILY
Refills: 0 | Status: DISCONTINUED | OUTPATIENT
Start: 2023-02-18 | End: 2023-02-19

## 2023-02-18 RX ORDER — POTASSIUM CHLORIDE 20 MEQ
20 PACKET (EA) ORAL ONCE
Refills: 0 | Status: COMPLETED | OUTPATIENT
Start: 2023-02-18 | End: 2023-02-18

## 2023-02-18 RX ORDER — POTASSIUM CHLORIDE 20 MEQ
20 PACKET (EA) ORAL ONCE
Refills: 0 | Status: DISCONTINUED | OUTPATIENT
Start: 2023-02-18 | End: 2023-02-18

## 2023-02-18 RX ORDER — POTASSIUM CHLORIDE 20 MEQ
10 PACKET (EA) ORAL
Refills: 0 | Status: DISCONTINUED | OUTPATIENT
Start: 2023-02-18 | End: 2023-02-18

## 2023-02-18 RX ORDER — MAGNESIUM SULFATE 500 MG/ML
2 VIAL (ML) INJECTION ONCE
Refills: 0 | Status: COMPLETED | OUTPATIENT
Start: 2023-02-18 | End: 2023-02-18

## 2023-02-18 RX ORDER — SENNA PLUS 8.6 MG/1
2 TABLET ORAL AT BEDTIME
Refills: 0 | Status: DISCONTINUED | OUTPATIENT
Start: 2023-02-18 | End: 2023-02-19

## 2023-02-18 RX ORDER — LEVOTHYROXINE SODIUM 125 MCG
75 TABLET ORAL DAILY
Refills: 0 | Status: DISCONTINUED | OUTPATIENT
Start: 2023-02-18 | End: 2023-02-19

## 2023-02-18 RX ORDER — POTASSIUM CHLORIDE 20 MEQ
40 PACKET (EA) ORAL ONCE
Refills: 0 | Status: COMPLETED | OUTPATIENT
Start: 2023-02-18 | End: 2023-02-18

## 2023-02-18 RX ADMIN — Medication 5 MILLIGRAM(S): at 00:08

## 2023-02-18 RX ADMIN — PANTOPRAZOLE SODIUM 40 MILLIGRAM(S): 20 TABLET, DELAYED RELEASE ORAL at 06:07

## 2023-02-18 RX ADMIN — ATORVASTATIN CALCIUM 10 MILLIGRAM(S): 80 TABLET, FILM COATED ORAL at 22:32

## 2023-02-18 RX ADMIN — LACOSAMIDE 110 MILLIGRAM(S): 50 TABLET ORAL at 06:07

## 2023-02-18 RX ADMIN — ENOXAPARIN SODIUM 40 MILLIGRAM(S): 100 INJECTION SUBCUTANEOUS at 18:18

## 2023-02-18 RX ADMIN — Medication 40 MILLIEQUIVALENT(S): at 09:21

## 2023-02-18 RX ADMIN — LACOSAMIDE 110 MILLIGRAM(S): 50 TABLET ORAL at 18:18

## 2023-02-18 RX ADMIN — Medication 25 GRAM(S): at 09:21

## 2023-02-18 RX ADMIN — Medication 20 MILLIEQUIVALENT(S): at 18:18

## 2023-02-18 RX ADMIN — Medication 37.5 MICROGRAM(S): at 07:41

## 2023-02-18 RX ADMIN — SODIUM CHLORIDE 50 MILLILITER(S): 9 INJECTION, SOLUTION INTRAVENOUS at 00:06

## 2023-02-18 RX ADMIN — Medication 5 MILLIGRAM(S): at 06:07

## 2023-02-18 NOTE — PROGRESS NOTE ADULT - PROBLEM SELECTOR PLAN 1
symptoms of increased confusion x1-2 weeks, baseline A&Ox4. Afebrile here, but family reports temp baseline temps 92-94 at home at times. Per family, mental status changes similar to Dec hospitalization when pt had UTI.  -Mental status is improving today, A&Ox4 but remains weak.   -Etiology remains unclear, infectious work-up is negative to date   -CTH w/old lacunar infarct   -EEG (2/15) Rare generalized epileptiform discharges indicate a risk of generalized-onset seizures.  -s/p LP with IR (2/16) - negative results to date   -Labs wnl, TSH wnl, blood cultures and UA - NGTD  -Discontinued empiric antibiotics, discussed w/ ID Dr. Oquendo previously, continue to monitor off antimicrobials   -Neuro following, appreciate recs   -NSG interrogated baclofen pump on 2/14   -c/w IV lamictal 50mg bid  -istop done, only meds was lamictal  -fu B12, TSH wnl  -MRI brain reviewed; limited exam due to motion. w/ mild to moderate chronic microvascular ischemic changes

## 2023-02-18 NOTE — PROGRESS NOTE ADULT - PROBLEM SELECTOR PLAN 6
Patient required moreno for urinary retention.   - Will attempt TOV tonight as patient is now more alert

## 2023-02-18 NOTE — PROGRESS NOTE ADULT - ASSESSMENT
68 yo female with hx of MS (on balcofen pump), seizure, hypothyroidism, urosepsis is brought in by sister for encephalopathy, unclear etiology at this time.   Neuro workup ongoing, s/p LP on 2/16, and EEG w/Rare generalized epileptiform discharges indicate a risk of generalized-onset seizures.

## 2023-02-18 NOTE — PROGRESS NOTE ADULT - SUBJECTIVE AND OBJECTIVE BOX
[facility] Division of Hospital Medicine  Camille Ojeda MD  Pager: [phone]      Patient is a 68y old  Female who presents with a chief complaint of AMS (2023 12:24)      SUBJECTIVE / OVERNIGHT EVENTS: patient seen and examined, states she feels weak and tired, has been sleeping a lot. Also asking for enema because otherwise she cannot have a BM. Denies any pain, chest pain, fevers. States that she eats regular food and requesting a regular diet.     MEDICATIONS  (STANDING):  atorvastatin 10 milliGRAM(s) Oral at bedtime  enoxaparin Injectable 40 milliGRAM(s) SubCutaneous every 24 hours  hydrALAZINE Injectable 5 milliGRAM(s) IV Push every 6 hours  lacosamide IVPB 50 milliGRAM(s) IV Intermittent every 12 hours  levothyroxine Injectable 37.5 MICROGram(s) IV Push at bedtime  pantoprazole    Tablet 40 milliGRAM(s) Oral before breakfast  potassium chloride   Powder 20 milliEquivalent(s) Oral once  senna 2 Tablet(s) Oral at bedtime    MEDICATIONS  (PRN):  polyethylene glycol 3350 17 Gram(s) Oral daily PRN Constipation      CAPILLARY BLOOD GLUCOSE      POCT Blood Glucose.: 94 mg/dL (2023 16:38)  POCT Blood Glucose.: 86 mg/dL (2023 12:43)    I&O's Summary    2023 07:01  -  2023 07:00  --------------------------------------------------------  IN: 0 mL / OUT: 550 mL / NET: -550 mL    2023 07:01  -  2023 10:52  --------------------------------------------------------  IN: 60 mL / OUT: 1700 mL / NET: -1640 mL        PHYSICAL EXAM:  Vital Signs Last 24 Hrs  T(C): 36.1 (2023 05:14), Max: 36.7 (2023 12:30)  T(F): 97 (2023 05:14), Max: 98 (2023 12:30)  HR: 69 (2023 05:14) (69 - 89)  BP: 125/63 (2023 05:14) (116/58 - 149/71)  BP(mean): --  RR: 16 (2023 05:14) (16 - 18)  SpO2: 98% (2023 05:14) (98% - 99%)    Parameters below as of 2023 05:14  Patient On (Oxygen Delivery Method): room air    CONSTITUTIONAL: NAD  ENMT: Moist oral mucosa  RESPIRATORY: Normal respiratory effort; lungs are clear to auscultation bilaterally  CARDIOVASCULAR:  S1/S2; No lower extremity edema  ABDOMEN: Nontender to palpation, normoactive bowel sounds, no rebound/guarding  MUSCULOSKELETAL:  no clubbing or cyanosis of digits; no joint swelling or tenderness to palpation  PSYCH: A+O to person, place, and time; affect appropriate  NEUROLOGY: not able to move legs at baseline; able to tell me name/[facility] and the month/year.     LABS:                        9.5    4.83  )-----------( 195      ( 2023 02:58 )             30.1     02-18    128<L>  |  94<L>  |  6<L>  ----------------------------<  83  3.3<L>   |  26  |  0.57    Ca    9.2      2023 02:58  Phos  3.1     02-18  Mg     1.80     02-18    TPro  5.9<L>  /  Alb  3.3  /  TBili  0.3  /  DBili  x   /  AST  30  /  ALT  24  /  AlkPhos  112  02-18              Culture - Fungal, CSF (collected 2023 11:10)  Source: .CSF CSF  Preliminary Report (2023 07:24):    Testing in progress    Culture - CSF with Gram Stain (collected 2023 11:10)  Source: .CSF CSF.lumbar  Gram Stain (2023 12:29):    No polymorphonuclear cells seen    No organisms seen    by cytocentrifuge  Preliminary Report (2023 07:21):    No growth to date.    Culture - Acid Fast - CSF (collected 2023 11:10)  Source: .CSF CSF

## 2023-02-19 ENCOUNTER — TRANSCRIPTION ENCOUNTER (OUTPATIENT)
Age: 68
End: 2023-02-19

## 2023-02-19 VITALS
SYSTOLIC BLOOD PRESSURE: 140 MMHG | OXYGEN SATURATION: 98 % | HEART RATE: 68 BPM | RESPIRATION RATE: 17 BRPM | DIASTOLIC BLOOD PRESSURE: 80 MMHG | TEMPERATURE: 98 F

## 2023-02-19 DIAGNOSIS — D64.9 ANEMIA, UNSPECIFIED: ICD-10-CM

## 2023-02-19 LAB
ALBUMIN SERPL ELPH-MCNC: 3.4 G/DL — SIGNIFICANT CHANGE UP (ref 3.3–5)
ALP SERPL-CCNC: 118 U/L — SIGNIFICANT CHANGE UP (ref 40–120)
ALT FLD-CCNC: 21 U/L — SIGNIFICANT CHANGE UP (ref 4–33)
ANION GAP SERPL CALC-SCNC: 7 MMOL/L — SIGNIFICANT CHANGE UP (ref 7–14)
AST SERPL-CCNC: 27 U/L — SIGNIFICANT CHANGE UP (ref 4–32)
BILIRUB SERPL-MCNC: 0.3 MG/DL — SIGNIFICANT CHANGE UP (ref 0.2–1.2)
BUN SERPL-MCNC: 9 MG/DL — SIGNIFICANT CHANGE UP (ref 7–23)
CALCIUM SERPL-MCNC: 9.4 MG/DL — SIGNIFICANT CHANGE UP (ref 8.4–10.5)
CHLORIDE SERPL-SCNC: 102 MMOL/L — SIGNIFICANT CHANGE UP (ref 98–107)
CO2 SERPL-SCNC: 28 MMOL/L — SIGNIFICANT CHANGE UP (ref 22–31)
CREAT SERPL-MCNC: 0.66 MG/DL — SIGNIFICANT CHANGE UP (ref 0.5–1.3)
CULTURE RESULTS: NO GROWTH — SIGNIFICANT CHANGE UP
CULTURE RESULTS: SIGNIFICANT CHANGE UP
CULTURE RESULTS: SIGNIFICANT CHANGE UP
EGFR: 97 ML/MIN/1.73M2 — SIGNIFICANT CHANGE UP
FERRITIN SERPL-MCNC: 144 NG/ML — SIGNIFICANT CHANGE UP (ref 15–150)
GLUCOSE BLDC GLUCOMTR-MCNC: 92 MG/DL — SIGNIFICANT CHANGE UP (ref 70–99)
GLUCOSE SERPL-MCNC: 64 MG/DL — LOW (ref 70–99)
HCT VFR BLD CALC: 29.6 % — LOW (ref 34.5–45)
HGB BLD-MCNC: 9.3 G/DL — LOW (ref 11.5–15.5)
IRON SATN MFR SERPL: 12 % — LOW (ref 14–50)
IRON SATN MFR SERPL: 30 UG/DL — SIGNIFICANT CHANGE UP (ref 30–160)
MAGNESIUM SERPL-MCNC: 2 MG/DL — SIGNIFICANT CHANGE UP (ref 1.6–2.6)
MCHC RBC-ENTMCNC: 28.7 PG — SIGNIFICANT CHANGE UP (ref 27–34)
MCHC RBC-ENTMCNC: 31.4 GM/DL — LOW (ref 32–36)
MCV RBC AUTO: 91.4 FL — SIGNIFICANT CHANGE UP (ref 80–100)
NRBC # BLD: 0 /100 WBCS — SIGNIFICANT CHANGE UP (ref 0–0)
NRBC # FLD: 0 K/UL — SIGNIFICANT CHANGE UP (ref 0–0)
PHOSPHATE SERPL-MCNC: 3.2 MG/DL — SIGNIFICANT CHANGE UP (ref 2.5–4.5)
PLATELET # BLD AUTO: 201 K/UL — SIGNIFICANT CHANGE UP (ref 150–400)
POTASSIUM SERPL-MCNC: 4 MMOL/L — SIGNIFICANT CHANGE UP (ref 3.5–5.3)
POTASSIUM SERPL-SCNC: 4 MMOL/L — SIGNIFICANT CHANGE UP (ref 3.5–5.3)
PROT SERPL-MCNC: 6.1 G/DL — SIGNIFICANT CHANGE UP (ref 6–8.3)
RBC # BLD: 3.24 M/UL — LOW (ref 3.8–5.2)
RBC # FLD: 17.3 % — HIGH (ref 10.3–14.5)
SODIUM SERPL-SCNC: 137 MMOL/L — SIGNIFICANT CHANGE UP (ref 135–145)
SPECIMEN SOURCE: SIGNIFICANT CHANGE UP
TIBC SERPL-MCNC: 243 UG/DL — SIGNIFICANT CHANGE UP (ref 220–430)
UIBC SERPL-MCNC: 213 UG/DL — SIGNIFICANT CHANGE UP (ref 110–370)
WBC # BLD: 6.75 K/UL — SIGNIFICANT CHANGE UP (ref 3.8–10.5)
WBC # FLD AUTO: 6.75 K/UL — SIGNIFICANT CHANGE UP (ref 3.8–10.5)

## 2023-02-19 PROCEDURE — 99239 HOSP IP/OBS DSCHRG MGMT >30: CPT

## 2023-02-19 RX ORDER — LACOSAMIDE 50 MG/1
1 TABLET ORAL
Qty: 60 | Refills: 0
Start: 2023-02-19 | End: 2023-03-20

## 2023-02-19 RX ORDER — LANOLIN ALCOHOL/MO/W.PET/CERES
6 CREAM (GRAM) TOPICAL ONCE
Refills: 0 | Status: COMPLETED | OUTPATIENT
Start: 2023-02-19 | End: 2023-02-19

## 2023-02-19 RX ORDER — LANOLIN ALCOHOL/MO/W.PET/CERES
6 CREAM (GRAM) TOPICAL AT BEDTIME
Refills: 0 | Status: DISCONTINUED | OUTPATIENT
Start: 2023-02-19 | End: 2023-02-19

## 2023-02-19 RX ADMIN — LACOSAMIDE 110 MILLIGRAM(S): 50 TABLET ORAL at 06:30

## 2023-02-19 RX ADMIN — PANTOPRAZOLE SODIUM 40 MILLIGRAM(S): 20 TABLET, DELAYED RELEASE ORAL at 06:30

## 2023-02-19 RX ADMIN — Medication 6 MILLIGRAM(S): at 03:07

## 2023-02-19 RX ADMIN — Medication 75 MICROGRAM(S): at 06:30

## 2023-02-19 NOTE — PHYSICAL THERAPY INITIAL EVALUATION ADULT - GENERAL OBSERVATIONS, REHAB EVAL
Patient received in semifowler position in bed in [facility]. Patient denies chest pain, SOB, headache, and dizziness.

## 2023-02-19 NOTE — PROGRESS NOTE ADULT - SUBJECTIVE AND OBJECTIVE BOX
[facility] Division of Hospital Medicine  Camille Ojeda MD  Pager: [phone]      Patient is a 68y old  Female who presents with a chief complaint of AMS (18 Feb 2023 10:52)      SUBJECTIVE / OVERNIGHT EVENTS: patient seen and examined. States she feels better today, is eating okay and had a BM after the enema. She is concerned she has sleep apnea - reports snoring - discussed she will need outpatient sleep study for formal diagnosis of LOU. She is requesting OT eval. She feels ready to go home today or tomorrow.     MEDICATIONS  (STANDING):  atorvastatin 10 milliGRAM(s) Oral at bedtime  enoxaparin Injectable 40 milliGRAM(s) SubCutaneous every 24 hours  lacosamide IVPB 50 milliGRAM(s) IV Intermittent every 12 hours  levothyroxine 75 MICROGram(s) Oral daily  pantoprazole    Tablet 40 milliGRAM(s) Oral before breakfast  senna 2 Tablet(s) Oral at bedtime    MEDICATIONS  (PRN):  melatonin 6 milliGRAM(s) Oral at bedtime PRN Sleep  polyethylene glycol 3350 17 Gram(s) Oral daily PRN Constipation      CAPILLARY BLOOD GLUCOSE      POCT Blood Glucose.: 92 mg/dL (19 Feb 2023 10:26)    I&O's Summary    18 Feb 2023 07:01  -  19 Feb 2023 07:00  --------------------------------------------------------  IN: 60 mL / OUT: 4150 mL / NET: -4090 mL        PHYSICAL EXAM:  Vital Signs Last 24 Hrs  T(C): 36.6 (19 Feb 2023 06:40), Max: 36.6 (18 Feb 2023 22:54)  T(F): 97.9 (19 Feb 2023 06:40), Max: 97.9 (19 Feb 2023 06:40)  HR: 57 (19 Feb 2023 06:40) (55 - 66)  BP: 118/57 (19 Feb 2023 06:40) (116/51 - 147/69)  BP(mean): --  RR: 16 (19 Feb 2023 06:40) (16 - 16)  SpO2: 97% (19 Feb 2023 06:40) (97% - 99%)    Parameters below as of 19 Feb 2023 06:40  Patient On (Oxygen Delivery Method): room air    CONSTITUTIONAL: NAD  ENMT: Moist oral mucosa  RESPIRATORY: Normal respiratory effort; lungs are clear to auscultation bilaterally  CARDIOVASCULAR:  S1/S2; No lower extremity edema  ABDOMEN: Nontender to palpation, normoactive bowel sounds, no rebound/guarding  MUSCULOSKELETAL:  no clubbing or cyanosis of digits; no joint swelling or tenderness to palpation  PSYCH: A+O to person, place, and time; affect appropriate  NEUROLOGY: not able to move legs at baseline; upper exts with limited movement bilaterally     LABS:                        9.3    6.75  )-----------( 201      ( 19 Feb 2023 03:23 )             29.6     02-19    137  |  102  |  9   ----------------------------<  64<L>  4.0   |  28  |  0.66    Ca    9.4      19 Feb 2023 03:23  Phos  3.2     02-19  Mg     2.00     02-19    TPro  6.1  /  Alb  3.4  /  TBili  0.3  /  DBili  x   /  AST  27  /  ALT  21  /  AlkPhos  118  02-19                RADIOLOGY & ADDITIONAL TESTS:  Results Reviewed:   Imaging Personally Reviewed:  Electrocardiogram Personally Reviewed:    COORDINATION OF CARE:  Care Discussed with Consultants/Other Providers [Y/N]:  Prior or Outpatient Records Reviewed [Y/N]:

## 2023-02-19 NOTE — DISCHARGE NOTE PROVIDER - CARE PROVIDER_API CALL
Magaly Hopson  INTERNAL MEDICINE  [address] [place] [address]  Phone: [phone]  Fax: [phone]  Follow Up Time:    Magaly Hopson  INTERNAL MEDICINE  [address] [place] [address]  Phone: [phone]  Fax: [phone]  Follow Up Time:     JUAN PULIDO  Neuromusculoskeletal Medicine, Sports Medicine  [address] [address]  Phone: [phone]  Fax: [phone]  Follow Up Time:

## 2023-02-19 NOTE — PROGRESS NOTE ADULT - PROBLEM SELECTOR PLAN 9
DVT ppx: lovenox  Dispo: Pending clinical improvement. PT/OT josiah. CM assistance as patient has HHA DVT ppx: lovenox  Dispo: Patient stable for dc home today. She follows w/ neuro Dr. Dennis at [facility]. She has an appointment for baclofen pump refill on Friday 2/24. Spoke with patient's  Yoni [phone] and updated regarding plan. He is in agreement with dc home today. CM assistance appreciated in transport.     45 minutes spent on discharge planning

## 2023-02-19 NOTE — PROGRESS NOTE ADULT - PROBLEM SELECTOR PLAN 6
Patient required moreno for urinary retention.   - In process of TOV today, will continue to monitor for retention

## 2023-02-19 NOTE — PROGRESS NOTE ADULT - PROBLEM SELECTOR PLAN 4
TSH wnl  C/w home synthroid 75mcg
TSH wnl, home synthroid 75mcg  -change to IV Synthroid 37.5mcg as pt unable to swallow
TSH wnl  C/w home synthroid 75mcg
cont with baclofen, needs to be refilled 2/24  -interrogated by NSG on 2/14     Updated  and sister over the phone on 2/15.
TSH wnl, home synthroid 75mcg  -change to IV Synthroid 37.5mcg as pt not taking PO

## 2023-02-19 NOTE — PROGRESS NOTE ADULT - PROBLEM SELECTOR PLAN 1
symptoms of increased confusion x1-2 weeks, baseline A&Ox4. Per family, mental status changes similar to Dec hospitalization when pt had UTI.  -Mental status is now back to baseline, A&Ox4   -Etiology remains unclear, infectious work-up is negative to date   -CTH w/old lacunar infarct   -EEG (2/15) Rare generalized epileptiform discharges indicate a risk of generalized-onset seizures.  -s/p LP with IR (2/16) - negative results to date   -Labs wnl, TSH wnl, blood cultures and UA/Ucx - NGTD  -Discontinued empiric antibiotics, discussed w/ ID Dr. Oquendo previously, continue to monitor off antimicrobials   -Neuro following, appreciate recs   -NSG interrogated baclofen pump on 2/14   -c/w IV lamictal 50mg bid  -istop done, only meds was lamictal  -B12, TSH wnl  -MRI brain reviewed; limited exam due to motion. w/ mild to moderate chronic microvascular ischemic changes symptoms of increased confusion x1-2 weeks, baseline A&Ox4. Per family, mental status changes similar to Dec hospitalization when pt had UTI.  -Mental status is now back to baseline, A&Ox4   -Etiology remains unclear, infectious work-up is negative to date   -CTH w/old lacunar infarct   -EEG (2/15) Rare generalized epileptiform discharges indicate a risk of generalized-onset seizures.  -s/p LP with IR (2/16) - negative results to date   -Labs wnl, TSH wnl, blood cultures and UA/Ucx - NGTD  -Discontinued empiric antibiotics, discussed w/ ID Dr. Oquendo previously, continue to monitor off antimicrobials   -Neuro following, appreciate recs   -NSG interrogated baclofen pump on 2/14   -c/w IV lamictal 50mg bid  -istop done, only meds was lamictal  -B12, TSH wnl  -MRI brain reviewed; limited exam due to motion. w/ mild to moderate chronic microvascular ischemic changes  -2/19: family reports that patient was endorsing visual disturbances to them, however she did not mention to me. Discussed case with Neuro Dr. Ramos, no need to treat for acute MS flare, okay to follow-up as outpatient.

## 2023-02-19 NOTE — PROGRESS NOTE ADULT - PROBLEM SELECTOR PLAN 2
-cont with lamictal 50mg bid  -EEG (2/15): Rare generalized epileptiform discharges indicate a risk of generalized-onset seizures.  -Neuro following
-cont with lamictal 50mg bid  -EEG (2/15): Rare generalized epileptiform discharges indicate a risk of generalized-onset seizures.  -Neuro following
-cont with lamictal 50mg bid  -fu neuro recs  -video eeg
-cont with lamictal 50mg bid  -EEG (2/15): Rare generalized epileptiform discharges indicate a risk of generalized-onset seizures.  -Neuro following
-cont with lamictal 50mg bid  -EEG (2/15): Rare generalized epileptiform discharges indicate a risk of generalized-onset seizures.  -Neuro following

## 2023-02-19 NOTE — DISCHARGE NOTE PROVIDER - NSDCMRMEDTOKEN_GEN_ALL_CORE_FT
acetaminophen 325 mg oral tablet: 2 tab(s) orally every 6 hours, As needed, Temp greater or equal to 38C (100.4F), Mild Pain (1 - 3)  ammonium lactate 12% topical cream: Apply topically to affected area once a day, As Needed  atorvastatin 10 mg oral tablet: 1 tab(s) orally once a day  ciprofloxacin 500 mg oral tablet: 1 tab(s) orally 2 times a day ( Dosin tab in PM , 1 tab AM , last dose  PM) MDD:2 tablets - 1000mg  Fleet Enema 19 g-7 g rectal enema: 133 milliliter(s) rectal every other day, to go to the bathroom  fluticasone 50 mcg/inh nasal spray: 1 spray(s) in each nostril once a day  ibandronate 150 mg oral tablet: 1 tab(s) orally once a month  Magnesium OTC pain relief spray: Apply topically to affected area (knee) once a day, As Needed  pantoprazole 40 mg oral delayed release tablet: 1 tab(s) orally once a day  Synthroid 75 mcg (0.075 mg) oral tablet: 1 tab(s) orally once a day   acetaminophen 325 mg oral tablet: 2 tab(s) orally every 6 hours, As needed, Temp greater or equal to 38C (100.4F), Mild Pain (1 - 3)  ammonium lactate 12% topical cream: Apply topically to affected area once a day, As Needed  atorvastatin 10 mg oral tablet: 1 tab(s) orally once a day  ciprofloxacin 500 mg oral tablet: 1 tab(s) orally 2 times a day ( Dosin tab in PM , 1 tab AM , last dose  PM) MDD:2 tablets - 1000mg  Fleet Enema 19 g-7 g rectal enema: 133 milliliter(s) rectal every other day, to go to the bathroom  fluticasone 50 mcg/inh nasal spray: 1 spray(s) in each nostril once a day  ibandronate 150 mg oral tablet: 1 tab(s) orally once a month  lacosamide 50 mg oral tablet: 1 tab(s) orally 2 times a day MDD:100mg  Magnesium OTC pain relief spray: Apply topically to affected area (knee) once a day, As Needed  pantoprazole 40 mg oral delayed release tablet: 1 tab(s) orally once a day  Synthroid 75 mcg (0.075 mg) oral tablet: 1 tab(s) orally once a day   acetaminophen 325 mg oral tablet: 2 tab(s) orally every 6 hours, As needed, Temp greater or equal to 38C (100.4F), Mild Pain (1 - 3)  ammonium lactate 12% topical cream: Apply topically to affected area once a day, As Needed  atorvastatin 10 mg oral tablet: 1 tab(s) orally once a day  Fleet Enema 19 g-7 g rectal enema: 133 milliliter(s) rectal every other day, to go to the bathroom  fluticasone 50 mcg/inh nasal spray: 1 spray(s) in each nostril once a day  ibandronate 150 mg oral tablet: 1 tab(s) orally once a month  lacosamide 50 mg oral tablet: 1 tab(s) orally 2 times a day MDD:100mg  Magnesium OTC pain relief spray: Apply topically to affected area (knee) once a day, As Needed  pantoprazole 40 mg oral delayed release tablet: 1 tab(s) orally once a day  Synthroid 75 mcg (0.075 mg) oral tablet: 1 tab(s) orally once a day

## 2023-02-19 NOTE — DISCHARGE NOTE PROVIDER - NSDCCPTREATMENT_GEN_ALL_CORE_FT
PRINCIPAL PROCEDURE  Procedure: Brain MRI  Findings and Treatment: FINDINGS:  Study limited due to motion  There is no abnormal intraparenchymal or leptomeningeal enhancement.  There is mild diffuse parenchymal volume loss. There are T2 prolongation   signal abnormalities in the periventricular white matter likely related   to mild to moderate chronic microvascular ischemic changes.  There is no acute parenchymal hemorrhage, parenchymal mass, mass effect   or midline shift. There is no extra-axial fluid collection.  There is no   hydrocephalus.  There is no acute infarct.  Minimal mucosal thickening ethmoid sinus  IMPRESSION:  Limited exam due to motion.  No acute intracranial hemorrhage or acute infarct.  Mild to moderate chronic microvascular ischemic changes

## 2023-02-19 NOTE — PHYSICAL THERAPY INITIAL EVALUATION ADULT - ADDITIONAL COMMENTS
patient reports she is wheelchair bound, power wheelchair. her home health aide assists with all ADLs. she reports she requires 2 person assist for showers. she transfers with assistance of home health aide. she has hospital bed

## 2023-02-19 NOTE — DISCHARGE NOTE PROVIDER - NSDCCPCAREPLAN_GEN_ALL_CORE_FT
PRINCIPAL DISCHARGE DIAGNOSIS  Diagnosis: Altered mental status  Assessment and Plan of Treatment: You came to the hospital for confusion. You had a complete infectious work-up done, including a lumbar puncture, which was negative for infection. You also had an MRI of your brain which did not show anything concerning. You were seen by the neurology team and the infectious disease team.   Please return to the ER if you experience further confusion, fevers, or seizures.      SECONDARY DISCHARGE DIAGNOSES  Diagnosis: Seizure  Assessment and Plan of Treatment: Please continue your seizure medication as prescribed.    Diagnosis: Multiple sclerosis  Assessment and Plan of Treatment: Please follow-up with your neurologist and continue to take your medication as prescribed.

## 2023-02-19 NOTE — DISCHARGE NOTE NURSING/CASE MANAGEMENT/SOCIAL WORK - NURSING SECTION COMPLETE
Reason for Call:  Medication or medication refill:    Do you use a [facility]?  Name of the pharmacy and phone number for the current request:  [id number] [address] [place] [address] AT SEC OF [facility] [place] [place]    Name of the medication requested: PARoxetine (PAXIL) 40 MG tablet and Valium    Other request: Patient states her mom got admitted to the hospital and she needs medication to help her anxiety. She's out of refills and wondering if covering provider will ok the above medications today. She is aware Dr. Tidwell is out today.    Can we leave a detailed message on this number? YES    Phone number patient can be reached at: Cell number on file:    Telephone Information:   Mobile [phone]       Best Time: any    Call taken on 1/3/2022 at 1:03 PM by Kayleigh Durham    Patient/Caregiver provided printed discharge information.

## 2023-02-19 NOTE — DISCHARGE NOTE PROVIDER - HOSPITAL COURSE
66 yo female with hx of MS (on balcofen pump), seizure, hypothyroidism, urosepsis is brought in by sister for encephalopathy, unclear etiology at this time.   -Etiology remains unclear, infectious work-up is negative to date   -CTH w/old lacunar infarct   -EEG (2/15) Rare generalized epileptiform discharges indicate a risk of generalized-onset seizures.  -s/p LP with IR (2/16) - negative results to date   -Labs wnl, TSH wnl, blood cultures and UA/Ucx - NGTD  -Discontinued empiric antibiotics   -NSG interrogated baclofen pump on 2/14  -MRI brain; limited exam due to motion. w/ mild to moderate chronic microvascular ischemic changes.

## 2023-02-19 NOTE — PROGRESS NOTE ADULT - PROBLEM SELECTOR PLAN 7
cont with baclofen, needs to be refilled 2/24  -interrogated by NSG on 2/14
Baseline Hgb 9-10  - Check iron studies and folate  - B12 wnl
DVT ppx: lovenox  Dispo: Pending clinical improvement    Discussed w/ sister Deloris [phone] and updated on 2/17

## 2023-02-19 NOTE — PHYSICAL THERAPY INITIAL EVALUATION ADULT - BED MOBILITY LIMITATIONS, REHAB EVAL
Paxlovid sent per request due to confirmed COVID infection.  Patient counseled regarding side effects of medication and drug interactions.   decreased ability to use legs for bridging/pushing/impaired ability to control trunk for mobility

## 2023-02-19 NOTE — DISCHARGE NOTE PROVIDER - NSDCFUSCHEDAPPT_GEN_ALL_CORE_FT
Matias Sterling  [facility] Physician [facility]  [facility]OLOGY [address] [facility]  Scheduled Appointment: 04/05/2023

## 2023-02-19 NOTE — DISCHARGE NOTE NURSING/CASE MANAGEMENT/SOCIAL WORK - PATIENT PORTAL LINK FT
You can access the FollowMyHealth Patient Portal offered by [facility] by registering at the following website: [email or web address]. By joining [facility]’s FollowMyHealth portal, you will also be able to view your health information using other applications (apps) compatible with our system.

## 2023-02-19 NOTE — DISCHARGE NOTE NURSING/CASE MANAGEMENT/SOCIAL WORK - NSDCPEFALRISK_GEN_ALL_CORE
For information on Fall & Injury Prevention, visit: [email or web address] OR  [email or web address] OR  https://www.cdc.gov/steadi/patient.html

## 2023-02-19 NOTE — PROGRESS NOTE ADULT - PROBLEM SELECTOR PLAN 3
TSH wnl  cont with synthroid 75mcg
Missing teeth right back lower mouth
BP elevated 170-180s   -Started Hydral 5mg q6, adjust as needed
BP elevated 170-180s   -Started Hydral 5mg q6, adjust as needed
BP elevated 170-180s, now Ps improved.  Will dc hydralazine today and monitor as patient is not on medication at home
BP elevated 170-180s, now BPs improved.  Will dc hydralazine today and monitor as patient is not on medication at home

## 2023-02-19 NOTE — DISCHARGE NOTE PROVIDER - NSDCFUADDAPPT_GEN_ALL_CORE_FT
Please follow-up with your neurologist Dr. Dennis.   Please follow-up during your scheduled appointment on 12/24 to have your baclofen pump refilled.

## 2023-02-19 NOTE — PROGRESS NOTE ADULT - PROBLEM SELECTOR PLAN 8
DVT ppx: lovenox  Dispo: Pending clinical improvement. PT/OT josiah. CM assistance as patient has HHA
cont with baclofen, needs to be refilled 2/24  -interrogated by NSG on 2/14

## 2023-02-19 NOTE — PHYSICAL THERAPY INITIAL EVALUATION ADULT - RISK REDUCTION/PREVENTION, PT EVAL
85 yo F with PMHx of macular degeneration, HTN, mild LV dysfunction, mild CAD (cath in 2013) and hx of syncopal episodes as per family, found down by family members last seen well 2 days prior, unknown down time    1.	High AGMA with acute hypercapneic respiratory failure 2/2 hypothermia (Resolved)  2.	Acute comminuted R intertrochanteric femoral fracture S/P IMN  3.   Acute minimally displaced posterior L 9th Rib Fracture   4.   Mild troponinemia and heart strain 2/2 new-onset paroxsymal AFib (BNP 8476)  5.   Rhabdomyolysis 2/2 mechanical fall (CK 2748)- resolved  6.   Multinodular goiter   7.   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   Mechanical fall   9.   Acute metabolic encephalopathy 2/2 acute bacterial cystitis - resolved  10.  A. Fib- rate controlled now.  12. Shock unspecified on admission        PLAN:    ·	Ortho F/U noted  ·	D/C Foly'S catheteter  ·	Check RA POX  ·	WBAT  ·	Rehab eval. OOB to Chair  ·	Cont metoprolol  25 mg po q 12h  ·	Pain control  ·	D/C all Abx. risk factors

## 2023-02-19 NOTE — DISCHARGE NOTE PROVIDER - PROVIDER TOKENS
PROVIDER:[TOKEN:[[id number]]] PROVIDER:[TOKEN:[[id number]]],PROVIDER:[TOKEN:[[id number]]]

## 2023-02-20 LAB
INNER EAR 68KD AB FLD QL: <1.5 U/L — SIGNIFICANT CHANGE UP (ref 0–3.1)
TM INTERPRETATION: SIGNIFICANT CHANGE UP

## 2023-02-21 LAB — B BURGDOR AB CSF-ACNC: SIGNIFICANT CHANGE UP

## 2023-02-23 ENCOUNTER — INPATIENT (INPATIENT)
Facility: HOSPITAL | Age: 68
LOS: 7 days | Discharge: HOME HEALTH | End: 2023-03-03
Attending: INTERNAL MEDICINE | Admitting: INTERNAL MEDICINE
Payer: MEDICARE

## 2023-02-23 VITALS
OXYGEN SATURATION: 100 % | SYSTOLIC BLOOD PRESSURE: 113 MMHG | DIASTOLIC BLOOD PRESSURE: 84 MMHG | HEIGHT: 64 IN | RESPIRATION RATE: 16 BRPM | TEMPERATURE: 99 F | HEART RATE: 110 BPM

## 2023-02-23 PROCEDURE — 99285 EMERGENCY DEPT VISIT HI MDM: CPT

## 2023-02-23 NOTE — ED ADULT TRIAGE NOTE - CHIEF COMPLAINT QUOTE
pt admitted x1week for AMS, as per sister pt has been lethargic x1day. Baseline pt is aox3, on arrival pt responsive to pain stimuli. pmhx MS, Hypothyroid

## 2023-02-24 DIAGNOSIS — Z29.9 ENCOUNTER FOR PROPHYLACTIC MEASURES, UNSPECIFIED: ICD-10-CM

## 2023-02-24 DIAGNOSIS — U07.1 COVID-19: ICD-10-CM

## 2023-02-24 DIAGNOSIS — R56.9 UNSPECIFIED CONVULSIONS: ICD-10-CM

## 2023-02-24 DIAGNOSIS — R41.82 ALTERED MENTAL STATUS, UNSPECIFIED: ICD-10-CM

## 2023-02-24 DIAGNOSIS — G35 MULTIPLE SCLEROSIS: ICD-10-CM

## 2023-02-24 DIAGNOSIS — R74.8 ABNORMAL LEVELS OF OTHER SERUM ENZYMES: ICD-10-CM

## 2023-02-24 DIAGNOSIS — E03.9 HYPOTHYROIDISM, UNSPECIFIED: ICD-10-CM

## 2023-02-24 LAB
ALBUMIN SERPL ELPH-MCNC: 3.7 G/DL — SIGNIFICANT CHANGE UP (ref 3.3–5)
ALP SERPL-CCNC: 130 U/L — HIGH (ref 40–120)
ALT FLD-CCNC: 40 U/L — HIGH (ref 4–33)
ANION GAP SERPL CALC-SCNC: 11 MMOL/L — SIGNIFICANT CHANGE UP (ref 7–14)
ANION GAP SERPL CALC-SCNC: 13 MMOL/L — SIGNIFICANT CHANGE UP (ref 7–14)
APPEARANCE UR: CLEAR — SIGNIFICANT CHANGE UP
AST SERPL-CCNC: 71 U/L — HIGH (ref 4–32)
B PERT DNA SPEC QL NAA+PROBE: SIGNIFICANT CHANGE UP
B PERT+PARAPERT DNA PNL SPEC NAA+PROBE: SIGNIFICANT CHANGE UP
BASOPHILS # BLD AUTO: 0.06 K/UL — SIGNIFICANT CHANGE UP (ref 0–0.2)
BASOPHILS NFR BLD AUTO: 0.7 % — SIGNIFICANT CHANGE UP (ref 0–2)
BILIRUB SERPL-MCNC: 0.4 MG/DL — SIGNIFICANT CHANGE UP (ref 0.2–1.2)
BILIRUB UR-MCNC: NEGATIVE — SIGNIFICANT CHANGE UP
BLOOD GAS VENOUS COMPREHENSIVE RESULT: SIGNIFICANT CHANGE UP
BORDETELLA PARAPERTUSSIS (RAPRVP): SIGNIFICANT CHANGE UP
BUN SERPL-MCNC: 14 MG/DL — SIGNIFICANT CHANGE UP (ref 7–23)
BUN SERPL-MCNC: 14 MG/DL — SIGNIFICANT CHANGE UP (ref 7–23)
C PNEUM DNA SPEC QL NAA+PROBE: SIGNIFICANT CHANGE UP
CALCIUM SERPL-MCNC: 10 MG/DL — SIGNIFICANT CHANGE UP (ref 8.4–10.5)
CALCIUM SERPL-MCNC: 9.8 MG/DL — SIGNIFICANT CHANGE UP (ref 8.4–10.5)
CHLORIDE SERPL-SCNC: 101 MMOL/L — SIGNIFICANT CHANGE UP (ref 98–107)
CHLORIDE SERPL-SCNC: 99 MMOL/L — SIGNIFICANT CHANGE UP (ref 98–107)
CO2 SERPL-SCNC: 23 MMOL/L — SIGNIFICANT CHANGE UP (ref 22–31)
CO2 SERPL-SCNC: 26 MMOL/L — SIGNIFICANT CHANGE UP (ref 22–31)
COLOR SPEC: SIGNIFICANT CHANGE UP
CREAT SERPL-MCNC: 0.72 MG/DL — SIGNIFICANT CHANGE UP (ref 0.5–1.3)
CREAT SERPL-MCNC: 0.76 MG/DL — SIGNIFICANT CHANGE UP (ref 0.5–1.3)
DIFF PNL FLD: NEGATIVE — SIGNIFICANT CHANGE UP
EGFR: 86 ML/MIN/1.73M2 — SIGNIFICANT CHANGE UP
EGFR: 92 ML/MIN/1.73M2 — SIGNIFICANT CHANGE UP
EOSINOPHIL # BLD AUTO: 0.03 K/UL — SIGNIFICANT CHANGE UP (ref 0–0.5)
EOSINOPHIL NFR BLD AUTO: 0.3 % — SIGNIFICANT CHANGE UP (ref 0–6)
FLUAV SUBTYP SPEC NAA+PROBE: SIGNIFICANT CHANGE UP
FLUBV RNA SPEC QL NAA+PROBE: SIGNIFICANT CHANGE UP
GGT SERPL-CCNC: 6 U/L — SIGNIFICANT CHANGE UP (ref 5–36)
GLUCOSE SERPL-MCNC: 73 MG/DL — SIGNIFICANT CHANGE UP (ref 70–99)
GLUCOSE SERPL-MCNC: 73 MG/DL — SIGNIFICANT CHANGE UP (ref 70–99)
GLUCOSE UR QL: NEGATIVE — SIGNIFICANT CHANGE UP
HADV DNA SPEC QL NAA+PROBE: SIGNIFICANT CHANGE UP
HCOV 229E RNA SPEC QL NAA+PROBE: SIGNIFICANT CHANGE UP
HCOV HKU1 RNA SPEC QL NAA+PROBE: SIGNIFICANT CHANGE UP
HCOV NL63 RNA SPEC QL NAA+PROBE: SIGNIFICANT CHANGE UP
HCOV OC43 RNA SPEC QL NAA+PROBE: SIGNIFICANT CHANGE UP
HCT VFR BLD CALC: 32.2 % — LOW (ref 34.5–45)
HGB BLD-MCNC: 9.9 G/DL — LOW (ref 11.5–15.5)
HMPV RNA SPEC QL NAA+PROBE: SIGNIFICANT CHANGE UP
HPIV1 RNA SPEC QL NAA+PROBE: SIGNIFICANT CHANGE UP
HPIV2 RNA SPEC QL NAA+PROBE: SIGNIFICANT CHANGE UP
HPIV3 RNA SPEC QL NAA+PROBE: SIGNIFICANT CHANGE UP
HPIV4 RNA SPEC QL NAA+PROBE: SIGNIFICANT CHANGE UP
IANC: 7.2 K/UL — SIGNIFICANT CHANGE UP (ref 1.8–7.4)
IMM GRANULOCYTES NFR BLD AUTO: 0.6 % — SIGNIFICANT CHANGE UP (ref 0–0.9)
KETONES UR-MCNC: ABNORMAL
LEUKOCYTE ESTERASE UR-ACNC: NEGATIVE — SIGNIFICANT CHANGE UP
LYMPHOCYTES # BLD AUTO: 0.91 K/UL — LOW (ref 1–3.3)
LYMPHOCYTES # BLD AUTO: 10.3 % — LOW (ref 13–44)
M PNEUMO DNA SPEC QL NAA+PROBE: SIGNIFICANT CHANGE UP
MAGNESIUM SERPL-MCNC: 1.6 MG/DL — SIGNIFICANT CHANGE UP (ref 1.6–2.6)
MCHC RBC-ENTMCNC: 28.3 PG — SIGNIFICANT CHANGE UP (ref 27–34)
MCHC RBC-ENTMCNC: 30.7 GM/DL — LOW (ref 32–36)
MCV RBC AUTO: 92 FL — SIGNIFICANT CHANGE UP (ref 80–100)
MONOCYTES # BLD AUTO: 0.59 K/UL — SIGNIFICANT CHANGE UP (ref 0–0.9)
MONOCYTES NFR BLD AUTO: 6.7 % — SIGNIFICANT CHANGE UP (ref 2–14)
NEUTROPHILS # BLD AUTO: 7.2 K/UL — SIGNIFICANT CHANGE UP (ref 1.8–7.4)
NEUTROPHILS NFR BLD AUTO: 81.4 % — HIGH (ref 43–77)
NITRITE UR-MCNC: NEGATIVE — SIGNIFICANT CHANGE UP
NRBC # BLD: 0 /100 WBCS — SIGNIFICANT CHANGE UP (ref 0–0)
NRBC # FLD: 0 K/UL — SIGNIFICANT CHANGE UP (ref 0–0)
PH UR: 6 — SIGNIFICANT CHANGE UP (ref 5–8)
PHOSPHATE SERPL-MCNC: 2.6 MG/DL — SIGNIFICANT CHANGE UP (ref 2.5–4.5)
PLATELET # BLD AUTO: 218 K/UL — SIGNIFICANT CHANGE UP (ref 150–400)
POTASSIUM SERPL-MCNC: 4.1 MMOL/L — SIGNIFICANT CHANGE UP (ref 3.5–5.3)
POTASSIUM SERPL-MCNC: SIGNIFICANT CHANGE UP MMOL/L (ref 3.5–5.3)
POTASSIUM SERPL-SCNC: 4.1 MMOL/L — SIGNIFICANT CHANGE UP (ref 3.5–5.3)
POTASSIUM SERPL-SCNC: SIGNIFICANT CHANGE UP MMOL/L (ref 3.5–5.3)
PROT SERPL-MCNC: 7.7 G/DL — SIGNIFICANT CHANGE UP (ref 6–8.3)
PROT UR-MCNC: ABNORMAL
RAPID RVP RESULT: DETECTED
RBC # BLD: 3.5 M/UL — LOW (ref 3.8–5.2)
RBC # FLD: 17.7 % — HIGH (ref 10.3–14.5)
RSV RNA SPEC QL NAA+PROBE: SIGNIFICANT CHANGE UP
RV+EV RNA SPEC QL NAA+PROBE: SIGNIFICANT CHANGE UP
SARS-COV-2 RNA SPEC QL NAA+PROBE: DETECTED
SODIUM SERPL-SCNC: 135 MMOL/L — SIGNIFICANT CHANGE UP (ref 135–145)
SODIUM SERPL-SCNC: 138 MMOL/L — SIGNIFICANT CHANGE UP (ref 135–145)
SP GR SPEC: 1.02 — SIGNIFICANT CHANGE UP (ref 1.01–1.05)
TROPONIN T, HIGH SENSITIVITY RESULT: 44 NG/L — SIGNIFICANT CHANGE UP
TSH SERPL-MCNC: 2.14 UIU/ML — SIGNIFICANT CHANGE UP (ref 0.27–4.2)
UROBILINOGEN FLD QL: SIGNIFICANT CHANGE UP
WBC # BLD: 8.84 K/UL — SIGNIFICANT CHANGE UP (ref 3.8–10.5)
WBC # FLD AUTO: 8.84 K/UL — SIGNIFICANT CHANGE UP (ref 3.8–10.5)

## 2023-02-24 PROCEDURE — 71045 X-RAY EXAM CHEST 1 VIEW: CPT | Mod: 26

## 2023-02-24 PROCEDURE — 70450 CT HEAD/BRAIN W/O DYE: CPT | Mod: 26,MA

## 2023-02-24 PROCEDURE — 99223 1ST HOSP IP/OBS HIGH 75: CPT

## 2023-02-24 RX ORDER — PANTOPRAZOLE SODIUM 20 MG/1
40 TABLET, DELAYED RELEASE ORAL
Refills: 0 | Status: DISCONTINUED | OUTPATIENT
Start: 2023-02-24 | End: 2023-03-03

## 2023-02-24 RX ORDER — LANOLIN ALCOHOL/MO/W.PET/CERES
3 CREAM (GRAM) TOPICAL AT BEDTIME
Refills: 0 | Status: DISCONTINUED | OUTPATIENT
Start: 2023-02-24 | End: 2023-03-03

## 2023-02-24 RX ORDER — ENOXAPARIN SODIUM 100 MG/ML
40 INJECTION SUBCUTANEOUS EVERY 24 HOURS
Refills: 0 | Status: DISCONTINUED | OUTPATIENT
Start: 2023-02-24 | End: 2023-03-01

## 2023-02-24 RX ORDER — IBANDRONATE SODIUM 150 MG/1
1 TABLET ORAL
Qty: 0 | Refills: 0 | DISCHARGE

## 2023-02-24 RX ORDER — ACETAMINOPHEN 500 MG
650 TABLET ORAL EVERY 6 HOURS
Refills: 0 | Status: DISCONTINUED | OUTPATIENT
Start: 2023-02-24 | End: 2023-03-03

## 2023-02-24 RX ORDER — ATORVASTATIN CALCIUM 80 MG/1
1 TABLET, FILM COATED ORAL
Qty: 0 | Refills: 0 | DISCHARGE

## 2023-02-24 RX ORDER — SOD,AMMONIUM,POTASSIUM LACTATE
1 CREAM (GRAM) TOPICAL
Qty: 0 | Refills: 0 | DISCHARGE

## 2023-02-24 RX ORDER — AMLODIPINE BESYLATE 2.5 MG/1
10 TABLET ORAL DAILY
Refills: 0 | Status: DISCONTINUED | OUTPATIENT
Start: 2023-02-24 | End: 2023-03-03

## 2023-02-24 RX ORDER — FLUTICASONE PROPIONATE 50 MCG
1 SPRAY, SUSPENSION NASAL
Qty: 0 | Refills: 0 | DISCHARGE

## 2023-02-24 RX ORDER — LEVOTHYROXINE SODIUM 125 MCG
75 TABLET ORAL DAILY
Refills: 0 | Status: DISCONTINUED | OUTPATIENT
Start: 2023-02-24 | End: 2023-03-03

## 2023-02-24 RX ORDER — LACOSAMIDE 50 MG/1
50 TABLET ORAL
Refills: 0 | Status: DISCONTINUED | OUTPATIENT
Start: 2023-02-24 | End: 2023-02-25

## 2023-02-24 RX ORDER — AMLODIPINE BESYLATE 2.5 MG/1
5 TABLET ORAL ONCE
Refills: 0 | Status: COMPLETED | OUTPATIENT
Start: 2023-02-24 | End: 2023-02-24

## 2023-02-24 RX ORDER — LABETALOL HCL 100 MG
10 TABLET ORAL ONCE
Refills: 0 | Status: COMPLETED | OUTPATIENT
Start: 2023-02-24 | End: 2023-02-24

## 2023-02-24 RX ADMIN — AMLODIPINE BESYLATE 5 MILLIGRAM(S): 2.5 TABLET ORAL at 19:45

## 2023-02-24 RX ADMIN — Medication 10 MILLIGRAM(S): at 17:06

## 2023-02-24 RX ADMIN — LACOSAMIDE 50 MILLIGRAM(S): 50 TABLET ORAL at 17:06

## 2023-02-24 NOTE — ED PROVIDER NOTE - OBJECTIVE STATEMENT
68 year-old female past medical history of MS, hypothyroidism, urosepsis presenting with a chief complaint of altered mental status.  Patient is accompanied by sister at bedside.  History is obtained from sister.  According to sister patient states was admitted to the hospital last week for altered mental status.  States that nothing significant was found during her admission.  When she was discharged she was alert to self and place and had returned to baseline. Beginning on Thursday patient was less alert and mental status started to deteriorate again.  Patient was not endorsing any pain but sister noticed that she began to cough.

## 2023-02-24 NOTE — ED PROVIDER NOTE - ADMIT DISPOSITION PRESENT ON ADMISSION SEPSIS
Dr Cannon needed this case added. I spoke to Chel in the OR. She was going to add the case on for Dr. Cannon Fri 8-5 at 4:30pm right urs with laser and stent placment.     I called to confirm with pt. She will arrive at 2:30 to [facility] and will need a RPPID COVID test. Left a message for day surgery nurse as well.     All orders are in place.     Cece    No

## 2023-02-24 NOTE — H&P ADULT - NSHPLABSRESULTS_GEN_ALL_CORE
Continuity of Care Form    Patient Name: Jac Costa   :  1969  MRN:  [id number]    Admit date:  10/1/2021  Discharge date:  ***    Code Status Order: Full Code   Advance Directives:     Admitting Physician:  Kendrick Elmore MD  PCP: Yaritza Rees    Discharging Nurse: [facility] Unit/Room#:   Discharging Unit Phone Number: ***    Emergency Contact:   Extended Emergency Contact Information  Primary Emergency Contact: Leticia Charles  Address: [address] [place] [address] Phone: [phone]  Work Phone: [phone]  Mobile Phone: [phone]  Relation: Spouse   needed? No  Secondary Emergency Contact: Adan Adan [address] Phone: [phone]  Work Phone: [phone]  Mobile Phone: [phone]  Relation: Parent   needed?  No    Past Surgical History:  Past Surgical History:   Procedure Laterality Date    ANTERIOR CRUCIATE LIGAMENT REPAIR  2009    TONSILLECTOMY         Immunization History:   Immunization History   Administered Date(s) Administered    Influenza Whole 2011    Influenza, Quadv, IM, (6 mo and older Fluzone, Flulaval, Fluarix and 3 yrs and older Afluria) 10/25/2018    Pneumococcal Polysaccharide ([id number]) 2017    Td, unspecified formulation 2006       Active Problems:  Patient Active Problem List   Diagnosis Code    Hyperlipidemia E78.5    MVP (mitral valve prolapse) I34.1    Sleep apnea G47.30    Diabetes mellitus (HCC) E11.9    GERD (gastroesophageal reflux disease) K21.9    Asthma R26.140    Nonalcoholic hepatosteatosis U43.9    Constipation K59.00    Gastroparesis K31.84    Mixed hyperlipidemia E78.2    Essential hypertension I10    Recurrent nephrolithiasis Z38.6    Complicated UTI (urinary tract infection) N39.0    Cellulitis of left lower extremity L03.116       Isolation/Infection:   Isolation          Contact Patient Infection Status     Infection Onset Added Last Indicated Last Indicated By Review Planned Expiration Resolved Resolved By    ESBL (Extended Spectrum Beta Lactamase)  09/06/16 09/06/16 Evelia Perkins RN        E. Coli - urine 9/2016      Resolved    COVID-19 Rule Out 10/01/21 10/01/21 10/01/21 COVID-19, Rapid (Ordered)   10/01/21 Rule-Out Test Resulted          Nurse Assessment:  Last Vital Signs: BP (!) 118/56   Pulse 68   Temp 98.2 °F (36.8 °C)   Resp 20   Ht 5' 6\" (1.676 m)   Wt (!) 399 lb 14.6 oz (181.4 kg)   SpO2 96%   BMI 64.55 kg/m²     Last documented pain score (0-10 scale): Pain Level: 6  Last Weight:   Wt Readings from Last 1 Encounters:   10/04/21 (!) 399 lb 14.6 oz (181.4 kg)     Mental Status:  {IP PT MENTAL STATUS:20030}    IV Access:  { RAMSES IV ACCESS:429269197}    Nursing Mobility/ADLs:  Walking   {CHP DME ICGI:840553647}  Transfer  {CHP DME PILM:923059174}  Bathing  {CHP DME OKWX:810799454}  Dressing  {CHP DME NNFO:574577738}  Toileting  {CHP DME FTAN:983887666}  Feeding  {CHP DME MLPQ:511296450}  Med Admin  {CHP DME BYAH:375131915}  Med Delivery   { RAMSES MED Delivery:342852276}    Wound Care Documentation and Therapy:        Elimination:  Continence:   · Bowel: {YES / JQ:06105}  · Bladder: {YES / KJ:61098}  Urinary Catheter: {Urinary Catheter:051053242}   Colostomy/Ileostomy/Ileal Conduit: {YES / FE:30148}       Date of Last BM: ***    Intake/Output Summary (Last 24 hours) at 10/5/2021 1116  Last data filed at 10/4/2021 1827  Gross per 24 hour   Intake 475 ml   Output --   Net 475 ml     I/O last 3 completed shifts:   In: 595 [P.O.:595]  Out: -     Safety Concerns:     [address] Concerns:383322395}    Impairments/Disabilities:      [address] Impairments/Disabilities:124356919}    Nutrition Therapy:  Current Nutrition Therapy:   [address] Diet List:499684448}    Routes of Feeding: {CHP DME Other Feedings:439729114}  Liquids: {Slp liquid thickness:22352}  Daily Fluid Vital Signs Last 24 Hrs  T(C): 36.7 (24 Feb 2023 09:07), Max: 37.1 (23 Feb 2023 23:23)  T(F): 98 (24 Feb 2023 09:07), Max: 98.8 (23 Feb 2023 23:23)  HR: 64 (24 Feb 2023 09:07) (64 - 110)  BP: 154/79 (24 Feb 2023 09:07) (113/84 - 154/79)  BP(mean): --  RR: 14 (24 Feb 2023 09:07) (14 - 16)  SpO2: 98% (24 Feb 2023 09:07) (97% - 100%)    Parameters below as of 24 Feb 2023 09:07  Patient On (Oxygen Delivery Method): room air    LABS:  cret                        9.9    8.84  )-----------( 218      ( 24 Feb 2023 01:40 )             32.2     02-24    138  |  101  |  14  ----------------------------<  73  4.1   |  26  |  0.76    Ca    9.8      24 Feb 2023 03:43  Phos  2.6     02-24  Mg     1.60     02-24    TPro  7.7  /  Alb  3.7  /  TBili  0.4  /  DBili  x   /  AST  71<H>  /  ALT  40<H>  /  AlkPhos  130<H>  02-24 Restriction: {CHP DME Yes amt example:481244811}  Last Modified Barium Swallow with Video (Video Swallowing Test): {Done Not Done SAOZ:213253229}    Treatments at the Time of Hospital Discharge:   Respiratory Treatments: ***  Oxygen Therapy:  {Therapy; copd oxygen:53629}  Ventilator:    { CC Vent JNNO:950926853}    Rehab Therapies: {THERAPEUTIC INTERVENTION:6478305091}  Weight Bearing Status/Restrictions: {[facility] Weight Bearin}  Other Medical Equipment (for information only, NOT a DME order):  {EQUIPMENT:021673304}  Other Treatments: ***    Patient's personal belongings (please select all that are sent with patient):  {CHP DME Belongings:100135589}    RN SIGNATURE:  {Esignature:758233711}    CASE MANAGEMENT/SOCIAL WORK SECTION    Inpatient Status Date: ***    Readmission Risk Assessment Score:  Readmission Risk              Risk of Unplanned Readmission:  16           Discharging to Facility/ Agency   · Name:   · Address:  · Phone:  · Fax:    Dialysis Facility (if applicable)   · Name:  · Address:  · Dialysis Schedule:  · Phone:  · Fax:    / signature: {Esignature:935348688}    PHYSICIAN SECTION    Prognosis: {Prognosis:5899681213}    Condition at Discharge: [address] [place] [address] Patient Condition:894183600}    Rehab Potential (if transferring to Rehab): {Prognosis:2891207905}    Recommended Labs or Other Treatments After Discharge: ***    Physician Certification: I certify the above information and transfer of Ti Garcia  is necessary for the continuing treatment of the diagnosis listed and that he requires {Admit to Appropriate Level of Care:57554} for {GREATER/LESS:117164153} 30 days.      Update Admission H&P: {CHP DME Changes in XDEIT:168957600}    PHYSICIAN SIGNATURE:  {Esignature:035911688} Vital Signs Last 24 Hrs  T(C): 36.7 (24 Feb 2023 09:07), Max: 37.1 (23 Feb 2023 23:23)  T(F): 98 (24 Feb 2023 09:07), Max: 98.8 (23 Feb 2023 23:23)  HR: 64 (24 Feb 2023 09:07) (64 - 110)  BP: 154/79 (24 Feb 2023 09:07) (113/84 - 154/79)  BP(mean): --  RR: 14 (24 Feb 2023 09:07) (14 - 16)  SpO2: 98% (24 Feb 2023 09:07) (97% - 100%)    Parameters below as of 24 Feb 2023 09:07  Patient On (Oxygen Delivery Method): room air    LABS:  cret                        9.9    8.84  )-----------( 218      ( 24 Feb 2023 01:40 )             32.2     02-24    138  |  101  |  14  ----------------------------<  73  4.1   |  26  |  0.76    Ca    9.8      24 Feb 2023 03:43  Phos  2.6     02-24  Mg     1.60     02-24    TPro  7.7  /  Alb  3.7  /  TBili  0.4  /  DBili  x   /  AST  71<H>  /  ALT  40<H>  /  AlkPhos  130<H>  02-24    CT Head:   IMPRESSION:    No significant interval change.    No hydrocephalus, acute intracranial hemorrhage, mass effect, or brain edema.    Nonspecific foci of decreased density involving the bilateral temporal occipital periventricular white matter. Differential diagnosis includes ischemic, demyelinating, infectious, inflammatory, and post infectious/inflammatory processes.

## 2023-02-24 NOTE — ED PROVIDER NOTE - MDM ORDERS SUBMITTED SELECTION
Labs/EKG/Imaging Studies

## 2023-02-24 NOTE — PATIENT PROFILE ADULT - FALL HARM RISK - HARM RISK INTERVENTIONS
Assistance with ambulation/Assistance OOB with selected safe patient handling equipment/Communicate Risk of Fall with Harm to all staff/Discuss with provider need for PT consult/Monitor gait and stability/Reinforce activity limits and safety measures with patient and family/Tailored Fall Risk Interventions/Visual Cue: Yellow wristband and red socks/Bed in lowest position, wheels locked, appropriate side rails in place/Call bell, personal items and telephone in reach/Instruct patient to call for assistance before getting out of bed or chair/Non-slip footwear when patient is out of bed/Colon to call system/Physically safe environment - no spills, clutter or unnecessary equipment/Purposeful Proactive Rounding/Room/bathroom lighting operational, light cord in reach

## 2023-02-24 NOTE — ED PROVIDER NOTE - PHYSICAL EXAMINATION
GENERAL: Awake, NAD  HEENT: NC/AT, moist mucous membranes, PERRL, EOMI  LUNGS: CTAB, no wheezes or crackles   CARDIAC: RRR, no m/r/g  ABDOMEN: Soft, non tender, non distended, no rebound, no guarding  BACK: No midline spinal tenderness, no CVA tenderness  EXT: No edema, no calf tenderness, 2+ DP pulses bilaterally, no deformities.  NEURO: A&Ox0. upper extremities flexed. Resisting passive ROM    SKIN: Warm and dry. No rash.  PSYCH: Normal affect.

## 2023-02-24 NOTE — CONSULT NOTE ADULT - ASSESSMENT
Pt vomiting-Dr. Anju Heck is aware and will order nausea medication. 67 y.o. F with PMH of MS on baclofen pump (nonambulatory at baseline), seizure on vimpat 50 bid, hypothyroidism, hx of frequent UTIs presented to [facility] ED, brought in by  for fluctuating temperature and cough. Neurology consulted for evaluation of altered mental status. Found to be COVID + in ED. Neuro exam showing improved mental status than last admission in early 2/2023, otherwise same increased tone in b/l UE, b/l LE minimal movement and upgoing toes. Neck supple without Kernig's and Brudzinski's sign. UA neg. Underwent lumbar puncture with neg CSF results last admission as well as negative MR imaging for brain w/ iv contrast though motion limited.    Impression: Likely encephalopathy from infectious (covid) etiology, localization is diffuse brain dyfunction. At this time very low suspicion for seizure at this time    Recommendations:  [] Treat underlying symptomatic covid per primary and medical team  [] No indication for eeg or mri since done last admission and much improved mental status than last  [] C/w home vimpat 50 mg bid  [] Neurocheck and vital per unit protocol    Case to be seen and discussed with neurology attending in AM 67 y.o. F with PMH of MS on baclofen pump (nonambulatory at baseline), seizure on vimpat 50 bid, hypothyroidism, hx of frequent UTIs presented to [facility] ED, brought in by  for fluctuating temperature and cough. Neurology consulted for evaluation of altered mental status. Found to be COVID + in ED. Neuro exam showing improved mental status than last admission in early 2/2023, otherwise same increased tone in b/l UE, b/l LE minimal movement and upgoing toes. Neck supple without Kernig's and Brudzinski's sign. UA neg. Underwent lumbar puncture with neg CSF results last admission as well as negative MR imaging for brain w/ iv contrast though motion limited.    Impression: Likely encephalopathy from infectious (covid) etiology, localization is diffuse brain dysfunction. At this time very low suspicion for seizure at this time. CTH with no interval change and decreased density likely correlate to chronic white matter disease mentioned on on MR 2/14    Recommendations:  [] Treat underlying symptomatic covid per primary and medical team  [] No indication for eeg or mri since done last admission and much improved mental status than last  [] C/w home vimpat 50 mg bid  [] Neurocheck and vital per unit protocol    Case to be seen and discussed with neurology attending in AM

## 2023-02-24 NOTE — H&P ADULT - PROBLEM SELECTOR PLAN 2
-cont with lamictal 50mg bid COVID+ on admission  - Afebrile, recent cough starting 2/23 COVID+ on admission  - Afebrile, recent cough starting 2/23  - F/u infectious w/u as above  - SpO2 >89%  - May consider remdesevir (pt on RA, high risk of disease progression given age; although mild transaminitis AST/ALT 71/40) COVID+ on admission  - Afebrile, recent cough starting 2/23  - F/u infectious w/u as above  - SpO2 >89%  - Defer remdesevir for now (pt on RA, high risk of disease progression given age; although mild transaminitis AST/ALT 71/40)

## 2023-02-24 NOTE — ED PROVIDER NOTE - UNABLE TO OBTAIN
Unresponsive Patient does not follow command and does not answer yes or no to questions. Patient history obtained from sister.

## 2023-02-24 NOTE — H&P ADULT - NSICDXPASTMEDICALHX_GEN_ALL_CORE_FT
PAST MEDICAL HISTORY:  Hypothyroid     Multiple sclerosis      PAST MEDICAL HISTORY:  Hypothyroid     Multiple sclerosis     Seizures

## 2023-02-24 NOTE — ED PROVIDER NOTE - ATTENDING CONTRIBUTION TO CARE
68-year-old female with history of multiple sclerosis on baclofen pump, hypothyroidism, seizures, recent admission from February 14 to 19 for altered mental status and somnolence, had comprehensive work-up at that time including lumbar puncture, EEG, MRI of the brain, negative blood and urine cultures, normal TSH, seen by infectious disease and neurology with no acute findings and cleared for discharge, was feeling better at baseline mental status (AAO x4), then over the last 24 hours patient has become more somnolent and less responsive per family at bedside.  No vomiting or diarrhea, no fevers at home, no trauma or falls.  No new medications.    Exam  Glucose 82  Mildly tachycardic in triage, resolved at bedside  Afebrile  Patient somnolent, responds to pain in all extremities  Pupils equal bilaterally and responsive to light  Lungs clear bilaterally  Abdomen soft nontender nondistended  No sacral decubitus ulcers    Assessment/plan  Altered mental status/encephalopathy  Infectious versus metabolic  Hypothyroidism a consideration but with comprehensive work-up less likely  Comprehensive labs, UA, chest x-ray, RVP, TSH, CT head  Will need admission

## 2023-02-24 NOTE — ED PROVIDER NOTE - CLINICAL SUMMARY MEDICAL DECISION MAKING FREE TEXT BOX
68 year-old female past medical history of MS, hypothyroidism, urosepsis presenting with a chief complaint of altered mental status.  Patient was not endorsing any pain but sister noticed that she began to cough. ROS limited d/t patient presentation. VS - tachy (110s). PE- NEURO: A&Ox0. upper extremities flexed. Resisting passive ROM.    Ddx: not limited to infxn (PNA vs UTI) vs electrolyte derangement (hypercalcemia vs hyponatremia) vs TSH abn vs progression of MS  Plan to get basic labs, UA and UC, cardiac labs, TSH.  Dispo - pending labs and imaging. Patient likely to be admitted for deterioration in mental status

## 2023-02-24 NOTE — ED PROVIDER NOTE - WET READ LAUNCH FT
Detail Level: Detailed Quality 137: Melanoma: Continuity Of Care - Recall System: Patient information entered into a recall system that includes: target date for the next exam specified AND a process to follow up with patients regarding missed or unscheduled appointments When Should The Patient Follow-Up For Their Next Full-Body Skin Exam?: 6 Months Detail Level: Generalized There are no Wet Read(s) to document. There is 1 Wet Read(s) to document.

## 2023-02-24 NOTE — CHART NOTE - NSCHARTNOTEFT_GEN_A_CORE
#HTN Urgency  - BP measurements in ESSU in SBP 190s, manual repeat by myself 170s LUE; Pt denies being in pain, A&Ox2, following instructions without issues - states she was on BP medications in the past but can't remember which ones.  - Possible signs of end organ damage include mild transaminitis or admitting complaint of AMS - although multiple BP readings in 120s-130s in ED prior to transport. Pt denies any abdominal pain, HA, vision changes, or any new complaints compared to earlier evaluation.  - Surescripts shows amlodipine 10mg qd rx filled several times  - Labetalol 10mg IVP x1 followed by amlodipine 5mg x1 + 10mg qd starting tomorrow    Neurology evaluated, do not recommend further workup for CT findings - low suspicion for seizure or need for medication adjustment.    Will sign out to night float to do a BP check. If persistently elevated, IV contingencies will be provided. If symptomatic, will advise to evaluate and consider gtt.

## 2023-02-24 NOTE — CONSULT NOTE ADULT - SUBJECTIVE AND OBJECTIVE BOX
HPI:  67 y.o. F with PMH of MS on baclofen pump (nonambulatory at baseline), seizure on vimpat 50 bid, hypothyroidism, hx of frequent UTIs presented to [facility] ED, brought in by  for fluctuating temperature and cough. Neurology consulted for evaluation of altered mental status.  at bedside provided collateral. Starting , pt developed cough. Noted to have low temperature down to 92 and high temperature up to 98. Pt and  do mention that pt has fluctuating temperature from MS occasionally. For few months pt herself mentions difficulty talking, progressively getting worse, mainly with word recall. Pt currently denies any fever, chills, runny nose, back pain, numbness/tingling/weakness throughout extremities but unclear if perseverating. Lives at home, wheelchair/bed bound, needs help with ADLs. COVID + in ED.      REVIEW OF SYSTEMS  A 10-system ROS was performed and is negative except for those items noted above and/or in the HPI.    PAST MEDICAL & SURGICAL HISTORY:  Hypothyroid      Multiple sclerosis      Seizures      No significant past surgical history        FAMILY HISTORY:    SOCIAL HISTORY: as noted in HPI    MEDICATIONS (HOME):  Home Medications:  acetaminophen 325 mg oral tablet: 2 tab(s) orally every 6 hours, As needed, Temp greater or equal to 38C (100.4F), Mild Pain (1 - 3) (2023 14:14)  Fleet Enema 19 g-7 g rectal enema: 133 milliliter(s) rectal every other day, to go to the bathroom (2023 14:14)  pantoprazole 40 mg oral delayed release tablet: 1 tab(s) orally once a day (2023 14:14)  Synthroid 75 mcg (0.075 mg) oral tablet: 1 tab(s) orally once a day (2023 14:14)    MEDICATIONS  (STANDING):  enoxaparin Injectable 40 milliGRAM(s) SubCutaneous every 24 hours  lacosamide 50 milliGRAM(s) Oral two times a day  levothyroxine 75 MICROGram(s) Oral daily  pantoprazole    Tablet 40 milliGRAM(s) Oral before breakfast    MEDICATIONS  (PRN):  acetaminophen     Tablet .. 650 milliGRAM(s) Oral every 6 hours PRN Temp greater or equal to 38C (100.4F), Mild Pain (1 - 3)  melatonin 3 milliGRAM(s) Oral at bedtime PRN Insomnia    ALLERGIES/INTOLERANCES:  Allergies  sulfa drugs (Hives; Rash)    Intolerances    VITALS & EXAMINATION:  Vital Signs Last 24 Hrs  T(C): 36.6 (2023 14:48), Max: 37.1 (2023 23:23)  T(F): 97.9 (2023 14:48), Max: 98.8 (2023 23:23)  HR: 108 (2023 15:27) (64 - 110)  BP: 195/84 (2023 15:27) (113/84 - 195/84)  BP(mean): --  RR: 18 (2023 14:48) (14 - 18)  SpO2: 99% (2023 14:48) (97% - 100%)    Parameters below as of 2023 14:48  Patient On (Oxygen Delivery Method): room air        General:  Constitutional: Obese Female, appears stated age, in no apparent distress including pain  Head: Normocephalic & atraumatic  Neck: Supple without Kernig's and Brudzinski's sign    Neurological (>12):  MS: Eyes open. Responds to verbal stimuli. Awake, alert, oriented to person, knows hospital but thinks [facility], knows month and year. Normal affect. Follows two step commands, with minor error (touching R ear with L index finger when asked to do it with L thumb)    Language: Speech is clear, fluent with good repetition & comprehension (able to name objects thumb, pencil). Perseveration    CNs: PERRL (R = 3mm, L = 3mm). VFF. EOMI no nystagmus, no diplopia. V1-3 intact to LT, well developed masseter muscles b/l. No facial asymmetry b/l, full eye closure strength b/l. Hearing grossly normal (rubbing fingers) b/l. Symmetric palate elevation in midline. Gag reflex deferred. Head turning & shoulder shrug intact b/l. Tongue midline, normal movements, no atrophy.    Motor: No noticeable tremor or seizure. No pronator drift. Increased tone in RUE flexed toward the chest. LUE increased tone but able to move. Unable to move b/l LE    Sensation: Intact to LT throughout and grimaces to noxious throughout    Reflexes: 2 throughout b/l biceps and BR, mute patellar and ankles, upgoing toes b/l    Coordination: Mild end tremors but no dysmetria to FTN     Gait: Deferred    LABORATORY:  CBC                       9.9    8.84  )-----------( 218      ( 2023 01:40 )             32.2     Chem     138  |  101  |  14  ----------------------------<  73  4.1   |  26  |  0.76    Ca    9.8      2023 03:43  Phos  2.6       Mg     1.60         TPro  7.7  /  Alb  3.7  /  TBili  0.4  /  DBili  x   /  AST  71<H>  /  ALT  40<H>  /  AlkPhos  130<H>      LFTs LIVER FUNCTIONS - ( 2023 03:43 )  Alb: x     / Pro: x     / ALK PHOS: x     / ALT: x     / AST: x     / GGT: 6 U/L       Coagulopathy   Lipid Panel   A1c   Cardiac enzymes     U/A Urinalysis Basic - ( 2023 01:40 )    Color: Light Yellow / Appearance: Clear / S.020 / pH: x  Gluc: x / Ketone: Small  / Bili: Negative / Urobili: <2 mg/dL   Blood: x / Protein: Trace / Nitrite: Negative   Leuk Esterase: Negative / RBC: x / WBC x   Sq Epi: x / Non Sq Epi: x / Bacteria: x      CSF  Immunological  Other    STUDIES & IMAGING:  Studies (EKG, EEG, EMG, etc):     Radiology (XR, CT, MR, U/S, TTE/EDGAR):  < from: CT Head No Cont (23 @ 10:41) >  IMPRESSION:  No significant interval change.  No hydrocephalus, acute intracranial hemorrhage, mass effect, or brain   edema.  Nonspecific foci of decreased density involving the bilateral temporal   occipital periventricular white matter. Differential diagnosis includes   ischemic, demyelinating, infectious, inflammatory, and post   infectious/inflammatory processes.  --- End of Report ---  < end of copied text >   HPI:  67 y.o. F with PMH of MS on baclofen pump (nonambulatory at baseline), seizure on vimpat 50 bid, hypothyroidism, hx of frequent UTIs presented to [facility] ED, brought in by  for fluctuating temperature and cough. Neurology consulted for evaluation of altered mental status.  at bedside provided collateral. Starting , pt developed cough. Noted to have low temperature down to 92 and high temperature up to 98. Pt and  do mention that pt has fluctuating temperature from MS occasionally. For few months pt herself mentions difficulty talking, progressively getting worse, mainly with word recall. Pt currently denies any fever, chills, runny nose, back pain, numbness/tingling/weakness throughout extremities but unclear if perseverating. Lives at home, wheelchair/bed bound, needs help with ADLs. COVID + in ED.      REVIEW OF SYSTEMS  A 10-system ROS was performed and is negative except for those items noted above and/or in the HPI.    PAST MEDICAL & SURGICAL HISTORY:  Hypothyroid      Multiple sclerosis      Seizures      No significant past surgical history        FAMILY HISTORY:    SOCIAL HISTORY: as noted in HPI    MEDICATIONS (HOME):  Home Medications:  acetaminophen 325 mg oral tablet: 2 tab(s) orally every 6 hours, As needed, Temp greater or equal to 38C (100.4F), Mild Pain (1 - 3) (2023 14:14)  Fleet Enema 19 g-7 g rectal enema: 133 milliliter(s) rectal every other day, to go to the bathroom (2023 14:14)  pantoprazole 40 mg oral delayed release tablet: 1 tab(s) orally once a day (2023 14:14)  Synthroid 75 mcg (0.075 mg) oral tablet: 1 tab(s) orally once a day (2023 14:14)    MEDICATIONS  (STANDING):  enoxaparin Injectable 40 milliGRAM(s) SubCutaneous every 24 hours  lacosamide 50 milliGRAM(s) Oral two times a day  levothyroxine 75 MICROGram(s) Oral daily  pantoprazole    Tablet 40 milliGRAM(s) Oral before breakfast    MEDICATIONS  (PRN):  acetaminophen     Tablet .. 650 milliGRAM(s) Oral every 6 hours PRN Temp greater or equal to 38C (100.4F), Mild Pain (1 - 3)  melatonin 3 milliGRAM(s) Oral at bedtime PRN Insomnia    ALLERGIES/INTOLERANCES:  Allergies  sulfa drugs (Hives; Rash)    Intolerances    VITALS & EXAMINATION:  Vital Signs Last 24 Hrs  T(C): 36.6 (2023 14:48), Max: 37.1 (2023 23:23)  T(F): 97.9 (2023 14:48), Max: 98.8 (2023 23:23)  HR: 108 (2023 15:27) (64 - 110)  BP: 195/84 (2023 15:27) (113/84 - 195/84)  BP(mean): --  RR: 18 (2023 14:48) (14 - 18)  SpO2: 99% (2023 14:48) (97% - 100%)    Parameters below as of 2023 14:48  Patient On (Oxygen Delivery Method): room air        General:  Constitutional: Obese Female, appears stated age, in no apparent distress including pain  Head: Normocephalic & atraumatic  Neck: Supple without Kernig's and Brudzinski's sign    Neurological (>12):  MS: Eyes open. Responds to verbal stimuli. Awake, alert, oriented to person, knows hospital but thinks [facility], knows month and year. Normal affect. Follows two step commands, with minor error (touching R ear with L index finger when asked to do it with L thumb)    Language: Speech is clear, fluent with good repetition & comprehension (able to name objects thumb, pencil). Perseveration    CNs: PERRL (R = 3mm, L = 3mm). VFF. EOMI no nystagmus, no diplopia. V1-3 intact to LT, well developed masseter muscles b/l. No facial asymmetry b/l, full eye closure strength b/l. Hearing grossly normal (rubbing fingers) b/l. Symmetric palate elevation in midline. Gag reflex deferred. Head turning & shoulder shrug intact b/l. Tongue midline, normal movements, no atrophy.    Motor: No noticeable tremor or seizure. No pronator drift. Increased tone in RUE flexed toward the chest. LUE increased tone but able to move. Unable to move b/l LE    Sensation: Intact to LT throughout and grimaces to noxious throughout    Reflexes: 2 throughout b/l biceps and BR, mute patellar and ankles, upgoing toes b/l    Coordination: Mild end tremors but no dysmetria to FTN     Gait: Deferred    LABORATORY:  CBC                       9.9    8.84  )-----------( 218      ( 2023 01:40 )             32.2     Chem     138  |  101  |  14  ----------------------------<  73  4.1   |  26  |  0.76    Ca    9.8      2023 03:43  Phos  2.6       Mg     1.60         TPro  7.7  /  Alb  3.7  /  TBili  0.4  /  DBili  x   /  AST  71<H>  /  ALT  40<H>  /  AlkPhos  130<H>      LFTs LIVER FUNCTIONS - ( 2023 03:43 )  Alb: x     / Pro: x     / ALK PHOS: x     / ALT: x     / AST: x     / GGT: 6 U/L       Coagulopathy   Lipid Panel   A1c   Cardiac enzymes     U/A Urinalysis Basic - ( 2023 01:40 )    Color: Light Yellow / Appearance: Clear / S.020 / pH: x  Gluc: x / Ketone: Small  / Bili: Negative / Urobili: <2 mg/dL   Blood: x / Protein: Trace / Nitrite: Negative   Leuk Esterase: Negative / RBC: x / WBC x   Sq Epi: x / Non Sq Epi: x / Bacteria: x      CSF  Immunological  Other    STUDIES & IMAGING:  Studies (EKG, EEG, EMG, etc):     Radiology (XR, CT, MR, U/S, TTE/EDGAR):  < from: CT Head No Cont (23 @ 10:41) >  IMPRESSION:  No significant interval change.  No hydrocephalus, acute intracranial hemorrhage, mass effect, or brain   edema.  Nonspecific foci of decreased density involving the bilateral temporal   occipital periventricular white matter. Differential diagnosis includes   ischemic, demyelinating, infectious, inflammatory, and post   infectious/inflammatory processes.  --- End of Report ---  < end of copied text >    < from: MR Head w/wo IV Cont (23 @ 19:54) >  FINDINGS:  Study limited due to motion    There is no abnormal intraparenchymal or leptomeningeal enhancement.    There is mild diffuse parenchymal volume loss. There are T2 prolongation   signal abnormalities in the periventricular white matter likely related   to mild to moderate chronic microvascular ischemic changes.    There is no acute parenchymal hemorrhage, parenchymal mass, mass effect   or midline shift. There is no extra-axial fluid collection.  There is no   hydrocephalus.  There is no acute infarct.    Minimal mucosal thickening ethmoid sinus    IMPRESSION:  Limited exam due to motion.    No acute intracranial hemorrhage or acute infarct.    Mild to moderate chronic microvascular ischemic changes        --- End of Report ---      < end of copied text >

## 2023-02-24 NOTE — H&P ADULT - NSHPPHYSICALEXAM_GEN_ALL_CORE
GENERAL: Awake, NAD  	HEENT: NC/AT, moist mucous membranes, PERRL, EOMI  	LUNGS: CTAB, no wheezes or crackles   	CARDIAC: RRR, no m/r/g  	ABDOMEN: Soft, non tender, non distended, no rebound, no guarding  	BACK: No midline spinal tenderness, no CVA tenderness  	EXT: No edema, no calf tenderness, 2+ DP pulses bilaterally, no deformities.  	NEURO: A&Ox0. upper extremities flexed. Resisting passive ROM    	SKIN: Warm and dry. No rash.  PSYCH: Normal affect GENERAL: Awake, NAD  	HEENT: NC/AT, moist mucous membranes, PERRL, EOMI  	LUNGS: CTAB, no wheezes or crackles   	CARDIAC: RRR, no m/r/g  	ABDOMEN: Soft, non tender, non distended, no rebound, no guarding  	BACK: No midline spinal tenderness, no CVA tenderness  	EXT: No edema, no calf tenderness, 2+ DP pulses bilaterally, no deformities.  	NEURO: AAOx1, able to respond to basic commands  	SKIN: Warm and dry. No rash.  PSYCH: Normal affect GENERAL: Awake, NAD  	HEENT: NC/AT, moist mucous membranes, PERRL, EOMI  	LUNGS: CTAB, no wheezes or crackles   	CARDIAC: RRR, no m/r/g  	ABDOMEN: Soft, non tender, non distended, no rebound, no guarding  	BACK: No midline spinal tenderness, no CVA tenderness  	EXT: No edema, no calf tenderness, 2+ DP pulses bilaterally, no deformities.  	NEURO: AAOx1, able to respond to basic commands, CNII-XII intact, decreased strength b/l lower extremities -   	SKIN: Warm and dry. No rash.  PSYCH: Normal affect GENERAL: Awake, NAD  	HEENT: NC/AT, moist mucous membranes, PERRL, EOMI; good dentition, no observable abscess, no lymphadenopathy  	LUNGS: CTAB, no wheezes or crackles   	CARDIAC: RRR, no m/r/g  	ABDOMEN: Soft, non distended, no rebound, no guarding; possible mild suprapubic tenderness to palpation - difficult to ascertain given ams  	BACK: no CVA tenderness  	EXT: mild pedal edema non-pitting, no calf tenderness, 2+ DP pulses bilaterally, no deformities.  	NEURO: AAOx1, able to respond to basic commands, CNII-XII intact, decreased strength b/l lower extremities 2/2 MS, moving b/l UE but with decreased extension - no rigidity  	SKIN: Warm and dry. No rash.  PSYCH: Normal affect

## 2023-02-24 NOTE — H&P ADULT - ATTENDING COMMENTS
Patient seen and examined, care plan discussed with house staff as above.    68yoF presenting w AMS, readmitted.     Hx of spinal stimulator device in L anterior abdominal wall entering spinal canal at L3-4 for baclofen pump. Hx of constipation, recurrent UTIs (this started w/i the past year),  and a hx of seizures (currently on lacosamide).    Recent hospitalization 2/14-2/19 for AMS, LP on 2/16 was unrevealing (no fungal growth, no bacterial growth, HSV neg, lyme neg, AFB ngtd, protein 26, glucose 74, TNC 3, TBC 0), baclofen pump interrogated by NSG on 2/14, Ucx NGTD, MR brain limited due to motion but showed mild/mod microvascular ischemic changes. EEG w rare generalized epileptiform discharges indicating risk of generalized onset seizures. Neuro eval, rec'd cont lacosamide 50 mg BID (dose change?). Hx of benzo use (zolpidem and ativan, used PRN, not daily). Per , pt did return to baseline mental status at home (2/20 approx) after she was last d/c'd.     Fevers could be related to new COVID infection. AMS, lethargy could be sequelae of uncontrolled seizure activitity, or alternatively from lacosamide (recent dose adjustment? family unsure). MS disease progression also a consideration. Hx of recurrent UTI w urinary retenion an ongoing problem, could be developing neurogenic bladder iso of MS. F/u bladder scan w PVR [ ] and UA. Bilateral LE dopplers for further fever work up [ ] given wheelchair bound and covid positive. Will re-consult Neuro for further eval. Consult NSG for eval of baclofen pump.      Need OP records from Neuro ([facility]?), specifically MRI results and treatment hx. [ ] Family and patient are unclear as to what therapies she has been on in the past for her MS, currently on baclofen only.

## 2023-02-24 NOTE — H&P ADULT - PROBLEM SELECTOR PLAN 5
dvt;   diet:   dispo: pending medical optimization TSH wnl  c/w synthroid 75mcg qam. - TSH wnl  - c/w synthroid 75mcg qam.

## 2023-02-24 NOTE — CONSULT NOTE ADULT - ATTENDING COMMENTS
67 y.o. F with PMH of MS on baclofen pump (nonambulatory at baseline), seizure on vimpat 50 bid, hypothyroidism, hx of frequent UTIs presented to [facility] ED, brought in by  for fluctuating temperature and cough. Neurology consulted for evaluation of altered mental status. Found to be COVID + in ED. Neuro exam showing improved mental status than last admission in early 2/2023, otherwise same increased tone in b/l UE, b/l LE minimal movement and upgoing toes. Neck supple without Kernig's and Brudzinski's sign. UA neg. Underwent lumbar puncture with neg CSF results last admission as well as negative MR imaging for brain w/ iv contrast though motion limited.    Covid care    Most likelt encephalopathy from covid

## 2023-02-24 NOTE — H&P ADULT - PROBLEM SELECTOR PLAN 4
TSH wnl  cont with syntrhoid 75mcg qam. c/w baclofen pump Diagnosed over 37 years ago, pt follows at [facility], on baclofen pump (L abdomen).  - C/w baclofen  - Neurosurgery evaluation of pump functionality (recently checked without issues)

## 2023-02-24 NOTE — H&P ADULT - PROBLEM SELECTOR PLAN 1
Per , AAOx4 baseline. Presents AAOx1 on admission with confusion and somnolence. Mildly tachycardic in triage but resolved bedside, afebrile, COVID+ on admission. WBC wnl,TSH wnl. ua negative. Elevated liver enzymes. CT head wo acute findings except left lacunar infarct. no seizure noted at home.   - Unclear etiology of AMS, possible COVID symptoms, sepsis, seizure (given recent dose reduction of lamictal) or medication induced  - Infectious vs metabolic workup Per , AAOx4 baseline. Presents AAOx1 on admission with confusion and somnolence. CT head wo acute findings except left lacunar infarct. no seizure noted at home.  - Infectious: Lactate/WBC/Fever wnl; BCx, UCx, sputum cx f/u  - Baclofen related - pump recently evaluated during prior admission, 104.06 mcg/day, no renal dysfunction; Pt does take ambien PRN - last dose 2/21  - Decreased PO intake, advance diet as tolerated

## 2023-02-24 NOTE — H&P ADULT - ASSESSMENT
68 yo female with hx of MS (on baclofen pump), seizure, hypothyroidism, urosepsis presents with AMS found to be COVID+ on admission.  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 Admitted for acute encephalopathy 2/2 suspected COVID pneumonia vs. drug (baclofen) related vs. MS progression.

## 2023-02-24 NOTE — H&P ADULT - HISTORY OF PRESENT ILLNESS
68 year-old female with past medical history of MS, hypothyroidism, urosepsis presenting with a chief complaint of altered mental status.  Patient is accompanied by sister at bedside.  History is obtained from sister.  According to sister patient states was admitted to the hospital last week for altered mental status.  States that nothing significant was found during her admission.  When she was discharged she was alert to self and place and had returned to baseline. Beginning on Thursday 2/23 patient was less alert and mental status started to deteriorate again.   68 year-old female with past medical history of MS, seizure, hypothyroidism, urosepsis presenting with a chief complaint of altered mental status.  Patient is accompanied by sister at bedside.  History is obtained from sister.  According to sister patient states was admitted to the hospital last week for altered mental status.  States that nothing significant was found during her admission.  When she was discharged she was alert to self and place and had returned to baseline. Beginning on Thursday 2/23 patient was less alert and mental status started to deteriorate again.   68 year-old female with past medical history of MS, seizure, hypothyroidism, urosepsis presenting with a chief complaint of altered mental status.  Patient is accompanied by  Julio César at bedside.  History is obtained from .  According to , patient was admitted to the hospital last week for altered mental status. Patient received comprehensive work-up at that time including lumbar puncture, EEG, MRI of the brain, negative blood and urine cultures, normal TSH, seen by infectious disease and neurology with no acute findings and cleared for discharge. When she was discharged she was alert to self and place and had returned to baseline. Beginning on Thursday 2/23 patient was less alert and mental status started to deteriorate again. Patient has become more somnolent and confused, no longer oriented to place and time. No vomiting or diarrhea, no fevers at home, no trauma or falls.  No new medications. Resides at home with  and taken care of by care giver, Deloris. COVID+ on admission. Recent cough starting on Thursday 2/23. Patient is a [employer] and does not want blood given.      68 year-old female [employer]'s Witness with past medical history of MS, seizure, hypothyroidism, urosepsis presenting with a chief complaint of altered mental status.  Patient is accompanied by  Julio César at bedside.  History is obtained from .  According to , patient was admitted to the hospital last week for altered mental status. Patient received comprehensive work-up at that time including lumbar puncture, EEG, MRI of the brain, negative blood and urine cultures, normal TSH, seen by infectious disease and neurology with no acute findings and cleared for discharge. When she was discharged she was alert to self and place and had returned to baseline. Beginning on Thursday 2/23 patient was less alert and mental status started to deteriorate again. Patient has become more somnolent and confused, no longer oriented to place and time. No vomiting or diarrhea, no fevers at home, no trauma or falls.  No new medications. Resides at home with  and taken care of by care giver, Deloris. COVID+ on admission. Recent cough starting on Thursday 2/23. Patient is a [employer] and does not want blood given.

## 2023-02-24 NOTE — ED ADULT NURSE REASSESSMENT NOTE - NS ED NURSE REASSESS COMMENT FT1
received report from night RN. pt made comfortable in stretcher. appears lethargic, will shake head yes or no in response to questions. cough noted. respirations are even and unlabored. pt denies sob, chest pain, headache, numbness/tingling. no acute distress noted at this time. family at bedside.
Patient sleeping in stretcher, respirations even and unlabored. Vitals stable. Remains on cardiac monitor-NS. Family member aware of positive covid result. Awaiting dispo. Stretcher in lowest position, wheels locked, appropriate side rails in place, call bell in reach.

## 2023-02-24 NOTE — H&P ADULT - PROBLEM SELECTOR PLAN 6
c/w lacosamide 50mg bid  Last seizure noted to be Dec 2022 per  Pt was admitted at [facility] for urosepsis with seizure like event that was witnessed. Was discharged on keppra 500mg BID. Patient also had RRT 12/16 for 40 seconds of seizure like activity described as stiffening of b/l UE, foaming at the mouth, hypoxic during event.  - EEGRare generalized epileptiform discharges indicate a risk of generalized-onset seizures. Interpretation of the EEG is limited by continuous artifacts. Consider repeat study if clinically indicated.  - c/w lacosamide 50mg bid  Last seizure noted to be Dec 2022 per  Pt was admitted at [facility] for urosepsis with seizure like event that was witnessed. Was discharged on keppra 500mg BID. Patient also had RRT 12/16 for 40 seconds of seizure like activity described as stiffening of b/l UE, foaming at the mouth, hypoxic during event.  - 12/2022 EEG with rare generalized epileptiform discharges indicate a risk of generalized-onset seizures. Interpretation of the EEG is limited by continuous artifacts. Consider repeat study if clinically indicated.  - No seizure like activity noted since recent discarge  - c/w lacosamide 50mg bid Pt was admitted at [facility] for urosepsis with seizure like event that was witnessed. Was discharged on keppra 500mg BID. Patient also had RRT 12/16 for 40 seconds of seizure like activity described as stiffening of b/l UE, foaming at the mouth, hypoxic during event.  - 12/2022 EEG with rare generalized epileptiform discharges indicate a risk of generalized-onset seizures. Interpretation of the EEG is limited by continuous artifacts. Consider repeat study if clinically indicated.  - No seizure like activity noted since recent discarge  - Neuro consult for possible EEG, seizure activity, and CT head findings   - c/w lacosamide 50mg bid

## 2023-02-24 NOTE — ED ADULT NURSE NOTE - OBJECTIVE STATEMENT
Patient A&Ox2-3 with periods of confusion, non ambulatory at baseline coming in from home with sister (primary caregiver) for increase ams, weakness, lethargy and cough x2 days. Pmhx MS and hypothyroidism. Patient resting in stretcher, respirations even and unlabored, awake to verbal and painful stimuli when putting in IV line however not speaking like normal per sister. Vitals stable. No obvious signs of pain or discomfort on palpation to abdomen. Patient afebrile rectally. Skin observed intact. 20G IV placed to left arm, labs collected and sent to lab. Patient straight cath for urine using sterile technique, sent to lab. Family member at bedside. Awaiting chest xray. Stretcher in lowest position, wheels locked, appropriate side rails in place, call bell in reach.

## 2023-02-24 NOTE — ED PROVIDER NOTE - PROGRESS NOTE DETAILS
Karie Little, PGY-1 DO:  Patient RVP remarkable for COVID 19. Family informed. Patient currently stable. Patient with mild anemia otherwise labs are stable. Ammy Cruz MD PGY1: CTH unchanged from prior, family comfortable with plan for admission for AMS, generalized weakness. plan to admit to hospitalist

## 2023-02-25 LAB
ALBUMIN SERPL ELPH-MCNC: 3.8 G/DL — SIGNIFICANT CHANGE UP (ref 3.3–5)
ALP SERPL-CCNC: 148 U/L — HIGH (ref 40–120)
ALT FLD-CCNC: 26 U/L — SIGNIFICANT CHANGE UP (ref 4–33)
ANION GAP SERPL CALC-SCNC: 17 MMOL/L — HIGH (ref 7–14)
AST SERPL-CCNC: 33 U/L — HIGH (ref 4–32)
BILIRUB SERPL-MCNC: 0.3 MG/DL — SIGNIFICANT CHANGE UP (ref 0.2–1.2)
BUN SERPL-MCNC: 14 MG/DL — SIGNIFICANT CHANGE UP (ref 7–23)
CALCIUM SERPL-MCNC: 10 MG/DL — SIGNIFICANT CHANGE UP (ref 8.4–10.5)
CHLORIDE SERPL-SCNC: 93 MMOL/L — LOW (ref 98–107)
CO2 SERPL-SCNC: 24 MMOL/L — SIGNIFICANT CHANGE UP (ref 22–31)
CREAT SERPL-MCNC: 0.65 MG/DL — SIGNIFICANT CHANGE UP (ref 0.5–1.3)
CULTURE RESULTS: NO GROWTH — SIGNIFICANT CHANGE UP
EGFR: 97 ML/MIN/1.73M2 — SIGNIFICANT CHANGE UP
FOLATE SERPL-MCNC: 12.6 NG/ML — SIGNIFICANT CHANGE UP (ref 3.1–17.5)
GLUCOSE BLDC GLUCOMTR-MCNC: 100 MG/DL — HIGH (ref 70–99)
GLUCOSE BLDC GLUCOMTR-MCNC: 101 MG/DL — HIGH (ref 70–99)
GLUCOSE SERPL-MCNC: 81 MG/DL — SIGNIFICANT CHANGE UP (ref 70–99)
HCT VFR BLD CALC: 33.5 % — LOW (ref 34.5–45)
HGB BLD-MCNC: 10.3 G/DL — LOW (ref 11.5–15.5)
MCHC RBC-ENTMCNC: 28.3 PG — SIGNIFICANT CHANGE UP (ref 27–34)
MCHC RBC-ENTMCNC: 30.7 GM/DL — LOW (ref 32–36)
MCV RBC AUTO: 92 FL — SIGNIFICANT CHANGE UP (ref 80–100)
NRBC # BLD: 0 /100 WBCS — SIGNIFICANT CHANGE UP (ref 0–0)
NRBC # FLD: 0 K/UL — SIGNIFICANT CHANGE UP (ref 0–0)
PLATELET # BLD AUTO: 263 K/UL — SIGNIFICANT CHANGE UP (ref 150–400)
POTASSIUM SERPL-MCNC: 4 MMOL/L — SIGNIFICANT CHANGE UP (ref 3.5–5.3)
POTASSIUM SERPL-SCNC: 4 MMOL/L — SIGNIFICANT CHANGE UP (ref 3.5–5.3)
PROT SERPL-MCNC: 7.7 G/DL — SIGNIFICANT CHANGE UP (ref 6–8.3)
RBC # BLD: 3.64 M/UL — LOW (ref 3.8–5.2)
RBC # FLD: 17.8 % — HIGH (ref 10.3–14.5)
SODIUM SERPL-SCNC: 134 MMOL/L — LOW (ref 135–145)
SPECIMEN SOURCE: SIGNIFICANT CHANGE UP
VIT B12 SERPL-MCNC: 940 PG/ML — HIGH (ref 200–900)
VIT D25+D1,25 OH+D1,25 PNL SERPL-MCNC: 57.5 PG/ML — SIGNIFICANT CHANGE UP (ref 19.9–79.3)
WBC # BLD: 6.82 K/UL — SIGNIFICANT CHANGE UP (ref 3.8–10.5)
WBC # FLD AUTO: 6.82 K/UL — SIGNIFICANT CHANGE UP (ref 3.8–10.5)

## 2023-02-25 PROCEDURE — 99233 SBSQ HOSP IP/OBS HIGH 50: CPT | Mod: GC

## 2023-02-25 RX ORDER — HYDRALAZINE HCL 50 MG
10 TABLET ORAL EVERY 6 HOURS
Refills: 0 | Status: DISCONTINUED | OUTPATIENT
Start: 2023-02-25 | End: 2023-03-01

## 2023-02-25 RX ORDER — LACOSAMIDE 50 MG/1
50 TABLET ORAL EVERY 12 HOURS
Refills: 0 | Status: DISCONTINUED | OUTPATIENT
Start: 2023-02-25 | End: 2023-03-01

## 2023-02-25 RX ORDER — SODIUM CHLORIDE 9 MG/ML
1000 INJECTION, SOLUTION INTRAVENOUS
Refills: 0 | Status: DISCONTINUED | OUTPATIENT
Start: 2023-02-25 | End: 2023-02-27

## 2023-02-25 RX ORDER — SODIUM CHLORIDE 9 MG/ML
500 INJECTION, SOLUTION INTRAVENOUS
Refills: 0 | Status: DISCONTINUED | OUTPATIENT
Start: 2023-02-25 | End: 2023-02-25

## 2023-02-25 RX ORDER — LABETALOL HCL 100 MG
5 TABLET ORAL ONCE
Refills: 0 | Status: COMPLETED | OUTPATIENT
Start: 2023-02-25 | End: 2023-02-25

## 2023-02-25 RX ADMIN — ENOXAPARIN SODIUM 40 MILLIGRAM(S): 100 INJECTION SUBCUTANEOUS at 18:20

## 2023-02-25 RX ADMIN — Medication 5 MILLIGRAM(S): at 04:10

## 2023-02-25 RX ADMIN — LACOSAMIDE 110 MILLIGRAM(S): 50 TABLET ORAL at 17:32

## 2023-02-25 RX ADMIN — Medication 10 MILLIGRAM(S): at 13:45

## 2023-02-25 RX ADMIN — SODIUM CHLORIDE 500 MILLILITER(S): 9 INJECTION, SOLUTION INTRAVENOUS at 10:46

## 2023-02-25 RX ADMIN — SODIUM CHLORIDE 75 MILLILITER(S): 9 INJECTION, SOLUTION INTRAVENOUS at 15:03

## 2023-02-25 NOTE — SWALLOW BEDSIDE ASSESSMENT ADULT - ASR SWALLOW RECOMMEND DIAG
Objective testing NOT warranted given no overt signs of impaired airway protection and CXR is clear.

## 2023-02-25 NOTE — PROGRESS NOTE ADULT - ASSESSMENT
68 year-old female Jehova's Witness with past medical history of MS, seizure, hypothyroidism, urosepsis presenting with a chief complaint of altered mental status.  Patient is accompanied by  Julio César at bedside.  History is obtained from .  According to , patient was admitted to the hospital last week for altered mental status. Patient received comprehensive work-up at that time including lumbar puncture, EEG, MRI of the brain, negative blood and urine cultures, normal TSH, seen by infectious disease and neurology with no acute findings and cleared for discharge. When she was discharged she was alert to self and place and had returned to baseline. Beginning on Thursday 2/23 patient was less alert and mental status started to deteriorate again. Patient has become more somnolent and confused, no longer oriented to place and time. No vomiting or diarrhea, no fevers at home, no trauma or falls.  No new medications. Resides at home with  and taken care of by care giver, Deloris. COVID+ on admission. Recent cough starting on Thursday 2/23. Patient is a Yarsanism and does not want blood given.   Encephalopathy from covid   improving

## 2023-02-25 NOTE — PROVIDER CONTACT NOTE (OTHER) - RECOMMENDATIONS
provider notified via TEAMS
ANABELLA madden notified via TEAMS.
Provider notified
Provider notified.

## 2023-02-25 NOTE — PROVIDER CONTACT NOTE (OTHER) - ASSESSMENT
pt was refusing VS earlier, VS taken at 3:38, /95,  95% and temp 97.5  pt awake, a/o1-2, confused and is refusing PO meds.

## 2023-02-25 NOTE — PROVIDER CONTACT NOTE (OTHER) - ACTION/TREATMENT ORDERED:
provider notified via TEAMS. no further interventions at this time provider notified via TEAMS. no further interventions at this time. meds rescheduled for later

## 2023-02-25 NOTE — SWALLOW BEDSIDE ASSESSMENT ADULT - ADDITIONAL RECOMMENDATIONS
1. This service to follow up for diet tolerance as schedule permits. 2. Medical team advised to reconsult this service if change in medical status and/or patient's tolerance to recommended PO diet.

## 2023-02-25 NOTE — PROGRESS NOTE ADULT - PROBLEM SELECTOR PLAN 1
Per , AAOx4 baseline. Presents AAOx1 on admission with confusion and somnolence. CT head wo acute findings except left lacunar infarct. no seizure noted at home.  - Infectious: Lactate/WBC/Fever wnl; BCx, UCx, sputum cx f/u  - Baclofen related - pump recently evaluated during prior admission, 104.06 mcg/day, no renal dysfunction; Pt does take ambien PRN - last dose 2/21  - Decreased PO intake, advance diet as tolerated Per , AAOx4 baseline. Presents AAOx1 on admission with confusion and somnolence. CT head wo acute findings except left lacunar infarct. no seizure noted at home.  - Infectious: Lactate/WBC/Fever wnl; BCx, UCx, sputum cx f/u  - Baclofen related - pump recently evaluated during prior admission, 104.06 mcg/day, no renal dysfunction; Pt does take ambien PRN - last dose 2/21  - Decreased PO intake, advance diet as tolerated. Encourage PO intake with family member   - Pending bladder scan, doppler  - New paranoia about oral medications, refusing medications and increased suspicion about her  Julio César - consider psych consult if paranoia does not resolve

## 2023-02-25 NOTE — PROGRESS NOTE ADULT - SUBJECTIVE AND OBJECTIVE BOX
INCOMPLETE NOTE    Monster Owen | PGY1| Pager: [phone]  Interval Events:    REVIEW OF SYSTEMS:  CONSTITUTIONAL: No weakness, fevers or chills  EYES/ENT: No visual changes;  No vertigo or throat pain   NECK: No pain or stiffness  RESPIRATORY: No cough, wheezing, hemoptysis; No shortness of breath  CARDIOVASCULAR: No chest pain or palpitations  GASTROINTESTINAL: No abdominal or epigastric pain. No nausea, vomiting, or hematemesis; No diarrhea or constipation. No melena or hematochezia.  GENITOURINARY: No dysuria, frequency or hematuria  NEUROLOGICAL: No numbness or weakness  SKIN: No itching, burning, rashes, or lesions   All other review of systems is negative unless indicated above.    OBJECTIVE:  ICU Vital Signs Last 24 Hrs  T(C): 36.3 (2023 06:00), Max: 36.9 (2023 10:00)  T(F): 97.4 (2023 06:00), Max: 98.4 (2023 10:00)  HR: 92 (2023 06:00) (64 - 108)  BP: 179/91 (2023 06:00) (136/82 - 195/84)  BP(mean): --  ABP: --  ABP(mean): --  RR: 18 (2023 06:00) (14 - 18)  SpO2: 96% (2023 06:00) (95% - 99%)    O2 Parameters below as of 2023 06:00  Patient On (Oxygen Delivery Method): room air               @ 07:01  -   @ 07:00  --------------------------------------------------------  IN: 0 mL / OUT: 400 mL / NET: -400 mL      CAPILLARY BLOOD GLUCOSE      POCT Blood Glucose.: 82 mg/dL (2023 23:33)      PHYSICAL EXAM:  General: WN/WD NAD  Neurology: A&Ox3, nonfocal, KELSEY x 4  Eyes: PERRLA/ EOMI, Gross vision intact  ENT/Neck: Neck supple, trachea midline, No JVD, Gross hearing intact  Respiratory: CTA B/L, No wheezing, rales, rhonchi  CV: RRR, +S1/S2, -S3/S4, no murmurs, rubs or gallops  Abdominal: Soft, NT, ND +BS, No HSM  MSK: 5/5 strength UE/LE bilaterally  Extremities: No edema, 2+ peripheral pulses  Skin: No Rashes, Hematoma, Ecchymosis  Incisions:   Tubes:    HOSPITAL MEDICATIONS:  MEDICATIONS  (STANDING):  amLODIPine   Tablet 10 milliGRAM(s) Oral daily  enoxaparin Injectable 40 milliGRAM(s) SubCutaneous every 24 hours  lacosamide 50 milliGRAM(s) Oral two times a day  levothyroxine 75 MICROGram(s) Oral daily  pantoprazole    Tablet 40 milliGRAM(s) Oral before breakfast    MEDICATIONS  (PRN):  acetaminophen     Tablet .. 650 milliGRAM(s) Oral every 6 hours PRN Temp greater or equal to 38C (100.4F), Mild Pain (1 - 3)  melatonin 3 milliGRAM(s) Oral at bedtime PRN Insomnia      LABS:                        9.9    8.84  )-----------( 218      ( 2023 01:40 )             32.2     Hgb Trend: 9.9<--, 9.3<--      138  |  101  |  14  ----------------------------<  73  4.1   |  26  |  0.76    Ca    9.8      2023 03:43  Phos  2.6       Mg     1.60         TPro  7.7  /  Alb  3.7  /  TBili  0.4  /  DBili  x   /  AST  71<H>  /  ALT  40<H>  /  AlkPhos  130<H>  -24    Creatinine Trend: 0.76<--, 0.72<--, 0.66<--, 0.57<--, 0.56<--, 0.60<--    Urinalysis Basic - ( 2023 01:40 )    Color: Light Yellow / Appearance: Clear / S.020 / pH: x  Gluc: x / Ketone: Small  / Bili: Negative / Urobili: <2 mg/dL   Blood: x / Protein: Trace / Nitrite: Negative   Leuk Esterase: Negative / RBC: x / WBC x   Sq Epi: x / Non Sq Epi: x / Bacteria: x        Venous Blood Gas:   @ 01:40  7.38/48/56/28/89.0  VBG Lactate: 1.2      MICROBIOLOGY:     Culture - Urine (collected 2023 01:40)  Source: Clean Catch Clean Catch (Midstream)  Final Report (2023 07:49):    No growth       PROGRESS NOTE    Monster Owen | PGY1| Pager: [phone]  Interval Events:  Patient hypertensive o/n to 170's/90's and mildly tachycardic to 101. Asymptomatic however patient is refusing oral medications and spitting out medications, endorsing "what are you doing to me" when attempting to give oral medication. Also appears to "see dust" everywhere. Otherwise in NAD. Appears to be more verbal, knows her name, where she is, and what year it is.     OBJECTIVE:  ICU Vital Signs Last 24 Hrs  T(C): 36.3 (2023 06:00), Max: 36.9 (2023 10:00)  T(F): 97.4 (2023 06:00), Max: 98.4 (2023 10:00)  HR: 92 (2023 06:00) (64 - 108)  BP: 179/91 (2023 06:00) (136/82 - 195/84)  BP(mean): --  ABP: --  ABP(mean): --  RR: 18 (2023 06:00) (14 - 18)  SpO2: 96% (2023 06:00) (95% - 99%)    O2 Parameters below as of 2023 06:00  Patient On (Oxygen Delivery Method): room air    - @ 07:01  -  - @ 07:00  --------------------------------------------------------  IN: 0 mL / OUT: 400 mL / NET: -400 mL    CAPILLARY BLOOD GLUCOSE    POCT Blood Glucose.: 82 mg/dL (2023 23:33)    PHYSICAL EXAM  GENERAL: Awake, NAD  	HEENT: NC/AT, moist mucous membranes, PERRL, EOMI; good dentition, no observable abscess, no lymphadenopathy  	LUNGS: CTAB, no wheezes or crackles   ABDOMEN: Soft, non distended, no rebound, no guarding  	CARDIAC: RRR, no m/r/g  	BACK: no CVA tenderness  	EXT: mild pedal edema non-pitting, no calf tenderness, 2+ DP pulses bilaterally, no deformities.  	NEURO: AAOx2-3, able to respond to basic commands and knows her name, location hospital, and year, CNII-XII intact, decreased strength b/l lower extremities 2/2 MS, moving b/l UE but with decreased extension - no rigidity  	SKIN: Warm and dry. No rash.  PSYCH: Normal affect    HOSPITAL MEDICATIONS:  MEDICATIONS  (STANDING):  amLODIPine   Tablet 10 milliGRAM(s) Oral daily  enoxaparin Injectable 40 milliGRAM(s) SubCutaneous every 24 hours  lacosamide 50 milliGRAM(s) Oral two times a day  levothyroxine 75 MICROGram(s) Oral daily  pantoprazole    Tablet 40 milliGRAM(s) Oral before breakfast    MEDICATIONS  (PRN):  acetaminophen     Tablet .. 650 milliGRAM(s) Oral every 6 hours PRN Temp greater or equal to 38C (100.4F), Mild Pain (1 - 3)  melatonin 3 milliGRAM(s) Oral at bedtime PRN Insomnia      LABS:                        9.9    8.84  )-----------( 218      ( 2023 01:40 )             32.2     Hgb Trend: 9.9<--, 9.3<--  02-24    138  |  101  |  14  ----------------------------<  73  4.1   |  26  |  0.76    Ca    9.8      2023 03:43  Phos  2.6     -24  Mg     1.60     -24    TPro  7.7  /  Alb  3.7  /  TBili  0.4  /  DBili  x   /  AST  71<H>  /  ALT  40<H>  /  AlkPhos  130<H>  -24    Creatinine Trend: 0.76<--, 0.72<--, 0.66<--, 0.57<--, 0.56<--, 0.60<--    Urinalysis Basic - ( 2023 01:40 )    Color: Light Yellow / Appearance: Clear / S.020 / pH: x  Gluc: x / Ketone: Small  / Bili: Negative / Urobili: <2 mg/dL   Blood: x / Protein: Trace / Nitrite: Negative   Leuk Esterase: Negative / RBC: x / WBC x   Sq Epi: x / Non Sq Epi: x / Bacteria: x        Venous Blood Gas:   @ 01:40  7.38/48/56/28/89.0  VBG Lactate: 1.2      MICROBIOLOGY:     Culture - Urine (collected 2023 01:40)  Source: Clean Catch Clean Catch (Midstream)  Final Report (2023 07:49):    No growth       PROGRESS NOTE    Monster Owen | PGY1| Pager: [phone]  Interval Events:  Patient hypertensive o/n to 170's/90's and mildly tachycardic to 101. Asymptomatic however patient appears to be more paranoid than yesterday - refusing food and spitting out oral medications, endorsing "what are you doing to me" when attempting to give oral medication. Increased suspicion about  Julio César and states she thinks everything is Julio César's fault. Also appears to "see dust" everywhere. Otherwise in NAD. Appears to be more verbal, knows her name, where she is, and what year it is.     OBJECTIVE:  ICU Vital Signs Last 24 Hrs  T(C): 36.3 (2023 06:00), Max: 36.9 (2023 10:00)  T(F): 97.4 (2023 06:00), Max: 98.4 (2023 10:00)  HR: 92 (2023 06:00) (64 - 108)  BP: 179/91 (2023 06:00) (136/82 - 195/84)  BP(mean): --  ABP: --  ABP(mean): --  RR: 18 (2023 06:00) (14 - 18)  SpO2: 96% (2023 06:00) (95% - 99%)    O2 Parameters below as of 2023 06:00  Patient On (Oxygen Delivery Method): room air    - @ 07:01  -  - @ 07:00  --------------------------------------------------------  IN: 0 mL / OUT: 400 mL / NET: -400 mL    CAPILLARY BLOOD GLUCOSE    POCT Blood Glucose.: 82 mg/dL (2023 23:33)    PHYSICAL EXAM  GENERAL: Awake, NAD  	HEENT: NC/AT, moist mucous membranes, PERRL, EOMI; good dentition, no observable abscess, no lymphadenopathy  	LUNGS: CTAB, no wheezes or crackles   ABDOMEN: Soft, non distended, no rebound, no guarding  	CARDIAC: RRR, no m/r/g  	BACK: no CVA tenderness  	EXT: mild pedal edema non-pitting, no calf tenderness, 2+ DP pulses bilaterally, no deformities.  	NEURO: AAOx2-3, able to respond to basic commands and knows her name, location hospital, and year, CNII-XII intact, decreased strength b/l lower extremities 2/2 MS, moving b/l UE but with decreased extension - no rigidity  	SKIN: Warm and dry. No rash.  PSYCH: Normal affect    HOSPITAL MEDICATIONS:  MEDICATIONS  (STANDING):  amLODIPine   Tablet 10 milliGRAM(s) Oral daily  enoxaparin Injectable 40 milliGRAM(s) SubCutaneous every 24 hours  lacosamide 50 milliGRAM(s) Oral two times a day  levothyroxine 75 MICROGram(s) Oral daily  pantoprazole    Tablet 40 milliGRAM(s) Oral before breakfast    MEDICATIONS  (PRN):  acetaminophen     Tablet .. 650 milliGRAM(s) Oral every 6 hours PRN Temp greater or equal to 38C (100.4F), Mild Pain (1 - 3)  melatonin 3 milliGRAM(s) Oral at bedtime PRN Insomnia      LABS:                        9.9    8.84  )-----------( 218      ( 2023 01:40 )             32.2     Hgb Trend: 9.9<--, 9.3<--      138  |  101  |  14  ----------------------------<  73  4.1   |  26  |  0.76    Ca    9.8      2023 03:43  Phos  2.6       Mg     1.60         TPro  7.7  /  Alb  3.7  /  TBili  0.4  /  DBili  x   /  AST  71<H>  /  ALT  40<H>  /  AlkPhos  130<H>      Creatinine Trend: 0.76<--, 0.72<--, 0.66<--, 0.57<--, 0.56<--, 0.60<--    Urinalysis Basic - ( 2023 01:40 )    Color: Light Yellow / Appearance: Clear / S.020 / pH: x  Gluc: x / Ketone: Small  / Bili: Negative / Urobili: <2 mg/dL   Blood: x / Protein: Trace / Nitrite: Negative   Leuk Esterase: Negative / RBC: x / WBC x   Sq Epi: x / Non Sq Epi: x / Bacteria: x        Venous Blood Gas:   @ 01:40  7.38/48/56/28/89.0  VBG Lactate: 1.2      MICROBIOLOGY:     Culture - Urine (collected 2023 01:40)  Source: Clean Catch Clean Catch (Midstream)  Final Report (2023 07:49):    No growth

## 2023-02-25 NOTE — PROGRESS NOTE ADULT - PROBLEM SELECTOR PLAN 6
Pt was admitted at [facility] for urosepsis with seizure like event that was witnessed. Was discharged on keppra 500mg BID. Patient also had RRT 12/16 for 40 seconds of seizure like activity described as stiffening of b/l UE, foaming at the mouth, hypoxic during event.  - 12/2022 EEG with rare generalized epileptiform discharges indicate a risk of generalized-onset seizures. Interpretation of the EEG is limited by continuous artifacts. Consider repeat study if clinically indicated.  - No seizure like activity noted since recent discarge  - Neuro consult for possible EEG, seizure activity, and CT head findings   - c/w lacosamide 50mg bid Pt was admitted at [facility] for urosepsis with seizure like event that was witnessed. Was discharged on keppra 500mg BID. Patient also had RRT 12/16 for 40 seconds of seizure like activity described as stiffening of b/l UE, foaming at the mouth, hypoxic during event.  - 12/2022 EEG with rare generalized epileptiform discharges indicate a risk of generalized-onset seizures. Interpretation of the EEG is limited by continuous artifacts. Consider repeat study if clinically indicated.  - No seizure like activity noted since recent discarge  - Neuro recs no new EEG or MRI, c/w with home dose lacosamide  - c/w lacosamide 50mg bid

## 2023-02-25 NOTE — PROGRESS NOTE ADULT - ASSESSMENT
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 Admitted for acute encephalopathy 2/2 suspected COVID pneumonia vs. drug (baclofen) related vs. MS progression. 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 Admitted for acute encephalopathy 2/2 suspected COVID pneumonia vs. drug (baclofen) related vs. MS progression c/b new paranoia about medications and refusing PO intake.

## 2023-02-25 NOTE — PROGRESS NOTE ADULT - PROBLEM SELECTOR PLAN 3
Alk phos 130, AST 71, ALT 40 on admission  - Hx of bisphosphonate use, unclear hx of osteopenia/porosis; alkphos likely elevated in relation to this >> f/u GGT  - CT A/P 12/2022 wnl, no cirrhosis or gallbladder disease noted; no jaundice/RUQ ttp or change in stools Alk phos 130, AST 71, ALT 40 on admission  - Hx of bisphosphonate use, unclear hx of osteopenia/porosis; alkphos likely elevated in relation to this >> f/u GGT  - CT A/P 12/2022 wnl, no cirrhosis or gallbladder disease noted; no jaundice/RUQ ttp or change in stools  - Normal GGT, AST/ALT resolving

## 2023-02-25 NOTE — SWALLOW BEDSIDE ASSESSMENT ADULT - SWALLOW EVAL: DIAGNOSIS
1. Functional oral stage for puree, regular solids and thin liquids marked by adequate oral acceptance, chewing/collection and transfer. 2. Functional pharyngeal phase for puree, regular solids and thin liquids marked by a present pharyngeal swallow trigger with hyolaryngeal elevation noted upon digital palpation without evidence of impaired airway protection.

## 2023-02-25 NOTE — PROVIDER CONTACT NOTE (OTHER) - BACKGROUND
67 yo female admitted for Covid
67F came in for covid and AMS. pmhx seizures, MS
67 y/o F admitted for COVID
68F admitted for COVID. pmhx MS, seizures, AMS
Patient admitted for

## 2023-02-25 NOTE — CHART NOTE - NSCHARTNOTEFT_GEN_A_CORE
#Delirium  - Likely 2/2 COVID pneumonia as remaining workup remains negative, WBC/temp/UA wnl - cultures pending  - Patient refusing to take medications or food PO, making paranoid statements. Per family and 24/7 aide, patient has hx of in hospital delirium during previous admissions - resolves over time  - POCT glucose q6h + D5 NS at 75cc/hr  - Frequent re-orientation, patient is by window  - PO vimpat to IV  - PO amlodipine refusing >> IV hydralazine 10mg q6h PRN for SBP >180    If patient remains paranoid or delirium worsening, may consider  consultation. Discussed plan with nursing staff, will sign out to covering team.

## 2023-02-25 NOTE — PROGRESS NOTE ADULT - PROBLEM SELECTOR PLAN 4
Diagnosed over 37 years ago, pt follows at [facility], on baclofen pump (L abdomen).  - C/w baclofen  - Neurosurgery evaluation of pump functionality (recently checked without issues) Diagnosed over 37 years ago, pt follows at [facility], on baclofen pump (L abdomen).  - C/w baclofen  - Neurosurgery evaluation of pump functionality (recently checked without issues)  - Awaiting outside records from [facility] for MS and baclofen records

## 2023-02-25 NOTE — SWALLOW BEDSIDE ASSESSMENT ADULT - COMMENTS
Internal Medicine 2/25 "67F Jehova's Witness with PMH MS (on baclofen pump), seizure, hypothyroidism, urosepsis presents with AMS found to be COVID+ on admission. Admitted for acute encephalopathy 2/2 suspected COVID pneumonia vs. drug (baclofen) related vs. MS progression c/b new paranoia about medications and refusing PO intake. "    CXR " Clear lungs."    Of Note: Patient is known to this service as patient was seen for clinical swallow assessments during recent admission on 12/19, 12/22 and on 2/16 (please see notes).    Patient seen at bedside this AM for an initial assessment of the swallow function, at which time patient was alert though pleasantly confused. Patient is able to verbalize wants/needs.

## 2023-02-25 NOTE — PROGRESS NOTE ADULT - SUBJECTIVE AND OBJECTIVE BOX
Neurology Progress    RAMIRO [id number]    HPI:  68 year-old female Jehova's Witness with past medical history of MS, seizure, hypothyroidism, urosepsis presenting with a chief complaint of altered mental status.  Patient is accompanied by  Julio César at bedside.  History is obtained from .  According to , patient was admitted to the hospital last week for altered mental status. Patient received comprehensive work-up at that time including lumbar puncture, EEG, MRI of the brain, negative blood and urine cultures, normal TSH, seen by infectious disease and neurology with no acute findings and cleared for discharge. When she was discharged she was alert to self and place and had returned to baseline. Beginning on  patient was less alert and mental status started to deteriorate again. Patient has become more somnolent and confused, no longer oriented to place and time. No vomiting or diarrhea, no fevers at home, no trauma or falls.  No new medications. Resides at home with  and taken care of by care giver, Deloris. COVID+ on admission. Recent cough starting on . Patient is a J[employer] and does not want blood given.      (2023 12:25)      Past Medical History  Hypothyroid    Multiple sclerosis    Seizures        Past Surgical History  No significant past surgical history        MEDICATIONS    acetaminophen     Tablet .. 650 milliGRAM(s) Oral every 6 hours PRN  amLODIPine   Tablet 10 milliGRAM(s) Oral daily  enoxaparin Injectable 40 milliGRAM(s) SubCutaneous every 24 hours  lacosamide 50 milliGRAM(s) Oral two times a day  lactated ringers. 500 milliLiter(s) IV Continuous <Continuous>  levothyroxine 75 MICROGram(s) Oral daily  melatonin 3 milliGRAM(s) Oral at bedtime PRN  pantoprazole    Tablet 40 milliGRAM(s) Oral before breakfast         Family history: No history of dementia, strokes, or seizures   FAMILY HISTORY:    SOCIAL HISTORY -- No history of tobacco or alcohol use     Allergies    sulfa drugs (Hives; Rash)    Intolerances        Height (cm): 170.2 ( @ 18:15)  Weight (kg): 68.9 ( @ 18:15)  BMI (kg/m2): 23.8 ( @ 18:15)    Vital Signs Last 24 Hrs  T(C): 36.3 (2023 06:00), Max: 36.7 (2023 18:15)  T(F): 97.4 (2023 06:00), Max: 98.1 (2023 18:15)  HR: 92 (2023 06:00) (87 - 108)  BP: 179/91 (2023 06:00) (165/71 - 195/84)  BP(mean): --  RR: 18 (2023 06:00) (17 - 18)  SpO2: 96% (2023 06:00) (95% - 99%)    Parameters below as of 2023 06:00  Patient On (Oxygen Delivery Method): room air            On Neurological Examination:    Mental Status - Patient is alert, awake, orient x2   Follows commands well and able to answer questions appropriately. Mood and affect  normal  Follow simple commands able to repeat  able to name.  Speech - Fluent no Dysarthria  no  Aphasia                              Cranial Nerves - Extraocular muscle intact  KARLEY Facial symmetry Tongue midline, CnV1to V3 intact gross hearing intact      Motor Exam -   Right upper  5/5 throughout  Left upper 5/5 throughtout  Right lower- 1/5 throughout  Left lower 1/5 throughout  Coordination -finger to nose intact  Muscle tone - is normal all over. No asymmetry is seen.      Sensory    Bilateral intact to light touch    GENERAL Exam:     Nontoxic , No Acute Distress   	  HEENT:  normocephalic, atraumatic  		  LUNGS:	Clear bilaterally  No Wheeze      VASCULAR: no carotid brui  	  HEART:	 Normal S1S2   No murmur RRR        	  MUSCULOSKELETAL: Normal Range of Motion  	   SKIN:      Normal   No Ecchymosis               LABS:  CBC Full  -  ( 2023 07:20 )  WBC Count : 6.82 K/uL  RBC Count : 3.64 M/uL  Hemoglobin : 10.3 g/dL  Hematocrit : 33.5 %  Platelet Count - Automated : 263 K/uL  Mean Cell Volume : 92.0 fL  Mean Cell Hemoglobin : 28.3 pg  Mean Cell Hemoglobin Concentration : 30.7 gm/dL  Auto Neutrophil # : x  Auto Lymphocyte # : x  Auto Monocyte # : x  Auto Eosinophil # : x  Auto Basophil # : x  Auto Neutrophil % : x  Auto Lymphocyte % : x  Auto Monocyte % : x  Auto Eosinophil % : x  Auto Basophil % : x    Urinalysis Basic - ( 2023 01:40 )    Color: Light Yellow / Appearance: Clear / S.020 / pH: x  Gluc: x / Ketone: Small  / Bili: Negative / Urobili: <2 mg/dL   Blood: x / Protein: Trace / Nitrite: Negative   Leuk Esterase: Negative / RBC: x / WBC x   Sq Epi: x / Non Sq Epi: x / Bacteria: x          134<L>  |  93<L>  |  14  ----------------------------<  81  4.0   |  24  |  0.65    Ca    10.0      2023 07:20  Phos  2.6       Mg     1.60         TPro  7.7  /  Alb  3.8  /  TBili  0.3  /  DBili  x   /  AST  33<H>  /  ALT  26  /  AlkPhos  148<H>      Hemoglobin A1C:     LIVER FUNCTIONS - ( 2023 07:20 )  Alb: 3.8 g/dL / Pro: 7.7 g/dL / ALK PHOS: 148 U/L / ALT: 26 U/L / AST: 33 U/L / GGT: x           Vitamin B12 Vitamin B12, Serum: 940 pg/mL ( @ 07:20)          RADIOLOGY    EKG      IMPRESSION  This is a  year old           schoenberg

## 2023-02-26 LAB
ALBUMIN SERPL ELPH-MCNC: 3.8 G/DL — SIGNIFICANT CHANGE UP (ref 3.3–5)
ALP SERPL-CCNC: 139 U/L — HIGH (ref 40–120)
ALT FLD-CCNC: 24 U/L — SIGNIFICANT CHANGE UP (ref 4–33)
ANION GAP SERPL CALC-SCNC: 11 MMOL/L — SIGNIFICANT CHANGE UP (ref 7–14)
AST SERPL-CCNC: 36 U/L — HIGH (ref 4–32)
BILIRUB SERPL-MCNC: 0.3 MG/DL — SIGNIFICANT CHANGE UP (ref 0.2–1.2)
BUN SERPL-MCNC: 13 MG/DL — SIGNIFICANT CHANGE UP (ref 7–23)
CALCIUM SERPL-MCNC: 9.8 MG/DL — SIGNIFICANT CHANGE UP (ref 8.4–10.5)
CHLORIDE SERPL-SCNC: 99 MMOL/L — SIGNIFICANT CHANGE UP (ref 98–107)
CO2 SERPL-SCNC: 26 MMOL/L — SIGNIFICANT CHANGE UP (ref 22–31)
CREAT SERPL-MCNC: 0.63 MG/DL — SIGNIFICANT CHANGE UP (ref 0.5–1.3)
EGFR: 98 ML/MIN/1.73M2 — SIGNIFICANT CHANGE UP
GLUCOSE BLDC GLUCOMTR-MCNC: 108 MG/DL — HIGH (ref 70–99)
GLUCOSE BLDC GLUCOMTR-MCNC: 111 MG/DL — HIGH (ref 70–99)
GLUCOSE BLDC GLUCOMTR-MCNC: 112 MG/DL — HIGH (ref 70–99)
GLUCOSE BLDC GLUCOMTR-MCNC: 98 MG/DL — SIGNIFICANT CHANGE UP (ref 70–99)
GLUCOSE SERPL-MCNC: 104 MG/DL — HIGH (ref 70–99)
HCT VFR BLD CALC: 34 % — LOW (ref 34.5–45)
HGB BLD-MCNC: 10.6 G/DL — LOW (ref 11.5–15.5)
MAGNESIUM SERPL-MCNC: 1.7 MG/DL — SIGNIFICANT CHANGE UP (ref 1.6–2.6)
MCHC RBC-ENTMCNC: 28 PG — SIGNIFICANT CHANGE UP (ref 27–34)
MCHC RBC-ENTMCNC: 31.2 GM/DL — LOW (ref 32–36)
MCV RBC AUTO: 89.9 FL — SIGNIFICANT CHANGE UP (ref 80–100)
NRBC # BLD: 0 /100 WBCS — SIGNIFICANT CHANGE UP (ref 0–0)
NRBC # FLD: 0 K/UL — SIGNIFICANT CHANGE UP (ref 0–0)
PHOSPHATE SERPL-MCNC: 2.6 MG/DL — SIGNIFICANT CHANGE UP (ref 2.5–4.5)
PLATELET # BLD AUTO: 270 K/UL — SIGNIFICANT CHANGE UP (ref 150–400)
POTASSIUM SERPL-MCNC: 3.5 MMOL/L — SIGNIFICANT CHANGE UP (ref 3.5–5.3)
POTASSIUM SERPL-SCNC: 3.5 MMOL/L — SIGNIFICANT CHANGE UP (ref 3.5–5.3)
PROT SERPL-MCNC: 7.4 G/DL — SIGNIFICANT CHANGE UP (ref 6–8.3)
RBC # BLD: 3.78 M/UL — LOW (ref 3.8–5.2)
RBC # FLD: 17.2 % — HIGH (ref 10.3–14.5)
SODIUM SERPL-SCNC: 136 MMOL/L — SIGNIFICANT CHANGE UP (ref 135–145)
WBC # BLD: 7.98 K/UL — SIGNIFICANT CHANGE UP (ref 3.8–10.5)
WBC # FLD AUTO: 7.98 K/UL — SIGNIFICANT CHANGE UP (ref 3.8–10.5)

## 2023-02-26 PROCEDURE — 99232 SBSQ HOSP IP/OBS MODERATE 35: CPT | Mod: GC

## 2023-02-26 RX ORDER — MAGNESIUM SULFATE 500 MG/ML
1 VIAL (ML) INJECTION ONCE
Refills: 0 | Status: COMPLETED | OUTPATIENT
Start: 2023-02-26 | End: 2023-02-26

## 2023-02-26 RX ORDER — POTASSIUM CHLORIDE 20 MEQ
20 PACKET (EA) ORAL ONCE
Refills: 0 | Status: COMPLETED | OUTPATIENT
Start: 2023-02-26 | End: 2023-02-26

## 2023-02-26 RX ADMIN — Medication 20 MILLIEQUIVALENT(S): at 10:54

## 2023-02-26 RX ADMIN — Medication 100 GRAM(S): at 10:54

## 2023-02-26 RX ADMIN — LACOSAMIDE 110 MILLIGRAM(S): 50 TABLET ORAL at 06:34

## 2023-02-26 RX ADMIN — ENOXAPARIN SODIUM 40 MILLIGRAM(S): 100 INJECTION SUBCUTANEOUS at 17:43

## 2023-02-26 RX ADMIN — AMLODIPINE BESYLATE 10 MILLIGRAM(S): 2.5 TABLET ORAL at 09:10

## 2023-02-26 RX ADMIN — SODIUM CHLORIDE 75 MILLILITER(S): 9 INJECTION, SOLUTION INTRAVENOUS at 07:11

## 2023-02-26 RX ADMIN — LACOSAMIDE 110 MILLIGRAM(S): 50 TABLET ORAL at 17:35

## 2023-02-26 RX ADMIN — Medication 75 MICROGRAM(S): at 09:07

## 2023-02-26 RX ADMIN — Medication 10 MILLIGRAM(S): at 07:12

## 2023-02-26 RX ADMIN — PANTOPRAZOLE SODIUM 40 MILLIGRAM(S): 20 TABLET, DELAYED RELEASE ORAL at 09:10

## 2023-02-26 NOTE — PROGRESS NOTE ADULT - PROBLEM SELECTOR PLAN 6
Pt was admitted at [facility] for urosepsis with seizure like event that was witnessed. Was discharged on keppra 500mg BID. Patient also had RRT 12/16 for 40 seconds of seizure like activity described as stiffening of b/l UE, foaming at the mouth, hypoxic during event.  - 12/2022 EEG with rare generalized epileptiform discharges indicate a risk of generalized-onset seizures. Interpretation of the EEG is limited by continuous artifacts. Consider repeat study if clinically indicated.  - No seizure like activity noted since recent discarge  - Neuro recs no new EEG or MRI, c/w with home dose lacosamide  - c/w lacosamide 50mg bid

## 2023-02-26 NOTE — PROGRESS NOTE ADULT - SUBJECTIVE AND OBJECTIVE BOX
68 year-old female Jehova's Witness with past medical history of MS, seizure, hypothyroidism, urosepsis presenting with a chief complaint of altered mental status.  Patient is accompanied by  Julio César at bedside.  History is obtained from .  According to , patient was admitted to the hospital last week for altered mental status. Patient received comprehensive work-up at that time including lumbar puncture, EEG, MRI of the brain, negative blood and urine cultures, normal TSH, seen by infectious disease and neurology with no acute findings and cleared for discharge. When she was discharged she was alert to self and place and had returned to baseline. Beginning on  patient was less alert and mental status started to deteriorate again. Patient has become more somnolent and confused, no longer oriented to place and time. No vomiting or diarrhea, no fevers at home, no trauma or falls.  No new medications. Resides at home with  and taken care of by care giver, Deloris. COVID+ on admission. Recent cough starting on . Patient is a Lutheran and does not want blood given.      (2023 12:25)      Past Medical History  Hypothyroid    Multiple sclerosis    Seizures        Past Surgical History  No significant past surgical history        MEDICATIONS    acetaminophen     Tablet .. 650 milliGRAM(s) Oral every 6 hours PRN  amLODIPine   Tablet 10 milliGRAM(s) Oral daily  enoxaparin Injectable 40 milliGRAM(s) SubCutaneous every 24 hours  lacosamide 50 milliGRAM(s) Oral two times a day  lactated ringers. 500 milliLiter(s) IV Continuous <Continuous>  levothyroxine 75 MICROGram(s) Oral daily  melatonin 3 milliGRAM(s) Oral at bedtime PRN  pantoprazole    Tablet 40 milliGRAM(s) Oral before breakfast         Family history: No history of dementia, strokes, or seizures   FAMILY HISTORY:    SOCIAL HISTORY -- No history of tobacco or alcohol use     Allergies    sulfa drugs (Hives; Rash)    Intolerances        Height (cm): 170.2 ( @ 18:15)  Weight (kg): 68.9 ( @ 18:15)  BMI (kg/m2): 23.8 ( @ 18:15)    Vital Signs Last 24 Hrs  T(C): 36.3 (2023 06:00), Max: 36.7 (2023 18:15)  T(F): 97.4 (2023 06:00), Max: 98.1 (2023 18:15)  HR: 92 (2023 06:00) (87 - 108)  BP: 179/91 (2023 06:00) (165/71 - 195/84)  BP(mean): --  RR: 18 (2023 06:00) (17 - 18)  SpO2: 96% (2023 06:00) (95% - 99%)    Parameters below as of 2023 06:00  Patient On (Oxygen Delivery Method): room air            On Neurological Examination:    Mental Status - Patient is alert, awake, orient x2   Follows commands well and able to answer questions appropriately. Mood and affect  normal  Follow simple commands able to repeat  able to name.  Speech - Fluent no Dysarthria  no  Aphasia                              Cranial Nerves - Extraocular muscle intact  KARLEY Facial symmetry Tongue midline, CnV1to V3 intact gross hearing intact      Motor Exam -   Right upper  5/5 throughout  Left upper 5/5 throughtout  Right lower- 1/5 throughout  Left lower 1/5 throughout  Coordination -finger to nose intact  Muscle tone - is normal all over. No asymmetry is seen.      Sensory    Bilateral intact to light touch    GENERAL Exam:     Nontoxic , No Acute Distress   	  HEENT:  normocephalic, atraumatic  		  LUNGS:	Clear bilaterally  No Wheeze      VASCULAR: no carotid brui  	  HEART:	 Normal S1S2   No murmur RRR        	  MUSCULOSKELETAL: Normal Range of Motion  	   SKIN:      Normal   No Ecchymosis               LABS:  CBC Full  -  ( 2023 07:20 )  WBC Count : 6.82 K/uL  RBC Count : 3.64 M/uL  Hemoglobin : 10.3 g/dL  Hematocrit : 33.5 %  Platelet Count - Automated : 263 K/uL  Mean Cell Volume : 92.0 fL  Mean Cell Hemoglobin : 28.3 pg  Mean Cell Hemoglobin Concentration : 30.7 gm/dL  Auto Neutrophil # : x  Auto Lymphocyte # : x  Auto Monocyte # : x  Auto Eosinophil # : x  Auto Basophil # : x  Auto Neutrophil % : x  Auto Lymphocyte % : x  Auto Monocyte % : x  Auto Eosinophil % : x  Auto Basophil % : x    Urinalysis Basic - ( 2023 01:40 )    Color: Light Yellow / Appearance: Clear / S.020 / pH: x  Gluc: x / Ketone: Small  / Bili: Negative / Urobili: <2 mg/dL   Blood: x / Protein: Trace / Nitrite: Negative   Leuk Esterase: Negative / RBC: x / WBC x   Sq Epi: x / Non Sq Epi: x / Bacteria: x          134<L>  |  93<L>  |  14  ----------------------------<  81  4.0   |  24  |  0.65    Ca    10.0      2023 07:20  Phos  2.6       Mg     1.60         TPro  7.7  /  Alb  3.8  /  TBili  0.3  /  DBili  x   /  AST  33<H>  /  ALT  26  /  AlkPhos  148<H>      Hemoglobin A1C:     LIVER FUNCTIONS - ( 2023 07:20 )  Alb: 3.8 g/dL / Pro: 7.7 g/dL / ALK PHOS: 148 U/L / ALT: 26 U/L / AST: 33 U/L / GGT: x           Vitamin B12 Vitamin B12, Serum: 940 pg/mL ( @ 07:20)          RADIOLOGY    EKG                schoenberg     Assessment and Plan:   · Assessment	  68 year-old female Jehova's Witness with past medical history of MS, seizure, hypothyroidism, urosepsis presenting with a chief complaint of altered mental status.  Patient is accompanied by  Julio César at bedside.  History is obtained from .  According to , patient was admitted to the hospital last week for altered mental status. Patient received comprehensive work-up at that time including lumbar puncture, EEG, MRI of the brain, negative blood and urine cultures, normal TSH, seen by infectious disease and neurology with no acute findings and cleared for discharge. When she was discharged she was alert to self and place and had returned to baseline. Beginning on  patient was less alert and mental status started to deteriorate again. Patient has become more somnolent and confused, no longer oriented to place and time. No vomiting or diarrhea, no fevers at home, no trauma or falls.  No new medications. Resides at home with  and taken care of by care giver, Deloris. COVID+ on admission. Recent cough starting on . Patient is a Lutheran and does not want blood given.   Encephalopathy from covid   improving          Electronic Signatures:  Schoenberg, Laura Gray (MD)

## 2023-02-26 NOTE — PROGRESS NOTE ADULT - SUBJECTIVE AND OBJECTIVE BOX
PROGRESS NOTE:   Authored by Megan Perla MD  Pager [phone] [facility] | [phone] [facility]    Patient is a 68y old  Female who presents with a chief complaint of AMS (25 Feb 2023 11:43)      SUBJECTIVE / OVERNIGHT EVENTS:    MEDICATIONS  (STANDING):  amLODIPine   Tablet 10 milliGRAM(s) Oral daily  dextrose 5% + sodium chloride 0.9%. 1000 milliLiter(s) (75 mL/Hr) IV Continuous <Continuous>  enoxaparin Injectable 40 milliGRAM(s) SubCutaneous every 24 hours  lacosamide IVPB 50 milliGRAM(s) IV Intermittent every 12 hours  levothyroxine 75 MICROGram(s) Oral daily  pantoprazole    Tablet 40 milliGRAM(s) Oral before breakfast    MEDICATIONS  (PRN):  acetaminophen     Tablet .. 650 milliGRAM(s) Oral every 6 hours PRN Temp greater or equal to 38C (100.4F), Mild Pain (1 - 3)  hydrALAZINE Injectable 10 milliGRAM(s) IV Push every 6 hours PRN Systolic > 180  melatonin 3 milliGRAM(s) Oral at bedtime PRN Insomnia      I&O's Summary    24 Feb 2023 07:01  -  25 Feb 2023 07:00  --------------------------------------------------------  IN: 0 mL / OUT: 400 mL / NET: -400 mL    25 Feb 2023 07:01  -  26 Feb 2023 06:45  --------------------------------------------------------  IN: 0 mL / OUT: 900 mL / NET: -900 mL        PHYSICAL EXAM:  Vital Signs Last 24 Hrs  T(C): 36.8 (25 Feb 2023 22:41), Max: 36.8 (25 Feb 2023 17:43)  T(F): 98.3 (25 Feb 2023 22:41), Max: 98.3 (25 Feb 2023 17:43)  HR: 102 (25 Feb 2023 22:41) (88 - 105)  BP: 179/93 (25 Feb 2023 22:41) (174/89 - 186/97)  BP(mean): --  RR: 20 (25 Feb 2023 22:41) (17 - 20)  SpO2: 97% (25 Feb 2023 22:41) (97% - 98%)    Parameters below as of 25 Feb 2023 22:41  Patient On (Oxygen Delivery Method): room air            LABS:                        10.3   6.82  )-----------( 263      ( 25 Feb 2023 07:20 )             33.5     02-25    134<L>  |  93<L>  |  14  ----------------------------<  81  4.0   |  24  |  0.65    Ca    10.0      25 Feb 2023 07:20    TPro  7.7  /  Alb  3.8  /  TBili  0.3  /  DBili  x   /  AST  33<H>  /  ALT  26  /  AlkPhos  148<H>  02-25              Culture - Urine (collected 24 Feb 2023 01:40)  Source: Clean Catch Clean Catch (Midstream)  Final Report (25 Feb 2023 07:49):    No growth       PROGRESS NOTE:   Authored by Megan Perla MD  Pager [phone] [facility] | [phone] [facility]    Patient is a 68y old  Female who presents with a chief complaint of AMS (25 Feb 2023 11:43)      SUBJECTIVE / OVERNIGHT EVENTS:  Afebrile. A&Ox2 this morning, not to time. Had been refusing BP medications and BP elevated, this AM states will take BP meds.    MEDICATIONS  (STANDING):  amLODIPine   Tablet 10 milliGRAM(s) Oral daily  dextrose 5% + sodium chloride 0.9%. 1000 milliLiter(s) (75 mL/Hr) IV Continuous <Continuous>  enoxaparin Injectable 40 milliGRAM(s) SubCutaneous every 24 hours  lacosamide IVPB 50 milliGRAM(s) IV Intermittent every 12 hours  levothyroxine 75 MICROGram(s) Oral daily  pantoprazole    Tablet 40 milliGRAM(s) Oral before breakfast    MEDICATIONS  (PRN):  acetaminophen     Tablet .. 650 milliGRAM(s) Oral every 6 hours PRN Temp greater or equal to 38C (100.4F), Mild Pain (1 - 3)  hydrALAZINE Injectable 10 milliGRAM(s) IV Push every 6 hours PRN Systolic > 180  melatonin 3 milliGRAM(s) Oral at bedtime PRN Insomnia      I&O's Summary    24 Feb 2023 07:01  -  25 Feb 2023 07:00  --------------------------------------------------------  IN: 0 mL / OUT: 400 mL / NET: -400 mL    25 Feb 2023 07:01  -  26 Feb 2023 06:45  --------------------------------------------------------  IN: 0 mL / OUT: 900 mL / NET: -900 mL        PHYSICAL EXAM:  Vital Signs Last 24 Hrs  T(C): 36.8 (25 Feb 2023 22:41), Max: 36.8 (25 Feb 2023 17:43)  T(F): 98.3 (25 Feb 2023 22:41), Max: 98.3 (25 Feb 2023 17:43)  HR: 102 (25 Feb 2023 22:41) (88 - 105)  BP: 179/93 (25 Feb 2023 22:41) (174/89 - 186/97)  BP(mean): --  RR: 20 (25 Feb 2023 22:41) (17 - 20)  SpO2: 97% (25 Feb 2023 22:41) (97% - 98%)    Parameters below as of 25 Feb 2023 22:41  Patient On (Oxygen Delivery Method): room air      GENERAL: Awake, NAD  HEENT: NC/AT, moist mucous membranes, , EOMI;  no lymphadenopathy  LUNGS: CTAB, no wheezes or crackles   ABDOMEN: Soft, non distended, no rebound, no guarding  CARDIAC: RRR, no m/r/g  BACK: no CVA tenderness  EXT: mild pedal edema non-pitting, no calf tenderness, 2+ DP pulses bilaterally, no deformities.  NEURO: AAOx2, able to respond to basic commands and knows her name, location, CNII-XII intact, decreased strength b/l lower extremities 2/2 MS, moving b/l UE but with decreased extension - no rigidity  SKIN: Warm and dry. No rash.  PSYCH: Normal affect      LABS:                        10.3   6.82  )-----------( 263      ( 25 Feb 2023 07:20 )             33.5     02-25    134<L>  |  93<L>  |  14  ----------------------------<  81  4.0   |  24  |  0.65    Ca    10.0      25 Feb 2023 07:20    TPro  7.7  /  Alb  3.8  /  TBili  0.3  /  DBili  x   /  AST  33<H>  /  ALT  26  /  AlkPhos  148<H>  02-25              Culture - Urine (collected 24 Feb 2023 01:40)  Source: Clean Catch Clean Catch (Midstream)  Final Report (25 Feb 2023 07:49):    No growth

## 2023-02-26 NOTE — PROGRESS NOTE ADULT - PROBLEM SELECTOR PLAN 4
Diagnosed over 37 years ago, pt follows at [facility], on baclofen pump (L abdomen).  - C/w baclofen  - Neurosurgery evaluation of pump functionality (recently checked without issues)  - Awaiting outside records from [facility] for MS and baclofen records

## 2023-02-26 NOTE — PROGRESS NOTE ADULT - PROBLEM SELECTOR PLAN 1
Per , AAOx4 baseline. Presents AAOx1 on admission with confusion and somnolence. CT head wo acute findings except left lacunar infarct. no seizure noted at home.  - Infectious: Lactate/WBC/Fever wnl; BCx, UCx, sputum cx f/u  - Baclofen related - pump recently evaluated during prior admission, 104.06 mcg/day, no renal dysfunction; Pt does take ambien PRN - last dose 2/21  - Decreased PO intake, advance diet as tolerated. Encourage PO intake with family member   - Pending bladder scan, doppler  - New paranoia about oral medications, refusing medications and increased suspicion about her  Julio César - consider psych consult if paranoia does not resolve Per , AAOx4 baseline. Presents AAOx1 on admission with confusion and somnolence. CT head wo acute findings except left lacunar infarct. no seizure noted at home.  - Infectious: Lactate/WBC/Fever wnl; BCx, UCx, sputum cx f/u  - Baclofen related - pump recently evaluated during prior admission, 104.06 mcg/day, no renal dysfunction; Pt does take ambien PRN - last dose 2/21  - Decreased PO intake, advance diet as tolerated. Encourage PO intake with family member

## 2023-02-26 NOTE — PROGRESS NOTE ADULT - ASSESSMENT
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 Admitted for acute encephalopathy 2/2 suspected COVID pneumonia vs. drug (baclofen) related vs. MS progression c/b new paranoia about medications and refusing PO intake.

## 2023-02-26 NOTE — PROGRESS NOTE ADULT - PROBLEM SELECTOR PLAN 3
Alk phos 130, AST 71, ALT 40 on admission  - Hx of bisphosphonate use, unclear hx of osteopenia/porosis; alkphos likely elevated in relation to this >> f/u GGT  - CT A/P 12/2022 wnl, no cirrhosis or gallbladder disease noted; no jaundice/RUQ ttp or change in stools  - Normal GGT, AST/ALT resolving

## 2023-02-27 ENCOUNTER — TRANSCRIPTION ENCOUNTER (OUTPATIENT)
Age: 68
End: 2023-02-27

## 2023-02-27 LAB
ALBUMIN SERPL ELPH-MCNC: 3.3 G/DL — SIGNIFICANT CHANGE UP (ref 3.3–5)
ALP SERPL-CCNC: 117 U/L — SIGNIFICANT CHANGE UP (ref 40–120)
ALT FLD-CCNC: 19 U/L — SIGNIFICANT CHANGE UP (ref 4–33)
ANION GAP SERPL CALC-SCNC: 10 MMOL/L — SIGNIFICANT CHANGE UP (ref 7–14)
AST SERPL-CCNC: 24 U/L — SIGNIFICANT CHANGE UP (ref 4–32)
BILIRUB SERPL-MCNC: 0.3 MG/DL — SIGNIFICANT CHANGE UP (ref 0.2–1.2)
BUN SERPL-MCNC: 8 MG/DL — SIGNIFICANT CHANGE UP (ref 7–23)
CALCIUM SERPL-MCNC: 9.4 MG/DL — SIGNIFICANT CHANGE UP (ref 8.4–10.5)
CHLORIDE SERPL-SCNC: 105 MMOL/L — SIGNIFICANT CHANGE UP (ref 98–107)
CO2 SERPL-SCNC: 25 MMOL/L — SIGNIFICANT CHANGE UP (ref 22–31)
CREAT SERPL-MCNC: 0.71 MG/DL — SIGNIFICANT CHANGE UP (ref 0.5–1.3)
EGFR: 94 ML/MIN/1.73M2 — SIGNIFICANT CHANGE UP
GLUCOSE BLDC GLUCOMTR-MCNC: 116 MG/DL — HIGH (ref 70–99)
GLUCOSE BLDC GLUCOMTR-MCNC: 75 MG/DL — SIGNIFICANT CHANGE UP (ref 70–99)
GLUCOSE BLDC GLUCOMTR-MCNC: 94 MG/DL — SIGNIFICANT CHANGE UP (ref 70–99)
GLUCOSE BLDC GLUCOMTR-MCNC: 96 MG/DL — SIGNIFICANT CHANGE UP (ref 70–99)
GLUCOSE SERPL-MCNC: 75 MG/DL — SIGNIFICANT CHANGE UP (ref 70–99)
HCT VFR BLD CALC: 31.7 % — LOW (ref 34.5–45)
HGB BLD-MCNC: 9.8 G/DL — LOW (ref 11.5–15.5)
MAGNESIUM SERPL-MCNC: 1.8 MG/DL — SIGNIFICANT CHANGE UP (ref 1.6–2.6)
MCHC RBC-ENTMCNC: 28.3 PG — SIGNIFICANT CHANGE UP (ref 27–34)
MCHC RBC-ENTMCNC: 30.9 GM/DL — LOW (ref 32–36)
MCV RBC AUTO: 91.6 FL — SIGNIFICANT CHANGE UP (ref 80–100)
NRBC # BLD: 0 /100 WBCS — SIGNIFICANT CHANGE UP (ref 0–0)
NRBC # FLD: 0 K/UL — SIGNIFICANT CHANGE UP (ref 0–0)
PHOSPHATE SERPL-MCNC: 2.9 MG/DL — SIGNIFICANT CHANGE UP (ref 2.5–4.5)
PLATELET # BLD AUTO: 244 K/UL — SIGNIFICANT CHANGE UP (ref 150–400)
POTASSIUM SERPL-MCNC: 3.3 MMOL/L — LOW (ref 3.5–5.3)
POTASSIUM SERPL-SCNC: 3.3 MMOL/L — LOW (ref 3.5–5.3)
PROT SERPL-MCNC: 6.4 G/DL — SIGNIFICANT CHANGE UP (ref 6–8.3)
RBC # BLD: 3.46 M/UL — LOW (ref 3.8–5.2)
RBC # FLD: 17.5 % — HIGH (ref 10.3–14.5)
SODIUM SERPL-SCNC: 140 MMOL/L — SIGNIFICANT CHANGE UP (ref 135–145)
WBC # BLD: 5.91 K/UL — SIGNIFICANT CHANGE UP (ref 3.8–10.5)
WBC # FLD AUTO: 5.91 K/UL — SIGNIFICANT CHANGE UP (ref 3.8–10.5)

## 2023-02-27 PROCEDURE — 99232 SBSQ HOSP IP/OBS MODERATE 35: CPT

## 2023-02-27 PROCEDURE — 93970 EXTREMITY STUDY: CPT | Mod: 26

## 2023-02-27 RX ORDER — SODIUM CHLORIDE 9 MG/ML
1000 INJECTION, SOLUTION INTRAVENOUS
Refills: 0 | Status: COMPLETED | OUTPATIENT
Start: 2023-02-27 | End: 2023-02-28

## 2023-02-27 RX ORDER — POTASSIUM CHLORIDE 20 MEQ
40 PACKET (EA) ORAL EVERY 4 HOURS
Refills: 0 | Status: COMPLETED | OUTPATIENT
Start: 2023-02-27 | End: 2023-02-27

## 2023-02-27 RX ADMIN — LACOSAMIDE 110 MILLIGRAM(S): 50 TABLET ORAL at 06:17

## 2023-02-27 RX ADMIN — Medication 75 MICROGRAM(S): at 06:18

## 2023-02-27 RX ADMIN — LACOSAMIDE 110 MILLIGRAM(S): 50 TABLET ORAL at 18:26

## 2023-02-27 RX ADMIN — PANTOPRAZOLE SODIUM 40 MILLIGRAM(S): 20 TABLET, DELAYED RELEASE ORAL at 06:18

## 2023-02-27 RX ADMIN — ENOXAPARIN SODIUM 40 MILLIGRAM(S): 100 INJECTION SUBCUTANEOUS at 18:35

## 2023-02-27 RX ADMIN — Medication 40 MILLIEQUIVALENT(S): at 09:10

## 2023-02-27 RX ADMIN — AMLODIPINE BESYLATE 10 MILLIGRAM(S): 2.5 TABLET ORAL at 06:18

## 2023-02-27 NOTE — PROGRESS NOTE ADULT - PROBLEM SELECTOR PLAN 1
Per , AAOx4 baseline. Presents AAOx1 on admission with confusion and somnolence. CT head wo acute findings except left lacunar infarct. no seizure noted at home.  - Infectious: Lactate/WBC/Fever wnl; BCx, UCx, sputum cx f/u  - Baclofen related - pump recently evaluated during prior admission, 104.06 mcg/day, no renal dysfunction; Pt does take ambien PRN - last dose 2/21  - Decreased PO intake, advance diet as tolerated. Encourage PO intake with family member Per , AAOx4 baseline. Presents AAOx1 on admission with confusion and somnolence. CT head wo acute findings except left lacunar infarct. no seizure noted at home.  - Infectious: Lactate/WBC/Fever wnl; BCx, UCx, sputum cx f/u  - Baclofen related - pump recently evaluated during prior admission, 104.06 mcg/day, no renal dysfunction; Pt does take ambien PRN - last dose 2/21  - Decreased PO intake, advance diet as tolerated. Encourage PO intake with family member  - 2/27 C/b delirium - claims to see dead bodies

## 2023-02-27 NOTE — DISCHARGE NOTE PROVIDER - HOSPITAL COURSE
66 yo female with hx of MS (on balcofen pump), seizure, hypothyroidism, urosepsis is brought in by  for encephalopathy, unclear etiology at this time.   -Etiology remains unclear, infectious work-up is negative to date   -CTH w/old lacunar infarct   -EEG (2/15) Rare generalized epileptiform discharges indicate a risk of generalized-onset seizures.  -s/p LP with IR (2/16) - negative results to date   -Labs wnl, TSH wnl, blood cultures and UA/Ucx - NGTD  -  -NSG interrogated baclofen pump on 2/14  -MRI brain pending 66 yo female with hx of MS (on balcofen pump), seizure, hypothyroidism, urosepsis is brought in by  for encephalopathy, unclear etiology at this time.   - Presents AAOx1 on admission with confusion and somnolence. CT head wo acute findings except left lacunar infarct. no seizure noted at home.  - Infectious: Lactate/WBC/Fever wnl; BCx, UCx, sputum cx f/u  - Baclofen related - pump recently evaluated during prior admission, 104.06 mcg/day, no renal dysfunction; Pt does take ambien PRN - last dose 2/21  - Decreased PO intake, advance diet as tolerated. Encourage PO intake with family member  - 2/27 C/b delirium - claims to see dead bodies.  - MRI brain pending 68 yo female with hx of MS (on balcofen pump), seizure, hypothyroidism, urosepsis is brought in by  for encephalopathy, unclear etiology at this time.   - Presents AAOx1 on admission with confusion and somnolence. CT head wo acute findings except left lacunar infarct. no seizure noted at home.  - Infectious: Lactate/WBC/Fever wnl; BCx, UCx, sputum cx f/u  - Baclofen related - pump recently evaluated during prior admission, 104.06 mcg/day, no renal dysfunction; Pt does take ambien PRN - last dose 2/21. Pump refilled 3/1   - Decreased PO intake, advance diet as tolerated. Encourage PO intake with family member  - 2/27 C/b delirium - claims to see dead bodies.  - MRI brain pending 68 yo female with hx of MS (on balcofen pump), seizure, hypothyroidism, urosepsis is brought in by  for encephalopathy, unclear etiology at this time.  On presentation she was confused and somnolence. CT head wo acute findings except left lacunar infarct. no seizure noted at home.  - Infectious: Lactate/WBC/Fever wnl; BCx, UCx, sputum cx f/u  - Baclofen related - pump recently evaluated during prior admission, 104.06 mcg/day, no renal dysfunction; Pt does take ambien PRN - last dose 2/21. Pump refilled 3/1   - Decreased PO intake, advance diet as tolerated. Encourage PO intake with family member  - 2/27 C/b delirium - claims to see dead bodies.  - MRI brain pending 67 year old woman with a past medical history significant for MS (on balcofen pump), seizure, hypothyroidism, urosepsis is brought in by  for encephalopathy, unclear etiology at this time.  On presentation she was confused and somnolence. CT head wo acute findings except left lacunar infarct. No seizure noted at home. Infectious workup was unremarkable with the exception of being COVID positive. She did not require O2 during her stay. Her baclofen was replaced in her pump during her stay. An MRI brain was completed and reviewed by neurology who did not recommend any inpatient evaluation and recommended that the patient follow up with her outpatient neurosurgery team.             Problem/Plan - 5:  ·  Problem: Hypothyroidism.   ·  Plan: - TSH wnl  - c/w synthroid 75mcg qam.     Problem/Plan - 6:  ·  Problem: Seizure.   ·  Plan: Pt was admitted at [facility] for urosepsis with seizure like event that was witnessed. Was discharged on keppra 500mg BID. Patient also had RRT 12/16 for 40 seconds of seizure like activity described as stiffening of b/l UE, foaming at the mouth, hypoxic during event.  - 12/2022 EEG with rare generalized epileptiform discharges indicate a risk of generalized-onset seizures. Interpretation of the EEG is limited by continuous artifacts. Consider repeat study if clinically indicated.  - No seizure like activity noted since recent discarge  - Neuro recs no new EEG or MRI, c/w with home dose lacosamide  - c/w lacosamide 50mg bid.

## 2023-02-27 NOTE — PROGRESS NOTE ADULT - ATTENDING COMMENTS
68yoF presenting w AMS, readmitted after recent admission for the same. Today AAOx 2 to self and place. Improved today compared to yesterday. Now agreeable to taking PO meds. Per RN paranoid, refusing meds intermittently and she thinks her  is trying to kill her. Will encourage her to be compliant with care. If continues to refuse and show signs of paranoia, will obtain psych eval. No infectious or metabolic signs of AMS identified at this time. Per neuro, extensive workup including EEG, MRI, LP performed during recent hospital stay and not recommending further eval at this time. Asymptomatic from covid. No cough or SOB. Saturating well on RA. Has baclofen pump that needs to be refilled on 3/1. Missed apt with neurologist for refill on 2/24. Will need to reach out on Monday to make new appointment. Case discussed with nurse and HS1.
Patient seen and examined, care plan discussed with house staff as above.    68yoF presenting w AMS, readmitted.     Hx of spinal stimulator device in L anterior abdominal wall entering spinal canal at L3-4 for baclofen pump. Hx of constipation, recurrent UTIs,  and a hx of seizures (currently on lacosamide).    Recent hospitalization 2/14-2/19 for AMS, LP on 2/16 was unrevealing (no fungal growth, no bacterial growth, HSV neg, lyme neg, AFB ngtd, protein 26, glucose 74, TNC 3, TBC 0), baclofen pump interrogated by NSG on 2/14, Ucx NGTD, MR brain severely limited due to motion but showed mild/mod microvascular ischemic changes. EEG w rare generalized epileptiform discharges indicating risk of generalized onset seizures. Neuro eval, rec'd cont lacosamide 50 mg BID (dose change?). Hx of benzo use (zolpidem and ativan, used PRN, not daily). Per , pt did return to baseline mental status at home (2/20 approx) after she was last d/c'd.     Fevers could be related to new COVID infection, now improving. AMS, lethargy, delirium could be sequelae of uncontrolled seizure activitity, or alternatively from lacosamide (recent dose adjustment? family unsure), Neuro does not want to repeat EEG presently. MS disease progression also a consideration. Hx of recurrent UTI w urinary retenion an ongoing problem, could be developing neurogenic bladder iso of MS. Bilateral LE dopplers for further fever work up [ ] given wheelchair bound and covid positive. Neuro w/o further rec's, thinks encephapathy is due to covid-19. Note her workup last admission was incomplete given that her MRI was severely limited by motion. Obtain repeat MRI wwo brain [ ]. PT eval [ ]. Consult NSG for eval of baclofen pump.      Need OP records from Neuro ([facility]?), specifically MRI results and treatment hx. [ ] Family and patient are unclear as to what therapies she has been on in the past for her MS, currently on baclofen only.
68yoF presenting w AMS, readmitted after recent admisison for the same. Today AAOx 2 to self and place. Improved today compared to yesterday per team. Per RN paranoid, refusing meds and she thinks her  is trying to kill her. Will encourage her to be compliant with care. If continues to refuse and show signs of paranoia, will obtain psych eval. No infectious or metabolic signs of AMS identified at this time. Per neuro, extensive workup including EEG, MRI, LP performed during recent hospital stay and not recommending further eval at this time. Asymptomatic from covid. No cough or SOB. Saturating well on RA. Has baclofen pump that needs to be refilled on 3/1. Missed apt with neurologist for refill on 2/24. Will need to reach out on Monday to make new appointment. Case discussed with nurse and HS1.

## 2023-02-27 NOTE — DISCHARGE NOTE PROVIDER - NSDCMRMEDTOKEN_GEN_ALL_CORE_FT
acetaminophen 325 mg oral tablet: 2 tab(s) orally every 6 hours, As needed, Temp greater or equal to 38C (100.4F), Mild Pain (1 - 3)  Fleet Enema 19 g-7 g rectal enema: 133 milliliter(s) rectal every other day, to go to the bathroom  lacosamide 50 mg oral tablet: 1 tab(s) orally 2 times a day MDD:100mg  pantoprazole 40 mg oral delayed release tablet: 1 tab(s) orally once a day  Synthroid 75 mcg (0.075 mg) oral tablet: 1 tab(s) orally once a day   acetaminophen 325 mg oral tablet: 2 tab(s) orally every 6 hours, As needed, Temp greater or equal to 38C (100.4F), Mild Pain (1 - 3)  Ambien 5 mg oral tablet: 1 tab(s) orally once a day (at bedtime), As Needed  gabapentin 300 mg oral capsule: 1 cap(s) orally once a day  lacosamide 50 mg oral tablet: 1 tab(s) orally 2 times a day MDD:100mg  Norvasc 10 mg oral tablet: 1 tab(s) orally once a day   pantoprazole 40 mg oral delayed release tablet: 1 tab(s) orally once a day  Synthroid 75 mcg (0.075 mg) oral tablet: 1 tab(s) orally once a day  traZODone 50 mg oral tablet: 0.5 tab(s) orally once a day   acetaminophen 325 mg oral tablet: 2 tab(s) orally every 6 hours, As needed, Temp greater or equal to 38C (100.4F), Mild Pain (1 - 3)  gabapentin 300 mg oral capsule: 1 cap(s) orally once a day  lacosamide 50 mg oral tablet: 1 tab(s) orally 2 times a day MDD:100mg  Norvasc 10 mg oral tablet: 1 tab(s) orally once a day   pantoprazole 40 mg oral delayed release tablet: 1 tab(s) orally once a day  Synthroid 75 mcg (0.075 mg) oral tablet: 1 tab(s) orally once a day  traZODone 50 mg oral tablet: 0.5 tab(s) orally once a day

## 2023-02-27 NOTE — PROGRESS NOTE ADULT - ASSESSMENT
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 Admitted for acute encephalopathy 2/2 suspected COVID pneumonia vs. drug (baclofen) related vs. MS progression c/b new paranoia about medications and refusing PO intake.  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 Admitted for acute encephalopathy 2/2 suspected COVID pneumonia vs. drug (baclofen) related vs. MS progression c/b new paranoia about medications, refusing PO intake, and delirium.

## 2023-02-27 NOTE — DISCHARGE NOTE PROVIDER - CARE PROVIDER_API CALL
CONNIE, [facility]  Internal Medicine  [address] [id number]  Phone: [phone]  Fax: [phone]  Follow Up Time:

## 2023-02-27 NOTE — CONSULT NOTE ADULT - SUBJECTIVE AND OBJECTIVE BOX
HPI:  68 year-old female [employer]'s Witness with past medical history of MS, seizure, hypothyroidism, intrathecal baclofen , urosepsis presenting with a chief complaint of altered mental status.  Patient is accompanied by  Julio César at bedside.  History is obtained from .  According to , patient was admitted to the hospital last week for altered mental status. Patient received comprehensive work-up at that time including lumbar puncture, EEG, MRI of the brain, negative blood and urine cultures, normal TSH, seen by infectious disease and neurology with no acute findings and cleared for discharge. When she was discharged she was alert to self and place and had returned to baseline. Beginning on Thursday 2/23 patient was less alert and mental status started to deteriorate again. Patient has become more somnolent and confused, no longer oriented to place and time. No vomiting or diarrhea, no fevers at home, no trauma or falls.  No new medications. Resides at home with  and taken care of by care giver, Deloris. COVID+ on admission. Recent cough starting on Thursday 2/23. Patient is a [employer] and does not want blood given.   CT head on 2/24/23 shows No significant interval change.  No hydrocephalus, acute intracranial hemorrhage, mass effect, or brain edema.  Nonspecific foci of decreased density involving the bilateral temporal occipital periventricular white matter. Differential diagnosis includes ischemic, demyelinating, infectious, inflammatory, and post infectious/inflammatory processes.    Patient was seen by neurosurgery service on 2/14/23, pump was interrogated and found to be working properly, with a refill date of 3/1/23,  Medical team arranging refill.     PAST MEDICAL & SURGICAL HISTORY:  Hypothyroid  Multiple sclerosis  Seizures    Allergies  sulfa drugs (Hives; Rash)    MEDICATIONS  (STANDING):  amLODIPine   Tablet 10 milliGRAM(s) Oral daily  dextrose 5% + sodium chloride 0.9%. 1000 milliLiter(s) (75 mL/Hr) IV Continuous <Continuous>  enoxaparin Injectable 40 milliGRAM(s) SubCutaneous every 24 hours  lacosamide IVPB 50 milliGRAM(s) IV Intermittent every 12 hours  levothyroxine 75 MICROGram(s) Oral daily  pantoprazole    Tablet 40 milliGRAM(s) Oral before breakfast  potassium chloride    Tablet ER 40 milliEquivalent(s) Oral every 4 hours    MEDICATIONS  (PRN):  acetaminophen     Tablet .. 650 milliGRAM(s) Oral every 6 hours PRN Temp greater or equal to 38C (100.4F), Mild Pain (1 - 3)  hydrALAZINE Injectable 10 milliGRAM(s) IV Push every 6 hours PRN Systolic > 180  melatonin 3 milliGRAM(s) Oral at bedtime PRN Insomnia    Vital Signs Last 24 Hrs  T(C): 36.3 (27 Feb 2023 11:48), Max: 36.8 (26 Feb 2023 17:15)  T(F): 97.3 (27 Feb 2023 11:48), Max: 98.2 (26 Feb 2023 17:15)  HR: 68 (27 Feb 2023 11:48) (68 - 95)  BP: 108/45 (27 Feb 2023 11:48) (108/45 - 145/74)  RR: 17 (27 Feb 2023 11:48) (17 - 18)  SpO2: 99% (27 Feb 2023 11:48) (96% - 99%)  Parameters below as of 27 Feb 2023 11:48  Patient On (Oxygen Delivery Method): room air    PE:  AA&0 x   Motor:  Sensory:  Incision site:  head and abdomen- well healed    LABS:                        9.8    5.91  )-----------( 244      ( 27 Feb 2023 05:37 )             31.7     02-27    140  |  105  |  8   ----------------------------<  75  3.3<L>   |  25  |  0.71    Ca    9.4      27 Feb 2023 05:37  Phos  2.9     02-27  Mg     1.80     02-27    TPro  6.4  /  Alb  3.3  /  TBili  0.3  /  DBili  x   /  AST  24  /  ALT  19  /  AlkPhos  117  02-27 02-26-23 @ 07:01  -  02-27-23 @ 07:00  --------------------------------------------------------  IN: 180 mL / OUT: 1150 mL / NET: -970 mL    02-27-23 @ 07:01  - 02-27-23 @ 14:51  --------------------------------------------------------  IN: 300 mL / OUT: 150 mL / NET: 150 mL           HPI:  68 year-old female Jehova's Witness with past medical history of MS, seizure, hypothyroidism, intrathecal baclofen , urosepsis presenting with a chief complaint of altered mental status.  Patient is accompanied by  Julio César at bedside.  History is obtained from .  According to , patient was admitted to the hospital last week for altered mental status. Patient received comprehensive work-up at that time including lumbar puncture, EEG, MRI of the brain, negative blood and urine cultures, normal TSH, seen by infectious disease and neurology with no acute findings and cleared for discharge. When she was discharged she was alert to self and place and had returned to baseline. Beginning on Thursday 2/23 patient was less alert and mental status started to deteriorate again. Patient has become more somnolent and confused, no longer oriented to place and time. No vomiting or diarrhea, no fevers at home, no trauma or falls.  No new medications. Resides at home with  and taken care of by care giver, Deloris. COVID+ on admission. Recent cough starting on Thursday 2/23. Patient is a [employer] and does not want blood given.   CT head on 2/24/23 shows No significant interval change.  No hydrocephalus, acute intracranial hemorrhage, mass effect, or brain edema.  Nonspecific foci of decreased density involving the bilateral temporal occipital periventricular white matter. Differential diagnosis includes ischemic, demyelinating, infectious, inflammatory, and post infectious/inflammatory processes.    Patient was seen by neurosurgery service on 2/14/23, pump was interrogated and found to be working properly, with a refill date of 3/1/23,  Medical team arranging refill.     Baclofen pump interrogation:  Dose-104.06 mcg/day  refill date- 3/1/23  pump reservoir- 20 ml  Concentration 500mcg/ml  reservoir volume-    PAST MEDICAL & SURGICAL HISTORY:  Hypothyroid  Multiple sclerosis  Seizures    Allergies  sulfa drugs (Hives; Rash)    MEDICATIONS  (STANDING):  amLODIPine   Tablet 10 milliGRAM(s) Oral daily  dextrose 5% + sodium chloride 0.9%. 1000 milliLiter(s) (75 mL/Hr) IV Continuous <Continuous>  enoxaparin Injectable 40 milliGRAM(s) SubCutaneous every 24 hours  lacosamide IVPB 50 milliGRAM(s) IV Intermittent every 12 hours  levothyroxine 75 MICROGram(s) Oral daily  pantoprazole    Tablet 40 milliGRAM(s) Oral before breakfast  potassium chloride    Tablet ER 40 milliEquivalent(s) Oral every 4 hours    MEDICATIONS  (PRN):  acetaminophen     Tablet .. 650 milliGRAM(s) Oral every 6 hours PRN Temp greater or equal to 38C (100.4F), Mild Pain (1 - 3)  hydrALAZINE Injectable 10 milliGRAM(s) IV Push every 6 hours PRN Systolic > 180  melatonin 3 milliGRAM(s) Oral at bedtime PRN Insomnia    Vital Signs Last 24 Hrs  T(C): 36.3 (27 Feb 2023 11:48), Max: 36.8 (26 Feb 2023 17:15)  T(F): 97.3 (27 Feb 2023 11:48), Max: 98.2 (26 Feb 2023 17:15)  HR: 68 (27 Feb 2023 11:48) (68 - 95)  BP: 108/45 (27 Feb 2023 11:48) (108/45 - 145/74)  RR: 17 (27 Feb 2023 11:48) (17 - 18)  SpO2: 99% (27 Feb 2023 11:48) (96% - 99%)  Parameters below as of 27 Feb 2023 11:48  Patient On (Oxygen Delivery Method): room air    PE:  AA&0 x   Motor:  Sensory:  Incision site:  head and abdomen- well healed    LABS:                        9.8    5.91  )-----------( 244      ( 27 Feb 2023 05:37 )             31.7     02-27    140  |  105  |  8   ----------------------------<  75  3.3<L>   |  25  |  0.71    Ca    9.4      27 Feb 2023 05:37  Phos  2.9     02-27  Mg     1.80     02-27    TPro  6.4  /  Alb  3.3  /  TBili  0.3  /  DBili  x   /  AST  24  /  ALT  19  /  AlkPhos  117  02-27 02-26-23 @ 07:01  -  02-27-23 @ 07:00  --------------------------------------------------------  IN: 180 mL / OUT: 1150 mL / NET: -970 mL    02-27-23 @ 07:01  -  02-27-23 @ 14:51  --------------------------------------------------------  IN: 300 mL / OUT: 150 mL / NET: 150 mL           HPI:  68 year-old female Jehova's Witness with past medical history of MS, seizure, hypothyroidism, intrathecal baclofen , urosepsis presenting with a chief complaint of altered mental status.  Patient is accompanied by  Julio César at bedside.  History is obtained from .  According to , patient was admitted to the hospital last week for altered mental status. Patient received comprehensive work-up at that time including lumbar puncture, EEG, MRI of the brain, negative blood and urine cultures, normal TSH, seen by infectious disease and neurology with no acute findings and cleared for discharge. When she was discharged she was alert to self and place and had returned to baseline. Beginning on Thursday 2/23 patient was less alert and mental status started to deteriorate again. Patient has become more somnolent and confused, no longer oriented to place and time. No vomiting or diarrhea, no fevers at home, no trauma or falls.  No new medications. Resides at home with  and taken care of by care giver, Deloris. COVID+ on admission. Recent cough starting on Thursday 2/23. Patient is a [employer] and does not want blood given.   CT head on 2/24/23 shows No significant interval change.  No hydrocephalus, acute intracranial hemorrhage, mass effect, or brain edema.  Nonspecific foci of decreased density involving the bilateral temporal occipital periventricular white matter. Differential diagnosis includes ischemic, demyelinating, infectious, inflammatory, and post infectious/inflammatory processes.    Patient was seen by neurosurgery service on 2/14/23, pump was interrogated and found to be working properly, with a refill date of 3/1/23,  Medical team arranging refill.     Baclofen pump interrogation:  Dose-104.06 mcg/day  refill date- 3/1/23  pump reservoir- 20 ml  Concentration 500mcg/ml  reservoir volume- 2.5 ml    PAST MEDICAL & SURGICAL HISTORY:  Hypothyroid  Multiple sclerosis  Seizures    Allergies  sulfa drugs (Hives; Rash)    MEDICATIONS  (STANDING):  amLODIPine   Tablet 10 milliGRAM(s) Oral daily  dextrose 5% + sodium chloride 0.9%. 1000 milliLiter(s) (75 mL/Hr) IV Continuous <Continuous>  enoxaparin Injectable 40 milliGRAM(s) SubCutaneous every 24 hours  lacosamide IVPB 50 milliGRAM(s) IV Intermittent every 12 hours  levothyroxine 75 MICROGram(s) Oral daily  pantoprazole    Tablet 40 milliGRAM(s) Oral before breakfast  potassium chloride    Tablet ER 40 milliEquivalent(s) Oral every 4 hours    MEDICATIONS  (PRN):  acetaminophen     Tablet .. 650 milliGRAM(s) Oral every 6 hours PRN Temp greater or equal to 38C (100.4F), Mild Pain (1 - 3)  hydrALAZINE Injectable 10 milliGRAM(s) IV Push every 6 hours PRN Systolic > 180  melatonin 3 milliGRAM(s) Oral at bedtime PRN Insomnia    Vital Signs Last 24 Hrs  T(C): 36.3 (27 Feb 2023 11:48), Max: 36.8 (26 Feb 2023 17:15)  T(F): 97.3 (27 Feb 2023 11:48), Max: 98.2 (26 Feb 2023 17:15)  HR: 68 (27 Feb 2023 11:48) (68 - 95)  BP: 108/45 (27 Feb 2023 11:48) (108/45 - 145/74)  RR: 17 (27 Feb 2023 11:48) (17 - 18)  SpO2: 99% (27 Feb 2023 11:48) (96% - 99%)  Parameters below as of 27 Feb 2023 11:48  Patient On (Oxygen Delivery Method): room air    PE:  AA&0 x 3, speech clear, follows commands, CN2-12 grossly intact  Motor: increased tone throughout, RUE : 4/5, LUE 4+/5, RLE 0/5, LLE - wiggles toes  Sensory: SILT  Incision site:  lower back and abdomen- well healed    LABS:                        9.8    5.91  )-----------( 244      ( 27 Feb 2023 05:37 )             31.7     02-27    140  |  105  |  8   ----------------------------<  75  3.3<L>   |  25  |  0.71    Ca    9.4      27 Feb 2023 05:37  Phos  2.9     02-27  Mg     1.80     02-27    TPro  6.4  /  Alb  3.3  /  TBili  0.3  /  DBili  x   /  AST  24  /  ALT  19  /  AlkPhos  117  02-27 02-26-23 @ 07:01  -  02-27-23 @ 07:00  --------------------------------------------------------  IN: 180 mL / OUT: 1150 mL / NET: -970 mL    02-27-23 @ 07:01  -  02-27-23 @ 14:51  --------------------------------------------------------  IN: 300 mL / OUT: 150 mL / NET: 150 mL

## 2023-02-27 NOTE — PROGRESS NOTE ADULT - PROBLEM SELECTOR PLAN 4
Diagnosed over 37 years ago, pt follows at [facility], on baclofen pump (L abdomen).  - C/w baclofen  - Neurosurgery evaluation of pump functionality (recently checked without issues)  - Awaiting outside records from [facility] for MS and baclofen records

## 2023-02-27 NOTE — DISCHARGE NOTE PROVIDER - NSDCFUSCHEDAPPT_GEN_ALL_CORE_FT
Matias Sterling  [facility] Physician [facility]  UROLOGY [address] [facility]  Scheduled Appointment: 04/05/2023

## 2023-02-27 NOTE — DISCHARGE NOTE PROVIDER - NSDCCPCAREPLAN_GEN_ALL_CORE_FT
PRINCIPAL DISCHARGE DIAGNOSIS  Diagnosis: Altered mental state  Assessment and Plan of Treatment:        PRINCIPAL DISCHARGE DIAGNOSIS  Diagnosis: Altered mental state  Assessment and Plan of Treatment: You came to the hospital for confusion. You had a complete infectious work-up done and you were seen by the neurology team.   Please return to the ER if you experience further confusion, fevers, or seizures.        SECONDARY DISCHARGE DIAGNOSES  Diagnosis: 2019 novel coronavirus disease (COVID-19)  Assessment and Plan of Treatment:      PRINCIPAL DISCHARGE DIAGNOSIS  Diagnosis: Altered mental state  Assessment and Plan of Treatment: You came to the hospital for confusion. You had a complete infectious work-up done and you were seen by the neurology team who determined your symptoms were likely due to COVID.   Please return to the ER if you experience further confusion, fevers, or seizures.        SECONDARY DISCHARGE DIAGNOSES  Diagnosis: 2019 novel coronavirus disease (COVID-19)  Assessment and Plan of Treatment: You tested positive for COVID-19 when you were admitted. You have a mild cough that has since resolved but did not have any fever or chills. You were not given any medications regarding COVID due to your lack of severe symptoms.     PRINCIPAL DISCHARGE DIAGNOSIS  Diagnosis: Altered mental state  Assessment and Plan of Treatment: You came to the hospital for confusion. You had a complete infectious work-up done and you were seen by the neurology team who determined your symptoms were likely due to COVID.   Please return to the ER if you experience further confusion, fevers, or seizures.        SECONDARY DISCHARGE DIAGNOSES  Diagnosis: Multiple sclerosis  Assessment and Plan of Treatment: You were diagnosed over 37 years ago and follows at [facility], on baclofen pump (L abdomen).  Please follow up with your N/S team on discharge.  You had an MRI during your hospital stay.  This was reviewed by neurology who did not recommend any inpatient evaluation.  Your MRI showed: "Stable areas of T2 prolongation in the periventricular white   matter region is seen as described above. 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  Continue with home lacosamide 50mg bid.    Diagnosis: 2019 novel coronavirus disease (COVID-19)  Assessment and Plan of Treatment: You tested positive for COVID-19 when you were admitted. You have a mild cough that has since resolved but did not have any fever or chills. You were not given any medications regarding COVID due to your lack of severe symptoms.

## 2023-02-27 NOTE — PROGRESS NOTE ADULT - PROBLEM SELECTOR PLAN 6
Pt was admitted at [facility] for urosepsis with seizure like event that was witnessed. Was discharged on keppra 500mg BID. Patient also had RRT 12/16 for 40 seconds of seizure like activity described as stiffening of b/l UE, foaming at the mouth, hypoxic during event.  - 12/2022 EEG with rare generalized epileptiform discharges indicate a risk of generalized-onset seizures. Interpretation of the EEG is limited by continuous artifacts. Consider repeat study if clinically indicated.  - No seizure like activity noted since recent discarge  - Neuro recs no new EEG or MRI, c/w with home dose lacosamide  - c/w lacosamide 50mg bid

## 2023-02-27 NOTE — PROGRESS NOTE ADULT - SUBJECTIVE AND OBJECTIVE BOX
INCOMPLETE NOTE    Monster Owen | PGY1| Pager: [phone]  Interval Events:    REVIEW OF SYSTEMS:  CONSTITUTIONAL: No weakness, fevers or chills  EYES/ENT: No visual changes;  No vertigo or throat pain   NECK: No pain or stiffness  RESPIRATORY: No cough, wheezing, hemoptysis; No shortness of breath  CARDIOVASCULAR: No chest pain or palpitations  GASTROINTESTINAL: No abdominal or epigastric pain. No nausea, vomiting, or hematemesis; No diarrhea or constipation. No melena or hematochezia.  GENITOURINARY: No dysuria, frequency or hematuria  NEUROLOGICAL: No numbness or weakness  SKIN: No itching, burning, rashes, or lesions   All other review of systems is negative unless indicated above.    OBJECTIVE:  ICU Vital Signs Last 24 Hrs  T(C): 36.7 (26 Feb 2023 22:30), Max: 36.8 (26 Feb 2023 17:15)  T(F): 98 (26 Feb 2023 22:30), Max: 98.2 (26 Feb 2023 17:15)  HR: 82 (26 Feb 2023 22:30) (82 - 102)  BP: 135/69 (26 Feb 2023 22:30) (135/69 - 145/74)  BP(mean): --  ABP: --  ABP(mean): --  RR: 18 (26 Feb 2023 22:30) (18 - 18)  SpO2: 97% (26 Feb 2023 22:30) (95% - 99%)    O2 Parameters below as of 26 Feb 2023 22:30  Patient On (Oxygen Delivery Method): room air              02-26 @ 07:01  -  02-27 @ 07:00  --------------------------------------------------------  IN: 180 mL / OUT: 1150 mL / NET: -970 mL      CAPILLARY BLOOD GLUCOSE      POCT Blood Glucose.: 75 mg/dL (27 Feb 2023 06:17)      PHYSICAL EXAM:  General: WN/WD NAD  Neurology: A&Ox3, nonfocal, KELSEY x 4  Eyes: PERRLA/ EOMI, Gross vision intact  ENT/Neck: Neck supple, trachea midline, No JVD, Gross hearing intact  Respiratory: CTA B/L, No wheezing, rales, rhonchi  CV: RRR, +S1/S2, -S3/S4, no murmurs, rubs or gallops  Abdominal: Soft, NT, ND +BS, No HSM  MSK: 5/5 strength UE/LE bilaterally  Extremities: No edema, 2+ peripheral pulses  Skin: No Rashes, Hematoma, Ecchymosis  Incisions:   Tubes:    HOSPITAL MEDICATIONS:  MEDICATIONS  (STANDING):  amLODIPine   Tablet 10 milliGRAM(s) Oral daily  dextrose 5% + sodium chloride 0.9%. 1000 milliLiter(s) (75 mL/Hr) IV Continuous <Continuous>  enoxaparin Injectable 40 milliGRAM(s) SubCutaneous every 24 hours  lacosamide IVPB 50 milliGRAM(s) IV Intermittent every 12 hours  levothyroxine 75 MICROGram(s) Oral daily  pantoprazole    Tablet 40 milliGRAM(s) Oral before breakfast    MEDICATIONS  (PRN):  acetaminophen     Tablet .. 650 milliGRAM(s) Oral every 6 hours PRN Temp greater or equal to 38C (100.4F), Mild Pain (1 - 3)  hydrALAZINE Injectable 10 milliGRAM(s) IV Push every 6 hours PRN Systolic > 180  melatonin 3 milliGRAM(s) Oral at bedtime PRN Insomnia      LABS:                        10.6   7.98  )-----------( 270      ( 26 Feb 2023 05:50 )             34.0     Hgb Trend: 10.6<--, 10.3<--, 9.9<--  02-26    136  |  99  |  13  ----------------------------<  104<H>  3.5   |  26  |  0.63    Ca    9.8      26 Feb 2023 05:50  Phos  2.6     02-26  Mg     1.70     02-26    TPro  7.4  /  Alb  3.8  /  TBili  0.3  /  DBili  x   /  AST  36<H>  /  ALT  24  /  AlkPhos  139<H>  02-26    Creatinine Trend: 0.63<--, 0.65<--, 0.76<--, 0.72<--, 0.66<--, 0.57<--            MICROBIOLOGY:      PROGRESS NOTE    Monster Owen | PGY1| Pager: [phone]  Interval Events:  ULYSSES o/n. Afebrile. Vitals stable. Patient taking PO meds but only had 3 bites of dinner last night. Per nurse, patient is delirious and claims to see dead bodies. At bedside this AM, patient is AAOx2 and endorsing seeing dead bodies.     OBJECTIVE:  ICU Vital Signs Last 24 Hrs  T(C): 36.7 (26 Feb 2023 22:30), Max: 36.8 (26 Feb 2023 17:15)  T(F): 98 (26 Feb 2023 22:30), Max: 98.2 (26 Feb 2023 17:15)  HR: 82 (26 Feb 2023 22:30) (82 - 102)  BP: 135/69 (26 Feb 2023 22:30) (135/69 - 145/74)  BP(mean): --  ABP: --  ABP(mean): --  RR: 18 (26 Feb 2023 22:30) (18 - 18)  SpO2: 97% (26 Feb 2023 22:30) (95% - 99%)    O2 Parameters below as of 26 Feb 2023 22:30  Patient On (Oxygen Delivery Method): room air    02-26 @ 07:01  -  02-27 @ 07:00  --------------------------------------------------------  IN: 180 mL / OUT: 1150 mL / NET: -970 mL      CAPILLARY BLOOD GLUCOSE      POCT Blood Glucose.: 75 mg/dL (27 Feb 2023 06:17)    PHYSICAL EXAM  GENERAL: Awake, NAD  HEENT: NC/AT, moist mucous membranes, , EOMI;  no lymphadenopathy  LUNGS: CTAB, no wheezes or crackles   ABDOMEN: Soft, non distended, no rebound, no guarding  CARDIAC: RRR, no m/r/g  BACK: no CVA tenderness  EXT: mild pedal edema non-pitting, no calf tenderness, 2+ DP pulses bilaterally, no deformities.  NEURO: AAOx2, able to respond to basic commands and knows her name, location, CNII-XII intact, decreased strength b/l lower extremities 2/2 MS, moving b/l UE but with decreased extension - no rigidity  SKIN: Warm and dry. No rash.  PSYCH: Normal affect    HOSPITAL MEDICATIONS:  MEDICATIONS  (STANDING):  amLODIPine   Tablet 10 milliGRAM(s) Oral daily  dextrose 5% + sodium chloride 0.9%. 1000 milliLiter(s) (75 mL/Hr) IV Continuous <Continuous>  enoxaparin Injectable 40 milliGRAM(s) SubCutaneous every 24 hours  lacosamide IVPB 50 milliGRAM(s) IV Intermittent every 12 hours  levothyroxine 75 MICROGram(s) Oral daily  pantoprazole    Tablet 40 milliGRAM(s) Oral before breakfast    MEDICATIONS  (PRN):  acetaminophen     Tablet .. 650 milliGRAM(s) Oral every 6 hours PRN Temp greater or equal to 38C (100.4F), Mild Pain (1 - 3)  hydrALAZINE Injectable 10 milliGRAM(s) IV Push every 6 hours PRN Systolic > 180  melatonin 3 milliGRAM(s) Oral at bedtime PRN Insomnia      LABS:                        10.6   7.98  )-----------( 270      ( 26 Feb 2023 05:50 )             34.0     Hgb Trend: 10.6<--, 10.3<--, 9.9<--  02-26    136  |  99  |  13  ----------------------------<  104<H>  3.5   |  26  |  0.63    Ca    9.8      26 Feb 2023 05:50  Phos  2.6     02-26  Mg     1.70     02-26    TPro  7.4  /  Alb  3.8  /  TBili  0.3  /  DBili  x   /  AST  36<H>  /  ALT  24  /  AlkPhos  139<H>  02-26    Creatinine Trend: 0.63<--, 0.65<--, 0.76<--, 0.72<--, 0.66<--, 0.57<--            MICROBIOLOGY:

## 2023-02-27 NOTE — DISCHARGE NOTE PROVIDER - ATTENDING DISCHARGE PHYSICAL EXAMINATION:
The patient was seen and examined and her vital signs and labs were review.  Her exam revealed a patient in no acute distress with regular rate and rhythm, no murmurs, rubs or gallops, lungs are clear bilaterally and abdominal exam is benign.

## 2023-02-27 NOTE — DISCHARGE NOTE PROVIDER - NSDCCAREPROVSEEN_GEN_ALL_CORE_FT
Monique Washbunr The patient was seen and examined and her vital signs and labs were review.  Her exam revealed a patient in no acute distress with regular rate and rhythm, no murmurs, rubs or gallops, lungs are clear bilaterally and abdominal exam is benign.   The patient was seen and examined and her vital signs and labs were review.  Her exam revealed a patient in no acute distress with regular rate and rhythm, no murmurs, rubs or gallops, lungs are clear bilaterally and abdominal exam is benign.      Monique Washburn H       Monique Washburn

## 2023-02-28 LAB
ALBUMIN SERPL ELPH-MCNC: 3.3 G/DL — SIGNIFICANT CHANGE UP (ref 3.3–5)
ALP SERPL-CCNC: 110 U/L — SIGNIFICANT CHANGE UP (ref 40–120)
ALT FLD-CCNC: 18 U/L — SIGNIFICANT CHANGE UP (ref 4–33)
ANION GAP SERPL CALC-SCNC: 9 MMOL/L — SIGNIFICANT CHANGE UP (ref 7–14)
AST SERPL-CCNC: 20 U/L — SIGNIFICANT CHANGE UP (ref 4–32)
BILIRUB SERPL-MCNC: 0.3 MG/DL — SIGNIFICANT CHANGE UP (ref 0.2–1.2)
BUN SERPL-MCNC: 12 MG/DL — SIGNIFICANT CHANGE UP (ref 7–23)
CALCIUM SERPL-MCNC: 9.2 MG/DL — SIGNIFICANT CHANGE UP (ref 8.4–10.5)
CHLORIDE SERPL-SCNC: 104 MMOL/L — SIGNIFICANT CHANGE UP (ref 98–107)
CO2 SERPL-SCNC: 24 MMOL/L — SIGNIFICANT CHANGE UP (ref 22–31)
CREAT SERPL-MCNC: 0.62 MG/DL — SIGNIFICANT CHANGE UP (ref 0.5–1.3)
EGFR: 98 ML/MIN/1.73M2 — SIGNIFICANT CHANGE UP
GLUCOSE BLDC GLUCOMTR-MCNC: 116 MG/DL — HIGH (ref 70–99)
GLUCOSE BLDC GLUCOMTR-MCNC: 81 MG/DL — SIGNIFICANT CHANGE UP (ref 70–99)
GLUCOSE BLDC GLUCOMTR-MCNC: 88 MG/DL — SIGNIFICANT CHANGE UP (ref 70–99)
GLUCOSE SERPL-MCNC: 88 MG/DL — SIGNIFICANT CHANGE UP (ref 70–99)
HCT VFR BLD CALC: 29.8 % — LOW (ref 34.5–45)
HGB BLD-MCNC: 9.2 G/DL — LOW (ref 11.5–15.5)
MAGNESIUM SERPL-MCNC: 1.7 MG/DL — SIGNIFICANT CHANGE UP (ref 1.6–2.6)
MCHC RBC-ENTMCNC: 28.9 PG — SIGNIFICANT CHANGE UP (ref 27–34)
MCHC RBC-ENTMCNC: 30.9 GM/DL — LOW (ref 32–36)
MCV RBC AUTO: 93.7 FL — SIGNIFICANT CHANGE UP (ref 80–100)
NRBC # BLD: 0 /100 WBCS — SIGNIFICANT CHANGE UP (ref 0–0)
NRBC # FLD: 0 K/UL — SIGNIFICANT CHANGE UP (ref 0–0)
PHOSPHATE SERPL-MCNC: 3.1 MG/DL — SIGNIFICANT CHANGE UP (ref 2.5–4.5)
PLATELET # BLD AUTO: 217 K/UL — SIGNIFICANT CHANGE UP (ref 150–400)
POTASSIUM SERPL-MCNC: 3.5 MMOL/L — SIGNIFICANT CHANGE UP (ref 3.5–5.3)
POTASSIUM SERPL-SCNC: 3.5 MMOL/L — SIGNIFICANT CHANGE UP (ref 3.5–5.3)
PROT SERPL-MCNC: 6.2 G/DL — SIGNIFICANT CHANGE UP (ref 6–8.3)
RBC # BLD: 3.18 M/UL — LOW (ref 3.8–5.2)
RBC # FLD: 17.4 % — HIGH (ref 10.3–14.5)
SODIUM SERPL-SCNC: 137 MMOL/L — SIGNIFICANT CHANGE UP (ref 135–145)
WBC # BLD: 9.4 K/UL — SIGNIFICANT CHANGE UP (ref 3.8–10.5)
WBC # FLD AUTO: 9.4 K/UL — SIGNIFICANT CHANGE UP (ref 3.8–10.5)

## 2023-02-28 PROCEDURE — 99233 SBSQ HOSP IP/OBS HIGH 50: CPT

## 2023-02-28 RX ORDER — LOPERAMIDE HCL 2 MG
2 TABLET ORAL ONCE
Refills: 0 | Status: COMPLETED | OUTPATIENT
Start: 2023-02-28 | End: 2023-02-28

## 2023-02-28 RX ADMIN — ENOXAPARIN SODIUM 40 MILLIGRAM(S): 100 INJECTION SUBCUTANEOUS at 17:21

## 2023-02-28 RX ADMIN — LACOSAMIDE 110 MILLIGRAM(S): 50 TABLET ORAL at 07:07

## 2023-02-28 RX ADMIN — AMLODIPINE BESYLATE 10 MILLIGRAM(S): 2.5 TABLET ORAL at 07:07

## 2023-02-28 RX ADMIN — PANTOPRAZOLE SODIUM 40 MILLIGRAM(S): 20 TABLET, DELAYED RELEASE ORAL at 07:07

## 2023-02-28 RX ADMIN — LACOSAMIDE 110 MILLIGRAM(S): 50 TABLET ORAL at 17:20

## 2023-02-28 RX ADMIN — Medication 75 MICROGRAM(S): at 07:07

## 2023-02-28 RX ADMIN — Medication 2 MILLIGRAM(S): at 17:20

## 2023-02-28 NOTE — DIETITIAN INITIAL EVALUATION ADULT - ADD RECOMMEND
1) Recommend continue diet without therapeutic restrictions; texture/consistency per SLP recommendations/MD discretion.  2) RDN to provide Hormel Shake 1 PO 2x daily (provides 520 kcal, 22 gm protein per 8.45oz serving).  3) Provide assistance, encouragement and reorientation at meal times as appropriate. Document % PO intake in flowsheets.  4) Obtain weekly weights.

## 2023-02-28 NOTE — PROGRESS NOTE ADULT - PROBLEM SELECTOR PLAN 6
Pt was admitted at [facility] for urosepsis with seizure like event that was witnessed. Was discharged on keppra 500mg BID. Patient also had RRT 12/16 for 40 seconds of seizure like activity described as stiffening of b/l UE, foaming at the mouth, hypoxic during event.  - 12/2022 EEG with rare generalized epileptiform discharges indicate a risk of generalized-onset seizures. Interpretation of the EEG is limited by continuous artifacts. Consider repeat study if clinically indicated.  - No seizure like activity noted since recent discarge  - Neuro recs no new EEG or MRI, c/w with home dose lacosamide  - c/w lacosamide 50mg bid

## 2023-02-28 NOTE — PHYSICAL THERAPY INITIAL EVALUATION ADULT - PERTINENT HX OF CURRENT PROBLEM, REHAB EVAL
67 year old female Jehova's Witness with PMH MS (on baclofen pump), seizure, hypothyroidism, urosepsis presents with AMS found to be COVID+ on admission. Admitted for acute encephalopathy 2/2 suspected COVID pneumonia vs. drug (baclofen) related vs. MS progression.

## 2023-02-28 NOTE — DIETITIAN INITIAL EVALUATION ADULT - OTHER INFO
Per chart, pt is 67 year old female Druze PMH MS (on baclofen pump), seizure, hypothyroidism, urosepsis presenting with AMS found to be COVID positive admitted for acute encephalopathy secondary to suspected COVID pneumonia vs. drug (baclofen) related vs. MS progression. Course complicated by new paranoia/delirium. Neurology and Neurosurgery were consulted. DNR/DNI.     Pt unable to meaningfully participate in interview, A&Ox1-2. NKFA. Pt lives at home with  and has an aide, bedbound. S/p bedside swallow assessment (2/25) recommending regular solids/thin liquids; current diet order inclusive of thickened liquids. Pt requires total assistance with feeding at meal times. Pt is refusing oral medications and meals secondary to AMS. Pt continues on IVF with D5. Fingersticks WDL. No noted GI distress, fecal incontinence per flowsheets; no bowel regimen ordered at this time. Of note, pt met criteria for malnutrition during previous admission per RDN assessment (12/22/22).

## 2023-02-28 NOTE — PHYSICAL THERAPY INITIAL EVALUATION ADULT - NSPTDISCHREC_GEN_A_CORE
anticipated discharge to home with home PT services to address current functional limitations to optimize safety within the home environment with the use of least restrictive assistive device./Home PT

## 2023-02-28 NOTE — PHYSICAL THERAPY INITIAL EVALUATION ADULT - FUNCTIONAL LIMITATIONS, PT EVAL
Spoke with patient. States she absolutely wants her valve replaced and she was told her angiogram is scheduled for 9/28, and the valve procedure 1 week after that.   She has 3 concerns - why she needs an angiogram, why not proceed directly to valve replacement. Questions answered to her satisfaction.  Second, she has urinary issues and will not be able to hold her urine for any length of time. Asking for a catheter for the procedure.  Third, she needs to know if this is a planned over night stay as she is diabetic and needs to be monitored.     Advised I will discuss all of the above with Dr. Coburn and call her back. Advised I may be calling her tomorrow morning as he is in procedures today. Patient v/u and agreed.    self-care/home management

## 2023-02-28 NOTE — PHYSICAL THERAPY INITIAL EVALUATION ADULT - LEVEL OF INDEPENDENCE: SUPINE/SIT, REHAB EVAL
Pt deferred to perform functional mobility assessment secondary to bowel movement and wanting to be cleaned

## 2023-02-28 NOTE — PROGRESS NOTE ADULT - PROBLEM SELECTOR PLAN 1
Per , AAOx4 baseline. Presents AAOx1 on admission with confusion and somnolence. CT head wo acute findings except left lacunar infarct. no seizure noted at home.  -Today AAOx4  - COVID positive, however rest of infectious w/u negative   - Decreased PO intake, advance diet as tolerated. Encourage PO intake with family member

## 2023-02-28 NOTE — DIETITIAN INITIAL EVALUATION ADULT - OTHER CALCULATIONS
Dosing weight (2/24) 151.8 lbs / 68.9 kg.  Recent chart weight (12/16/22) 151 lbs.   Ideal Body Weight: 135 lbs / 61.4 kg +/-10%

## 2023-02-28 NOTE — PROGRESS NOTE ADULT - ASSESSMENT
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 Admitted for acute encephalopathy 2/2 suspected COVID pneumonia vs. drug (baclofen) related vs. MS progression c/b new paranoia about medications.

## 2023-02-28 NOTE — PROGRESS NOTE ADULT - SUBJECTIVE AND OBJECTIVE BOX
PROGRESS NOTE:     Patient is a 68y old  Female who presents with a chief complaint of Infection due to severe acute respiratory syndrome coronavirus 2 (SARS-CoV-2)     (28 Feb 2023 13:16)      SUBJECTIVE / OVERNIGHT EVENTS: no acute overnight events    ADDITIONAL REVIEW OF SYSTEMS:    MEDICATIONS  (STANDING):  amLODIPine   Tablet 10 milliGRAM(s) Oral daily  dextrose 5% + sodium chloride 0.9%. 1000 milliLiter(s) (75 mL/Hr) IV Continuous <Continuous>  enoxaparin Injectable 40 milliGRAM(s) SubCutaneous every 24 hours  lacosamide IVPB 50 milliGRAM(s) IV Intermittent every 12 hours  levothyroxine 75 MICROGram(s) Oral daily  pantoprazole    Tablet 40 milliGRAM(s) Oral before breakfast    MEDICATIONS  (PRN):  acetaminophen     Tablet .. 650 milliGRAM(s) Oral every 6 hours PRN Temp greater or equal to 38C (100.4F), Mild Pain (1 - 3)  hydrALAZINE Injectable 10 milliGRAM(s) IV Push every 6 hours PRN Systolic > 180  melatonin 3 milliGRAM(s) Oral at bedtime PRN Insomnia      CAPILLARY BLOOD GLUCOSE      POCT Blood Glucose.: 76 mg/dL (28 Feb 2023 17:54)  POCT Blood Glucose.: 116 mg/dL (28 Feb 2023 12:37)  POCT Blood Glucose.: 88 mg/dL (28 Feb 2023 07:09)  POCT Blood Glucose.: 96 mg/dL (27 Feb 2023 23:55)  POCT Blood Glucose.: 116 mg/dL (27 Feb 2023 18:34)    I&O's Summary    27 Feb 2023 07:01  -  28 Feb 2023 07:00  --------------------------------------------------------  IN: 540 mL / OUT: 500 mL / NET: 40 mL    28 Feb 2023 07:01  -  28 Feb 2023 18:14  --------------------------------------------------------  IN: 650 mL / OUT: 700 mL / NET: -50 mL        PHYSICAL EXAM:  Vital Signs Last 24 Hrs  T(C): 36.6 (28 Feb 2023 17:26), Max: 36.6 (28 Feb 2023 17:26)  T(F): 97.8 (28 Feb 2023 17:26), Max: 97.8 (28 Feb 2023 17:26)  HR: 65 (28 Feb 2023 17:26) (65 - 84)  BP: 117/67 (28 Feb 2023 17:26) (103/47 - 123/57)  BP(mean): --  RR: 18 (28 Feb 2023 17:26) (17 - 18)  SpO2: 99% (28 Feb 2023 17:26) (96% - 100%)    Parameters below as of 28 Feb 2023 17:26  Patient On (Oxygen Delivery Method): room air        CONSTITUTIONAL: NAD, well-developed  RESPIRATORY: Normal respiratory effort; lungs are clear to auscultation bilaterally  CARDIOVASCULAR: Regular rate and rhythm, normal S1 and S2, no murmur/rub/gallop; No lower extremity edema; Peripheral pulses are 2+ bilaterally  ABDOMEN: Nontender to palpation, normoactive bowel sounds, no rebound/guarding; No hepatosplenomegaly  MUSCLOSKELETAL: b/l LE weakness  PSYCH: A+O to person, place, and time; affect appropriate    LABS:                        9.2    9.40  )-----------( 217      ( 28 Feb 2023 06:20 )             29.8     02-28    137  |  104  |  12  ----------------------------<  88  3.5   |  24  |  0.62    Ca    9.2      28 Feb 2023 06:20  Phos  3.1     02-28  Mg     1.70     02-28    TPro  6.2  /  Alb  3.3  /  TBili  0.3  /  DBili  x   /  AST  20  /  ALT  18  /  AlkPhos  110  02-28                RADIOLOGY & ADDITIONAL TESTS:  Results Reviewed:   Imaging Personally Reviewed:  Electrocardiogram Personally Reviewed:    COORDINATION OF CARE:  Care Discussed with Consultants/Other Providers [Y/N]:  Prior or Outpatient Records Reviewed [Y/N]:

## 2023-02-28 NOTE — PROGRESS NOTE ADULT - PROBLEM SELECTOR PLAN 4
Diagnosed over 37 years ago, pt follows at [facility], on baclofen pump (L abdomen).  - C/w baclofen  - Neurosurgery evaluation of pump functionality (recently checked without issues)  - Awaiting outside records from [facility] for MS and baclofen records

## 2023-02-28 NOTE — DIETITIAN INITIAL EVALUATION ADULT - PERTINENT LABORATORY DATA
(2/28) Na 137, BUN 12, Cr 0.62, BG 88, K+ 3.5, Phos 3.1, Mg 1.70, Alk Phos 110, ALT/SGPT 18, AST/SGOT 20  POCT: (2/28) , (2/27)

## 2023-02-28 NOTE — PHYSICAL THERAPY INITIAL EVALUATION ADULT - ADDITIONAL COMMENTS
Pt lives in a private house with , is non-ambulatory at baseline for several years. Uses a motorized wheelchair for all ambulation and requires assistance for all transfers via slide-board for bed<>chair, wheelchair<>shower chair, etc. Pt has a 24/7 home health aide.     Pt left semi-supine in bed, all lines intact, all needs in reach, bed alarm set, in NAD. KIKI ratliff.

## 2023-03-01 DIAGNOSIS — R25.2 CRAMP AND SPASM: ICD-10-CM

## 2023-03-01 LAB
ANION GAP SERPL CALC-SCNC: 9 MMOL/L — SIGNIFICANT CHANGE UP (ref 7–14)
BUN SERPL-MCNC: 15 MG/DL — SIGNIFICANT CHANGE UP (ref 7–23)
CALCIUM SERPL-MCNC: 9.5 MG/DL — SIGNIFICANT CHANGE UP (ref 8.4–10.5)
CHLORIDE SERPL-SCNC: 104 MMOL/L — SIGNIFICANT CHANGE UP (ref 98–107)
CO2 SERPL-SCNC: 27 MMOL/L — SIGNIFICANT CHANGE UP (ref 22–31)
CREAT SERPL-MCNC: 0.69 MG/DL — SIGNIFICANT CHANGE UP (ref 0.5–1.3)
EGFR: 96 ML/MIN/1.73M2 — SIGNIFICANT CHANGE UP
GLUCOSE BLDC GLUCOMTR-MCNC: 72 MG/DL — SIGNIFICANT CHANGE UP (ref 70–99)
GLUCOSE SERPL-MCNC: 71 MG/DL — SIGNIFICANT CHANGE UP (ref 70–99)
HCT VFR BLD CALC: 30 % — LOW (ref 34.5–45)
HGB BLD-MCNC: 9.1 G/DL — LOW (ref 11.5–15.5)
MCHC RBC-ENTMCNC: 28.2 PG — SIGNIFICANT CHANGE UP (ref 27–34)
MCHC RBC-ENTMCNC: 30.3 GM/DL — LOW (ref 32–36)
MCV RBC AUTO: 92.9 FL — SIGNIFICANT CHANGE UP (ref 80–100)
NRBC # BLD: 0 /100 WBCS — SIGNIFICANT CHANGE UP (ref 0–0)
NRBC # FLD: 0 K/UL — SIGNIFICANT CHANGE UP (ref 0–0)
PLATELET # BLD AUTO: 227 K/UL — SIGNIFICANT CHANGE UP (ref 150–400)
POTASSIUM SERPL-MCNC: 3.9 MMOL/L — SIGNIFICANT CHANGE UP (ref 3.5–5.3)
POTASSIUM SERPL-SCNC: 3.9 MMOL/L — SIGNIFICANT CHANGE UP (ref 3.5–5.3)
RBC # BLD: 3.23 M/UL — LOW (ref 3.8–5.2)
RBC # FLD: 17.3 % — HIGH (ref 10.3–14.5)
SODIUM SERPL-SCNC: 140 MMOL/L — SIGNIFICANT CHANGE UP (ref 135–145)
WBC # BLD: 9.17 K/UL — SIGNIFICANT CHANGE UP (ref 3.8–10.5)
WBC # FLD AUTO: 9.17 K/UL — SIGNIFICANT CHANGE UP (ref 3.8–10.5)

## 2023-03-01 PROCEDURE — 70553 MRI BRAIN STEM W/O & W/DYE: CPT | Mod: 26

## 2023-03-01 PROCEDURE — 95990 SPIN/BRAIN PUMP REFIL & MAIN: CPT

## 2023-03-01 PROCEDURE — 99232 SBSQ HOSP IP/OBS MODERATE 35: CPT

## 2023-03-01 RX ORDER — BACLOFEN 100 %
10000 POWDER (GRAM) MISCELLANEOUS ONCE
Refills: 0 | Status: DISCONTINUED | OUTPATIENT
Start: 2023-03-01 | End: 2023-03-03

## 2023-03-01 RX ORDER — LACOSAMIDE 50 MG/1
50 TABLET ORAL
Refills: 0 | Status: DISCONTINUED | OUTPATIENT
Start: 2023-03-01 | End: 2023-03-03

## 2023-03-01 RX ADMIN — PANTOPRAZOLE SODIUM 40 MILLIGRAM(S): 20 TABLET, DELAYED RELEASE ORAL at 07:19

## 2023-03-01 RX ADMIN — Medication 75 MICROGRAM(S): at 07:19

## 2023-03-01 RX ADMIN — LACOSAMIDE 110 MILLIGRAM(S): 50 TABLET ORAL at 07:19

## 2023-03-01 RX ADMIN — LACOSAMIDE 50 MILLIGRAM(S): 50 TABLET ORAL at 18:17

## 2023-03-01 RX ADMIN — AMLODIPINE BESYLATE 10 MILLIGRAM(S): 2.5 TABLET ORAL at 07:19

## 2023-03-01 RX ADMIN — SODIUM CHLORIDE 75 MILLILITER(S): 9 INJECTION, SOLUTION INTRAVENOUS at 04:42

## 2023-03-01 NOTE — PROCEDURE NOTE - ADDITIONAL PROCEDURE DETAILS
New Alarm date 5/26/23  Dose- 104mcg/day  3cc of baclofen aspirated from pump before refill was performed

## 2023-03-01 NOTE — PROCEDURE NOTE - NSICDXPROBLEMMLMBOX_GEN
IPSTART~^~1~^~[id number]~^~~^~IPEND  PKSTART~^~[id number]~^~PKEND  IPSTART~^~2~^~[id number]~^~~^~IPEND  PKSTART~^~[id number]~^~PKEND

## 2023-03-01 NOTE — PROGRESS NOTE ADULT - PROBLEM SELECTOR PLAN 1
Per , AAOx4 baseline. Presents AAOx1 on admission with confusion and somnolence. CT head wo acute findings except left lacunar infarct. no seizure noted at home.  -Today AAOx4  - COVID positive, however rest of infectious w/u negative   - Decreased PO intake, advance diet as tolerated. Encourage PO intake with family member  -for MRI today

## 2023-03-01 NOTE — PROGRESS NOTE ADULT - PROBLEM SELECTOR PLAN 4
Diagnosed over 37 years ago, pt follows at [facility], on baclofen pump (L abdomen).  - C/w baclofen  - Neurosurgery evaluation of pump functionality (recently checked without issues)  - N/S consulted for baclofen replacement

## 2023-03-01 NOTE — PROGRESS NOTE ADULT - SUBJECTIVE AND OBJECTIVE BOX
PROGRESS NOTE:     Patient is a 68y old  Female who presents with a chief complaint of AMS (02 Mar 2023 11:36)      SUBJECTIVE / OVERNIGHT EVENTS: no acute overnight events    ADDITIONAL REVIEW OF SYSTEMS:    MEDICATIONS  (STANDING):  amLODIPine   Tablet 10 milliGRAM(s) Oral daily  baclofen PF IntraThecal 97248 MICROGram(s) IntraThecal once  lacosamide 50 milliGRAM(s) Oral two times a day  levothyroxine 75 MICROGram(s) Oral daily  pantoprazole    Tablet 40 milliGRAM(s) Oral before breakfast    MEDICATIONS  (PRN):  acetaminophen     Tablet .. 650 milliGRAM(s) Oral every 6 hours PRN Temp greater or equal to 38C (100.4F), Mild Pain (1 - 3)  melatonin 3 milliGRAM(s) Oral at bedtime PRN Insomnia      CAPILLARY BLOOD GLUCOSE      POCT Blood Glucose.: 81 mg/dL (01 Mar 2023 11:49)    I&O's Summary    01 Mar 2023 07:01  -  02 Mar 2023 07:00  --------------------------------------------------------  IN: 240 mL / OUT: 1450 mL / NET: -1210 mL        PHYSICAL EXAM:  Vital Signs Last 24 Hrs  T(C): 36.4 (02 Mar 2023 06:12), Max: 36.7 (01 Mar 2023 21:10)  T(F): 97.5 (02 Mar 2023 06:12), Max: 98.1 (01 Mar 2023 21:10)  HR: 84 (02 Mar 2023 06:12) (73 - 84)  BP: 125/70 (02 Mar 2023 06:12) (125/70 - 128/65)  BP(mean): --  RR: 18 (02 Mar 2023 06:12) (17 - 18)  SpO2: 99% (02 Mar 2023 06:12) (99% - 100%)    Parameters below as of 02 Mar 2023 06:12  Patient On (Oxygen Delivery Method): room air        CONSTITUTIONAL: NAD, well-developed  RESPIRATORY: Normal respiratory effort; lungs are clear to auscultation bilaterally  CARDIOVASCULAR: Regular rate and rhythm, normal S1 and S2, no murmur/rub/gallop  ABDOMEN: Nontender to palpation, normoactive bowel sounds, no rebound/guarding; No hepatosplenomegaly  MUSCLOSKELETAL: no clubbing or cyanosis of digits; no joint swelling or tenderness to palpation  PSYCH: A+O to person, place, and time; affect appropriate    LABS:                        9.7    10.41 )-----------( 250      ( 02 Mar 2023 06:50 )             31.8     03-02    137  |  100  |  14  ----------------------------<  80  3.9   |  28  |  0.71    Ca    9.8      02 Mar 2023 06:50  Phos  3.3     03-02  Mg     1.70     03-02                Culture - Blood (collected 27 Feb 2023 15:35)  Source: .Blood Blood-Peripheral  Preliminary Report (28 Feb 2023 19:02):    No growth to date.    Culture - Blood (collected 27 Feb 2023 15:27)  Source: .Blood Blood-Peripheral  Preliminary Report (28 Feb 2023 19:02):    No growth to date.        RADIOLOGY & ADDITIONAL TESTS:  Results Reviewed:   Imaging Personally Reviewed:  Electrocardiogram Personally Reviewed:    COORDINATION OF CARE:  Care Discussed with Consultants/Other Providers [Y/N]:  Prior or Outpatient Records Reviewed [Y/N]:

## 2023-03-01 NOTE — PROGRESS NOTE ADULT - PROBLEM SELECTOR PLAN 6
Pt was admitted at [facility] for urosepsis with seizure like event that was witnessed. Was discharged on keppra 500mg BID. Patient also had RRT 12/16 for 40 seconds of seizure like activity described as stiffening of b/l UE, foaming at the mouth, hypoxic during event.  - 12/2022 EEG with rare generalized epileptiform discharges indicate a risk of generalized-onset seizures. Interpretation of the EEG is limited by continuous artifacts. Consider repeat study if clinically indicated.  - No seizure like activity noted since recent discarge  - Neuro recs no new EEG or MRI, c/w with home dose lacosamide  - c/w lacosamide 50mg bid

## 2023-03-02 LAB
ANION GAP SERPL CALC-SCNC: 9 MMOL/L — SIGNIFICANT CHANGE UP (ref 7–14)
BUN SERPL-MCNC: 14 MG/DL — SIGNIFICANT CHANGE UP (ref 7–23)
CALCIUM SERPL-MCNC: 9.8 MG/DL — SIGNIFICANT CHANGE UP (ref 8.4–10.5)
CHLORIDE SERPL-SCNC: 100 MMOL/L — SIGNIFICANT CHANGE UP (ref 98–107)
CO2 SERPL-SCNC: 28 MMOL/L — SIGNIFICANT CHANGE UP (ref 22–31)
CREAT SERPL-MCNC: 0.71 MG/DL — SIGNIFICANT CHANGE UP (ref 0.5–1.3)
EGFR: 94 ML/MIN/1.73M2 — SIGNIFICANT CHANGE UP
GLUCOSE SERPL-MCNC: 80 MG/DL — SIGNIFICANT CHANGE UP (ref 70–99)
HCT VFR BLD CALC: 31.8 % — LOW (ref 34.5–45)
HGB BLD-MCNC: 9.7 G/DL — LOW (ref 11.5–15.5)
MAGNESIUM SERPL-MCNC: 1.7 MG/DL — SIGNIFICANT CHANGE UP (ref 1.6–2.6)
MCHC RBC-ENTMCNC: 28.2 PG — SIGNIFICANT CHANGE UP (ref 27–34)
MCHC RBC-ENTMCNC: 30.5 GM/DL — LOW (ref 32–36)
MCV RBC AUTO: 92.4 FL — SIGNIFICANT CHANGE UP (ref 80–100)
NRBC # BLD: 0 /100 WBCS — SIGNIFICANT CHANGE UP (ref 0–0)
NRBC # FLD: 0 K/UL — SIGNIFICANT CHANGE UP (ref 0–0)
PHOSPHATE SERPL-MCNC: 3.3 MG/DL — SIGNIFICANT CHANGE UP (ref 2.5–4.5)
PLATELET # BLD AUTO: 250 K/UL — SIGNIFICANT CHANGE UP (ref 150–400)
POTASSIUM SERPL-MCNC: 3.9 MMOL/L — SIGNIFICANT CHANGE UP (ref 3.5–5.3)
POTASSIUM SERPL-SCNC: 3.9 MMOL/L — SIGNIFICANT CHANGE UP (ref 3.5–5.3)
RBC # BLD: 3.44 M/UL — LOW (ref 3.8–5.2)
RBC # FLD: 17.2 % — HIGH (ref 10.3–14.5)
SODIUM SERPL-SCNC: 137 MMOL/L — SIGNIFICANT CHANGE UP (ref 135–145)
WBC # BLD: 10.41 K/UL — SIGNIFICANT CHANGE UP (ref 3.8–10.5)
WBC # FLD AUTO: 10.41 K/UL — SIGNIFICANT CHANGE UP (ref 3.8–10.5)

## 2023-03-02 PROCEDURE — 99232 SBSQ HOSP IP/OBS MODERATE 35: CPT

## 2023-03-02 RX ADMIN — AMLODIPINE BESYLATE 10 MILLIGRAM(S): 2.5 TABLET ORAL at 06:18

## 2023-03-02 RX ADMIN — Medication 3 MILLIGRAM(S): at 23:28

## 2023-03-02 RX ADMIN — LACOSAMIDE 50 MILLIGRAM(S): 50 TABLET ORAL at 06:18

## 2023-03-02 RX ADMIN — Medication 75 MICROGRAM(S): at 06:18

## 2023-03-02 RX ADMIN — PANTOPRAZOLE SODIUM 40 MILLIGRAM(S): 20 TABLET, DELAYED RELEASE ORAL at 06:18

## 2023-03-02 RX ADMIN — LACOSAMIDE 50 MILLIGRAM(S): 50 TABLET ORAL at 17:53

## 2023-03-02 NOTE — PROGRESS NOTE ADULT - SUBJECTIVE AND OBJECTIVE BOX
PROGRESS NOTE:     Patient is a 68y old  Female who presents with a chief concern of AMS (28 Feb 2023 18:14)      SUBJECTIVE / OVERNIGHT EVENTS: no acute events overnight    ADDITIONAL REVIEW OF SYSTEMS:    MEDICATIONS  (STANDING):  amLODIPine   Tablet 10 milliGRAM(s) Oral daily  baclofen PF IntraThecal 21982 MICROGram(s) IntraThecal once  lacosamide 50 milliGRAM(s) Oral two times a day  levothyroxine 75 MICROGram(s) Oral daily  pantoprazole    Tablet 40 milliGRAM(s) Oral before breakfast    MEDICATIONS  (PRN):  acetaminophen     Tablet .. 650 milliGRAM(s) Oral every 6 hours PRN Temp greater or equal to 38C (100.4F), Mild Pain (1 - 3)  melatonin 3 milliGRAM(s) Oral at bedtime PRN Insomnia      CAPILLARY BLOOD GLUCOSE      POCT Blood Glucose.: 81 mg/dL (01 Mar 2023 11:49)    I&O's Summary    01 Mar 2023 07:01  -  02 Mar 2023 07:00  --------------------------------------------------------  IN: 240 mL / OUT: 1450 mL / NET: -1210 mL        PHYSICAL EXAM:  Vital Signs Last 24 Hrs  T(C): 36.4 (02 Mar 2023 06:12), Max: 36.7 (01 Mar 2023 21:10)  T(F): 97.5 (02 Mar 2023 06:12), Max: 98.1 (01 Mar 2023 21:10)  HR: 84 (02 Mar 2023 06:12) (73 - 84)  BP: 125/70 (02 Mar 2023 06:12) (125/70 - 128/65)  BP(mean): --  RR: 18 (02 Mar 2023 06:12) (17 - 18)  SpO2: 99% (02 Mar 2023 06:12) (99% - 100%)    Parameters below as of 02 Mar 2023 06:12  Patient On (Oxygen Delivery Method): room air        CONSTITUTIONAL: NAD, well-developed  RESPIRATORY: Normal respiratory effort; lungs are clear to auscultation bilaterally  CARDIOVASCULAR: Regular rate and rhythm, normal S1 and S2, no murmur/rub/gallop; No lower extremity edema  ABDOMEN: Nontender to palpation, normoactive bowel sounds, no rebound/guarding  MUSCLOSKELETAL: no clubbing or cyanosis of digits; no joint swelling or tenderness to palpation  PSYCH: A+O to person, place, and time; affect appropriate    LABS:                        9.7    10.41 )-----------( 250      ( 02 Mar 2023 06:50 )             31.8     03-02    137  |  100  |  14  ----------------------------<  80  3.9   |  28  |  0.71    Ca    9.8      02 Mar 2023 06:50  Phos  3.3     03-02  Mg     1.70     03-02                Culture - Blood (collected 27 Feb 2023 15:35)  Source: .Blood Blood-Peripheral  Preliminary Report (28 Feb 2023 19:02):    No growth to date.    Culture - Blood (collected 27 Feb 2023 15:27)  Source: .Blood Blood-Peripheral  Preliminary Report (28 Feb 2023 19:02):    No growth to date.        RADIOLOGY & ADDITIONAL TESTS:  Results Reviewed:   Imaging Personally Reviewed:  Electrocardiogram Personally Reviewed:    COORDINATION OF CARE:  Care Discussed with Consultants/Other Providers [Y/N]:  Prior or Outpatient Records Reviewed [Y/N]:

## 2023-03-02 NOTE — PROGRESS NOTE ADULT - PROBLEM SELECTOR PLAN 5
- TSH wnl  - c/w synthroid 75mcg qam.

## 2023-03-02 NOTE — PROGRESS NOTE ADULT - PROVIDER SPECIALTY LIST ADULT
Neurology
Neurology
Internal Medicine
Hospitalist
Hospitalist
Internal Medicine
Hospitalist
Internal Medicine

## 2023-03-02 NOTE — PROGRESS NOTE ADULT - PROBLEM SELECTOR PROBLEM 4
Multiple sclerosis

## 2023-03-02 NOTE — PROGRESS NOTE ADULT - PROBLEM SELECTOR PLAN 1
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

## 2023-03-02 NOTE — PROGRESS NOTE ADULT - PROBLEM SELECTOR PLAN 4
Diagnosed over 37 years ago, pt follows at [facility], on baclofen pump (L abdomen).  - baclofen replaced  -PT eval  -per patient multiple family members have COVID at home and are not able to care for her

## 2023-03-02 NOTE — PROGRESS NOTE ADULT - PROBLEM SELECTOR PLAN 2
COVID+ on admission  - Afebrile, recent cough starting 2/23  - SpO2 >89%  - Defer remdesevir for now (pt on RA, high risk of disease progression given age; although mild transaminitis AST/ALT 71/40)
COVID+ on admission  - Afebrile, recent cough starting 2/23  - F/u infectious w/u as above  - SpO2 >89%  - Defer remdesevir for now (pt on RA, high risk of disease progression given age; although mild transaminitis AST/ALT 71/40)
COVID+ on admission  - Afebrile, recent cough starting 2/23  - F/u infectious w/u as above  - SpO2 >89%  - Defer remdesevir for now (pt on RA, high risk of disease progression given age; although mild transaminitis AST/ALT 71/40)
COVID+ on admission  -remains stable
COVID+ on admission  - Afebrile, recent cough starting 2/23  - F/u infectious w/u as above  - SpO2 >89%  - Defer remdesevir for now (pt on RA, high risk of disease progression given age; although mild transaminitis AST/ALT 71/40)
COVID+ on admission  - Afebrile, recent cough starting 2/23  - SpO2 >89%  - Defer remdesevir for now (pt on RA, high risk of disease progression given age; although mild transaminitis AST/ALT 71/40)

## 2023-03-02 NOTE — PROGRESS NOTE ADULT - PROBLEM SELECTOR PROBLEM 3
Elevated liver enzymes

## 2023-03-02 NOTE — PROGRESS NOTE ADULT - PROBLEM SELECTOR PLAN 7
dvt: lovenox  diet: bedside swallow, advance as tolerated  code status: DNR/DNI,  Julio César states he is healthcare proxy   dispo: pending medical optimization
dvt: lovenox  diet: bedside swallow, advance as tolerated  code status: DNR/DNI,  Julio César states he is healthcare proxy   dispo: pending medical optimization, PT eval
dvt: lovenox  diet: bedside swallow, advance as tolerated  code status: DNR/DNI,  Julio César states he is healthcare proxy   dispo: pending medical optimization
dvt: lovenox  diet: bedside swallow, advance as tolerated  code status: DNR/DNI,  Julio César states he is healthcare proxy   dispo: pending medical optimization
dvt: lovenox  diet: bedside swallow, advance as tolerated  code status: DNR/DNI,  Julio César states he is healthcare proxy   dispo: pending medical optimization, PT eval
dvt: lovenox  diet: bedside swallow, advance as tolerated  code status: DNR/DNI,  Julio César states he is healthcare proxy   dispo: pending medical optimization, PT eval

## 2023-03-02 NOTE — PROGRESS NOTE ADULT - PROBLEM SELECTOR PLAN 6
Pt was admitted at [facility] for urosepsis with seizure like event that was witnessed. Was discharged on keppra 500mg BID. Patient also had RRT 12/16 for 40 seconds of seizure like activity described as stiffening of b/l UE, foaming at the mouth, hypoxic during event.  - 12/2022 EEG with rare generalized epileptiform discharges indicate a risk of generalized-onset seizures. Interpretation of the EEG is limited by continuous artifacts. Consider repeat study if clinically indicated.  - No seizure like activity noted since recent discarge  - Neuro recs no new EEG or MRI, c/w with home dose lacosamide  - c/w lacosamide 50mg bid

## 2023-03-02 NOTE — PROGRESS NOTE ADULT - PROBLEM SELECTOR PROBLEM 1
Altered mental state

## 2023-03-03 ENCOUNTER — TRANSCRIPTION ENCOUNTER (OUTPATIENT)
Age: 68
End: 2023-03-03

## 2023-03-03 VITALS
SYSTOLIC BLOOD PRESSURE: 112 MMHG | OXYGEN SATURATION: 98 % | RESPIRATION RATE: 18 BRPM | HEART RATE: 65 BPM | DIASTOLIC BLOOD PRESSURE: 59 MMHG | TEMPERATURE: 98 F

## 2023-03-03 PROCEDURE — 99239 HOSP IP/OBS DSCHRG MGMT >30: CPT

## 2023-03-03 RX ORDER — AMLODIPINE BESYLATE 2.5 MG/1
1 TABLET ORAL
Qty: 30 | Refills: 0
Start: 2023-03-03 | End: 2023-04-01

## 2023-03-03 RX ORDER — ZOLPIDEM TARTRATE 10 MG/1
1 TABLET ORAL
Qty: 0 | Refills: 0 | DISCHARGE

## 2023-03-03 RX ORDER — AMLODIPINE BESYLATE 2.5 MG/1
1 TABLET ORAL
Qty: 0 | Refills: 0 | DISCHARGE
Start: 2023-03-03

## 2023-03-03 RX ADMIN — LACOSAMIDE 50 MILLIGRAM(S): 50 TABLET ORAL at 06:17

## 2023-03-03 RX ADMIN — Medication 75 MICROGRAM(S): at 06:18

## 2023-03-03 RX ADMIN — PANTOPRAZOLE SODIUM 40 MILLIGRAM(S): 20 TABLET, DELAYED RELEASE ORAL at 06:17

## 2023-03-03 RX ADMIN — AMLODIPINE BESYLATE 10 MILLIGRAM(S): 2.5 TABLET ORAL at 06:18

## 2023-03-03 RX ADMIN — LACOSAMIDE 50 MILLIGRAM(S): 50 TABLET ORAL at 17:26

## 2023-03-03 NOTE — CHART NOTE - NSCHARTNOTEFT_GEN_A_CORE
Called [facility] pharmacy, meds as listed     Ambien 5 mg QHS PRN   Gabapentin 300 mg QHS   Trazodone 25 mg QHS   Vimpat 100 mg BID (dispensed 1/19) and Vimpat 50 mg BID (dispensed 2/19)   Triamterene-HCTZ 37.5/25 mg QD  Keppra 500 mg BID (dispensed 10/28/22 x3 months)  PPI 40 mg QD     [facility]  [id number] Called [facility] pharmacy, meds as listed     Ambien 5 mg QHS PRN   Gabapentin 300 mg QHS   Trazodone 25 mg QHS   Vimpat 100 mg BID (dispensed 1/19) and Vimpat 50 mg BID (dispensed 2/19)   Triamterene-HCTZ 37.5/25 mg QD - not taking anymore   Keppra 500 mg BID (dispensed 10/28/22 x3 months) - spoke with family, pt only on Vimpat now   PPI 40 mg QD     [facility]  [id number]

## 2023-03-03 NOTE — DISCHARGE NOTE NURSING/CASE MANAGEMENT/SOCIAL WORK - NSDCPEFALRISK_GEN_ALL_CORE
For information on Fall & Injury Prevention, visit: [email or web address] OR  [email or web address] OR  https://www.cdc.gov/steadi/patient.html

## 2023-03-03 NOTE — DISCHARGE NOTE NURSING/CASE MANAGEMENT/SOCIAL WORK - PATIENT PORTAL LINK FT
You can access the FollowMyHealth Patient Portal offered by [facility] by registering at the following website: [email or web address]. By joining [email or web address] FollowMyHealth portal, you will also be able to view your health information using other applications (apps) compatible with our system.

## 2023-03-03 NOTE — DISCHARGE NOTE NURSING/CASE MANAGEMENT/SOCIAL WORK - NSSCNAMETXT_GEN_ALL_CORE
[facility]- this agency will continue your care at home. Please call agency if you do not receive services.

## 2023-03-08 ENCOUNTER — INPATIENT (INPATIENT)
Facility: HOSPITAL | Age: 68
LOS: 7 days | Discharge: HOME HEALTH | End: 2023-03-16
Attending: HOSPITALIST | Admitting: HOSPITALIST
Payer: MEDICARE

## 2023-03-08 VITALS
HEART RATE: 93 BPM | RESPIRATION RATE: 16 BRPM | SYSTOLIC BLOOD PRESSURE: 156 MMHG | TEMPERATURE: 99 F | DIASTOLIC BLOOD PRESSURE: 97 MMHG | OXYGEN SATURATION: 98 % | HEIGHT: 67 IN

## 2023-03-08 LAB
ALBUMIN SERPL ELPH-MCNC: 4.1 G/DL — SIGNIFICANT CHANGE UP (ref 3.3–5)
ALP SERPL-CCNC: 164 U/L — HIGH (ref 40–120)
ALT FLD-CCNC: 19 U/L — SIGNIFICANT CHANGE UP (ref 4–33)
ANION GAP SERPL CALC-SCNC: 10 MMOL/L — SIGNIFICANT CHANGE UP (ref 7–14)
APPEARANCE UR: ABNORMAL
AST SERPL-CCNC: 23 U/L — SIGNIFICANT CHANGE UP (ref 4–32)
BACTERIA # UR AUTO: ABNORMAL
BASE EXCESS BLDV CALC-SCNC: 5.6 MMOL/L — HIGH (ref -2–3)
BASOPHILS # BLD AUTO: 0.06 K/UL — SIGNIFICANT CHANGE UP (ref 0–0.2)
BASOPHILS NFR BLD AUTO: 0.7 % — SIGNIFICANT CHANGE UP (ref 0–2)
BILIRUB SERPL-MCNC: <0.2 MG/DL — SIGNIFICANT CHANGE UP (ref 0.2–1.2)
BILIRUB UR-MCNC: NEGATIVE — SIGNIFICANT CHANGE UP
BLOOD GAS VENOUS COMPREHENSIVE RESULT: SIGNIFICANT CHANGE UP
BUN SERPL-MCNC: 14 MG/DL — SIGNIFICANT CHANGE UP (ref 7–23)
CALCIUM SERPL-MCNC: 10.2 MG/DL — SIGNIFICANT CHANGE UP (ref 8.4–10.5)
CHLORIDE BLDV-SCNC: 103 MMOL/L — SIGNIFICANT CHANGE UP (ref 96–108)
CHLORIDE SERPL-SCNC: 102 MMOL/L — SIGNIFICANT CHANGE UP (ref 98–107)
CO2 BLDV-SCNC: 32.6 MMOL/L — HIGH (ref 22–26)
CO2 SERPL-SCNC: 28 MMOL/L — SIGNIFICANT CHANGE UP (ref 22–31)
COLOR SPEC: YELLOW — SIGNIFICANT CHANGE UP
CREAT SERPL-MCNC: 0.71 MG/DL — SIGNIFICANT CHANGE UP (ref 0.5–1.3)
DIFF PNL FLD: ABNORMAL
EGFR: 94 ML/MIN/1.73M2 — SIGNIFICANT CHANGE UP
EOSINOPHIL # BLD AUTO: 0.07 K/UL — SIGNIFICANT CHANGE UP (ref 0–0.5)
EOSINOPHIL NFR BLD AUTO: 0.9 % — SIGNIFICANT CHANGE UP (ref 0–6)
GAS PNL BLDV: 137 MMOL/L — SIGNIFICANT CHANGE UP (ref 136–145)
GAS PNL BLDV: SIGNIFICANT CHANGE UP
GLUCOSE BLDV-MCNC: 94 MG/DL — SIGNIFICANT CHANGE UP (ref 70–99)
GLUCOSE SERPL-MCNC: 91 MG/DL — SIGNIFICANT CHANGE UP (ref 70–99)
GLUCOSE UR QL: NEGATIVE — SIGNIFICANT CHANGE UP
HCO3 BLDV-SCNC: 31 MMOL/L — HIGH (ref 22–29)
HCT VFR BLD CALC: 33.5 % — LOW (ref 34.5–45)
HCT VFR BLDA CALC: 32 % — LOW (ref 34.5–46.5)
HGB BLD CALC-MCNC: 10.7 G/DL — LOW (ref 11.7–16.1)
HGB BLD-MCNC: 10.2 G/DL — LOW (ref 11.5–15.5)
IANC: 5.7 K/UL — SIGNIFICANT CHANGE UP (ref 1.8–7.4)
IMM GRANULOCYTES NFR BLD AUTO: 0.5 % — SIGNIFICANT CHANGE UP (ref 0–0.9)
KETONES UR-MCNC: ABNORMAL
LACTATE BLDV-MCNC: 1.1 MMOL/L — SIGNIFICANT CHANGE UP (ref 0.5–2)
LEUKOCYTE ESTERASE UR-ACNC: ABNORMAL
LYMPHOCYTES # BLD AUTO: 1.64 K/UL — SIGNIFICANT CHANGE UP (ref 1–3.3)
LYMPHOCYTES # BLD AUTO: 20.1 % — SIGNIFICANT CHANGE UP (ref 13–44)
MCHC RBC-ENTMCNC: 28.5 PG — SIGNIFICANT CHANGE UP (ref 27–34)
MCHC RBC-ENTMCNC: 30.4 GM/DL — LOW (ref 32–36)
MCV RBC AUTO: 93.6 FL — SIGNIFICANT CHANGE UP (ref 80–100)
MONOCYTES # BLD AUTO: 0.64 K/UL — SIGNIFICANT CHANGE UP (ref 0–0.9)
MONOCYTES NFR BLD AUTO: 7.9 % — SIGNIFICANT CHANGE UP (ref 2–14)
NEUTROPHILS # BLD AUTO: 5.7 K/UL — SIGNIFICANT CHANGE UP (ref 1.8–7.4)
NEUTROPHILS NFR BLD AUTO: 69.9 % — SIGNIFICANT CHANGE UP (ref 43–77)
NITRITE UR-MCNC: POSITIVE
NRBC # BLD: 0 /100 WBCS — SIGNIFICANT CHANGE UP (ref 0–0)
NRBC # FLD: 0 K/UL — SIGNIFICANT CHANGE UP (ref 0–0)
PCO2 BLDV: 49 MMHG — SIGNIFICANT CHANGE UP (ref 39–52)
PH BLDV: 7.41 — SIGNIFICANT CHANGE UP (ref 7.32–7.43)
PH UR: 7.5 — SIGNIFICANT CHANGE UP (ref 5–8)
PLATELET # BLD AUTO: 505 K/UL — HIGH (ref 150–400)
PO2 BLDV: 68 MMHG — HIGH (ref 25–45)
POTASSIUM BLDV-SCNC: 4.3 MMOL/L — SIGNIFICANT CHANGE UP (ref 3.5–5.1)
POTASSIUM SERPL-MCNC: 4.5 MMOL/L — SIGNIFICANT CHANGE UP (ref 3.5–5.3)
POTASSIUM SERPL-SCNC: 4.5 MMOL/L — SIGNIFICANT CHANGE UP (ref 3.5–5.3)
PROT SERPL-MCNC: 7.8 G/DL — SIGNIFICANT CHANGE UP (ref 6–8.3)
PROT UR-MCNC: ABNORMAL
RBC # BLD: 3.58 M/UL — LOW (ref 3.8–5.2)
RBC # FLD: 17.3 % — HIGH (ref 10.3–14.5)
RBC CASTS # UR COMP ASSIST: SIGNIFICANT CHANGE UP /HPF (ref 0–4)
SAO2 % BLDV: 95.1 % — HIGH (ref 67–88)
SODIUM SERPL-SCNC: 140 MMOL/L — SIGNIFICANT CHANGE UP (ref 135–145)
SP GR SPEC: 1.02 — SIGNIFICANT CHANGE UP (ref 1.01–1.05)
UROBILINOGEN FLD QL: SIGNIFICANT CHANGE UP
WBC # BLD: 8.15 K/UL — SIGNIFICANT CHANGE UP (ref 3.8–10.5)
WBC # FLD AUTO: 8.15 K/UL — SIGNIFICANT CHANGE UP (ref 3.8–10.5)
WBC UR QL: >50 /HPF — SIGNIFICANT CHANGE UP (ref 0–5)

## 2023-03-08 PROCEDURE — 99285 EMERGENCY DEPT VISIT HI MDM: CPT

## 2023-03-08 RX ORDER — SODIUM CHLORIDE 9 MG/ML
1000 INJECTION INTRAMUSCULAR; INTRAVENOUS; SUBCUTANEOUS ONCE
Refills: 0 | Status: COMPLETED | OUTPATIENT
Start: 2023-03-08 | End: 2023-03-08

## 2023-03-08 RX ORDER — CEFEPIME 1 G/1
1000 INJECTION, POWDER, FOR SOLUTION INTRAMUSCULAR; INTRAVENOUS ONCE
Refills: 0 | Status: COMPLETED | OUTPATIENT
Start: 2023-03-08 | End: 2023-03-08

## 2023-03-08 RX ADMIN — SODIUM CHLORIDE 1000 MILLILITER(S): 9 INJECTION INTRAMUSCULAR; INTRAVENOUS; SUBCUTANEOUS at 19:38

## 2023-03-08 RX ADMIN — CEFEPIME 100 MILLIGRAM(S): 1 INJECTION, POWDER, FOR SOLUTION INTRAMUSCULAR; INTRAVENOUS at 19:39

## 2023-03-08 NOTE — ED ADULT NURSE NOTE - NSIMPLEMENTINTERV_GEN_ALL_ED
Implemented All Fall with Harm Risk Interventions:  [place] to call system. Call bell, personal items and telephone within reach. Instruct patient to call for assistance. Room bathroom lighting operational. Non-slip footwear when patient is off stretcher. Physically safe environment: no spills, clutter or unnecessary equipment. Stretcher in lowest position, wheels locked, appropriate side rails in place. Provide visual cue, wrist band, yellow gown, etc. Monitor gait and stability. Monitor for mental status changes and reorient to person, place, and time. Review medications for side effects contributing to fall risk. Reinforce activity limits and safety measures with patient and family. Provide visual clues: red socks.

## 2023-03-08 NOTE — ED PROVIDER NOTE - ATTENDING CONTRIBUTION TO CARE
56-year-old male with history of hypertension (states at times uncontrolled) presents with headache and chest pain for approximately 1 week.  States was in a verbal argument with tenant and since then has been having palpitations, chest pain, headache.  Denies any focal weakness or numbness.  Denies any visual changes or gait instability.  Denies any speech changes.  Denies any shortness of breath, cough, fever.  States years ago had stress test which was within normal limits.  Has no prior history of having an echocardiogram.  Physical exam  Well-appearing male in no respiratory distress  Significantly hypertensive with MAP above 100  Lungs clear to auscultation bilaterally  S1-S2 no murmurs rubs or gallops  Abdomen soft, nontender, nondistended   extremities no edema  Neuro cranial nerves intact 5/5 motor upper and lower extremities extraocular movements intact gait stable  Agree with resident note  62-year-old female with past medical history of multiple sclerosis, seizure, hypothyroidism, urosepsis with baclofen pump recent admission for UTI presents for weakness altered mental status and foul-smelling urine.   took urine to outpatient lab tested positive for ESBL (only susceptible to IV antibiotics).  Patient at baseline is bedbound requires assistance for all ADLs,  states mental clarity is perfect and with recent infection patient has been weak and lethargic and not speaking much.  Physical exam  Patient not answering questions, groaning  Vital signs stable  Dry mucous membranes  Clear to auscultation bilaterally  S1-S2 no murmurs rubs or gallops  Abdomen mild suprapubic tenderness  Extremities no edema  Impression  Urosepsis   states has aide at home who helps patient go to the restroom whenever she needs to, uses diaper at night, at present bedbound, given symptoms and positive UA with culture patient is uroseptic  We will draw labs including cultures start IV fluids and IV antibiotics and admit to hospital

## 2023-03-08 NOTE — ED PROVIDER NOTE - ADMIT DISPOSITION PRESENT ON ADMISSION SEPSIS
Nursing Note:  Flor Griffin presents today for port labs.    Patient seen by provider today: No   present during visit today: Not Applicable.    Note: N/A.    Intravenous Access:  Labs drawn without difficulty.  Implanted Port.    Discharge Plan:   Patient was sent home, next appointment is tomorrow for chemo.    Sabine Fernandes RN             Yes

## 2023-03-08 NOTE — ED ADULT TRIAGE NOTE - CHIEF COMPLAINT QUOTE
pt biba from home with foul smelling urine, decreased po intake, AMS x 3 days after being d/c from hospital with UTI, not started on antibiotics until today as per aide.  PMH: MS bedbound, HTN, HLD

## 2023-03-08 NOTE — ED PROVIDER NOTE - OBJECTIVE STATEMENT
62-year-old female past medical history of multiple sclerosis seizure, hypothyroidism, frequent urosepsis with baclofen pump with recent hospital admission for UTI presents to the ED after altered mental status at home, dark discolored urine that patient's  took to the lab to get showed UTI.  Patient is currently not mentating at baseline, consistent with prior diagnoses of UTI.  Patient follows with Dr. Sterling outpatient.  Patient currently alert and oriented x0 which  states is how she gets whenever she is in either urinary retention or or with a UTI.

## 2023-03-08 NOTE — ED PROVIDER NOTE - NS ED ROS FT
ros per  as pt is currently non responsive to questioning:  Constitutional:  (-) fever, (-) chills, (-) lethargy  Eyes:  (-) eye pain (-) visual changes  ENMT: (-) nasal discharge, (-) sore throat. (-) neck pain or stiffness  Cardiac: (-) chest pain (-) palpitations  Respiratory:  (-) cough (-) respiratory distress.   GI:  (-) nausea (-) vomiting (-) diarrhea (-) abdominal pain.  :  (-) dysuria (-) frequency (-) burning.  MS:  (-) back pain (-) joint pain.  Neuro:  (-) headache (-) numbness (-) tingling (-) focal weakness  Skin:  (-) rash  Except as documented in the HPI,  all other systems are negative

## 2023-03-08 NOTE — ED ADULT NURSE NOTE - OBJECTIVE STATEMENT
67 yo female, awake, AOx1 to self, bedridden at baseline, present to the ED c/o foul smelling milky urine. As per  patient is altered from baseline mental status since yesterday.  Patient minimally verbal/ not answering questions at this time. PMH of UTIs and MS. Recently hospitalized for UTI, AMS and COVID. Pt has a baclofen pump. Pt upper extremities contracted. Skin intact, rectally afebrile. respirations even and unlabored. Straight cath'd for urine. Urine noted to be foul smelling, brownish/white. UA/UC sent. 20g IV placed in left hand. Labs drawn and sent. Medicated per orders.  at bedside. Bed in lowest positions, side rails up. Safety maintained.

## 2023-03-08 NOTE — ED PROVIDER NOTE - PHYSICAL EXAMINATION
CONSTITUTIONAL: uncomfortable appearing, moaning and not responding to questions  SKIN: Warm dry, normal skin turgor  HEAD: NCAT  ENT: pharynx dry  NECK: Supple; non tender. Full ROM.  CARD: RRR, no murmurs.  RESP: clear to ausculation b/l. No crackles or wheezing.  ABD: soft, non-tender, non-distended, no rebound or guarding. no cva tenderness  MSK: No pedal edema, no calf tenderness  PSYCH: Cooperative, appropriate.

## 2023-03-08 NOTE — ED PROVIDER NOTE - CLINICAL SUMMARY MEDICAL DECISION MAKING FREE TEXT BOX
With positive outpatient urinalysis and culture, concern for recurrence of urosepsis.  As patient has had persistent UTIs concern for possible pyelonephritis, will get CT abdomen pelvis, urinalysis, blood work including cultures, give empiric antibiotics based on outpatient sensitivities, labs, likely admission.  MS not likely secondary to chronic disease versus ICH at this time in the setting of positive urinalysis but will consider if UA comes back unremarkable.

## 2023-03-08 NOTE — ED ADULT NURSE REASSESSMENT NOTE - NS ED NURSE REASSESS COMMENT FT1
Report received from day RNOdalis. A&Ox0 at this time, confusion noted. Pt unable to appropriately answer questions. Resp even and unlabored. No acute distress. Pt appears comfortably laying in stretcher.  at bedside. Bed in lowest position, call bell in reach, wheels locked, safety maintained. Awaiting further orders.

## 2023-03-09 DIAGNOSIS — E03.9 HYPOTHYROIDISM, UNSPECIFIED: ICD-10-CM

## 2023-03-09 DIAGNOSIS — N39.0 URINARY TRACT INFECTION, SITE NOT SPECIFIED: ICD-10-CM

## 2023-03-09 DIAGNOSIS — G93.41 METABOLIC ENCEPHALOPATHY: ICD-10-CM

## 2023-03-09 DIAGNOSIS — G35 MULTIPLE SCLEROSIS: ICD-10-CM

## 2023-03-09 DIAGNOSIS — Z29.9 ENCOUNTER FOR PROPHYLACTIC MEASURES, UNSPECIFIED: ICD-10-CM

## 2023-03-09 DIAGNOSIS — I10 ESSENTIAL (PRIMARY) HYPERTENSION: ICD-10-CM

## 2023-03-09 PROBLEM — R56.9 UNSPECIFIED CONVULSIONS: Chronic | Status: ACTIVE | Noted: 2023-02-24

## 2023-03-09 LAB
FLUAV AG NPH QL: SIGNIFICANT CHANGE UP
FLUBV AG NPH QL: SIGNIFICANT CHANGE UP
RSV RNA NPH QL NAA+NON-PROBE: SIGNIFICANT CHANGE UP
SARS-COV-2 RNA SPEC QL NAA+PROBE: SIGNIFICANT CHANGE UP

## 2023-03-09 PROCEDURE — 99497 ADVNCD CARE PLAN 30 MIN: CPT | Mod: 25

## 2023-03-09 PROCEDURE — 74177 CT ABD & PELVIS W/CONTRAST: CPT | Mod: 26,MG

## 2023-03-09 PROCEDURE — 99223 1ST HOSP IP/OBS HIGH 75: CPT

## 2023-03-09 PROCEDURE — G1004: CPT

## 2023-03-09 RX ORDER — LEVOTHYROXINE SODIUM 125 MCG
75 TABLET ORAL DAILY
Refills: 0 | Status: DISCONTINUED | OUTPATIENT
Start: 2023-03-09 | End: 2023-03-09

## 2023-03-09 RX ORDER — ONDANSETRON 8 MG/1
4 TABLET, FILM COATED ORAL EVERY 8 HOURS
Refills: 0 | Status: DISCONTINUED | OUTPATIENT
Start: 2023-03-09 | End: 2023-03-16

## 2023-03-09 RX ORDER — PANTOPRAZOLE SODIUM 20 MG/1
40 TABLET, DELAYED RELEASE ORAL
Refills: 0 | Status: DISCONTINUED | OUTPATIENT
Start: 2023-03-09 | End: 2023-03-16

## 2023-03-09 RX ORDER — LANOLIN ALCOHOL/MO/W.PET/CERES
3 CREAM (GRAM) TOPICAL AT BEDTIME
Refills: 0 | Status: DISCONTINUED | OUTPATIENT
Start: 2023-03-09 | End: 2023-03-16

## 2023-03-09 RX ORDER — ACETAMINOPHEN 500 MG
650 TABLET ORAL EVERY 6 HOURS
Refills: 0 | Status: DISCONTINUED | OUTPATIENT
Start: 2023-03-09 | End: 2023-03-16

## 2023-03-09 RX ORDER — LACOSAMIDE 50 MG/1
50 TABLET ORAL EVERY 12 HOURS
Refills: 0 | Status: DISCONTINUED | OUTPATIENT
Start: 2023-03-09 | End: 2023-03-12

## 2023-03-09 RX ORDER — LEVOTHYROXINE SODIUM 125 MCG
50 TABLET ORAL AT BEDTIME
Refills: 0 | Status: DISCONTINUED | OUTPATIENT
Start: 2023-03-09 | End: 2023-03-15

## 2023-03-09 RX ORDER — ENOXAPARIN SODIUM 100 MG/ML
40 INJECTION SUBCUTANEOUS EVERY 24 HOURS
Refills: 0 | Status: DISCONTINUED | OUTPATIENT
Start: 2023-03-09 | End: 2023-03-16

## 2023-03-09 RX ORDER — SODIUM CHLORIDE 9 MG/ML
1000 INJECTION, SOLUTION INTRAVENOUS
Refills: 0 | Status: DISCONTINUED | OUTPATIENT
Start: 2023-03-09 | End: 2023-03-11

## 2023-03-09 RX ORDER — MEROPENEM 1 G/30ML
1000 INJECTION INTRAVENOUS ONCE
Refills: 0 | Status: COMPLETED | OUTPATIENT
Start: 2023-03-09 | End: 2023-03-09

## 2023-03-09 RX ORDER — LACOSAMIDE 50 MG/1
50 TABLET ORAL
Refills: 0 | Status: DISCONTINUED | OUTPATIENT
Start: 2023-03-09 | End: 2023-03-09

## 2023-03-09 RX ORDER — MEROPENEM 1 G/30ML
1000 INJECTION INTRAVENOUS EVERY 8 HOURS
Refills: 0 | Status: DISCONTINUED | OUTPATIENT
Start: 2023-03-09 | End: 2023-03-10

## 2023-03-09 RX ADMIN — Medication 3 MILLIGRAM(S): at 21:32

## 2023-03-09 RX ADMIN — SODIUM CHLORIDE 75 MILLILITER(S): 9 INJECTION, SOLUTION INTRAVENOUS at 11:20

## 2023-03-09 RX ADMIN — MEROPENEM 100 MILLIGRAM(S): 1 INJECTION INTRAVENOUS at 03:11

## 2023-03-09 RX ADMIN — LACOSAMIDE 110 MILLIGRAM(S): 50 TABLET ORAL at 12:59

## 2023-03-09 RX ADMIN — Medication 50 MICROGRAM(S): at 21:34

## 2023-03-09 RX ADMIN — MEROPENEM 100 MILLIGRAM(S): 1 INJECTION INTRAVENOUS at 19:02

## 2023-03-09 RX ADMIN — MEROPENEM 100 MILLIGRAM(S): 1 INJECTION INTRAVENOUS at 11:20

## 2023-03-09 NOTE — ED ADULT NURSE REASSESSMENT NOTE - NS ED NURSE REASSESS COMMENT FT1
Pt appears sleeping in stretcher at this time, HOB elevated. Respirations even and unlabored. No acute distress noted.  at bedside. Bed in lowest position, call bell in reach, wheels locked, safety maintained. Awaiting bed assignment as per admission orders.

## 2023-03-09 NOTE — PATIENT PROFILE ADULT - FALL HARM RISK - HARM RISK INTERVENTIONS
Assistance with ambulation/Assistance OOB with selected safe patient handling equipment/Communicate Risk of Fall with Harm to all staff/Discuss with provider need for PT consult/Monitor gait and stability/Reinforce activity limits and safety measures with patient and family/Tailored Fall Risk Interventions/Visual Cue: Yellow wristband and red socks/Bed in lowest position, wheels locked, appropriate side rails in place/Call bell, personal items and telephone in reach/Instruct patient to call for assistance before getting out of bed or chair/Non-slip footwear when patient is out of bed/[place] to call system/Physically safe environment - no spills, clutter or unnecessary equipment/Purposeful Proactive Rounding/Room/bathroom lighting operational, light cord in reach

## 2023-03-09 NOTE — H&P ADULT - PROBLEM SELECTOR PLAN 2
-CT a/p c/w cystitis. no e/o pyelonephritis   -outside urine cx 3/8 reviewed- grew ESBL citrobacter. sens to ertapenem, Bactrim, nitrofurantoin. resistant to cephalosporin. intermediate to Levaquin  -UA+.  f/u urine cx and blood cx  -start Merrem x 3 days -CT a/p c/w cystitis. no e/o pyelonephritis   -outside urine cx 3/8 reviewed- grew ESBL citrobacter. sens to ertapenem, Bactrim, nitrofurantoin. resistant to cephalosporin. intermediate to Levaquin  -UA+.  f/u urine cx and blood cx  -start Merrem x 3 days  -monitor for urinary retention   -has urology appt 4/5 for urinary retention/frequent UTIs

## 2023-03-09 NOTE — H&P ADULT - ASSESSMENT
67 yo F [employer] Witness PMH of MS with baclofen pump in place , seizure do,  hypothyroidism, frequent UTIs  admitted for acute metabolic encephalopathy likely 2/2 UTI

## 2023-03-09 NOTE — ED ADULT NURSE REASSESSMENT NOTE - NS ED NURSE REASSESS COMMENT FT1
Break Coverage RN: Pt A&Ox1, respirations equal and unlabored. Pt resting in stretcher at this time with no complaints. IV patent. VSS. Family at bedside. Pt pending CT scan. Safety maintained, bed in lowest position, side rails raised, call bell in reach.

## 2023-03-09 NOTE — H&P ADULT - HISTORY OF PRESENT ILLNESS
HPI:    68 year-old female Jehova's Witness with past medical history of MS with baclofen pump in place , seizure, hypothyroidism, frequent UTIs  presenting with a chief complaint of altered mental status. Pt is minimally verbal at the time of interview. History is obtained from  at bedside.   According to , pt is AAOX 3 at baseline. She was admitted to [facility]  -3/3 for AMS which was attributed to COVID. She did not receive any medication for COVID, and her mental status returned to baseline at the time of discharge. However after she arrived at home,  noted her urine was cloudy and malodorous, so he arranged urine cx with PCP. While awaiting for results, pt's mental status quickly deteriorated. She become more somnolent and confused. Eventually stopped eating or taking her medications 2 days ago. Yesterday pt's urine cx finally resulted and showed multi-drug resistant organism, so he decided to bring pt to ER. As per , pt did not have any fevers, chills, cough, sob, N/V or  abd pain. No convulsions or new focal neuro deficits. No new medications since discharge.     In ER, Tmax 99.5 /70 HR 60 Sat 99% RA. UA grossly+ pt received cefepime x 1 and merrem x 1, admitted for acute metabolic encephalopathy 2/2 UTI       PAST MEDICAL & SURGICAL HISTORY:  Hypothyroid      Multiple sclerosis      Seizures      s/p IT baclofen bump         Review of Systems:   CONSTITUTIONAL: No fever, weight loss, or fatigue  EYES: No eye pain, visual disturbances, or discharge  ENMT:  No difficulty hearing, tinnitus, vertigo; No sinus or throat pain  NECK: No pain or stiffness  BREASTS: No pain, masses, or nipple discharge  RESPIRATORY: No cough, wheezing, chills or hemoptysis; No shortness of breath  CARDIOVASCULAR: No chest pain, palpitations, dizziness, or leg swelling  GASTROINTESTINAL: No abdominal or epigastric pain. No nausea, vomiting, or hematemesis; No diarrhea or constipation. No melena or hematochezia.  GENITOURINARY: No dysuria, frequency, hematuria, or incontinence  NEUROLOGICAL:+ confusion   SKIN: No itching, burning, rashes, or lesions   LYMPH NODES: No enlarged glands  ENDOCRINE: No heat or cold intolerance; No hair loss  MUSCULOSKELETAL: No joint pain or swelling; No muscle, back, or extremity pain  PSYCHIATRIC: No depression, anxiety, mood swings, or difficulty sleeping  HEME/LYMPH: No easy bruising, or bleeding gums  ALLERGY AND IMMUNOLOGIC: No hives or eczema    Allergies    sulfa drugs (Hives; Rash)    Intolerances        Social History:     Denies ETOH, tobacco or illicit drug use    FAMILY HISTORY:    No FH of MI, CVA or DM    MEDICATIONS  (STANDING):  enoxaparin Injectable 40 milliGRAM(s) SubCutaneous every 24 hours  lacosamide IVPB 50 milliGRAM(s) IV Intermittent every 12 hours  lactated ringers. 1000 milliLiter(s) (75 mL/Hr) IV Continuous <Continuous>  levothyroxine Injectable 50 MICROGram(s) IV Push at bedtime  meropenem  IVPB 1000 milliGRAM(s) IV Intermittent every 8 hours  pantoprazole    Tablet 40 milliGRAM(s) Oral before breakfast    MEDICATIONS  (PRN):  acetaminophen     Tablet .. 650 milliGRAM(s) Oral every 6 hours PRN Temp greater or equal to 38C (100.4F), Mild Pain (1 - 3)  aluminum hydroxide/magnesium hydroxide/simethicone Suspension 30 milliLiter(s) Oral every 4 hours PRN Dyspepsia  melatonin 3 milliGRAM(s) Oral at bedtime PRN Insomnia  ondansetron Injectable 4 milliGRAM(s) IV Push every 8 hours PRN Nausea and/or Vomiting      T(C): 37.1 (23 @ 10:14), Max: 37.5 (23 @ 07:02)  HR: 100 (23 @ 10:14) (93 - 106)  BP: 154/81 (23 @ 10:14) (135/74 - 156/97)  RR: 18 (23 @ 10:14) (16 - 18)  SpO2: 97% (23 @ 10:14) (97% - 98%)  Height (cm): 170.2 (23 @ 17:53)  CAPILLARY BLOOD GLUCOSE        I&O's Summary      PHYSICAL EXAM:  GENERAL: NAD, confused. Moaning  HEAD:  Atraumatic, Normocephalic  EYES: EOMI, PERRLA, conjunctiva and sclera clear  NECK: Supple, No elevated JVD  CHEST/LUNG: Clear to auscultation bilaterally; No wheeze  HEART: Regular rate and rhythm; No murmurs, rubs, or gallops  ABDOMEN: Soft, Nontender, Nondistended; Bowel sounds present  EXTREMITIES:  2+ Peripheral Pulses, No clubbing, cyanosis, or edema  PSYCH: AAOx 0. not following commands   NEUROLOGY: CN II-XII grossly intact, moving all extremities  SKIN: No rashes or lesions    LABS:                        10.2   8.15  )-----------( 505      ( 08 Mar 2023 19:30 )             33.5     03-    140  |  102  |  14  ----------------------------<  91  4.5   |  28  |  0.71    Ca    10.2      08 Mar 2023 19:30    TPro  7.8  /  Alb  4.1  /  TBili  <0.2  /  DBili  x   /  AST  23  /  ALT  19  /  AlkPhos  164<H>  03-08          Urinalysis Basic - ( 08 Mar 2023 19:30 )    Color: Yellow / Appearance: Turbid / S.016 / pH: x  Gluc: x / Ketone: Trace  / Bili: Negative / Urobili: <2 mg/dL   Blood: x / Protein: 100 mg/dL / Nitrite: Positive   Leuk Esterase: Large / RBC: 5-10 /HPF / WBC >50 /HPF   Sq Epi: x / Non Sq Epi: x / Bacteria: Many          RADIOLOGY & ADDITIONAL TESTS:    ECG Personally Reviewed -     Imaging Personally Reviewed:    Consultant(s) Notes Reviewed:      Care Discussed with Consultants/Other Providers:

## 2023-03-09 NOTE — H&P ADULT - PROBLEM SELECTOR PLAN 1
likely 2/2 UTI. low suspicion for CNS infection. no seizure like activity as per family   -MRI brain 2/17 and 3/1 reviewed- stable exam  -cont to treat UTI as below  -unable to swallow d/t lethargy. converted meds to IV  -hold sedative meds Trazadone, gabapentin for now   -puree diet and IVF for now. swallow eval when MS improves

## 2023-03-10 LAB
ANION GAP SERPL CALC-SCNC: 16 MMOL/L — HIGH (ref 7–14)
BUN SERPL-MCNC: 9 MG/DL — SIGNIFICANT CHANGE UP (ref 7–23)
CALCIUM SERPL-MCNC: 10 MG/DL — SIGNIFICANT CHANGE UP (ref 8.4–10.5)
CHLORIDE SERPL-SCNC: 94 MMOL/L — LOW (ref 98–107)
CO2 SERPL-SCNC: 25 MMOL/L — SIGNIFICANT CHANGE UP (ref 22–31)
CREAT SERPL-MCNC: 0.62 MG/DL — SIGNIFICANT CHANGE UP (ref 0.5–1.3)
EGFR: 98 ML/MIN/1.73M2 — SIGNIFICANT CHANGE UP
GLUCOSE SERPL-MCNC: 104 MG/DL — HIGH (ref 70–99)
HCT VFR BLD CALC: 34 % — LOW (ref 34.5–45)
HGB BLD-MCNC: 10.4 G/DL — LOW (ref 11.5–15.5)
MAGNESIUM SERPL-MCNC: 1.8 MG/DL — SIGNIFICANT CHANGE UP (ref 1.6–2.6)
MCHC RBC-ENTMCNC: 28.1 PG — SIGNIFICANT CHANGE UP (ref 27–34)
MCHC RBC-ENTMCNC: 30.6 GM/DL — LOW (ref 32–36)
MCV RBC AUTO: 91.9 FL — SIGNIFICANT CHANGE UP (ref 80–100)
NRBC # BLD: 0 /100 WBCS — SIGNIFICANT CHANGE UP (ref 0–0)
NRBC # FLD: 0 K/UL — SIGNIFICANT CHANGE UP (ref 0–0)
PHOSPHATE SERPL-MCNC: 3 MG/DL — SIGNIFICANT CHANGE UP (ref 2.5–4.5)
PLATELET # BLD AUTO: 517 K/UL — HIGH (ref 150–400)
POTASSIUM SERPL-MCNC: 3.9 MMOL/L — SIGNIFICANT CHANGE UP (ref 3.5–5.3)
POTASSIUM SERPL-SCNC: 3.9 MMOL/L — SIGNIFICANT CHANGE UP (ref 3.5–5.3)
RBC # BLD: 3.7 M/UL — LOW (ref 3.8–5.2)
RBC # FLD: 17.1 % — HIGH (ref 10.3–14.5)
SODIUM SERPL-SCNC: 135 MMOL/L — SIGNIFICANT CHANGE UP (ref 135–145)
TSH SERPL-MCNC: 1.09 UIU/ML — SIGNIFICANT CHANGE UP (ref 0.27–4.2)
WBC # BLD: 9.95 K/UL — SIGNIFICANT CHANGE UP (ref 3.8–10.5)
WBC # FLD AUTO: 9.95 K/UL — SIGNIFICANT CHANGE UP (ref 3.8–10.5)

## 2023-03-10 PROCEDURE — 99233 SBSQ HOSP IP/OBS HIGH 50: CPT

## 2023-03-10 PROCEDURE — 70450 CT HEAD/BRAIN W/O DYE: CPT | Mod: 26

## 2023-03-10 PROCEDURE — 84165 PROTEIN E-PHORESIS SERUM: CPT | Mod: 26

## 2023-03-10 RX ORDER — ERTAPENEM SODIUM 1 G/1
1000 INJECTION, POWDER, LYOPHILIZED, FOR SOLUTION INTRAMUSCULAR; INTRAVENOUS EVERY 24 HOURS
Refills: 0 | Status: COMPLETED | OUTPATIENT
Start: 2023-03-10 | End: 2023-03-13

## 2023-03-10 RX ADMIN — MEROPENEM 100 MILLIGRAM(S): 1 INJECTION INTRAVENOUS at 09:51

## 2023-03-10 RX ADMIN — LACOSAMIDE 110 MILLIGRAM(S): 50 TABLET ORAL at 12:28

## 2023-03-10 RX ADMIN — MEROPENEM 100 MILLIGRAM(S): 1 INJECTION INTRAVENOUS at 02:44

## 2023-03-10 RX ADMIN — LACOSAMIDE 110 MILLIGRAM(S): 50 TABLET ORAL at 00:22

## 2023-03-10 RX ADMIN — PANTOPRAZOLE SODIUM 40 MILLIGRAM(S): 20 TABLET, DELAYED RELEASE ORAL at 07:31

## 2023-03-10 RX ADMIN — ENOXAPARIN SODIUM 40 MILLIGRAM(S): 100 INJECTION SUBCUTANEOUS at 07:31

## 2023-03-10 NOTE — CONSULT NOTE ADULT - ATTENDING COMMENTS
Patient seen and examined - as above - does not participate in exam - resists examiner bilaterally UE and LE  blinks to threat and is awake but not following commands or speaking.  Agree with recommendations as above - would however obtain EEG

## 2023-03-10 NOTE — CONSULT NOTE ADULT - ASSESSMENT
Assessment: 68 year-old female Jehova's Witness with past medical history of MS with baclofen pump in place , seizure on Vimpat 50mg BID last seizure 12/2022 (follows with Dr. Merrick ROJAS), hypothyroidism, frequent UTIs  presenting with a chief complaint of altered mental status. History is obtained from sister at bedside.  According to sister, pt is AAOX 3 at baseline and conversational. She was admitted to [facility]  2/24-3/3 for AMS which was attributed to COVID. She did not receive any medication for COVID, and her mental status returned to baseline at the time of discharge. However after she arrived at home,  noted her urine was cloudy and malodorous, so he arranged urine cx with PCP. While awaiting for results, pt's mental status quickly deteriorated. She become more somnolent and confused. Eventually stopped eating or taking her medications 2 days ago. Yesterday pt's urine cx finally resulted and showed multi-drug resistant organism, so he decided to bring pt to ER. No new medications since discharge. On last day of IV abx, total course 3 days. Neurology consulted for visual hallucinations of people trying to come after her. Not improving after completing IV abx. Limited exam. Agitated and not cooperating. Awake and alert. Screams out 'NO!" when trying to examine.    Impression: Possible delirium combined with infectious state. Low suspicion for seizure/post-ictal state.     Plan:  -CT head w/o  -May need to repeat MRI brain w/ w/o if no improvement  -Will hold off on EEG, low suspicion of seizure  -Labs: Utox, TSH, T3/T4, RPR, antithyroglobulin Ab, TPO Ab, vitamin B1, B6, B12, folate, homocysteine, methylmalonic acid, lactate, creatnine kinase, ammonia, Cu, ceruloplasmin, SPEP, HIV, ESR, CRP, Zn  -Delirium precautions  -Psych eval    -Management & disposition to be discussed with neuro attending, Dr. Blackburn

## 2023-03-10 NOTE — PROGRESS NOTE ADULT - PROBLEM SELECTOR PLAN 2
-CT a/p c/w cystitis. no e/o pyelonephritis   -outside urine cx 3/8 reviewed- grew ESBL citrobacter. sens to ertapenem, Bactrim, nitrofurantoin. resistant to cephalosporin. intermediate to Levaquin  -UA+.  f/u urine cx and blood cx  -start Merrem x 3 days, completed   -monitor for urinary retention   -has urology appt 4/5 for urinary retention/frequent UTIs

## 2023-03-10 NOTE — CONSULT NOTE ADULT - SUBJECTIVE AND OBJECTIVE BOX
HPI: 68 year-old female Jehova's Witness with past medical history of MS with baclofen pump in place , seizure on Vimpat 50mg BID last seizure 2022 (follows with Dr. Merrick ROJAS), hypothyroidism, frequent UTIs  presenting with a chief complaint of altered mental status. History is obtained from sister at bedside.  According to sister, pt is AAOX 3 at baseline and conversational. She was admitted to [facility]  -3/3 for AMS which was attributed to COVID. She did not receive any medication for COVID, and her mental status returned to baseline at the time of discharge. However after she arrived at home,  noted her urine was cloudy and malodorous, so he arranged urine cx with PCP. While awaiting for results, pt's mental status quickly deteriorated. She become more somnolent and confused. Eventually stopped eating or taking her medications 2 days ago. Yesterday pt's urine cx finally resulted and showed multi-drug resistant organism, so he decided to bring pt to ER. No new medications since discharge. On last day of IV abx, total course 3 days. Neurology consulted for visual hallucinations of people trying to come after her. Not improving after completing IV abx.     From prior recent  Jadmission  -3/3:  Admitted for fluctuating temperature and cough. Neurology was consulted for evaluation of altered mental status. Found to be COVID + in ED. Neuro exam showing improved mental status than last admission in early 2023, then discharged home. UA neg. Underwent lumbar puncture with neg CSF results last admission as well as negative MR imaging for brain w/ iv contrast though motion limited.      REVIEW OF SYSTEMS  Unable to provide    PAST MEDICAL & SURGICAL HISTORY:  Hypothyroid      Multiple sclerosis      Seizures      No significant past surgical history        FAMILY HISTORY:    SOCIAL HISTORY:   T/E/D:   Occupation:   Lives with:     MEDICATIONS (HOME):  Home Medications:  acetaminophen 325 mg oral tablet: 2 tab(s) orally every 6 hours, As needed, Temp greater or equal to 38C (100.4F), Mild Pain (1 - 3) (09 Mar 2023 09:17)  gabapentin 300 mg oral capsule: 1 cap(s) orally once a day (09 Mar 2023 09:17)  pantoprazole 40 mg oral delayed release tablet: 1 tab(s) orally once a day (09 Mar 2023 09:17)  Synthroid 75 mcg (0.075 mg) oral tablet: 1 tab(s) orally once a day (09 Mar 2023 09:17)  traZODone 50 mg oral tablet: 0.5 tab(s) orally once a day (09 Mar 2023 09:17)    MEDICATIONS  (STANDING):  enoxaparin Injectable 40 milliGRAM(s) SubCutaneous every 24 hours  lacosamide IVPB 50 milliGRAM(s) IV Intermittent every 12 hours  lactated ringers. 1000 milliLiter(s) (75 mL/Hr) IV Continuous <Continuous>  levothyroxine Injectable 50 MICROGram(s) IV Push at bedtime  meropenem  IVPB 1000 milliGRAM(s) IV Intermittent every 8 hours  pantoprazole    Tablet 40 milliGRAM(s) Oral before breakfast    MEDICATIONS  (PRN):  acetaminophen     Tablet .. 650 milliGRAM(s) Oral every 6 hours PRN Temp greater or equal to 38C (100.4F), Mild Pain (1 - 3)  aluminum hydroxide/magnesium hydroxide/simethicone Suspension 30 milliLiter(s) Oral every 4 hours PRN Dyspepsia  melatonin 3 milliGRAM(s) Oral at bedtime PRN Insomnia  ondansetron Injectable 4 milliGRAM(s) IV Push every 8 hours PRN Nausea and/or Vomiting    ALLERGIES/INTOLERANCES:  Allergies  sulfa drugs (Hives; Rash)    Intolerances    VITALS & EXAMINATION:  Vital Signs Last 24 Hrs  T(C): 36.3 (10 Mar 2023 14:51), Max: 36.6 (09 Mar 2023 23:36)  T(F): 97.3 (10 Mar 2023 14:51), Max: 97.8 (09 Mar 2023 23:36)  HR: 101 (10 Mar 2023 14:51) (85 - 103)  BP: 150/90 (10 Mar 2023 14:51) (150/90 - 184/82)  BP(mean): --  RR: 18 (10 Mar 2023 14:51) (18 - 18)  SpO2: 100% (10 Mar 2023 14:51) (94% - 100%)    Parameters below as of 10 Mar 2023 14:51  Patient On (Oxygen Delivery Method): room air        General:  Constitutional: Female, appears stated age  Head: Normocephalic & atraumatic.    Neurological (>12):  Limited  MS: Awake, alert, oriented to person, place or time. Yelling out 'No no no no!" Agitated mood. Does not follow commands.     Language: Speech is clear, unable to test fluency    CNs: PERRLA (R = 3mm, L = 3mm).BTT intact b/l. No facial asymmetry b/l, full eye closure strength b/l. Gag reflex deferred.     Motor: Normal muscle bulk & tone. No noticeable tremor or seizure.     Sensation: Grimaces to noxious stimuli    Cortical: Extinction on DSS (neglect): Unable to assess    Reflexes:  Patient would not allow    Coordination: Unable to assess    Gait: Deferred    LABORATORY:  CBC                       10.4   9.95  )-----------( 517      ( 10 Mar 2023 06:26 )             34.0     Chem 03-10    135  |  94<L>  |  9   ----------------------------<  104<H>  3.9   |  25  |  0.62    Ca    10.0      10 Mar 2023 06:26  Phos  3.0     03-10  Mg     1.80     03-10    TPro  7.8  /  Alb  4.1  /  TBili  <0.2  /  DBili  x   /  AST  23  /  ALT  19  /  AlkPhos  164<H>  03-08    LFTs LIVER FUNCTIONS - ( 08 Mar 2023 19:30 )  Alb: 4.1 g/dL / Pro: 7.8 g/dL / ALK PHOS: 164 U/L / ALT: 19 U/L / AST: 23 U/L / GGT: x           Coagulopathy   Lipid Panel   A1c   Cardiac enzymes     U/A Urinalysis Basic - ( 08 Mar 2023 19:30 )    Color: Yellow / Appearance: Turbid / S.016 / pH: x  Gluc: x / Ketone: Trace  / Bili: Negative / Urobili: <2 mg/dL   Blood: x / Protein: 100 mg/dL / Nitrite: Positive   Leuk Esterase: Large / RBC: 5-10 /HPF / WBC >50 /HPF   Sq Epi: x / Non Sq Epi: x / Bacteria: Many      CSF  Immunological  Other    STUDIES & IMAGING:  Studies (EKG, EEG, EMG, etc):     Radiology (XR, CT, MR, U/S, TTE/EDGAR)

## 2023-03-11 LAB
-  AMIKACIN: SIGNIFICANT CHANGE UP
-  AMOXICILLIN/CLAVULANIC ACID: SIGNIFICANT CHANGE UP
-  AMPICILLIN/SULBACTAM: SIGNIFICANT CHANGE UP
-  AMPICILLIN: SIGNIFICANT CHANGE UP
-  AZTREONAM: SIGNIFICANT CHANGE UP
-  CEFAZOLIN: SIGNIFICANT CHANGE UP
-  CEFEPIME: SIGNIFICANT CHANGE UP
-  CEFOXITIN: SIGNIFICANT CHANGE UP
-  CEFTRIAXONE: SIGNIFICANT CHANGE UP
-  CIPROFLOXACIN: SIGNIFICANT CHANGE UP
-  ERTAPENEM: SIGNIFICANT CHANGE UP
-  GENTAMICIN: SIGNIFICANT CHANGE UP
-  IMIPENEM: SIGNIFICANT CHANGE UP
-  LEVOFLOXACIN: SIGNIFICANT CHANGE UP
-  MEROPENEM: SIGNIFICANT CHANGE UP
-  NITROFURANTOIN: SIGNIFICANT CHANGE UP
-  PIPERACILLIN/TAZOBACTAM: SIGNIFICANT CHANGE UP
-  TOBRAMYCIN: SIGNIFICANT CHANGE UP
-  TRIMETHOPRIM/SULFAMETHOXAZOLE: SIGNIFICANT CHANGE UP
AMMONIA BLD-MCNC: 31 UMOL/L — SIGNIFICANT CHANGE UP (ref 11–55)
AMPHET UR-MCNC: NEGATIVE — SIGNIFICANT CHANGE UP
ANION GAP SERPL CALC-SCNC: 15 MMOL/L — HIGH (ref 7–14)
BARBITURATES UR SCN-MCNC: NEGATIVE — SIGNIFICANT CHANGE UP
BENZODIAZ UR-MCNC: NEGATIVE — SIGNIFICANT CHANGE UP
BUN SERPL-MCNC: 13 MG/DL — SIGNIFICANT CHANGE UP (ref 7–23)
CALCIUM SERPL-MCNC: 10.1 MG/DL — SIGNIFICANT CHANGE UP (ref 8.4–10.5)
CERULOPLASMIN SERPL-MCNC: 32 MG/DL — SIGNIFICANT CHANGE UP (ref 16–45)
CHLORIDE SERPL-SCNC: 97 MMOL/L — LOW (ref 98–107)
CK SERPL-CCNC: 218 U/L — HIGH (ref 25–170)
CO2 SERPL-SCNC: 26 MMOL/L — SIGNIFICANT CHANGE UP (ref 22–31)
COCAINE METAB.OTHER UR-MCNC: NEGATIVE — SIGNIFICANT CHANGE UP
CREAT SERPL-MCNC: 0.53 MG/DL — SIGNIFICANT CHANGE UP (ref 0.5–1.3)
CREATININE URINE RESULT, DAU: 88 MG/DL — SIGNIFICANT CHANGE UP
CRP SERPL-MCNC: 25.9 MG/L — HIGH
CULTURE RESULTS: SIGNIFICANT CHANGE UP
EGFR: 102 ML/MIN/1.73M2 — SIGNIFICANT CHANGE UP
FOLATE SERPL-MCNC: 9.9 NG/ML — SIGNIFICANT CHANGE UP (ref 3.1–17.5)
GLUCOSE SERPL-MCNC: 84 MG/DL — SIGNIFICANT CHANGE UP (ref 70–99)
HCT VFR BLD CALC: 35.6 % — SIGNIFICANT CHANGE UP (ref 34.5–45)
HCYS SERPL-MCNC: 11.5 UMOL/L — SIGNIFICANT CHANGE UP
HGB BLD-MCNC: 10.8 G/DL — LOW (ref 11.5–15.5)
LACTATE SERPL-SCNC: 2.2 MMOL/L — HIGH (ref 0.5–2)
MAGNESIUM SERPL-MCNC: 1.8 MG/DL — SIGNIFICANT CHANGE UP (ref 1.6–2.6)
MCHC RBC-ENTMCNC: 28.1 PG — SIGNIFICANT CHANGE UP (ref 27–34)
MCHC RBC-ENTMCNC: 30.3 GM/DL — LOW (ref 32–36)
MCV RBC AUTO: 92.5 FL — SIGNIFICANT CHANGE UP (ref 80–100)
METHADONE UR-MCNC: NEGATIVE — SIGNIFICANT CHANGE UP
METHOD TYPE: SIGNIFICANT CHANGE UP
NRBC # BLD: 0 /100 WBCS — SIGNIFICANT CHANGE UP (ref 0–0)
NRBC # FLD: 0 K/UL — SIGNIFICANT CHANGE UP (ref 0–0)
OPIATES UR-MCNC: NEGATIVE — SIGNIFICANT CHANGE UP
ORGANISM # SPEC MICROSCOPIC CNT: SIGNIFICANT CHANGE UP
ORGANISM # SPEC MICROSCOPIC CNT: SIGNIFICANT CHANGE UP
OXYCODONE UR-MCNC: NEGATIVE — SIGNIFICANT CHANGE UP
PCP SPEC-MCNC: SIGNIFICANT CHANGE UP
PCP UR-MCNC: NEGATIVE — SIGNIFICANT CHANGE UP
PHOSPHATE SERPL-MCNC: 2.9 MG/DL — SIGNIFICANT CHANGE UP (ref 2.5–4.5)
PLATELET # BLD AUTO: 567 K/UL — HIGH (ref 150–400)
POTASSIUM SERPL-MCNC: 3.7 MMOL/L — SIGNIFICANT CHANGE UP (ref 3.5–5.3)
POTASSIUM SERPL-SCNC: 3.7 MMOL/L — SIGNIFICANT CHANGE UP (ref 3.5–5.3)
PROT SERPL-MCNC: 8.2 G/DL — SIGNIFICANT CHANGE UP (ref 6–8.3)
RBC # BLD: 3.85 M/UL — SIGNIFICANT CHANGE UP (ref 3.8–5.2)
RBC # FLD: 16.8 % — HIGH (ref 10.3–14.5)
SODIUM SERPL-SCNC: 138 MMOL/L — SIGNIFICANT CHANGE UP (ref 135–145)
SPECIMEN SOURCE: SIGNIFICANT CHANGE UP
T PALLIDUM AB TITR SER: NEGATIVE — SIGNIFICANT CHANGE UP
T3 SERPL-MCNC: 67 NG/DL — LOW (ref 80–200)
T4 AB SER-ACNC: 11.01 UG/DL — SIGNIFICANT CHANGE UP (ref 5.1–13)
THC UR QL: NEGATIVE — SIGNIFICANT CHANGE UP
TSH SERPL-MCNC: 1.03 UIU/ML — SIGNIFICANT CHANGE UP (ref 0.27–4.2)
VIT B12 SERPL-MCNC: 1298 PG/ML — HIGH (ref 200–900)
WBC # BLD: 11.04 K/UL — HIGH (ref 3.8–10.5)
WBC # FLD AUTO: 11.04 K/UL — HIGH (ref 3.8–10.5)

## 2023-03-11 PROCEDURE — 95720 EEG PHY/QHP EA INCR W/VEEG: CPT

## 2023-03-11 PROCEDURE — 99233 SBSQ HOSP IP/OBS HIGH 50: CPT

## 2023-03-11 PROCEDURE — 99222 1ST HOSP IP/OBS MODERATE 55: CPT

## 2023-03-11 RX ORDER — SODIUM CHLORIDE 9 MG/ML
1000 INJECTION INTRAMUSCULAR; INTRAVENOUS; SUBCUTANEOUS
Refills: 0 | Status: DISCONTINUED | OUTPATIENT
Start: 2023-03-11 | End: 2023-03-12

## 2023-03-11 RX ADMIN — LACOSAMIDE 110 MILLIGRAM(S): 50 TABLET ORAL at 01:11

## 2023-03-11 RX ADMIN — LACOSAMIDE 110 MILLIGRAM(S): 50 TABLET ORAL at 12:49

## 2023-03-11 RX ADMIN — Medication 50 MICROGRAM(S): at 22:41

## 2023-03-11 RX ADMIN — Medication 50 MICROGRAM(S): at 01:52

## 2023-03-11 RX ADMIN — ENOXAPARIN SODIUM 40 MILLIGRAM(S): 100 INJECTION SUBCUTANEOUS at 05:32

## 2023-03-11 RX ADMIN — ERTAPENEM SODIUM 120 MILLIGRAM(S): 1 INJECTION, POWDER, LYOPHILIZED, FOR SOLUTION INTRAMUSCULAR; INTRAVENOUS at 00:17

## 2023-03-11 RX ADMIN — SODIUM CHLORIDE 75 MILLILITER(S): 9 INJECTION INTRAMUSCULAR; INTRAVENOUS; SUBCUTANEOUS at 04:36

## 2023-03-11 NOTE — PROGRESS NOTE ADULT - SUBJECTIVE AND OBJECTIVE BOX
Medicine Progress Note    Patient is a 68y old  Female who presents with a chief complaint of AMS (10 Mar 2023 14:49)      SUBJECTIVE / OVERNIGHT EVENTS: patient has multiple admissions for the same symptoms    Repeated UTI, pos neurogenic bladder , might need a home straight cath to avoid repeated UTI   at bedside say she is getting better , but patient completed atb on 3/10 and still taking to imaginary people , does not recognize    Guarded , milan upper extremities when the  trying to get close to her   Although , this a psych issues , he is refusing to talk to psych and consult , no collateral for psych to talk     ADDITIONAL REVIEW OF SYSTEMS:    MEDICATIONS  (STANDING):  enoxaparin Injectable 40 milliGRAM(s) SubCutaneous every 24 hours  ertapenem  IVPB 1000 milliGRAM(s) IV Intermittent every 24 hours  lacosamide IVPB 50 milliGRAM(s) IV Intermittent every 12 hours  lactated ringers. 1000 milliLiter(s) (75 mL/Hr) IV Continuous <Continuous>  levothyroxine Injectable 50 MICROGram(s) IV Push at bedtime  pantoprazole    Tablet 40 milliGRAM(s) Oral before breakfast  sodium chloride 0.9%. 1000 milliLiter(s) (75 mL/Hr) IV Continuous <Continuous>    MEDICATIONS  (PRN):  acetaminophen     Tablet .. 650 milliGRAM(s) Oral every 6 hours PRN Temp greater or equal to 38C (100.4F), Mild Pain (1 - 3)  aluminum hydroxide/magnesium hydroxide/simethicone Suspension 30 milliLiter(s) Oral every 4 hours PRN Dyspepsia  melatonin 3 milliGRAM(s) Oral at bedtime PRN Insomnia  ondansetron Injectable 4 milliGRAM(s) IV Push every 8 hours PRN Nausea and/or Vomiting    CAPILLARY BLOOD GLUCOSE        I&O's Summary    10 Mar 2023 07:01  -  11 Mar 2023 07:00  --------------------------------------------------------  IN: 375 mL / OUT: 450 mL / NET: -75 mL    11 Mar 2023 06:01  -  11 Mar 2023 15:25  --------------------------------------------------------  IN: 75 mL / OUT: 200 mL / NET: -125 mL        PHYSICAL EXAM:  Vital Signs Last 24 Hrs  T(C): 36.4 (11 Mar 2023 13:00), Max: 36.6 (11 Mar 2023 05:28)  T(F): 97.5 (11 Mar 2023 13:00), Max: 97.8 (11 Mar 2023 05:28)  HR: 100 (11 Mar 2023 13:00) (100 - 102)  BP: 138/86 (11 Mar 2023 13:00) (119/80 - 149/92)  BP(mean): --  RR: 18 (11 Mar 2023 13:00) (18 - 18)  SpO2: 100% (11 Mar 2023 13:00) (100% - 100%)    Parameters below as of 11 Mar 2023 13:00  Patient On (Oxygen Delivery Method): room air      CONSTITUTIONAL: mild distress , taking to imaginary people   ENMT: Moist oral mucosa, no pharyngeal injection or exudates;   RESPIRATORY: Normal respiratory effort; lungs are clear to auscultation bilaterally  CARDIOVASCULAR: Regular rate and rhythm, normal S1 and S2,  No lower extremity edema;  ABDOMEN: Nontender to palpation, normoactive bowel sounds, no rebound/guarding;   PSYCH: taking to imaginary people   NEUROLOGY: not cooperating , milan when approached   SKIN: No rashes; no palpable lesions    LABS:                        10.8   11.04 )-----------( 567      ( 11 Mar 2023 06:55 )             35.6     03-11    138  |  97<L>  |  13  ----------------------------<  84  3.7   |  26  |  0.53    Ca    10.1      11 Mar 2023 06:55  Phos  2.9     03-11  Mg     1.80     03-11    TPro  8.2  /  Alb  x   /  TBili  x   /  DBili  x   /  AST  x   /  ALT  x   /  AlkPhos  x   03-10      CARDIAC MARKERS ( 10 Mar 2023 23:45 )  x     / x     / 218 U/L / x     / x              Culture - Blood (collected 08 Mar 2023 19:30)  Source: .Blood Blood-Peripheral  Preliminary Report (10 Mar 2023 01:02):    No growth to date.    Culture - Blood (collected 08 Mar 2023 19:15)  Source: .Blood Blood-Venous  Preliminary Report (10 Mar 2023 01:02):    No growth to date.      SARS-CoV-2: Detected (24 Feb 2023 01:11)      RADIOLOGY & ADDITIONAL TESTS:  Imaging from Last 24 Hours: No CT evidence of acute intracranial pathology.    There are a few scattered pericallosal white matter lesions on the CT   scan, compatible with the patient's history of multiple sclerosis.  The   full extent of disease is better visualized on MRI of 03/01/2023.      Electrocardiogram/QTc Interval:    COORDINATION OF CARE:  Care Discussed with Consultants/Other Providers: psych

## 2023-03-11 NOTE — PROGRESS NOTE ADULT - ASSESSMENT
69 yo F Jehova's Witness PMH of MS with baclofen pump in place , seizure do,  hypothyroidism, frequent UTIs  admitted for acute metabolic encephalopathy likely 2/2 UTI

## 2023-03-11 NOTE — CHART NOTE - NSCHARTNOTEFT_GEN_A_CORE
Spouse refused consult from psychiatry today, states patient's issues are medical not psychiatric, discussed with [facility] attending, Dr. Pritchard, primary team, ALIS Zimmerman made aware.

## 2023-03-11 NOTE — PROGRESS NOTE ADULT - PROBLEM SELECTOR PLAN 2
-CT a/p c/w cystitis. no e/o pyelonephritis   -outside urine cx 3/8 reviewed- grew ESBL citrobacter. sens to ertapenem, Bactrim, nitrofurantoin. resistant to cephalosporin. intermediate to Levaquin  -UA+.  f/u urine cx and blood cx  -start Merrem x 3 days, completed   -monitor for urinary retention   -has urology appt 4/5 for urinary retention/frequent UTIs  - will order straight cath as suspect urinary incomplete bladder emptying source of UTI , same bacteria

## 2023-03-11 NOTE — PROGRESS NOTE ADULT - PROBLEM SELECTOR PLAN 1
Actively visual /auditory hallucinations . no seizure like activity as per family    Reach  for extra collaterals , no additional info, not useful   -MRI brain 2/17 and 3/1 reviewed-  Patient also had LP on prior visits   Consulted neuro and psych , f/w recs   -completed  UTI treatment , BC negative   Identify GNR, same Citrobacter Furandi , sensitive to Merrem , completed 3 days per guidelines   -unable to swallow d/t lethargy. converted meds to IV  -hold sedative meds Trazadone, gabapentin for now   -puree diet and IVF for now. swallow eval when MS improves  Check CT head as above

## 2023-03-11 NOTE — PROGRESS NOTE ADULT - PROBLEM SELECTOR PLAN 4
Avera Creighton Michael Valenzuela [facility], [facility], [facility], Antony Mariee [facility], Stephanie Slater [facility], Radha Olvera LMFT, [facility], Kellee Granger RN, Sunitha BRUNER, CNP       [facility] [place] 2[place] Signature:  ____________________________________  Date:  ___________________     -s/p baclofen bump. last refilled 3/1, next refill 5/26  Neuro follow up

## 2023-03-12 LAB
ANION GAP SERPL CALC-SCNC: 17 MMOL/L — HIGH (ref 7–14)
BUN SERPL-MCNC: 14 MG/DL — SIGNIFICANT CHANGE UP (ref 7–23)
CALCIUM SERPL-MCNC: 9.7 MG/DL — SIGNIFICANT CHANGE UP (ref 8.4–10.5)
CHLORIDE SERPL-SCNC: 100 MMOL/L — SIGNIFICANT CHANGE UP (ref 98–107)
CO2 SERPL-SCNC: 24 MMOL/L — SIGNIFICANT CHANGE UP (ref 22–31)
CREAT SERPL-MCNC: 0.56 MG/DL — SIGNIFICANT CHANGE UP (ref 0.5–1.3)
EGFR: 101 ML/MIN/1.73M2 — SIGNIFICANT CHANGE UP
GLUCOSE SERPL-MCNC: 56 MG/DL — LOW (ref 70–99)
HCT VFR BLD CALC: 32.3 % — LOW (ref 34.5–45)
HGB BLD-MCNC: 10 G/DL — LOW (ref 11.5–15.5)
MAGNESIUM SERPL-MCNC: 1.8 MG/DL — SIGNIFICANT CHANGE UP (ref 1.6–2.6)
MCHC RBC-ENTMCNC: 28.9 PG — SIGNIFICANT CHANGE UP (ref 27–34)
MCHC RBC-ENTMCNC: 31 GM/DL — LOW (ref 32–36)
MCV RBC AUTO: 93.4 FL — SIGNIFICANT CHANGE UP (ref 80–100)
NRBC # BLD: 0 /100 WBCS — SIGNIFICANT CHANGE UP (ref 0–0)
NRBC # FLD: 0 K/UL — SIGNIFICANT CHANGE UP (ref 0–0)
PHOSPHATE SERPL-MCNC: 2.9 MG/DL — SIGNIFICANT CHANGE UP (ref 2.5–4.5)
PLATELET # BLD AUTO: 482 K/UL — HIGH (ref 150–400)
POTASSIUM SERPL-MCNC: 3.7 MMOL/L — SIGNIFICANT CHANGE UP (ref 3.5–5.3)
POTASSIUM SERPL-SCNC: 3.7 MMOL/L — SIGNIFICANT CHANGE UP (ref 3.5–5.3)
RBC # BLD: 3.46 M/UL — LOW (ref 3.8–5.2)
RBC # FLD: 17.2 % — HIGH (ref 10.3–14.5)
SODIUM SERPL-SCNC: 141 MMOL/L — SIGNIFICANT CHANGE UP (ref 135–145)
WBC # BLD: 9.87 K/UL — SIGNIFICANT CHANGE UP (ref 3.8–10.5)
WBC # FLD AUTO: 9.87 K/UL — SIGNIFICANT CHANGE UP (ref 3.8–10.5)

## 2023-03-12 PROCEDURE — 99232 SBSQ HOSP IP/OBS MODERATE 35: CPT

## 2023-03-12 PROCEDURE — 95720 EEG PHY/QHP EA INCR W/VEEG: CPT

## 2023-03-12 RX ORDER — LACOSAMIDE 50 MG/1
100 TABLET ORAL EVERY 12 HOURS
Refills: 0 | Status: DISCONTINUED | OUTPATIENT
Start: 2023-03-12 | End: 2023-03-12

## 2023-03-12 RX ORDER — SODIUM CHLORIDE 9 MG/ML
1000 INJECTION INTRAMUSCULAR; INTRAVENOUS; SUBCUTANEOUS
Refills: 0 | Status: DISCONTINUED | OUTPATIENT
Start: 2023-03-12 | End: 2023-03-14

## 2023-03-12 RX ORDER — LACOSAMIDE 50 MG/1
100 TABLET ORAL EVERY 12 HOURS
Refills: 0 | Status: DISCONTINUED | OUTPATIENT
Start: 2023-03-12 | End: 2023-03-15

## 2023-03-12 RX ADMIN — LACOSAMIDE 110 MILLIGRAM(S): 50 TABLET ORAL at 00:41

## 2023-03-12 RX ADMIN — SODIUM CHLORIDE 100 MILLILITER(S): 9 INJECTION INTRAMUSCULAR; INTRAVENOUS; SUBCUTANEOUS at 22:30

## 2023-03-12 RX ADMIN — LACOSAMIDE 110 MILLIGRAM(S): 50 TABLET ORAL at 12:51

## 2023-03-12 RX ADMIN — SODIUM CHLORIDE 100 MILLILITER(S): 9 INJECTION INTRAMUSCULAR; INTRAVENOUS; SUBCUTANEOUS at 16:48

## 2023-03-12 RX ADMIN — ERTAPENEM SODIUM 120 MILLIGRAM(S): 1 INJECTION, POWDER, LYOPHILIZED, FOR SOLUTION INTRAMUSCULAR; INTRAVENOUS at 00:03

## 2023-03-12 RX ADMIN — ENOXAPARIN SODIUM 40 MILLIGRAM(S): 100 INJECTION SUBCUTANEOUS at 06:34

## 2023-03-12 RX ADMIN — Medication 50 MICROGRAM(S): at 22:29

## 2023-03-12 RX ADMIN — PANTOPRAZOLE SODIUM 40 MILLIGRAM(S): 20 TABLET, DELAYED RELEASE ORAL at 06:34

## 2023-03-12 NOTE — PROGRESS NOTE ADULT - ASSESSMENT
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

## 2023-03-12 NOTE — PROGRESS NOTE ADULT - SUBJECTIVE AND OBJECTIVE BOX
Ms. Edwards was seen in neurologic follow up today.  She is much more alert and interactive.  She opens her eyes to voice and can tell me her name and that she is in the hospital  Know the month to be March - year - 2022.  Names a few high frequency objects.  Unable to participate in VF testing  Left UE moving all muscle groups  Right UE appears quite weak proximally with good ;  Bilateral LE weakness - does not move to commands and bilateral foot drop.

## 2023-03-12 NOTE — EEG REPORT - NS EEG TEXT BOX
RAMIRO CASTAÑEDA [id number]     Study Date: 03/11/2023 14:55 - 03/12/2023 ONGOING  Duration:   --------------------------------------------------------------------------------------------------  History:  CC/ HPI Patient is a 68y old  Female who presents with a chief complaint of AMS (11 Mar 2023 15:17)    MEDICATIONS  (STANDING):  enoxaparin Injectable 40 milliGRAM(s) SubCutaneous every 24 hours  ertapenem  IVPB 1000 milliGRAM(s) IV Intermittent every 24 hours  lacosamide IVPB 50 milliGRAM(s) IV Intermittent every 12 hours  levothyroxine Injectable 50 MICROGram(s) IV Push at bedtime  pantoprazole    Tablet 40 milliGRAM(s) Oral before breakfast  sodium chloride 0.9%. 1000 milliLiter(s) (75 mL/Hr) IV Continuous <Continuous>    --------------------------------------------------------------------------------------------------  Study Interpretation:    [[[Abbreviation Key:  PDR=alpha rhythm/posterior dominant rhythm. A-P=anterior posterior.  Amplitude: ‘very low’:<20; ‘low’:20-49; ‘medium’:; ‘high’:>150uV.  Persistence for periodic/rhythmic patterns (% of epoch) ‘rare’:<1%; ‘occasional’:1-10%; ‘frequent’:10-50%; ‘abundant’:50-90%; ‘continuous’:>90%.  Persistence for sporadic discharges: ‘rare’:<1/hr; ‘occasional’:1/min-1/hr; ‘frequent’:>1/min; ‘abundant’:>1/10 sec.  RPP=rhythmic and periodic patterns; GRDA=generalized rhythmic delta activity; FIRDA=frontal intermittent GRDA; LRDA=lateralized rhythmic delta activity; TIRDA=temporal intermittent rhythmic delta activity;  LPD=PLED=lateralized periodic discharges; GPD=generalized periodic discharges; BIPDs =bilateral independent periodic discharges; Mf=multifocal; SIRPDs=stimulus induced rhythmic, periodic, or ictal appearing discharges; BIRDs=brief potentially ictal rhythmic discharges >4 Hz, lasting .5-10s; PFA (paroxysmal bursts >13 Hz or =8 Hz <10s).  Modifiers: +F=with fast component; +S=with spike component; +R=with rhythmic component.  S-B=burst suppression pattern.  Max=maximal. N1-drowsy; N2-stage II sleep; N3-slow wave sleep. SSS/BETS=small sharp spikes/benign epileptiform transients of sleep. HV=hyperventilation; PS=photic stimulation]]]    Daily EEG Visual Analysis    FINDINGS:      Background:  Continuity: Continuous  Symmetry: Symmetric  Posterior dominant rhythm (PDR): 8 Hz, reactive to eye closure. Symmetric low-amplitude frontal beta in wakefulness.  State Change: Present  Voltage: Normal  Anterior Posterior Gradient: Present  Other background findings: Occasional diffuse polymorphic delta slowing during wakefulness.  Breach: Absent    State Changes:   Drowsiness is characterized by fragmentation, attenuation, and slowing of the background frequencies, with the appearance of bursts and runs of generalized rhythmic delta activity (GRDA) at 1-1.7 Hz.  No stage 2 sleep architecture is captured.    Sporadic Epileptiform Discharges:    Rare generalized frontally predominant sharp waves with triphasic morphology.  Rare left anterior temporal (F7/T7) spike-wave discharges.    Rhythmic and Periodic Patterns (RPPs):  None     Electrographic and Electroclinical seizures:  None    Other Clinical Events:  None    Activation Procedures:   Hyperventilation was not performed.    Photic stimulation was not performed.    Artifacts:  Intermittent myogenic and movement artifacts are present.    EKG:  Single-lead EKG shows regular rhythm at 60-90 bpm.    EEG Classification / Summary:  Abnormal EEG in the awake and drowsy states due to:  -Rare generalized frontally predominant sharp waves with triphasic morphology  -Rare left anterior temporal spike-wave discharges  -Generalized rhythmic delta activity (GRDA) in drowsiness  -Mild diffuse slowing    Clinical Impression:  -Rare generalized epileptiform discharges indicate risk of generalized seizures  -Rare left anterior temporal epileptiform discharges indicate risk of focal-onset seizures from this reigon  -Mild diffuse slowing and GRDA indicate mild diffuse cerebral dysfunction of nonspecific etiology    In the absence of further clinical concern, consider disconnecting study.    Reviewed approximately 15 hours, up to 3/12/23 06:07 (i.e., 07:07 with daylight savings time). Report will be updated after 08:00 to reflect completion of the study.        -------------------------------------------------------------------------------------------------------  [facility] EEG Reading Room Ph#: [phone]  Epilepsy Answering Service after 5PM and before 8:30AM: Ph#: [phone]    Ragini Cifuentes MD  Attending Physician, [facility]   RAMIRO CASTAÑEDA [id number]     Study Date: 03/11/2023 14:55 - 03/12/2023 07:00  Duration: 16 hr 5 min  --------------------------------------------------------------------------------------------------  History:  CC/ HPI Patient is a 68y old  Female who presents with a chief complaint of AMS (11 Mar 2023 15:17)    MEDICATIONS  (STANDING):  enoxaparin Injectable 40 milliGRAM(s) SubCutaneous every 24 hours  ertapenem  IVPB 1000 milliGRAM(s) IV Intermittent every 24 hours  lacosamide IVPB 50 milliGRAM(s) IV Intermittent every 12 hours  levothyroxine Injectable 50 MICROGram(s) IV Push at bedtime  pantoprazole    Tablet 40 milliGRAM(s) Oral before breakfast  sodium chloride 0.9%. 1000 milliLiter(s) (75 mL/Hr) IV Continuous <Continuous>    --------------------------------------------------------------------------------------------------  Study Interpretation:    [[[Abbreviation Key:  PDR=alpha rhythm/posterior dominant rhythm. A-P=anterior posterior.  Amplitude: ‘very low’:<20; ‘low’:20-49; ‘medium’:; ‘high’:>150uV.  Persistence for periodic/rhythmic patterns (% of epoch) ‘rare’:<1%; ‘occasional’:1-10%; ‘frequent’:10-50%; ‘abundant’:50-90%; ‘continuous’:>90%.  Persistence for sporadic discharges: ‘rare’:<1/hr; ‘occasional’:1/min-1/hr; ‘frequent’:>1/min; ‘abundant’:>1/10 sec.  RPP=rhythmic and periodic patterns; GRDA=generalized rhythmic delta activity; FIRDA=frontal intermittent GRDA; LRDA=lateralized rhythmic delta activity; TIRDA=temporal intermittent rhythmic delta activity;  LPD=PLED=lateralized periodic discharges; GPD=generalized periodic discharges; BIPDs =bilateral independent periodic discharges; Mf=multifocal; SIRPDs=stimulus induced rhythmic, periodic, or ictal appearing discharges; BIRDs=brief potentially ictal rhythmic discharges >4 Hz, lasting .5-10s; PFA (paroxysmal bursts >13 Hz or =8 Hz <10s).  Modifiers: +F=with fast component; +S=with spike component; +R=with rhythmic component.  S-B=burst suppression pattern.  Max=maximal. N1-drowsy; N2-stage II sleep; N3-slow wave sleep. SSS/BETS=small sharp spikes/benign epileptiform transients of sleep. HV=hyperventilation; PS=photic stimulation]]]    Daily EEG Visual Analysis    FINDINGS:      Background:  Continuity: Continuous  Symmetry: Symmetric  Posterior dominant rhythm (PDR): 8 Hz, reactive to eye closure. Symmetric low-amplitude frontal beta in wakefulness.  State Change: Present  Voltage: Normal  Anterior Posterior Gradient: Present  Other background findings: Occasional diffuse polymorphic delta slowing during wakefulness.  Breach: Absent    State Changes:   Drowsiness is characterized by fragmentation, attenuation, and slowing of the background frequencies, with the appearance of bursts and runs of generalized rhythmic delta activity (GRDA) at 1-1.7 Hz.  No stage 2 sleep architecture is captured.    Sporadic Epileptiform Discharges:    Rare generalized frontally predominant sharp waves with triphasic morphology.  Rare left anterior temporal (F7/T7) spike-wave discharges.    Rhythmic and Periodic Patterns (RPPs):  None     Electrographic and Electroclinical seizures:  None    Other Clinical Events:  None    Activation Procedures:   Hyperventilation was not performed.    Photic stimulation was not performed.    Artifacts:  Intermittent myogenic and movement artifacts are present.    EKG:  Single-lead EKG shows regular rhythm at 60-90 bpm.    EEG Classification / Summary:  Abnormal EEG in the awake and drowsy states due to:  -Rare generalized frontally predominant sharp waves with triphasic morphology  -Rare left anterior temporal spike-wave discharges  -Generalized rhythmic delta activity (GRDA) in drowsiness  -Mild diffuse slowing    Clinical Impression:  -Rare generalized epileptiform discharges indicate risk of generalized seizures  -Rare left anterior temporal epileptiform discharges indicate risk of focal-onset seizures from this reigon  -Mild diffuse slowing and GRDA indicate mild diffuse cerebral dysfunction of nonspecific etiology    In the absence of further clinical concern, consider disconnecting study.        -------------------------------------------------------------------------------------------------------  [facility] EEG Reading Room Ph#: [phone]  Epilepsy Answering Service after 5PM and before 8:30AM: Ph#: [phone]    Ragini Cifuentes MD  Attending Physician, [facility]   RAMIRO CASTAÑEDA [id number]     Study Date: 03/11/2023 14:55 - 03/12/2023 07:00  Duration: 16 hr 5 min  --------------------------------------------------------------------------------------------------  History:  CC/ HPI Patient is a 68y old  Female who presents with a chief complaint of AMS (11 Mar 2023 15:17)    MEDICATIONS  (STANDING):  enoxaparin Injectable 40 milliGRAM(s) SubCutaneous every 24 hours  ertapenem  IVPB 1000 milliGRAM(s) IV Intermittent every 24 hours  lacosamide IVPB 50 milliGRAM(s) IV Intermittent every 12 hours  levothyroxine Injectable 50 MICROGram(s) IV Push at bedtime  pantoprazole    Tablet 40 milliGRAM(s) Oral before breakfast  sodium chloride 0.9%. 1000 milliLiter(s) (75 mL/Hr) IV Continuous <Continuous>    --------------------------------------------------------------------------------------------------  Study Interpretation:    [[[Abbreviation Key:  PDR=alpha rhythm/posterior dominant rhythm. A-P=anterior posterior.  Amplitude: ‘very low’:<20; ‘low’:20-49; ‘medium’:; ‘high’:>150uV.  Persistence for periodic/rhythmic patterns (% of epoch) ‘rare’:<1%; ‘occasional’:1-10%; ‘frequent’:10-50%; ‘abundant’:50-90%; ‘continuous’:>90%.  Persistence for sporadic discharges: ‘rare’:<1/hr; ‘occasional’:1/min-1/hr; ‘frequent’:>1/min; ‘abundant’:>1/10 sec.  RPP=rhythmic and periodic patterns; GRDA=generalized rhythmic delta activity; FIRDA=frontal intermittent GRDA; LRDA=lateralized rhythmic delta activity; TIRDA=temporal intermittent rhythmic delta activity;  LPD=PLED=lateralized periodic discharges; GPD=generalized periodic discharges; BIPDs =bilateral independent periodic discharges; Mf=multifocal; SIRPDs=stimulus induced rhythmic, periodic, or ictal appearing discharges; BIRDs=brief potentially ictal rhythmic discharges >4 Hz, lasting .5-10s; PFA (paroxysmal bursts >13 Hz or =8 Hz <10s).  Modifiers: +F=with fast component; +S=with spike component; +R=with rhythmic component.  S-B=burst suppression pattern.  Max=maximal. N1-drowsy; N2-stage II sleep; N3-slow wave sleep. SSS/BETS=small sharp spikes/benign epileptiform transients of sleep. HV=hyperventilation; PS=photic stimulation]]]    Daily EEG Visual Analysis    FINDINGS:      Background:  Continuity: Continuous  Symmetry: Symmetric  Posterior dominant rhythm (PDR): 8 Hz, reactive to eye closure. Symmetric low-amplitude frontal beta in wakefulness.  State Change: Present  Voltage: Normal  Anterior Posterior Gradient: Present  Other background findings: Occasional diffuse polymorphic delta slowing during wakefulness.  Breach: Absent    State Changes:   Drowsiness is characterized by fragmentation, attenuation, and slowing of the background frequencies, with the appearance of bursts and runs of generalized rhythmic delta activity (GRDA) at 1-1.7 Hz.  No stage 2 sleep architecture is captured.    Sporadic Epileptiform Discharges:    none    Rhythmic and Periodic Patterns (RPPs):  None     Electrographic and Electroclinical seizures:  None    Other Clinical Events:  None    Activation Procedures:   Hyperventilation was not performed.    Photic stimulation was not performed.    Artifacts:  Intermittent myogenic and movement artifacts are present.    EKG:  Single-lead EKG shows regular rhythm at 60-90 bpm.    EEG Classification / Summary:  Abnormal EEG in the awake and drowsy states due to:  -Generalized rhythmic delta activity (GRDA) in drowsiness  -Mild diffuse slowing    Clinical Impression:  -Mild diffuse slowing and GRDA indicate mild diffuse cerebral dysfunction of nonspecific etiology    In the absence of further clinical concern, consider disconnecting study.        -------------------------------------------------------------------------------------------------------  [facility] EEG Reading Room Ph#: [phone]  Epilepsy Answering Service after 5PM and before 8:30AM: Ph#: [phone]    Ragini Cifuentes MD  Attending Physician, [facility] [place]

## 2023-03-12 NOTE — PROGRESS NOTE ADULT - PROBLEM SELECTOR PLAN 2
-CT a/p c/w cystitis. no e/o pyelonephritis   -outside urine cx 3/8 reviewed- grew ESBL citrobacter. sens to ertapenem, Bactrim, nitrofurantoin. resistant to cephalosporin. intermediate to Levaquin  -UA+.  f/u urine cx and blood cx  -start Merrem x 3 days, completed   -monitor for urinary retention   -has urology appt 4/5 for urinary retention/frequent UTIs  - will order straight cath as suspect urinary incomplete bladder emptying source of UTI , same bacteria -CT a/p c/w cystitis. no e/o pyelonephritis   -outside urine cx 3/8 reviewed- grew ESBL citrobacter. sens to ertapenem, Bactrim, nitrofurantoin. resistant to cephalosporin. intermediate to Levaquin  -UA+.  f/u urine cx and blood cx  -start Merrem x 3 days, completed , ID started Ertapenem in 3/10   -monitor for urinary retention   -has urology appt 4/5 for urinary retention/frequent UTIs  - will order straight cath as suspect urinary incomplete bladder emptying source of UTI , same bacteria

## 2023-03-12 NOTE — PROVIDER CONTACT NOTE (OTHER) - SITUATION
Pt has had no urine output during shift, +bm x 2, bladder scan 78cc. Oral fluid intake encouraged. Pt has had no urine output during shift, bladder scan 78cc. Oral fluid intake encouraged.

## 2023-03-12 NOTE — PROGRESS NOTE ADULT - SUBJECTIVE AND OBJECTIVE BOX
Medicine Progress Note    Patient is a 68y old  Female who presents with a chief complaint of AMS (12 Mar 2023 10:49)      SUBJECTIVE / OVERNIGHT EVENTS: more awake and calm today       MEDICATIONS  (STANDING):  enoxaparin Injectable 40 milliGRAM(s) SubCutaneous every 24 hours  ertapenem  IVPB 1000 milliGRAM(s) IV Intermittent every 24 hours  lacosamide IVPB 50 milliGRAM(s) IV Intermittent every 12 hours  levothyroxine Injectable 50 MICROGram(s) IV Push at bedtime  pantoprazole    Tablet 40 milliGRAM(s) Oral before breakfast  sodium chloride 0.9%. 1000 milliLiter(s) (75 mL/Hr) IV Continuous <Continuous>    MEDICATIONS  (PRN):  acetaminophen     Tablet .. 650 milliGRAM(s) Oral every 6 hours PRN Temp greater or equal to 38C (100.4F), Mild Pain (1 - 3)  aluminum hydroxide/magnesium hydroxide/simethicone Suspension 30 milliLiter(s) Oral every 4 hours PRN Dyspepsia  melatonin 3 milliGRAM(s) Oral at bedtime PRN Insomnia  ondansetron Injectable 4 milliGRAM(s) IV Push every 8 hours PRN Nausea and/or Vomiting    CAPILLARY BLOOD GLUCOSE        I&O's Summary    11 Mar 2023 06:01  -  12 Mar 2023 07:00  --------------------------------------------------------  IN: 75 mL / OUT: 800 mL / NET: -725 mL        PHYSICAL EXAM:  Vital Signs Last 24 Hrs  T(C): 36.4 (12 Mar 2023 09:22), Max: 36.8 (11 Mar 2023 18:42)  T(F): 97.5 (12 Mar 2023 09:22), Max: 98.3 (11 Mar 2023 18:42)  HR: 97 (12 Mar 2023 09:22) (90 - 101)  BP: 151/68 (12 Mar 2023 09:22) (145/74 - 177/83)  BP(mean): --  RR: 18 (12 Mar 2023 09:22) (18 - 18)  SpO2: 98% (12 Mar 2023 09:22) (98% - 100%)    Parameters below as of 12 Mar 2023 09:22  Patient On (Oxygen Delivery Method): room air      CONSTITUTIONAL: NAD,  ENMT: Moist oral mucosa, no pharyngeal injection or exudates;   RESPIRATORY: Normal respiratory effort; lungs are clear to auscultation bilaterally  CARDIOVASCULAR: Regular rate and rhythm, normal S1 and S2, ; No lower extremity edema;   ABDOMEN: Nontender to palpation, normoactive bowel sounds, no rebound/guarding;   PSYCH: A+O to person; affect appropriate  NEUROLOGY: CN 2-12 are intact and symmetric; no gross sensory deficits   SKIN: No rashes; no palpable lesions    LABS:                        10.0   9.87  )-----------( 482      ( 12 Mar 2023 06:31 )             32.3     03-12    141  |  100  |  14  ----------------------------<  56<L>  3.7   |  24  |  0.56    Ca    9.7      12 Mar 2023 06:31  Phos  2.9     03-12  Mg     1.80     03-12    TPro  8.2  /  Alb  x   /  TBili  x   /  DBili  x   /  AST  x   /  ALT  x   /  AlkPhos  x   03-10      CARDIAC MARKERS ( 10 Mar 2023 23:45 )  x     / x     / 218 U/L / x     / x              SARS-CoV-2: Detected (24 Feb 2023 01:11)      RADIOLOGY & ADDITIONAL TESTS:  Imaging from Last 24 Hours:    Electrocardiogram/QTc Interval:    COORDINATION OF CARE:  Care Discussed with Consultants/Other Providers:

## 2023-03-12 NOTE — PROGRESS NOTE ADULT - PROBLEM SELECTOR PLAN 1
Actively visual /auditory hallucinations . no seizure like activity as per family    Reach  for extra collaterals , no additional info, not useful   -MRI brain 2/17 and 3/1 reviewed-  Patient also had LP on prior visits   Consulted neuro and psych , f/w recs   -completed  UTI treatment , BC negative   Identify GNR, same Citrobacter Furandi , sensitive to Merrem , completed 3 days per guidelines   -unable to swallow d/t lethargy. converted meds to IV  -hold sedative meds Trazadone, gabapentin for now   -puree diet and IVF for now. swallow eval when MS improves  Check CT head as above Actively visual /auditory hallucinations . no seizure like activity as per family    Reach  for extra collaterals , no additional info, not useful   -MRI brain 2/17 and 3/1 reviewed-  Patient also had LP on prior visits   Consulted neuro and psych , f/w recs   -completed  UTI treatment , BC negative   Identify GNR, same Citrobacter Furandi , sensitive to Merrem , completed 3 days per guidelines , now started on 3/10 on ertapenem   -unable to swallow d/t lethargy. converted meds to IV  -hold sedative meds Trazadone, gabapentin for now   -puree diet and IVF for now. swallow eval when MS improves  Check CT head unchanged

## 2023-03-13 LAB
ANION GAP SERPL CALC-SCNC: 9 MMOL/L — SIGNIFICANT CHANGE UP (ref 7–14)
BUN SERPL-MCNC: 10 MG/DL — SIGNIFICANT CHANGE UP (ref 7–23)
CALCIUM SERPL-MCNC: 9 MG/DL — SIGNIFICANT CHANGE UP (ref 8.4–10.5)
CHLORIDE SERPL-SCNC: 106 MMOL/L — SIGNIFICANT CHANGE UP (ref 98–107)
CO2 SERPL-SCNC: 25 MMOL/L — SIGNIFICANT CHANGE UP (ref 22–31)
CREAT SERPL-MCNC: 0.64 MG/DL — SIGNIFICANT CHANGE UP (ref 0.5–1.3)
EGFR: 97 ML/MIN/1.73M2 — SIGNIFICANT CHANGE UP
GLUCOSE SERPL-MCNC: 75 MG/DL — SIGNIFICANT CHANGE UP (ref 70–99)
HAPTOGLOB SERPL-MCNC: 163 MG/DL — SIGNIFICANT CHANGE UP (ref 34–200)
HCT VFR BLD CALC: 27.4 % — LOW (ref 34.5–45)
HCT VFR BLD CALC: 27.7 % — LOW (ref 34.5–45)
HGB BLD-MCNC: 8.2 G/DL — LOW (ref 11.5–15.5)
HGB BLD-MCNC: 8.4 G/DL — LOW (ref 11.5–15.5)
IRON SATN MFR SERPL: 28 % — SIGNIFICANT CHANGE UP (ref 14–50)
IRON SATN MFR SERPL: 49 UG/DL — SIGNIFICANT CHANGE UP (ref 30–160)
LDH SERPL L TO P-CCNC: 142 U/L — SIGNIFICANT CHANGE UP (ref 135–225)
MAGNESIUM SERPL-MCNC: 1.7 MG/DL — SIGNIFICANT CHANGE UP (ref 1.6–2.6)
MCHC RBC-ENTMCNC: 28 PG — SIGNIFICANT CHANGE UP (ref 27–34)
MCHC RBC-ENTMCNC: 28.1 PG — SIGNIFICANT CHANGE UP (ref 27–34)
MCHC RBC-ENTMCNC: 29.9 GM/DL — LOW (ref 32–36)
MCHC RBC-ENTMCNC: 30.3 GM/DL — LOW (ref 32–36)
MCV RBC AUTO: 92.6 FL — SIGNIFICANT CHANGE UP (ref 80–100)
MCV RBC AUTO: 93.5 FL — SIGNIFICANT CHANGE UP (ref 80–100)
NRBC # BLD: 0 /100 WBCS — SIGNIFICANT CHANGE UP (ref 0–0)
NRBC # BLD: 0 /100 WBCS — SIGNIFICANT CHANGE UP (ref 0–0)
NRBC # FLD: 0 K/UL — SIGNIFICANT CHANGE UP (ref 0–0)
NRBC # FLD: 0 K/UL — SIGNIFICANT CHANGE UP (ref 0–0)
PHOSPHATE SERPL-MCNC: 3.3 MG/DL — SIGNIFICANT CHANGE UP (ref 2.5–4.5)
PLATELET # BLD AUTO: 355 K/UL — SIGNIFICANT CHANGE UP (ref 150–400)
PLATELET # BLD AUTO: 394 K/UL — SIGNIFICANT CHANGE UP (ref 150–400)
POTASSIUM SERPL-MCNC: 3.1 MMOL/L — LOW (ref 3.5–5.3)
POTASSIUM SERPL-SCNC: 3.1 MMOL/L — LOW (ref 3.5–5.3)
RBC # BLD: 2.93 M/UL — LOW (ref 3.8–5.2)
RBC # BLD: 2.93 M/UL — LOW (ref 3.8–5.2)
RBC # BLD: 2.99 M/UL — LOW (ref 3.8–5.2)
RBC # FLD: 16.8 % — HIGH (ref 10.3–14.5)
RBC # FLD: 17 % — HIGH (ref 10.3–14.5)
RETICS #: 52.7 K/UL — SIGNIFICANT CHANGE UP (ref 25–125)
RETICS/RBC NFR: 1.8 % — SIGNIFICANT CHANGE UP (ref 0.5–2.5)
SODIUM SERPL-SCNC: 140 MMOL/L — SIGNIFICANT CHANGE UP (ref 135–145)
THYROGLOB AB FLD IA-ACNC: 22.9 IU/ML — SIGNIFICANT CHANGE UP
THYROGLOB AB SERPL-ACNC: 22.9 IU/ML — SIGNIFICANT CHANGE UP
THYROGLOB SERPL-MCNC: <0.2 NG/ML — LOW (ref 1.6–59.9)
THYROPEROXIDASE AB SERPL-ACNC: 49.5 IU/ML — HIGH
TIBC SERPL-MCNC: 178 UG/DL — LOW (ref 220–430)
UIBC SERPL-MCNC: 129 UG/DL — SIGNIFICANT CHANGE UP (ref 110–370)
WBC # BLD: 6.2 K/UL — SIGNIFICANT CHANGE UP (ref 3.8–10.5)
WBC # BLD: 6.74 K/UL — SIGNIFICANT CHANGE UP (ref 3.8–10.5)
WBC # FLD AUTO: 6.2 K/UL — SIGNIFICANT CHANGE UP (ref 3.8–10.5)
WBC # FLD AUTO: 6.74 K/UL — SIGNIFICANT CHANGE UP (ref 3.8–10.5)

## 2023-03-13 PROCEDURE — 95718 EEG PHYS/QHP 2-12 HR W/VEEG: CPT

## 2023-03-13 PROCEDURE — 99233 SBSQ HOSP IP/OBS HIGH 50: CPT

## 2023-03-13 RX ORDER — POTASSIUM CHLORIDE 20 MEQ
40 PACKET (EA) ORAL ONCE
Refills: 0 | Status: COMPLETED | OUTPATIENT
Start: 2023-03-13 | End: 2023-03-13

## 2023-03-13 RX ORDER — AMLODIPINE BESYLATE 2.5 MG/1
5 TABLET ORAL DAILY
Refills: 0 | Status: DISCONTINUED | OUTPATIENT
Start: 2023-03-13 | End: 2023-03-16

## 2023-03-13 RX ADMIN — ERTAPENEM SODIUM 120 MILLIGRAM(S): 1 INJECTION, POWDER, LYOPHILIZED, FOR SOLUTION INTRAMUSCULAR; INTRAVENOUS at 00:09

## 2023-03-13 RX ADMIN — LACOSAMIDE 120 MILLIGRAM(S): 50 TABLET ORAL at 00:51

## 2023-03-13 RX ADMIN — LACOSAMIDE 120 MILLIGRAM(S): 50 TABLET ORAL at 12:47

## 2023-03-13 RX ADMIN — LACOSAMIDE 120 MILLIGRAM(S): 50 TABLET ORAL at 23:27

## 2023-03-13 RX ADMIN — Medication 50 MICROGRAM(S): at 22:28

## 2023-03-13 RX ADMIN — Medication 40 MILLIEQUIVALENT(S): at 12:57

## 2023-03-13 RX ADMIN — ENOXAPARIN SODIUM 40 MILLIGRAM(S): 100 INJECTION SUBCUTANEOUS at 06:37

## 2023-03-13 RX ADMIN — PANTOPRAZOLE SODIUM 40 MILLIGRAM(S): 20 TABLET, DELAYED RELEASE ORAL at 06:37

## 2023-03-13 NOTE — PHYSICAL THERAPY INITIAL EVALUATION ADULT - IMPAIRMENTS FOUND, PT EVAL
aerobic capacity/endurance/decreased midline orientation/ergonomics and body mechanics/fine motor/gait, locomotion, and balance/gross motor/muscle strength/neuromotor development and sensory integration/posture/ROM/tone

## 2023-03-13 NOTE — EEG REPORT - NS EEG TEXT BOX
RAMIRO CASTAÑEDA [id number]     Study Date: 03/12/23 08:00 - 03/13/23 08:00  Duration: 23 hr 58 min  --------------------------------------------------------------------------------------------------  History:  CC/ HPI Patient is a 68y old  Female who presents with a chief complaint of AMS (13 Mar 2023 11:00)    MEDICATIONS  (STANDING):  enoxaparin Injectable 40 milliGRAM(s) SubCutaneous every 24 hours  lacosamide IVPB 100 milliGRAM(s) IV Intermittent every 12 hours  levothyroxine Injectable 50 MICROGram(s) IV Push at bedtime  pantoprazole    Tablet 40 milliGRAM(s) Oral before breakfast  potassium chloride   Powder 40 milliEquivalent(s) Oral once  sodium chloride 0.9%. 1000 milliLiter(s) (100 mL/Hr) IV Continuous <Continuous>    --------------------------------------------------------------------------------------------------  Study Interpretation:    [[[Abbreviation Key:  PDR=alpha rhythm/posterior dominant rhythm. A-P=anterior posterior.  Amplitude: ‘very low’:<20; ‘low’:20-49; ‘medium’:; ‘high’:>150uV.  Persistence for periodic/rhythmic patterns (% of epoch) ‘rare’:<1%; ‘occasional’:1-10%; ‘frequent’:10-50%; ‘abundant’:50-90%; ‘continuous’:>90%.  Persistence for sporadic discharges: ‘rare’:<1/hr; ‘occasional’:1/min-1/hr; ‘frequent’:>1/min; ‘abundant’:>1/10 sec.  RPP=rhythmic and periodic patterns; GRDA=generalized rhythmic delta activity; FIRDA=frontal intermittent GRDA; LRDA=lateralized rhythmic delta activity; TIRDA=temporal intermittent rhythmic delta activity;  LPD=PLED=lateralized periodic discharges; GPD=generalized periodic discharges; BIPDs =bilateral independent periodic discharges; Mf=multifocal; SIRPDs=stimulus induced rhythmic, periodic, or ictal appearing discharges; BIRDs=brief potentially ictal rhythmic discharges >4 Hz, lasting .5-10s; PFA (paroxysmal bursts >13 Hz or =8 Hz <10s).  Modifiers: +F=with fast component; +S=with spike component; +R=with rhythmic component.  S-B=burst suppression pattern.  Max=maximal. N1-drowsy; N2-stage II sleep; N3-slow wave sleep. SSS/BETS=small sharp spikes/benign epileptiform transients of sleep. HV=hyperventilation; PS=photic stimulation]]]    Daily EEG Visual Analysis    FINDINGS:      Background:  Continuity: Continuous  Symmetry: Symmetric  Posterior dominant rhythm (PDR): 8.5 Hz, reactive to eye closure. Symmetric low-amplitude frontal beta in wakefulness.  State Change: Present  Voltage: Normal  Anterior Posterior Gradient: Present  Other background findings: Occasional frontally predominant generalized rhythmic delta activity (GRDA) at 1-2 Hz during wakefulness.  Breach: Absent    Background Slowing:  Generalized slowing: None  Focal slowing: None    State Changes:   Drowsiness is characterized by fragmentation, attenuation, and slowing of the background frequencies. GRDA increases in prevalence to abundant during drowsiness. Left temporal wicket spikes are present.  Stage 2 sleep is characterized by symmetric K complexes and sleep spindles.     Sporadic Epileptiform Discharges:    None    Rhythmic and Periodic Patterns (RPPs):  None     Electrographic and Electroclinical seizures:  None    Other Clinical Events:  None    Activation Procedures:   Hyperventilation was not performed.    Photic stimulation was not performed.    Artifacts:  Intermittent myogenic and movement artifacts are present.    EKG:  Single-lead EKG shows regular rhythm at 50-90 bpm.    EEG Classification / Summary:  Abnormal EEG in the awake, drowsy, and asleep states due to generalized rhythmic delta activity (GRDA). No focal or epileptiform abnormalities are captured.     Clinical Impression:  GRDA indicates mild diffuse cerebral dysfunction of nonspecific etiology.    In the absence of further clinical concern, consider disconnecting study.      -------------------------------------------------------------------------------------------------------  [facility] EEG Reading Room Ph#: [phone]  Epilepsy Answering Service after 5PM and before 8:30AM: Ph#: [phone]    Ragini Cifuentes MD  Attending Physician, [facility] Epilepsy [place]

## 2023-03-13 NOTE — PHYSICAL THERAPY INITIAL EVALUATION ADULT - PLANNED THERAPY INTERVENTIONS, PT EVAL
bed mobility training/neuromuscular re-education/postural re-education/ROM/strengthening/stretching/transfer training

## 2023-03-13 NOTE — PROGRESS NOTE ADULT - PROBLEM SELECTOR PLAN 2
-CT a/p c/w cystitis. no e/o pyelonephritis   -outside urine cx 3/8 reviewed- grew ESBL citrobacter. sens to ertapenem, Bactrim, nitrofurantoin. resistant to cephalosporin. intermediate to Levaquin  -UA+.  f/u urine cx and blood cx  -start Merrem x 3 days, completed , ID started Ertapenem in 3/10   -monitor for urinary retention   -has urology appt 4/5 for urinary retention/frequent UTIs  - will order straight cath as suspect urinary incomplete bladder emptying source of UTI , same bacteria

## 2023-03-13 NOTE — PHYSICAL THERAPY INITIAL EVALUATION ADULT - BALANCE DISTURBANCE, IDENTIFIED IMPAIRMENT CONTRIBUTE, REHAB EVAL
impaired coordination/impaired motor control/abnormal muscle tone/impaired postural control/decreased ROM/impaired sensory feedback/decreased strength

## 2023-03-13 NOTE — PROGRESS NOTE ADULT - SUBJECTIVE AND OBJECTIVE BOX
Patient is a 68y old  Female who presents with a chief complaint of AMS (12 Mar 2023 10:49)      SUBJECTIVE / OVERNIGHT EVENTS:    MEDICATIONS  (STANDING):  enoxaparin Injectable 40 milliGRAM(s) SubCutaneous every 24 hours  lacosamide IVPB 100 milliGRAM(s) IV Intermittent every 12 hours  levothyroxine Injectable 50 MICROGram(s) IV Push at bedtime  pantoprazole    Tablet 40 milliGRAM(s) Oral before breakfast  potassium chloride   Powder 40 milliEquivalent(s) Oral once  sodium chloride 0.9%. 1000 milliLiter(s) (100 mL/Hr) IV Continuous <Continuous>    MEDICATIONS  (PRN):  acetaminophen     Tablet .. 650 milliGRAM(s) Oral every 6 hours PRN Temp greater or equal to 38C (100.4F), Mild Pain (1 - 3)  aluminum hydroxide/magnesium hydroxide/simethicone Suspension 30 milliLiter(s) Oral every 4 hours PRN Dyspepsia  melatonin 3 milliGRAM(s) Oral at bedtime PRN Insomnia  ondansetron Injectable 4 milliGRAM(s) IV Push every 8 hours PRN Nausea and/or Vomiting      Vital Signs Last 24 Hrs  T(C): 36.9 (13 Mar 2023 09:48), Max: 36.9 (12 Mar 2023 17:49)  T(F): 98.4 (13 Mar 2023 09:48), Max: 98.4 (12 Mar 2023 17:49)  HR: 78 (13 Mar 2023 09:48) (73 - 78)  BP: 132/78 (13 Mar 2023 09:48) (122/65 - 164/71)  BP(mean): --  RR: 18 (13 Mar 2023 09:48) (17 - 18)  SpO2: 98% (13 Mar 2023 09:48) (97% - 100%)    Parameters below as of 13 Mar 2023 09:48  Patient On (Oxygen Delivery Method): room air      CAPILLARY BLOOD GLUCOSE        I&O's Summary    12 Mar 2023 07:01  -  13 Mar 2023 07:00  --------------------------------------------------------  IN: 800 mL / OUT: 600 mL / NET: 200 mL        PHYSICAL EXAM:  GENERAL: NAD, well-developed  HEAD:  Atraumatic, Normocephalic  EYES: EOMI, PERRLA, conjunctiva and sclera clear  NECK: Supple, No JVD  CHEST/LUNG: Clear to auscultation bilaterally; No wheeze  HEART: Regular rate and rhythm; No murmurs, rubs, or gallops  ABDOMEN: Soft, Nontender, Nondistended; Bowel sounds present  EXTREMITIES:  2+ Peripheral Pulses, No clubbing, cyanosis, or edema  PSYCH: AAOx3  NEUROLOGY: non-focal  SKIN: No rashes or lesions    LABS:                        8.2    6.20  )-----------( 355      ( 13 Mar 2023 09:15 )             27.4     03-13    140  |  106  |  10  ----------------------------<  75  3.1<L>   |  25  |  0.64    Ca    9.0      13 Mar 2023 06:45  Phos  3.3     03-13  Mg     1.70     03-13                RADIOLOGY & ADDITIONAL TESTS:    Imaging Personally Reviewed:    Consultant(s) Notes Reviewed:      Care Discussed with Consultants/Other Providers:   Patient is a 68y old  Female who presents with a chief complaint of AMS (12 Mar 2023 10:49)      SUBJECTIVE / OVERNIGHT EVENTS: patient seen and examined by bedside, pt undergoing VEEG , denies headache, dizziness, SOB, CP, Palpitations , N/V/D, abdominal pain  pt concerned ab    MEDICATIONS  (STANDING):  enoxaparin Injectable 40 milliGRAM(s) SubCutaneous every 24 hours  lacosamide IVPB 100 milliGRAM(s) IV Intermittent every 12 hours  levothyroxine Injectable 50 MICROGram(s) IV Push at bedtime  pantoprazole    Tablet 40 milliGRAM(s) Oral before breakfast  potassium chloride   Powder 40 milliEquivalent(s) Oral once  sodium chloride 0.9%. 1000 milliLiter(s) (100 mL/Hr) IV Continuous <Continuous>    MEDICATIONS  (PRN):  acetaminophen     Tablet .. 650 milliGRAM(s) Oral every 6 hours PRN Temp greater or equal to 38C (100.4F), Mild Pain (1 - 3)  aluminum hydroxide/magnesium hydroxide/simethicone Suspension 30 milliLiter(s) Oral every 4 hours PRN Dyspepsia  melatonin 3 milliGRAM(s) Oral at bedtime PRN Insomnia  ondansetron Injectable 4 milliGRAM(s) IV Push every 8 hours PRN Nausea and/or Vomiting      Vital Signs Last 24 Hrs  T(C): 36.9 (13 Mar 2023 09:48), Max: 36.9 (12 Mar 2023 17:49)  T(F): 98.4 (13 Mar 2023 09:48), Max: 98.4 (12 Mar 2023 17:49)  HR: 78 (13 Mar 2023 09:48) (73 - 78)  BP: 132/78 (13 Mar 2023 09:48) (122/65 - 164/71)  BP(mean): --  RR: 18 (13 Mar 2023 09:48) (17 - 18)  SpO2: 98% (13 Mar 2023 09:48) (97% - 100%)    Parameters below as of 13 Mar 2023 09:48  Patient On (Oxygen Delivery Method): room air      CAPILLARY BLOOD GLUCOSE        I&O's Summary    12 Mar 2023 07:01  -  13 Mar 2023 07:00  --------------------------------------------------------  IN: 800 mL / OUT: 600 mL / NET: 200 mL        PHYSICAL EXAM:  GENERAL: NAD, well-developed  HEAD:  Atraumatic, Normocephalic, VEEG in place   EYES: EOMI, PERRLA, conjunctiva and sclera clear  CHEST/LUNG: Clear to auscultation bilaterally; No wheeze  HEART: Regular rate and rhythm; No murmurs, rubs, or gallops  ABDOMEN: Soft, Nontender, Nondistended; Bowel sounds present, primafit +   EXTREMITIES:  2+ Peripheral Pulses, No clubbing, cyanosis, or edema  PSYCH: AAOx3  NEUROLOGY: non-focal  SKIN: No rashes or lesions    LABS:                        8.2    6.20  )-----------( 355      ( 13 Mar 2023 09:15 )             27.4     03-13    140  |  106  |  10  ----------------------------<  75  3.1<L>   |  25  |  0.64    Ca    9.0      13 Mar 2023 06:45  Phos  3.3     03-13  Mg     1.70     03-13                RADIOLOGY & ADDITIONAL TESTS:    Imaging Personally Reviewed:    Consultant(s) Notes Reviewed:      Care Discussed with Consultants/Other Providers:

## 2023-03-13 NOTE — PROGRESS NOTE ADULT - PROBLEM SELECTOR PLAN 1
Actively visual /auditory hallucinations . no seizure like activity as per family    Reach  for extra collaterals , no additional info, not useful   -MRI brain 2/17 and 3/1 reviewed-  Patient also had LP on prior visits   Consulted neuro and psych , f/w recs   -completed  UTI treatment , BC negative   Identify GNR, same Citrobacter Furandi , sensitive to Merrem , completed 3 days per guidelines , now started on 3/10 on ertapenem   -unable to swallow d/t lethargy. converted meds to IV  -hold sedative meds Trazadone, gabapentin for now   -puree diet and IVF for now. swallow eval when MS improves  Check CT head unchanged Actively visual /auditory hallucinations . no seizure like activity as per family   -MRI brain 2/17 and 3/1 reviewed-  Patient also had LP on prior visits   Consulted neuro and psych , f/w recs , Neuro recs noted, EEG show left anterior temporal epileptiform discharges -  and neuro rec to increase Vimpat to 100 mg bid,  -completed  UTI treatment , BC negative   Identify GNR, same Citrobacter Furandi , sensitive to Merrem , completed 3 days per guidelines , now started on 3/10 on ertapenem   -unable to swallow d/t lethargy. converted meds to IV  -hold sedative meds Trazadone, gabapentin for now   -puree diet and IVF for now. swallow eval when MS improves  Check CT head unchanged

## 2023-03-13 NOTE — PHYSICAL THERAPY INITIAL EVALUATION ADULT - MANUAL MUSCLE TESTING RESULTS, REHAB EVAL
Left ankle DF 1/5; Left quadriceps 1/5; Left hamstrings 1/5; Right LE grossly 0/5; Right UE grossly 3/5; Left UE grossly 3-/5

## 2023-03-13 NOTE — PHYSICAL THERAPY INITIAL EVALUATION ADULT - SITTING BALANCE: STATIC
Required minimal-moderate assistance to maintain balance on EOB. Presents with loss of balance posteriorly towards left/poor balance

## 2023-03-13 NOTE — PHYSICAL THERAPY INITIAL EVALUATION ADULT - IMPAIRMENTS CONTRIBUTING IMPAIRED BED MOBILITY, REHAB EVAL
impaired balance/impaired coordination/decreased flexibility/impaired motor control/abnormal muscle tone/impaired postural control/decreased ROM/impaired sensory feedback/decreased strength

## 2023-03-13 NOTE — PROGRESS NOTE ADULT - PROBLEM SELECTOR PLAN 6
-lovenox sc    GOC-DNR/DNI.  MOLST verified with . -lovenox sc    GOC-DNR/DNI.  MOLST verified with  by previous physician  plan of care d/w pt and ACP

## 2023-03-13 NOTE — PHYSICAL THERAPY INITIAL EVALUATION ADULT - ACTIVE RANGE OF MOTION EXAMINATION, REHAB EVAL
Right elbow extension lacking ~70 degrees/Left UE Active ROM was WFL (within functional limits)/deficits as listed below

## 2023-03-13 NOTE — PROGRESS NOTE ADULT - PROBLEM SELECTOR PLAN 5
currently BP acceptable off meds   -on norvasc at home - hold in the setting of infection currently BP acceptable off meds   -on norvasc at home - is on hold in the setting of infection, can resume now as pt completed abx  and infection resolved

## 2023-03-13 NOTE — PHYSICAL THERAPY INITIAL EVALUATION ADULT - PERTINENT HX OF CURRENT PROBLEM, REHAB EVAL
Pt is a 68 year old female presenting with AMS. Pt was recently admitted to [facility]  2/24-3/3 for AMS which was attributed to COVID. Upon discharge,  noticed pt's urine was cloudy and malodorous and brought patient to PCP. Pt's urine cx showed multi-drug resistant organism, so he decided to bring pt to ER. Pt admitted for acute metabolic encephalopathy 2/2 UTI.

## 2023-03-13 NOTE — PHYSICAL THERAPY INITIAL EVALUATION ADULT - ADDITIONAL COMMENTS
Pt lives in a private house with her ; there is an elevator to enter and chair lift to bedroom on second floor. Has home health aide 24/7. Required x1 person assistance for transfers. Non-ambulatory since 2016. Utilizes power wheelchair via joystick. Reports she is independent with wheelchair mobility.

## 2023-03-13 NOTE — PHYSICAL THERAPY INITIAL EVALUATION ADULT - GENERAL OBSERVATIONS, REHAB EVAL
Upon entry, pt semi-supine in bed in NAD; + EEG monitoring, + telemetry. Friends present at bedside. Upon entry, pt semi-supine in bed in NAD; + EEG monitoring, + telemetry. HR 75bpm. Friends present at bedside.

## 2023-03-13 NOTE — PHYSICAL THERAPY INITIAL EVALUATION ADULT - PASSIVE RANGE OF MOTION EXAMINATION, REHAB EVAL
Right elbow extension lacking ~70 degrees/no Passive ROM deficits were identified/deficits as listed below

## 2023-03-13 NOTE — PHYSICAL THERAPY INITIAL EVALUATION ADULT - PATIENT PROFILE REVIEW, REHAB EVAL
PT initial evaluation received and chart review completed. Pt agreeable to participate in PT evaluation. Pt cleared by KIKI Obrien./yes

## 2023-03-14 LAB
ALBUMIN SERPL ELPH-MCNC: 3.1 G/DL — LOW (ref 3.3–5)
ALP SERPL-CCNC: 108 U/L — SIGNIFICANT CHANGE UP (ref 40–120)
ALT FLD-CCNC: 16 U/L — SIGNIFICANT CHANGE UP (ref 4–33)
ANION GAP SERPL CALC-SCNC: 8 MMOL/L — SIGNIFICANT CHANGE UP (ref 7–14)
ANION GAP SERPL CALC-SCNC: 9 MMOL/L — SIGNIFICANT CHANGE UP (ref 7–14)
AST SERPL-CCNC: 25 U/L — SIGNIFICANT CHANGE UP (ref 4–32)
BASE EXCESS BLDV CALC-SCNC: 1.6 MMOL/L — SIGNIFICANT CHANGE UP (ref -2–3)
BASOPHILS # BLD AUTO: 0.05 K/UL — SIGNIFICANT CHANGE UP (ref 0–0.2)
BASOPHILS NFR BLD AUTO: 0.8 % — SIGNIFICANT CHANGE UP (ref 0–2)
BILIRUB SERPL-MCNC: <0.2 MG/DL — SIGNIFICANT CHANGE UP (ref 0.2–1.2)
BLOOD GAS VENOUS COMPREHENSIVE RESULT: SIGNIFICANT CHANGE UP
BUN SERPL-MCNC: 8 MG/DL — SIGNIFICANT CHANGE UP (ref 7–23)
BUN SERPL-MCNC: 8 MG/DL — SIGNIFICANT CHANGE UP (ref 7–23)
CALCIUM SERPL-MCNC: 8.6 MG/DL — SIGNIFICANT CHANGE UP (ref 8.4–10.5)
CALCIUM SERPL-MCNC: 8.6 MG/DL — SIGNIFICANT CHANGE UP (ref 8.4–10.5)
CHLORIDE BLDV-SCNC: 106 MMOL/L — SIGNIFICANT CHANGE UP (ref 96–108)
CHLORIDE SERPL-SCNC: 107 MMOL/L — SIGNIFICANT CHANGE UP (ref 98–107)
CHLORIDE SERPL-SCNC: 109 MMOL/L — HIGH (ref 98–107)
CO2 BLDV-SCNC: 28.6 MMOL/L — HIGH (ref 22–26)
CO2 SERPL-SCNC: 25 MMOL/L — SIGNIFICANT CHANGE UP (ref 22–31)
CO2 SERPL-SCNC: 26 MMOL/L — SIGNIFICANT CHANGE UP (ref 22–31)
COPPER SERPL-MCNC: 152 UG/DL — SIGNIFICANT CHANGE UP (ref 80–158)
CREAT SERPL-MCNC: 0.53 MG/DL — SIGNIFICANT CHANGE UP (ref 0.5–1.3)
CREAT SERPL-MCNC: 0.61 MG/DL — SIGNIFICANT CHANGE UP (ref 0.5–1.3)
CULTURE RESULTS: SIGNIFICANT CHANGE UP
CULTURE RESULTS: SIGNIFICANT CHANGE UP
EGFR: 102 ML/MIN/1.73M2 — SIGNIFICANT CHANGE UP
EGFR: 99 ML/MIN/1.73M2 — SIGNIFICANT CHANGE UP
EOSINOPHIL # BLD AUTO: 0.14 K/UL — SIGNIFICANT CHANGE UP (ref 0–0.5)
EOSINOPHIL NFR BLD AUTO: 2.2 % — SIGNIFICANT CHANGE UP (ref 0–6)
GAS PNL BLDV: 139 MMOL/L — SIGNIFICANT CHANGE UP (ref 136–145)
GLUCOSE BLDC GLUCOMTR-MCNC: 199 MG/DL — HIGH (ref 70–99)
GLUCOSE BLDC GLUCOMTR-MCNC: 86 MG/DL — SIGNIFICANT CHANGE UP (ref 70–99)
GLUCOSE BLDV-MCNC: 198 MG/DL — HIGH (ref 70–99)
GLUCOSE SERPL-MCNC: 211 MG/DL — HIGH (ref 70–99)
GLUCOSE SERPL-MCNC: 74 MG/DL — SIGNIFICANT CHANGE UP (ref 70–99)
HCO3 BLDV-SCNC: 27 MMOL/L — SIGNIFICANT CHANGE UP (ref 22–29)
HCT VFR BLD CALC: 28.3 % — LOW (ref 34.5–45)
HCT VFR BLD CALC: 28.3 % — LOW (ref 34.5–45)
HCT VFR BLDA CALC: 26 % — LOW (ref 34.5–46.5)
HGB BLD CALC-MCNC: 8.8 G/DL — LOW (ref 11.7–16.1)
HGB BLD-MCNC: 8.5 G/DL — LOW (ref 11.5–15.5)
HGB BLD-MCNC: 8.5 G/DL — LOW (ref 11.5–15.5)
IANC: 3.55 K/UL — SIGNIFICANT CHANGE UP (ref 1.8–7.4)
IMM GRANULOCYTES NFR BLD AUTO: 0.5 % — SIGNIFICANT CHANGE UP (ref 0–0.9)
LACTATE BLDV-MCNC: 1 MMOL/L — SIGNIFICANT CHANGE UP (ref 0.5–2)
LYMPHOCYTES # BLD AUTO: 2.52 K/UL — SIGNIFICANT CHANGE UP (ref 1–3.3)
LYMPHOCYTES # BLD AUTO: 38.9 % — SIGNIFICANT CHANGE UP (ref 13–44)
MAGNESIUM SERPL-MCNC: 1.5 MG/DL — LOW (ref 1.6–2.6)
MAGNESIUM SERPL-MCNC: 1.6 MG/DL — SIGNIFICANT CHANGE UP (ref 1.6–2.6)
MCHC RBC-ENTMCNC: 28.3 PG — SIGNIFICANT CHANGE UP (ref 27–34)
MCHC RBC-ENTMCNC: 28.4 PG — SIGNIFICANT CHANGE UP (ref 27–34)
MCHC RBC-ENTMCNC: 30 GM/DL — LOW (ref 32–36)
MCHC RBC-ENTMCNC: 30 GM/DL — LOW (ref 32–36)
MCV RBC AUTO: 94.3 FL — SIGNIFICANT CHANGE UP (ref 80–100)
MCV RBC AUTO: 94.6 FL — SIGNIFICANT CHANGE UP (ref 80–100)
MONOCYTES # BLD AUTO: 0.19 K/UL — SIGNIFICANT CHANGE UP (ref 0–0.9)
MONOCYTES NFR BLD AUTO: 2.9 % — SIGNIFICANT CHANGE UP (ref 2–14)
NEUTROPHILS # BLD AUTO: 3.55 K/UL — SIGNIFICANT CHANGE UP (ref 1.8–7.4)
NEUTROPHILS NFR BLD AUTO: 54.7 % — SIGNIFICANT CHANGE UP (ref 43–77)
NRBC # BLD: 0 /100 WBCS — SIGNIFICANT CHANGE UP (ref 0–0)
NRBC # BLD: 0 /100 WBCS — SIGNIFICANT CHANGE UP (ref 0–0)
NRBC # FLD: 0 K/UL — SIGNIFICANT CHANGE UP (ref 0–0)
NRBC # FLD: 0 K/UL — SIGNIFICANT CHANGE UP (ref 0–0)
PCO2 BLDV: 47 MMHG — SIGNIFICANT CHANGE UP (ref 39–52)
PH BLDV: 7.37 — SIGNIFICANT CHANGE UP (ref 7.32–7.43)
PHOSPHATE SERPL-MCNC: 3 MG/DL — SIGNIFICANT CHANGE UP (ref 2.5–4.5)
PHOSPHATE SERPL-MCNC: 3.5 MG/DL — SIGNIFICANT CHANGE UP (ref 2.5–4.5)
PLATELET # BLD AUTO: 361 K/UL — SIGNIFICANT CHANGE UP (ref 150–400)
PLATELET # BLD AUTO: 367 K/UL — SIGNIFICANT CHANGE UP (ref 150–400)
PO2 BLDV: 98 MMHG — HIGH (ref 25–45)
POTASSIUM BLDV-SCNC: 3.6 MMOL/L — SIGNIFICANT CHANGE UP (ref 3.5–5.1)
POTASSIUM SERPL-MCNC: 3.7 MMOL/L — SIGNIFICANT CHANGE UP (ref 3.5–5.3)
POTASSIUM SERPL-MCNC: 3.9 MMOL/L — SIGNIFICANT CHANGE UP (ref 3.5–5.3)
POTASSIUM SERPL-SCNC: 3.7 MMOL/L — SIGNIFICANT CHANGE UP (ref 3.5–5.3)
POTASSIUM SERPL-SCNC: 3.9 MMOL/L — SIGNIFICANT CHANGE UP (ref 3.5–5.3)
PROLACTIN SERPL-MCNC: 7.1 NG/ML — SIGNIFICANT CHANGE UP (ref 3.4–24.1)
PROT SERPL-MCNC: 5.5 G/DL — LOW (ref 6–8.3)
RBC # BLD: 2.99 M/UL — LOW (ref 3.8–5.2)
RBC # BLD: 3 M/UL — LOW (ref 3.8–5.2)
RBC # FLD: 17 % — HIGH (ref 10.3–14.5)
RBC # FLD: 17.1 % — HIGH (ref 10.3–14.5)
SAO2 % BLDV: 99 % — HIGH (ref 67–88)
SODIUM SERPL-SCNC: 142 MMOL/L — SIGNIFICANT CHANGE UP (ref 135–145)
SODIUM SERPL-SCNC: 142 MMOL/L — SIGNIFICANT CHANGE UP (ref 135–145)
SPECIMEN SOURCE: SIGNIFICANT CHANGE UP
SPECIMEN SOURCE: SIGNIFICANT CHANGE UP
VIT B1 SERPL-MCNC: 99 NMOL/L — SIGNIFICANT CHANGE UP (ref 66.5–200)
WBC # BLD: 5.83 K/UL — SIGNIFICANT CHANGE UP (ref 3.8–10.5)
WBC # BLD: 6.48 K/UL — SIGNIFICANT CHANGE UP (ref 3.8–10.5)
WBC # FLD AUTO: 5.83 K/UL — SIGNIFICANT CHANGE UP (ref 3.8–10.5)
WBC # FLD AUTO: 6.48 K/UL — SIGNIFICANT CHANGE UP (ref 3.8–10.5)

## 2023-03-14 PROCEDURE — 99232 SBSQ HOSP IP/OBS MODERATE 35: CPT

## 2023-03-14 PROCEDURE — 70450 CT HEAD/BRAIN W/O DYE: CPT | Mod: 26

## 2023-03-14 PROCEDURE — 99233 SBSQ HOSP IP/OBS HIGH 50: CPT

## 2023-03-14 RX ADMIN — LACOSAMIDE 120 MILLIGRAM(S): 50 TABLET ORAL at 11:43

## 2023-03-14 RX ADMIN — Medication 50 MICROGRAM(S): at 23:02

## 2023-03-14 RX ADMIN — SODIUM CHLORIDE 100 MILLILITER(S): 9 INJECTION INTRAMUSCULAR; INTRAVENOUS; SUBCUTANEOUS at 06:39

## 2023-03-14 RX ADMIN — ENOXAPARIN SODIUM 40 MILLIGRAM(S): 100 INJECTION SUBCUTANEOUS at 06:36

## 2023-03-14 RX ADMIN — AMLODIPINE BESYLATE 5 MILLIGRAM(S): 2.5 TABLET ORAL at 06:39

## 2023-03-14 RX ADMIN — PANTOPRAZOLE SODIUM 40 MILLIGRAM(S): 20 TABLET, DELAYED RELEASE ORAL at 06:34

## 2023-03-14 NOTE — PROGRESS NOTE ADULT - ASSESSMENT
68 year-old female [employer]'s Witness with past medical history of MS with baclofen pump in place , seizure on Vimpat 50mg BID last seizure 12/2022 (follows with Dr. Merrick ROJAS), hypothyroidism, frequent UTIs, recent admission 2/24-3/3 with AMS in the setting of a COVID infection, readmitted 3/9 with AMS and UTI. MRI without evidence of acute pathology. EEG did not show epileptiform activity or seizures. Mental status improved on evaluation 3/14.     Impression: Infectious encephalopathy.     Recommendations:    68 year-old female [employer] Witness with past medical history of MS with baclofen pump in place , seizure on Vimpat 50mg BID last seizure 12/2022 (follows with Dr. Merrick ROJAS), hypothyroidism, frequent UTIs, recent admission 2/24-3/3 with AMS in the setting of a COVID infection, readmitted 3/9 with AMS and UTI. MRI without evidence of acute pathology. EEG did not show epileptiform activity or seizures. Mental status improved on evaluation 3/14.       Impression: Infectious encephalopathy.     Recommendations:   [] Continue vimpat 100mg BID  [] S/p IV abx for UTI  [] Telemetry monitoring; Neurochecks/VS per unit protocol  [] Seizure, fall precautions  [] PT/OT

## 2023-03-14 NOTE — PROGRESS NOTE ADULT - PROBLEM SELECTOR PLAN 1
Actively visual /auditory hallucinations . no seizure like activity as per family   -MRI brain 2/17 and 3/1 reviewed-  Patient also had LP on prior visits   Consulted neuro and psych , f/w recs , Neuro recs noted, EEG show left anterior temporal epileptiform discharges -  and neuro rec to increase Vimpat to 100 mg bid,  -completed  UTI treatment , BC negative   Identify GNR, same Citrobacter Furandi , sensitive to Merrem , completed 3 days per guidelines , now started on 3/10 on ertapenem   -unable to swallow d/t lethargy. converted meds to IV  -hold sedative meds Trazadone, gabapentin for now   -puree diet and IVF for now. swallow eval when MS improves  Check CT head unchanged Actively visual /auditory hallucinations . no seizure like activity as per family   -MRI brain 2/17 and 3/1 reviewed-  Patient also had LP on prior visits   Consulted neuro and psych , f/w recs , Neuro recs noted, EEG show left anterior temporal epileptiform discharges -  and neuro rec to increase Vimpat to 100 mg bid,  EEG report noted from today -No focal or epileptiform abnormalities are captured. Mild diffuse cerebral dysfunction is nonspecific in etiology. Will F/u with Neurology   -completed  UTI treatment , BC negative   Identify GNR, same Citrobacter Furandi , sensitive to Merrem , completed 3 days per guidelines , now started on 3/10 on ertapenem   -hold sedative meds Trazadone, gabapentin for now   -puree diet and IVF for now.   - CT head unchanged

## 2023-03-14 NOTE — RAPID RESPONSE TEAM SUMMARY - NSOTHERINTERVENTIONSRRT_GEN_ALL_CORE
- CBC, CMP, PT/PTT/INR. Lactate, VBG, Prolactin Drawn   - CTH as per primary team  - Primary team to follow up with neurology

## 2023-03-14 NOTE — EEG REPORT - NS EEG TEXT BOX
RAMIRO CASTAÑEDA [id number]     Study Date: 03/13/23 08:00-14:28  Duration: 6 hr 27 min  --------------------------------------------------------------------------------------------------  History:  CC/ HPI Patient is a 68y old  Female who presents with a chief complaint of AMS (14 Mar 2023 10:15)    MEDICATIONS  (STANDING):  amLODIPine   Tablet 5 milliGRAM(s) Oral daily  enoxaparin Injectable 40 milliGRAM(s) SubCutaneous every 24 hours  lacosamide IVPB 100 milliGRAM(s) IV Intermittent every 12 hours  levothyroxine Injectable 50 MICROGram(s) IV Push at bedtime  pantoprazole    Tablet 40 milliGRAM(s) Oral before breakfast  sodium chloride 0.9%. 1000 milliLiter(s) (100 mL/Hr) IV Continuous <Continuous>    --------------------------------------------------------------------------------------------------  Study Interpretation:    [[[Abbreviation Key:  PDR=alpha rhythm/posterior dominant rhythm. A-P=anterior posterior.  Amplitude: ‘very low’:<20; ‘low’:20-49; ‘medium’:; ‘high’:>150uV.  Persistence for periodic/rhythmic patterns (% of epoch) ‘rare’:<1%; ‘occasional’:1-10%; ‘frequent’:10-50%; ‘abundant’:50-90%; ‘continuous’:>90%.  Persistence for sporadic discharges: ‘rare’:<1/hr; ‘occasional’:1/min-1/hr; ‘frequent’:>1/min; ‘abundant’:>1/10 sec.  RPP=rhythmic and periodic patterns; GRDA=generalized rhythmic delta activity; FIRDA=frontal intermittent GRDA; LRDA=lateralized rhythmic delta activity; TIRDA=temporal intermittent rhythmic delta activity;  LPD=PLED=lateralized periodic discharges; GPD=generalized periodic discharges; BIPDs =bilateral independent periodic discharges; Mf=multifocal; SIRPDs=stimulus induced rhythmic, periodic, or ictal appearing discharges; BIRDs=brief potentially ictal rhythmic discharges >4 Hz, lasting .5-10s; PFA (paroxysmal bursts >13 Hz or =8 Hz <10s).  Modifiers: +F=with fast component; +S=with spike component; +R=with rhythmic component.  S-B=burst suppression pattern.  Max=maximal. N1-drowsy; N2-stage II sleep; N3-slow wave sleep. SSS/BETS=small sharp spikes/benign epileptiform transients of sleep. HV=hyperventilation; PS=photic stimulation]]]    Daily EEG Visual Analysis    FINDINGS:      Background:  Continuity: Continuous  Symmetry: Symmetric  Posterior dominant rhythm (PDR): 8 Hz, reactive to eye closure. Symmetric low-amplitude frontal beta in wakefulness.  State Change: Present  Voltage: Normal  Anterior Posterior Gradient: Present  Other background findings: None  Breach: Absent    Background Slowing:  Generalized slowing: As above  Focal slowing: None    State Changes:   Drowsiness and stage 2 sleep are not captured.    Sporadic Epileptiform Discharges:    None    Rhythmic and Periodic Patterns (RPPs):  None     Electrographic and Electroclinical seizures:  None    Other Clinical Events:  None    Activation Procedures:   Hyperventilation was not performed.    Photic stimulation was not performed.    Artifacts:  Intermittent myogenic and movement artifacts are present.    EKG:  Single-lead EKG shows regular rhythm at 60-80 bpm.    EEG Classification / Summary:  Abnormal EEG in the awake state due to mild diffuse slowing. No focal or epileptiform abnormalities are captured.     Clinical Impression:  Mild diffuse cerebral dysfunction is nonspecific in etiology.          -------------------------------------------------------------------------------------------------------  [facility] EEG Reading Room Ph#: [phone]  Epilepsy Answering Service after 5PM and before 8:30AM: Ph#: [phone]    Ragini Cifuentes MD  Attending Physician, [facility] Epilepsy [place]

## 2023-03-14 NOTE — PROGRESS NOTE ADULT - PROBLEM SELECTOR PLAN 5
currently BP acceptable off meds   -on norvasc at home - is on hold in the setting of infection, can resume now as pt completed abx  and infection resolved

## 2023-03-14 NOTE — PROGRESS NOTE ADULT - PROBLEM SELECTOR PLAN 2
-CT a/p c/w cystitis. no e/o pyelonephritis   -outside urine cx 3/8 reviewed- grew ESBL citrobacter. sens to ertapenem, Bactrim, nitrofurantoin. resistant to cephalosporin. intermediate to Levaquin  -UA+.  f/u urine cx and blood cx  -start Merrem x 3 days, completed , ID started Ertapenem in 3/10   -monitor for urinary retention   -has urology appt 4/5 for urinary retention/frequent UTIs  - will order straight cath as suspect urinary incomplete bladder emptying source of UTI , same bacteria -CT a/p c/w cystitis. no e/o pyelonephritis   -outside urine cx 3/8 reviewed- grew ESBL citrobacter. sens to ertapenem, Bactrim, nitrofurantoin. resistant to cephalosporin. intermediate to Levaquin  -UA+.  f/u urine cx and blood cx  -start Merrem x 3 days, completed , ID started Ertapenem in 3/10   -monitor for urinary retention   -has urology appt 4/5 for urinary retention/frequent UTIs  - pt with Primafit , voiding well

## 2023-03-14 NOTE — RAPID RESPONSE TEAM SUMMARY - NSSITUATIONBACKGROUNDRRT_GEN_ALL_CORE
67 yo F Caodaism PMH of MS with baclofen pump in place , seizure do,  hypothyroidism, frequent UTIs  admitted for acute metabolic encephalopathy likely 2/2 UTI but being treated for empiric seizures

## 2023-03-14 NOTE — PROGRESS NOTE ADULT - PROBLEM SELECTOR PLAN 6
-lovenox sc    GOC-DNR/DNI.  MOLST verified with  by previous physician  plan of care d/w pt and ACP

## 2023-03-14 NOTE — PROGRESS NOTE ADULT - SUBJECTIVE AND OBJECTIVE BOX
SUBJECTIVE/INTERVAL HISTORY:  ***    ROS: As above, otherwise negative.       MEDICATIONS:  MEDICATIONS  (STANDING):  amLODIPine   Tablet 5 milliGRAM(s) Oral daily  enoxaparin Injectable 40 milliGRAM(s) SubCutaneous every 24 hours  lacosamide IVPB 100 milliGRAM(s) IV Intermittent every 12 hours  levothyroxine Injectable 50 MICROGram(s) IV Push at bedtime  pantoprazole    Tablet 40 milliGRAM(s) Oral before breakfast  sodium chloride 0.9%. 1000 milliLiter(s) (100 mL/Hr) IV Continuous <Continuous>    MEDICATIONS  (PRN):  acetaminophen     Tablet .. 650 milliGRAM(s) Oral every 6 hours PRN Temp greater or equal to 38C (100.4F), Mild Pain (1 - 3)  aluminum hydroxide/magnesium hydroxide/simethicone Suspension 30 milliLiter(s) Oral every 4 hours PRN Dyspepsia  melatonin 3 milliGRAM(s) Oral at bedtime PRN Insomnia  ondansetron Injectable 4 milliGRAM(s) IV Push every 8 hours PRN Nausea and/or Vomiting      VITALS & EXAMINATION:  Vital Signs Last 24 Hrs  T(C): 36.9 (14 Mar 2023 09:57), Max: 36.9 (14 Mar 2023 09:57)  T(F): 98.4 (14 Mar 2023 09:57), Max: 98.4 (14 Mar 2023 09:57)  HR: 88 (14 Mar 2023 09:57) (55 - 88)  BP: 142/77 (14 Mar 2023 09:57) (127/58 - 147/79)  RR: 18 (14 Mar 2023 09:57) (17 - 18)  SpO2: 100% (14 Mar 2023 09:57) (99% - 100%)    Parameters below as of 14 Mar 2023 09:57  Patient On (Oxygen Delivery Method): room air    General:  Constitutional: appears stated age, in no apparent distress including pain.  Head: Normocephalic & atraumatic.  ENT: Patent ear canals, intact TM, mucus membranes moist & pink, neck supple, no lymphadenopathy.   Respiratory: Breathing comfortably on room air.  Extremities: No cyanosis, clubbing, or edema.  Skin: No rashes, bruising, or discoloration.    Neurological (>12):  MS: Eyes open, alert, oriented to person, place, situation, time. Follows all commands.    Speech/Language: Clear, fluent. Naming intact (pen, wallet).    CNs: PERRL. CVF intact OU. EOMI, no nystagmus, no diplopia. V1-3 intact to LT, well developed masseter muscles b/l. Facial movements normal and symmetric. Hearing grossly intact to conversation. Symmetric palate elevation in midline, no uvula deviation. Shoulder shrug intact b/l. Tongue midline on protrusion, normal movements.    Motor: Decreased muscle bulk in the UE b/l. Mild spasticity in the arms bilaterally and the LLE.      RLE: 0/5 hip flexors, 0/5 knee extensors, 0/5 ankle dorsiflexors, trace movement in toes.   LLE: 2/5 hip flexors, 2/5 knee extensors, 2/5 ankle dorsiflexors.     Reflexes: No ankle clonus b/l. Achilles mute b/l.               Biceps(C5)       BR(C6)     Triceps(C7)      Patellar(L4)    Plantar Resp  R	3+	          2+           0		3+	     Extensor  L	2+	          2+           0		3+	     Extensor    Coordination/Gait: Not assessed.       LABS:  CBC                       8.5    5.83  )-----------( 367      ( 14 Mar 2023 06:57 )             28.3     Chem 03-14    142  |  109<H>  |  8   ----------------------------<  74  3.7   |  25  |  0.61    Ca    8.6      14 Mar 2023 06:57  Phos  3.5     03-14  Mg     1.60     03-14      LFTs   Coagulopathy   Lipid Panel   A1c   Cardiac enzymes     U/A   CSF CSF:          STUDIES & IMAGING:    Study Date: 03/13/23 08:00-14:28  Duration: 6 hr 27 min  EEG Classification / Summary:  Abnormal EEG in the awake state due to mild diffuse slowing. No focal or epileptiform abnormalities are captured.     Clinical Impression:  Mild diffuse cerebral dysfunction is nonspecific in etiology.      Study Date: 03/12/23 08:00 - 03/13/23 08:00  Duration: 23 hr 58 min  EEG Classification / Summary:  Abnormal EEG in the awake, drowsy, and asleep states due to generalized rhythmic delta activity (GRDA). No focal or epileptiform abnormalities are captured.     Clinical Impression:  GRDA indicates mild diffuse cerebral dysfunction of nonspecific etiology.    In the absence of further clinical concern, consider disconnecting study.    Study Date: 03/11/2023 14:55 - 03/12/2023 07:00  Duration: 16 hr 5 min  EEG Classification / Summary:  Abnormal EEG in the awake and drowsy states due to:  -Generalized rhythmic delta activity (GRDA) in drowsiness  -Mild diffuse slowing    Clinical Impression:  -Mild diffuse slowing and GRDA indicate mild diffuse cerebral dysfunction of nonspecific etiology      CT Abdomen and Pelvis w/ IV Cont (03.09.23 @ 00:51)  IMPRESSION:  Urinary bladder wall thickening versus underdistention. Correlate with   urinalysis.      CT Head No Cont:  (10 Mar 2023 22:11)  IMPRESSION:  No CT evidence of acute intracranial pathology.    There are a few scattered pericallosal white matter lesions on the CT   scan, compatible with the patient's history of multiple sclerosis.  The   full extent of disease is better visualized on MRI of 03/01/2023.       SUBJECTIVE/INTERVAL HISTORY:  Patient seen and examined at bedside this AM with Neurology attending and team. Overnight there were no acute events. This AM she states she is feeling much better. She does not report any new complaints at this time.     ROS: As above, otherwise negative.       MEDICATIONS:  MEDICATIONS  (STANDING):  amLODIPine   Tablet 5 milliGRAM(s) Oral daily  enoxaparin Injectable 40 milliGRAM(s) SubCutaneous every 24 hours  lacosamide IVPB 100 milliGRAM(s) IV Intermittent every 12 hours  levothyroxine Injectable 50 MICROGram(s) IV Push at bedtime  pantoprazole    Tablet 40 milliGRAM(s) Oral before breakfast  sodium chloride 0.9%. 1000 milliLiter(s) (100 mL/Hr) IV Continuous <Continuous>    MEDICATIONS  (PRN):  acetaminophen     Tablet .. 650 milliGRAM(s) Oral every 6 hours PRN Temp greater or equal to 38C (100.4F), Mild Pain (1 - 3)  aluminum hydroxide/magnesium hydroxide/simethicone Suspension 30 milliLiter(s) Oral every 4 hours PRN Dyspepsia  melatonin 3 milliGRAM(s) Oral at bedtime PRN Insomnia  ondansetron Injectable 4 milliGRAM(s) IV Push every 8 hours PRN Nausea and/or Vomiting      VITALS & EXAMINATION:  Vital Signs Last 24 Hrs  T(C): 36.9 (14 Mar 2023 09:57), Max: 36.9 (14 Mar 2023 09:57)  T(F): 98.4 (14 Mar 2023 09:57), Max: 98.4 (14 Mar 2023 09:57)  HR: 88 (14 Mar 2023 09:57) (55 - 88)  BP: 142/77 (14 Mar 2023 09:57) (127/58 - 147/79)  RR: 18 (14 Mar 2023 09:57) (17 - 18)  SpO2: 100% (14 Mar 2023 09:57) (99% - 100%)    Parameters below as of 14 Mar 2023 09:57  Patient On (Oxygen Delivery Method): room air    General:  Constitutional: Seated in bed on her phone, NAD.    Respiratory: Breathing comfortably on room air.  Extremities: Slight LE swelling b/l. edema.  Skin: No rashes, bruising, or discoloration.    Neurological (>12):  MS: Eyes open, alert, oriented to person, place, situation, time. Follows all commands.    Speech/Language: Clear, fluent. Naming intact (pen, wallet).    CNs: PERRL. CVF intact OU. EOMI, no nystagmus, no diplopia. V1-3 intact to LT, well developed masseter muscles b/l. Facial movements normal and symmetric. Hearing grossly intact to conversation. Symmetric palate elevation in midline, no uvula deviation. Shoulder shrug intact b/l. Tongue midline on protrusion, normal movements.    Motor: Decreased muscle bulk in the UE b/l. Mild spasticity in the arms bilaterally and the LLE.      RLE: 0/5 hip flexors, 0/5 knee extensors, 0/5 ankle dorsiflexors, trace movement in toes.   LLE: 2/5 hip flexors, 2/5 knee extensors, 2/5 ankle dorsiflexors.     Reflexes: No ankle clonus b/l. Achilles mute b/l.               Biceps(C5)       BR(C6)     Triceps(C7)      Patellar(L4)    Plantar Resp  R	3+	          2+           0		3+	     Extensor  L	2+	          2+           0		3+	     Extensor    Coordination/Gait: Not assessed.       LABS:  CBC                       8.5    5.83  )-----------( 367      ( 14 Mar 2023 06:57 )             28.3     Chem 03-14    142  |  109<H>  |  8   ----------------------------<  74  3.7   |  25  |  0.61    Ca    8.6      14 Mar 2023 06:57  Phos  3.5     03-14  Mg     1.60     03-14      LFTs   Coagulopathy   Lipid Panel   A1c   Cardiac enzymes       STUDIES & IMAGING:    EEG    Study Date: 03/13/23 08:00-14:28; Duration: 6 hr 27 min  EEG Classification / Summary:  Abnormal EEG in the awake state due to mild diffuse slowing. No focal or epileptiform abnormalities are captured.   Clinical Impression:  Mild diffuse cerebral dysfunction is nonspecific in etiology.      Study Date: 03/12/23 08:00 - 03/13/23 08:00; Duration: 23 hr 58 min  EEG Classification / Summary:  Abnormal EEG in the awake, drowsy, and asleep states due to generalized rhythmic delta activity (GRDA). No focal or epileptiform abnormalities are captured.   Clinical Impression:  GRDA indicates mild diffuse cerebral dysfunction of nonspecific etiology.    Study Date: 03/11/2023 14:55 - 03/12/2023 07:00; Duration: 16 hr 5 min  EEG Classification / Summary:  Abnormal EEG in the awake and drowsy states due to:  -Generalized rhythmic delta activity (GRDA) in drowsiness  -Mild diffuse slowing  Clinical Impression:  -Mild diffuse slowing and GRDA indicate mild diffuse cerebral dysfunction of nonspecific etiology      CT Abdomen and Pelvis w/ IV Cont (03.09.23 @ 00:51)  IMPRESSION:  Urinary bladder wall thickening versus underdistention. Correlate with   urinalysis.      CT Head No Cont:  (10 Mar 2023 22:11)  IMPRESSION:  No CT evidence of acute intracranial pathology.    There are a few scattered pericallosal white matter lesions on the CT   scan, compatible with the patient's history of multiple sclerosis.  The   full extent of disease is better visualized on MRI of 03/01/2023.     SUBJECTIVE/INTERVAL HISTORY:  Patient seen and examined at bedside this AM with Neurology attending and team. Overnight there were no acute events. This AM she states she is feeling much better. She does not report any new complaints at this time.     ROS: As above, otherwise negative.       MEDICATIONS:  MEDICATIONS  (STANDING):  amLODIPine   Tablet 5 milliGRAM(s) Oral daily  enoxaparin Injectable 40 milliGRAM(s) SubCutaneous every 24 hours  lacosamide IVPB 100 milliGRAM(s) IV Intermittent every 12 hours  levothyroxine Injectable 50 MICROGram(s) IV Push at bedtime  pantoprazole    Tablet 40 milliGRAM(s) Oral before breakfast  sodium chloride 0.9%. 1000 milliLiter(s) (100 mL/Hr) IV Continuous <Continuous>    MEDICATIONS  (PRN):  acetaminophen     Tablet .. 650 milliGRAM(s) Oral every 6 hours PRN Temp greater or equal to 38C (100.4F), Mild Pain (1 - 3)  aluminum hydroxide/magnesium hydroxide/simethicone Suspension 30 milliLiter(s) Oral every 4 hours PRN Dyspepsia  melatonin 3 milliGRAM(s) Oral at bedtime PRN Insomnia  ondansetron Injectable 4 milliGRAM(s) IV Push every 8 hours PRN Nausea and/or Vomiting      VITALS & EXAMINATION:  Vital Signs Last 24 Hrs  T(C): 36.9 (14 Mar 2023 09:57), Max: 36.9 (14 Mar 2023 09:57)  T(F): 98.4 (14 Mar 2023 09:57), Max: 98.4 (14 Mar 2023 09:57)  HR: 88 (14 Mar 2023 09:57) (55 - 88)  BP: 142/77 (14 Mar 2023 09:57) (127/58 - 147/79)  RR: 18 (14 Mar 2023 09:57) (17 - 18)  SpO2: 100% (14 Mar 2023 09:57) (99% - 100%)    Parameters below as of 14 Mar 2023 09:57  Patient On (Oxygen Delivery Method): room air    General:  Constitutional: Seated in bed on her phone, NAD.    Respiratory: Breathing comfortably on room air.  Extremities: Slight LE swelling b/l.   Skin: No rashes, bruising, or discoloration.    Neurological (>12):  MS: Eyes open, alert, oriented to person, place, situation, time. Follows all commands.    Speech/Language: Clear, fluent. Naming intact (pen, wallet).    CNs: PERRL. CVF intact OU. EOMI, no nystagmus, no diplopia. V1-3 intact to LT, well developed masseter muscles b/l. Facial movements normal and symmetric. Hearing grossly intact to conversation. Symmetric palate elevation in midline, no uvula deviation. Shoulder shrug intact b/l. Tongue midline on protrusion, normal movements.    Motor: Decreased muscle bulk in the UE b/l. Mild spasticity in the arms bilaterally and the LLE.      RLE: 0/5 hip flexors, 0/5 knee extensors, 0/5 ankle dorsiflexors, trace movement in toes.   LLE: 2/5 hip flexors, 2/5 knee extensors, 2/5 ankle dorsiflexors.     Reflexes: No ankle clonus b/l. Achilles mute b/l.               Biceps(C5)       BR(C6)     Triceps(C7)      Patellar(L4)    Plantar Resp  R	3+	          2+           0		3+	     Extensor  L	2+	          2+           0		3+	     Extensor    Coordination/Gait: Not assessed.       LABS:  CBC                       8.5    5.83  )-----------( 367      ( 14 Mar 2023 06:57 )             28.3     Chem 03-14    142  |  109<H>  |  8   ----------------------------<  74  3.7   |  25  |  0.61    Ca    8.6      14 Mar 2023 06:57  Phos  3.5     03-14  Mg     1.60     03-14      LFTs   Coagulopathy   Lipid Panel   A1c   Cardiac enzymes       STUDIES & IMAGING:    EEG    Study Date: 03/13/23 08:00-14:28; Duration: 6 hr 27 min  EEG Classification / Summary:  Abnormal EEG in the awake state due to mild diffuse slowing. No focal or epileptiform abnormalities are captured.   Clinical Impression:  Mild diffuse cerebral dysfunction is nonspecific in etiology.      Study Date: 03/12/23 08:00 - 03/13/23 08:00; Duration: 23 hr 58 min  EEG Classification / Summary:  Abnormal EEG in the awake, drowsy, and asleep states due to generalized rhythmic delta activity (GRDA). No focal or epileptiform abnormalities are captured.   Clinical Impression:  GRDA indicates mild diffuse cerebral dysfunction of nonspecific etiology.    Study Date: 03/11/2023 14:55 - 03/12/2023 07:00; Duration: 16 hr 5 min  EEG Classification / Summary:  Abnormal EEG in the awake and drowsy states due to:  -Generalized rhythmic delta activity (GRDA) in drowsiness  -Mild diffuse slowing  Clinical Impression:  -Mild diffuse slowing and GRDA indicate mild diffuse cerebral dysfunction of nonspecific etiology      CT Abdomen and Pelvis w/ IV Cont (03.09.23 @ 00:51)  IMPRESSION:  Urinary bladder wall thickening versus underdistention. Correlate with   urinalysis.      CT Head No Cont:  (10 Mar 2023 22:11)  IMPRESSION:  No CT evidence of acute intracranial pathology.    There are a few scattered pericallosal white matter lesions on the CT   scan, compatible with the patient's history of multiple sclerosis.  The   full extent of disease is better visualized on MRI of 03/01/2023.

## 2023-03-14 NOTE — CHART NOTE - NSCHARTNOTEFT_GEN_A_CORE
RRT called this afternoon for R-eye gaze deviation. Per RN report, patient was video chatting with family who felt that the patient had a R-eye gaze deviation RRT called this afternoon for R-eye gaze deviation. Per RN report, patient was video chatting with family who felt that the patient had a R-eye gaze deviation, along with slurred speech and drooling. On writer arrival, Code Team was at bedside and interventions in progress. FSBG 86 which improved to 199 s/p 1/2 amp D50 push. On my exam, patient with RLE weakness, able to wiggle toes of LLE but unable to lift off bed (patient reports is chronic), RUE weakness (patient reports chronic), able to range LUE with good  strength. Patient appears slightly more lethargic than this morning, but oriented x3 on assessment. EOMs grossly intact. PERRL. CN2-12 ....    Will check labs including Prolactin, VBG. Will repeat CTH. Discussed this afternoon's events with Neurology, pending their reevaluation. RRT called this afternoon for R-eye gaze deviation. Per RN report, patient was video chatting with her sister Haily who felt that the patient had a R-eye gaze deviation, along with slurred speech and drooling. On writer arrival, Code Team was at bedside and interventions in progress. FSBG 86 which improved to 199 s/p 1/2 amp D50 push. On my exam, patient appears slightly more lethargic than this morning, but oriented x3. EOMs grossly intact. PERRL. CN2-12 grossly intact. Patient with RLE weakness, able to wiggle toes of LLE but unable to lift off bed (patient reports is chronic), RUE weakness (patient reports chronic), able to range LUE with good  strength. Reports subjective diminished sensation to RLE and RUE on light touch.     Will check labs including Prolactin, VBG. Will repeat CTH. Discussed this afternoon's events with Neurology, pending their reevaluation.    Spoke with patient's sister Haily via phone call who has confirmed the aforementioned Neuro exam findings (LE weakness, RUE weakness) are chronic.

## 2023-03-14 NOTE — PROGRESS NOTE ADULT - SUBJECTIVE AND OBJECTIVE BOX
Patient is a 68y old  Female who presents with a chief complaint of AMS (13 Mar 2023 11:00)      SUBJECTIVE / OVERNIGHT EVENTS:    MEDICATIONS  (STANDING):  amLODIPine   Tablet 5 milliGRAM(s) Oral daily  enoxaparin Injectable 40 milliGRAM(s) SubCutaneous every 24 hours  lacosamide IVPB 100 milliGRAM(s) IV Intermittent every 12 hours  levothyroxine Injectable 50 MICROGram(s) IV Push at bedtime  pantoprazole    Tablet 40 milliGRAM(s) Oral before breakfast  sodium chloride 0.9%. 1000 milliLiter(s) (100 mL/Hr) IV Continuous <Continuous>    MEDICATIONS  (PRN):  acetaminophen     Tablet .. 650 milliGRAM(s) Oral every 6 hours PRN Temp greater or equal to 38C (100.4F), Mild Pain (1 - 3)  aluminum hydroxide/magnesium hydroxide/simethicone Suspension 30 milliLiter(s) Oral every 4 hours PRN Dyspepsia  melatonin 3 milliGRAM(s) Oral at bedtime PRN Insomnia  ondansetron Injectable 4 milliGRAM(s) IV Push every 8 hours PRN Nausea and/or Vomiting      Vital Signs Last 24 Hrs  T(C): 36.9 (14 Mar 2023 09:57), Max: 36.9 (14 Mar 2023 09:57)  T(F): 98.4 (14 Mar 2023 09:57), Max: 98.4 (14 Mar 2023 09:57)  HR: 88 (14 Mar 2023 09:57) (55 - 88)  BP: 142/77 (14 Mar 2023 09:57) (127/58 - 147/79)  BP(mean): --  RR: 18 (14 Mar 2023 09:57) (17 - 18)  SpO2: 100% (14 Mar 2023 09:57) (99% - 100%)    Parameters below as of 14 Mar 2023 09:57  Patient On (Oxygen Delivery Method): room air      CAPILLARY BLOOD GLUCOSE        I&O's Summary    13 Mar 2023 07:01  -  14 Mar 2023 07:00  --------------------------------------------------------  IN: 1350 mL / OUT: 1750 mL / NET: -400 mL    PHYSICAL EXAM:  GENERAL: NAD, well-developed  HEAD:  Atraumatic, Normocephalic,   EYES: EOMI, PERRLA, conjunctiva and sclera clear  CHEST/LUNG: Clear to auscultation bilaterally; No wheeze  HEART: Regular rate and rhythm; No murmurs, rubs, or gallops  ABDOMEN: Soft, Nontender, Nondistended; Bowel sounds present, primafit +   EXTREMITIES:  2+ Peripheral Pulses, No clubbing, cyanosis, or edema  PSYCH: AAOx3  NEUROLOGY: non-focal  SKIN: No rashes or lesions          LABS:                        8.5    5.83  )-----------( 367      ( 14 Mar 2023 06:57 )             28.3     03-14    142  |  109<H>  |  8   ----------------------------<  74  3.7   |  25  |  0.61    Ca    8.6      14 Mar 2023 06:57  Phos  3.5     03-14  Mg     1.60     03-14                RADIOLOGY & ADDITIONAL TESTS:    Imaging Personally Reviewed:    Consultant(s) Notes Reviewed:      Care Discussed with Consultants/Other Providers:   Patient is a 68y old  Female who presents with a chief complaint of AMS (13 Mar 2023 11:00)      SUBJECTIVE / OVERNIGHT EVENTS: patient seen and examined by bedside at 10:45 AM, , pt denies headache, dizziness, SOB, CP, Palpitations , N/V/D, abdominal pain at this time       MEDICATIONS  (STANDING):  amLODIPine   Tablet 5 milliGRAM(s) Oral daily  enoxaparin Injectable 40 milliGRAM(s) SubCutaneous every 24 hours  lacosamide IVPB 100 milliGRAM(s) IV Intermittent every 12 hours  levothyroxine Injectable 50 MICROGram(s) IV Push at bedtime  pantoprazole    Tablet 40 milliGRAM(s) Oral before breakfast  sodium chloride 0.9%. 1000 milliLiter(s) (100 mL/Hr) IV Continuous <Continuous>    MEDICATIONS  (PRN):  acetaminophen     Tablet .. 650 milliGRAM(s) Oral every 6 hours PRN Temp greater or equal to 38C (100.4F), Mild Pain (1 - 3)  aluminum hydroxide/magnesium hydroxide/simethicone Suspension 30 milliLiter(s) Oral every 4 hours PRN Dyspepsia  melatonin 3 milliGRAM(s) Oral at bedtime PRN Insomnia  ondansetron Injectable 4 milliGRAM(s) IV Push every 8 hours PRN Nausea and/or Vomiting      Vital Signs Last 24 Hrs  T(C): 36.9 (14 Mar 2023 09:57), Max: 36.9 (14 Mar 2023 09:57)  T(F): 98.4 (14 Mar 2023 09:57), Max: 98.4 (14 Mar 2023 09:57)  HR: 88 (14 Mar 2023 09:57) (55 - 88)  BP: 142/77 (14 Mar 2023 09:57) (127/58 - 147/79)  BP(mean): --  RR: 18 (14 Mar 2023 09:57) (17 - 18)  SpO2: 100% (14 Mar 2023 09:57) (99% - 100%)    Parameters below as of 14 Mar 2023 09:57  Patient On (Oxygen Delivery Method): room air      CAPILLARY BLOOD GLUCOSE        I&O's Summary    13 Mar 2023 07:01  -  14 Mar 2023 07:00  --------------------------------------------------------  IN: 1350 mL / OUT: 1750 mL / NET: -400 mL    PHYSICAL EXAM:  GENERAL: NAD, well-developed  HEAD:  Atraumatic, Normocephalic,   EYES: EOMI, PERRLA, conjunctiva and sclera clear  CHEST/LUNG: Clear to auscultation bilaterally; No wheeze  HEART: Regular rate and rhythm; No murmurs, rubs, or gallops  ABDOMEN: Soft, Nontender, Nondistended; Bowel sounds present, primafit +   EXTREMITIES:  2+ Peripheral Pulses, No clubbing, cyanosis, or edema  PSYCH: AAOx3  NEUROLOGY: non-focal  SKIN: No rashes or lesions          LABS:                        8.5    5.83  )-----------( 367      ( 14 Mar 2023 06:57 )             28.3     03-14    142  |  109<H>  |  8   ----------------------------<  74  3.7   |  25  |  0.61    Ca    8.6      14 Mar 2023 06:57  Phos  3.5     03-14  Mg     1.60     03-14                RADIOLOGY & ADDITIONAL TESTS:  EEG Classification / Summary:  Abnormal EEG in the awake state due to mild diffuse slowing. No focal or epileptiform abnormalities are captured.     Clinical Impression:  Mild diffuse cerebral dysfunction is nonspecific in etiology.  Imaging Personally Reviewed:    Consultant(s) Notes Reviewed:      Care Discussed with Consultants/Other Providers:

## 2023-03-14 NOTE — RAPID RESPONSE TEAM SUMMARY - NSADDTLFINDINGSRRT_GEN_ALL_CORE
Afeb VSS. Glucose 80. Given 1/2 AMP D50. Primary nurse and team at bedside. Patient with MS, known RLE weakness. Per patient she was on facetime and saw her right eye drift. LNK 10 min ago. On evaluation patient without right eye drift. No FND. Somewhat slower to answer but per nursing not a change from this morning and speaking and answering appropriately. Patient followed by neurology; low suspicion of seizure.

## 2023-03-15 ENCOUNTER — TRANSCRIPTION ENCOUNTER (OUTPATIENT)
Age: 68
End: 2023-03-15

## 2023-03-15 LAB
ANION GAP SERPL CALC-SCNC: 9 MMOL/L — SIGNIFICANT CHANGE UP (ref 7–14)
APPEARANCE UR: CLEAR — SIGNIFICANT CHANGE UP
BACTERIA # UR AUTO: NEGATIVE — SIGNIFICANT CHANGE UP
BASOPHILS # BLD AUTO: 0.05 K/UL — SIGNIFICANT CHANGE UP (ref 0–0.2)
BASOPHILS NFR BLD AUTO: 0.9 % — SIGNIFICANT CHANGE UP (ref 0–2)
BILIRUB UR-MCNC: NEGATIVE — SIGNIFICANT CHANGE UP
BUN SERPL-MCNC: 6 MG/DL — LOW (ref 7–23)
CALCIUM SERPL-MCNC: 9.1 MG/DL — SIGNIFICANT CHANGE UP (ref 8.4–10.5)
CHLORIDE SERPL-SCNC: 107 MMOL/L — SIGNIFICANT CHANGE UP (ref 98–107)
CO2 SERPL-SCNC: 26 MMOL/L — SIGNIFICANT CHANGE UP (ref 22–31)
COLOR SPEC: COLORLESS — SIGNIFICANT CHANGE UP
CREAT SERPL-MCNC: 0.57 MG/DL — SIGNIFICANT CHANGE UP (ref 0.5–1.3)
DIFF PNL FLD: NEGATIVE — SIGNIFICANT CHANGE UP
EGFR: 100 ML/MIN/1.73M2 — SIGNIFICANT CHANGE UP
EOSINOPHIL # BLD AUTO: 0.12 K/UL — SIGNIFICANT CHANGE UP (ref 0–0.5)
EOSINOPHIL NFR BLD AUTO: 2.2 % — SIGNIFICANT CHANGE UP (ref 0–6)
EPI CELLS # UR: 1 /HPF — SIGNIFICANT CHANGE UP (ref 0–5)
GLUCOSE SERPL-MCNC: 72 MG/DL — SIGNIFICANT CHANGE UP (ref 70–99)
GLUCOSE UR QL: NEGATIVE — SIGNIFICANT CHANGE UP
HCT VFR BLD CALC: 28.9 % — LOW (ref 34.5–45)
HGB BLD-MCNC: 8.8 G/DL — LOW (ref 11.5–15.5)
HYALINE CASTS # UR AUTO: 1 /LPF — SIGNIFICANT CHANGE UP (ref 0–7)
IANC: 2.41 K/UL — SIGNIFICANT CHANGE UP (ref 1.8–7.4)
IMM GRANULOCYTES NFR BLD AUTO: 0.7 % — SIGNIFICANT CHANGE UP (ref 0–0.9)
KETONES UR-MCNC: NEGATIVE — SIGNIFICANT CHANGE UP
LEUKOCYTE ESTERASE UR-ACNC: ABNORMAL
LYMPHOCYTES # BLD AUTO: 2.6 K/UL — SIGNIFICANT CHANGE UP (ref 1–3.3)
LYMPHOCYTES # BLD AUTO: 47 % — HIGH (ref 13–44)
MAGNESIUM SERPL-MCNC: 1.7 MG/DL — SIGNIFICANT CHANGE UP (ref 1.6–2.6)
MCHC RBC-ENTMCNC: 28.3 PG — SIGNIFICANT CHANGE UP (ref 27–34)
MCHC RBC-ENTMCNC: 30.4 GM/DL — LOW (ref 32–36)
MCV RBC AUTO: 92.9 FL — SIGNIFICANT CHANGE UP (ref 80–100)
METHYLMALONATE SERPL-SCNC: 102 NMOL/L — SIGNIFICANT CHANGE UP (ref 0–378)
MONOCYTES # BLD AUTO: 0.31 K/UL — SIGNIFICANT CHANGE UP (ref 0–0.9)
MONOCYTES NFR BLD AUTO: 5.6 % — SIGNIFICANT CHANGE UP (ref 2–14)
NEUTROPHILS # BLD AUTO: 2.41 K/UL — SIGNIFICANT CHANGE UP (ref 1.8–7.4)
NEUTROPHILS NFR BLD AUTO: 43.6 % — SIGNIFICANT CHANGE UP (ref 43–77)
NITRITE UR-MCNC: NEGATIVE — SIGNIFICANT CHANGE UP
NRBC # BLD: 0 /100 WBCS — SIGNIFICANT CHANGE UP (ref 0–0)
NRBC # FLD: 0 K/UL — SIGNIFICANT CHANGE UP (ref 0–0)
PH UR: 7 — SIGNIFICANT CHANGE UP (ref 5–8)
PHOSPHATE SERPL-MCNC: 3.2 MG/DL — SIGNIFICANT CHANGE UP (ref 2.5–4.5)
PLATELET # BLD AUTO: 347 K/UL — SIGNIFICANT CHANGE UP (ref 150–400)
POTASSIUM SERPL-MCNC: 4 MMOL/L — SIGNIFICANT CHANGE UP (ref 3.5–5.3)
POTASSIUM SERPL-SCNC: 4 MMOL/L — SIGNIFICANT CHANGE UP (ref 3.5–5.3)
PROT UR-MCNC: NEGATIVE — SIGNIFICANT CHANGE UP
RBC # BLD: 3.11 M/UL — LOW (ref 3.8–5.2)
RBC # FLD: 16.9 % — HIGH (ref 10.3–14.5)
RBC CASTS # UR COMP ASSIST: 1 /HPF — SIGNIFICANT CHANGE UP (ref 0–4)
SODIUM SERPL-SCNC: 142 MMOL/L — SIGNIFICANT CHANGE UP (ref 135–145)
SP GR SPEC: 1 — LOW (ref 1.01–1.05)
T4 FREE SERPL-MCNC: 1.4 NG/DL — SIGNIFICANT CHANGE UP (ref 0.9–1.8)
TSH SERPL-MCNC: 6.33 UIU/ML — HIGH (ref 0.27–4.2)
UROBILINOGEN FLD QL: SIGNIFICANT CHANGE UP
WBC # BLD: 5.53 K/UL — SIGNIFICANT CHANGE UP (ref 3.8–10.5)
WBC # FLD AUTO: 5.53 K/UL — SIGNIFICANT CHANGE UP (ref 3.8–10.5)
WBC UR QL: 6 /HPF — HIGH (ref 0–5)
ZINC SERPL-MCNC: 73 UG/DL — SIGNIFICANT CHANGE UP (ref 44–115)

## 2023-03-15 PROCEDURE — 99233 SBSQ HOSP IP/OBS HIGH 50: CPT

## 2023-03-15 PROCEDURE — 71045 X-RAY EXAM CHEST 1 VIEW: CPT | Mod: 26

## 2023-03-15 PROCEDURE — 99232 SBSQ HOSP IP/OBS MODERATE 35: CPT

## 2023-03-15 RX ORDER — LEVOTHYROXINE SODIUM 125 MCG
75 TABLET ORAL DAILY
Refills: 0 | Status: DISCONTINUED | OUTPATIENT
Start: 2023-03-16 | End: 2023-03-16

## 2023-03-15 RX ORDER — LACOSAMIDE 50 MG/1
100 TABLET ORAL
Refills: 0 | Status: DISCONTINUED | OUTPATIENT
Start: 2023-03-15 | End: 2023-03-16

## 2023-03-15 RX ADMIN — LACOSAMIDE 120 MILLIGRAM(S): 50 TABLET ORAL at 13:22

## 2023-03-15 RX ADMIN — PANTOPRAZOLE SODIUM 40 MILLIGRAM(S): 20 TABLET, DELAYED RELEASE ORAL at 06:48

## 2023-03-15 RX ADMIN — AMLODIPINE BESYLATE 5 MILLIGRAM(S): 2.5 TABLET ORAL at 06:48

## 2023-03-15 RX ADMIN — ENOXAPARIN SODIUM 40 MILLIGRAM(S): 100 INJECTION SUBCUTANEOUS at 06:47

## 2023-03-15 RX ADMIN — LACOSAMIDE 120 MILLIGRAM(S): 50 TABLET ORAL at 01:51

## 2023-03-15 RX ADMIN — Medication 3 MILLIGRAM(S): at 22:38

## 2023-03-15 RX ADMIN — LACOSAMIDE 100 MILLIGRAM(S): 50 TABLET ORAL at 21:16

## 2023-03-15 NOTE — DISCHARGE NOTE PROVIDER - NSFOLLOWUPCLINICS_GEN_ALL_ED_FT
[facility] General Internal Medicine  General Internal Medicine  [address] [place] [address]  Phone: [phone]  Fax:

## 2023-03-15 NOTE — DISCHARGE NOTE PROVIDER - NSDCMRMEDTOKEN_GEN_ALL_CORE_FT
acetaminophen 325 mg oral tablet: 2 tab(s) orally every 6 hours, As needed, Temp greater or equal to 38C (100.4F), Mild Pain (1 - 3)  gabapentin 300 mg oral capsule: 1 cap(s) orally once a day  lacosamide 50 mg oral tablet: 1 tab(s) orally 2 times a day MDD:100mg  Norvasc 10 mg oral tablet: 1 tab(s) orally once a day   pantoprazole 40 mg oral delayed release tablet: 1 tab(s) orally once a day  Synthroid 75 mcg (0.075 mg) oral tablet: 1 tab(s) orally once a day  traZODone 50 mg oral tablet: 0.5 tab(s) orally once a day   acetaminophen 325 mg oral tablet: 2 tab(s) orally every 6 hours, As needed, Temp greater or equal to 38C (100.4F), Mild Pain (1 - 3)  aluminum hydroxide-magnesium hydroxide 200 mg-200 mg/5 mL oral suspension: 30 milliliter(s) orally every 4 hours, As needed, Dyspepsia  amLODIPine 5 mg oral tablet: 1 tab(s) orally once a day  lacosamide 100 mg oral tablet: 1 tab(s) orally 2 times a day MDD:2 tabs (200 mg)  pantoprazole 40 mg oral delayed release tablet: 1 tab(s) orally once a day  Synthroid 75 mcg (0.075 mg) oral tablet: 1 tab(s) orally once a day

## 2023-03-15 NOTE — PROGRESS NOTE ADULT - PROBLEM SELECTOR PLAN 4
-s/p baclofen bump. last refilled 3/1, next refill 5/26  Neuro follow up -s/p baclofen bump. last refilled 3/1, next refill 5/26  Neuro follow up as outpt

## 2023-03-15 NOTE — DISCHARGE NOTE PROVIDER - CARE PROVIDER_API CALL
YE MADRIGAL  [address] [facility], [address] [place] [address]  Phone: [phone]  Fax: ([phone]  Follow Up Time:

## 2023-03-15 NOTE — PROGRESS NOTE ADULT - PROBLEM SELECTOR PLAN 6
-lovenox sc    GOC-DNR/DNI.  MOLST verified with  by previous physician  plan of care d/w pt and ACP -lovenox sc    GOC-DNR/DNI.  MOLST verified with  by previous physician  plan of care d/w pt and ACP  plan of care d/w  on the phone

## 2023-03-15 NOTE — DISCHARGE NOTE PROVIDER - HOSPITAL COURSE
69 yo F [employer] PMH of MS with baclofen pump in place , seizure do,  hypothyroidism, frequent UTIs  admitted for acute metabolic encephalopathy likely 2/2 UTI     Acute metabolic encephalopathy.    Actively visual /auditory hallucinations . no seizure like activity as per family   -MRI brain 2/17 and 3/1 reviewed-  Patient also had LP on prior visits   Consulted neuro and psych , f/w recs , Neuro recs noted, EEG show left anterior temporal epileptiform discharges -  and neuro rec to increase Vimpat to 100 mg bid,  EEG report noted from today -No focal or epileptiform abnormalities are captured. Mild diffuse cerebral dysfunction is nonspecific in etiology. Will F/u with Neurology   -completed  UTI treatment , BC negative   Identify GNR, same Citrobacter Furandi , sensitive to Merrem , completed 3 days per guidelines , now started on 3/10 on ertapenem   -hold sedative meds Trazadone, gabapentin for now   -puree diet and IVF for now.   - CT head unchanged 3/14   pt hypothermic , will repeat UA , CXR , TFTS.       Acute UTI.   CT a/p c/w cystitis. no e/o pyelonephritis   -outside urine cx 3/8 reviewed- grew ESBL citrobacter. sens to ertapenem, Bactrim, nitrofurantoin. resistant to cephalosporin. intermediate to Levaquin  -UA+.  f/u urine cx and blood cx  -start Merrem x 3 days, completed , ID started Ertapenem in 3/10   -monitor for urinary retention   -has urology appt 4/5 for urinary retention/frequent UTIs  - pt with Primafit , voiding well.     Hypothyroidism.   TSH-1.07  -converted synthroid po 75 to iv 50,   - will transition to po.       Multiple sclerosis.    -s/p baclofen bump. last refilled 3/1, next refill 5/26  Neuro follow up as outpt.     HTN (hypertension).    currently BP acceptable off meds   -on norvasc at home - was on hold in the setting of infection,  resumed   as pt completed abx  and infection resolved.               69 yo F [employer] PMH of MS with baclofen pump in place , seizure do,  hypothyroidism, frequent UTIs  admitted for acute metabolic encephalopathy likely 2/2 UTI     Acute metabolic encephalopathy.    Actively visual /auditory hallucinations . no seizure like activity as per family   -MRI brain 2/17 and 3/1 reviewed-  Patient also had LP on prior visits   Consulted neuro and psych , f/w recs , Neuro recs noted, EEG show left anterior temporal epileptiform discharges -  and neuro rec to increase Vimpat to 100 mg bid,  EEG report noted from today -No focal or epileptiform abnormalities are captured. Mild diffuse cerebral dysfunction is nonspecific in etiology. Will F/u with Neurology   -completed  UTI treatment , BC negative   Identify GNR, same Citrobacter Furandi , sensitive to Merrem , completed 3 days per guidelines , now started on 3/10 on ertapenem   -hold sedative meds Trazadone, gabapentin for now   -puree diet and IVF for now.   - CT head unchanged 3/14   pt hypothermic on 3/15 , UA , CXR  unremarkable ,   TFTS noted, normal Ft4 , TSH 1.03(3/10)-->6.33(3/15), Since FT4 wnl, will recommend repeat TFTs in 6 weeks.         Acute UTI.   CT a/p c/w cystitis. no e/o pyelonephritis   -outside urine cx 3/8 reviewed- grew ESBL citrobacter. sens to ertapenem, Bactrim, nitrofurantoin. resistant to cephalosporin. intermediate to Levaquin  -UA+.  f/u urine cx and blood cx  -start Merrem x 3 days, completed , ID started Ertapenem in 3/10   -monitor for urinary retention   -has urology appt 4/5 for urinary retention/frequent UTIs  - pt with Primafit , voiding well.     Hypothyroidism.   TSH-1.07  -converted synthroid po 75 to iv 50,   - will transition to po.  -TFTS noted, normal Ft4 , TSH 1.03(3/10)-->6.33(3/15), Since FT4 wnl, will recommend repeat TFTs in 6 weeks.       Multiple sclerosis.    -s/p baclofen bump. last refilled 3/1, next refill 5/26  Neuro follow up as outpt.     HTN (hypertension).    currently BP acceptable off meds   -on norvasc at home - was on hold in the setting of infection,  resumed   as pt completed abx  and infection resolved.   Patient hemodynamically stable for discharge home

## 2023-03-15 NOTE — PROGRESS NOTE ADULT - PROBLEM SELECTOR PLAN 2
-CT a/p c/w cystitis. no e/o pyelonephritis   -outside urine cx 3/8 reviewed- grew ESBL citrobacter. sens to ertapenem, Bactrim, nitrofurantoin. resistant to cephalosporin. intermediate to Levaquin  -UA+.  f/u urine cx and blood cx  -start Merrem x 3 days, completed , ID started Ertapenem in 3/10   -monitor for urinary retention   -has urology appt 4/5 for urinary retention/frequent UTIs  - pt with Primafit , voiding well

## 2023-03-15 NOTE — PROGRESS NOTE ADULT - ASSESSMENT
68 year-old female [employer] with past medical history of MS with baclofen pump in place , seizure on Vimpat 50mg BID last seizure 12/2022 (follows with Dr. Merrick ROJAS), hypothyroidism, frequent UTIs, recent admission 2/24-3/3 with AMS in the setting of a COVID infection, readmitted 3/9 with AMS and UTI. MRI without evidence of acute pathology. EEG did not show epileptiform activity or seizures. Mental status improved on evaluation 3/14.       Impression: Infectious encephalopathy.     Recommendations:   [] Continue vimpat 100mg BID  [] S/p IV abx for UTI  [] Telemetry monitoring; Neurochecks/VS per unit protocol  [] Seizure, fall precautions  [] PT/OT   68 year-old female [employer] with past medical history of MS with baclofen pump in place , seizure on Vimpat 50mg BID last seizure 12/2022 (follows with Dr. Merrick ROJAS), hypothyroidism, frequent UTIs, recent admission 2/24-3/3 with AMS in the setting of a COVID infection, readmitted 3/9 with AMS and UTI. MRI without evidence of acute pathology. EEG did not show epileptiform activity or seizures. Mental status improved on evaluation 3/14. Exam stable and unchanged on evaluation 3/15.    Impression: Likely infectious encephalopathy from UTI, now resolved.        Recommendations:   [] Continue vimpat 100mg BID  [] S/p IV abx for UTI  [] F/u with her urologist as scheduled  [] Outpatient follow up with her Neurologist   [] Telemetry monitoring; Neurochecks/VS per unit protocol  [] Seizure, fall precautions  [] PT/OT  [] No further inpatient workup required from a Neurology standpoint   [] Neurology signing off. Please call [facility] at [phone] with any questions.

## 2023-03-15 NOTE — CHART NOTE - NSCHARTNOTEFT_GEN_A_CORE
Notified by RN, patient with hypothermia noted orally and axillary, per patient her "temperature normally runs low".  Recommended obtain rectal temperature.    Discussed w/ Dr. Acevedo, recommendations as follows:  - STAT UA, STAT CXR  - No need for BCx at this time  - Change IV Levothyroxine and Vimpat to PO Levothyroxine 75 mcg daily and PO Vimpat 100 mg BID      Xiao Xavier NP-BC  Department of Medicine  In House Pager #[phone]

## 2023-03-15 NOTE — PROGRESS NOTE ADULT - PROBLEM SELECTOR PLAN 5
currently BP acceptable off meds   -on norvasc at home - is on hold in the setting of infection, can resume now as pt completed abx  and infection resolved currently BP acceptable off meds   -on norvasc at home - was on hold in the setting of infection,  resumed   as pt completed abx  and infection resolved

## 2023-03-15 NOTE — DISCHARGE NOTE PROVIDER - NSDCFUSCHEDAPPT_GEN_ALL_CORE_FT
Matias Sterling  [facility] Physician [facility]  UROLOGY [address] [facility]  Scheduled Appointment: 04/05/2023

## 2023-03-15 NOTE — PROGRESS NOTE ADULT - ATTENDING COMMENTS
Ms. Edwards is a 68 you woman with septic encephalopathy - improving.   Multiple UTIs - she plans to see a urologist.   I agree with work up and management as above.   Thank you.
Ms. Edwards is a 68 you woman with septic encephalopathy - improving.   Episode yesterday was not consistent with seizure or stroke or TIA - she was generally more weak and sleepy in the setting of having been sat up from lying down.  Back to baseline clinically.   I agree with work up and management as above.   Thank you.

## 2023-03-15 NOTE — PROGRESS NOTE ADULT - PROBLEM SELECTOR PLAN 3
- TSH-1.07  -converted synthroid po 75 to iv 50 - TSH-1.07  -converted synthroid po 75 to iv 50,   - will transition to po

## 2023-03-15 NOTE — PROGRESS NOTE ADULT - SUBJECTIVE AND OBJECTIVE BOX
SUBJECTIVE/INTERVAL HISTORY:      ROS: As above, otherwise negative.       MEDICATIONS:  MEDICATIONS  (STANDING):  amLODIPine   Tablet 5 milliGRAM(s) Oral daily  enoxaparin Injectable 40 milliGRAM(s) SubCutaneous every 24 hours  lacosamide IVPB 100 milliGRAM(s) IV Intermittent every 12 hours  levothyroxine Injectable 50 MICROGram(s) IV Push at bedtime  pantoprazole    Tablet 40 milliGRAM(s) Oral before breakfast    MEDICATIONS  (PRN):  acetaminophen     Tablet .. 650 milliGRAM(s) Oral every 6 hours PRN Temp greater or equal to 38C (100.4F), Mild Pain (1 - 3)  aluminum hydroxide/magnesium hydroxide/simethicone Suspension 30 milliLiter(s) Oral every 4 hours PRN Dyspepsia  melatonin 3 milliGRAM(s) Oral at bedtime PRN Insomnia  ondansetron Injectable 4 milliGRAM(s) IV Push every 8 hours PRN Nausea and/or Vomiting      VITALS & EXAMINATION:  Vital Signs Last 24 Hrs  T(C): 36.3 (14 Mar 2023 20:50), Max: 36.9 (14 Mar 2023 09:57)  T(F): 97.4 (14 Mar 2023 20:50), Max: 98.4 (14 Mar 2023 09:57)  HR: 60 (14 Mar 2023 20:50) (60 - 88)  BP: 152/70 (14 Mar 2023 20:50) (130/55 - 152/70)  RR: 16 (14 Mar 2023 20:50) (16 - 18)  SpO2: 100% (14 Mar 2023 20:50) (99% - 100%)    Parameters below as of 14 Mar 2023 20:50  Patient On (Oxygen Delivery Method): room air    ***    LABS:  CBC                       8.8    5.53  )-----------( 347      ( 15 Mar 2023 05:44 )             28.9     Chem 03-15    142  |  107  |  6<L>  ----------------------------<  72  4.0   |  26  |  0.57    Ca    9.1      15 Mar 2023 05:44  Phos  3.2     03-15  Mg     1.70     03-15    TPro  5.5<L>  /  Alb  3.1<L>  /  TBili  <0.2  /  DBili  x   /  AST  25  /  ALT  16  /  AlkPhos  108  03-14    LFTs LIVER FUNCTIONS - ( 14 Mar 2023 13:43 )  Alb: 3.1 g/dL / Pro: 5.5 g/dL / ALK PHOS: 108 U/L / ALT: 16 U/L / AST: 25 U/L / GGT: x           Coagulopathy   Lipid Panel   A1c   Cardiac enzymes     U/A   CSF CSF:                    STUDIES & IMAGING:    EEG    Study Date: 03/13/23 08:00-14:28; Duration: 6 hr 27 min  EEG Classification / Summary:  Abnormal EEG in the awake state due to mild diffuse slowing. No focal or epileptiform abnormalities are captured.   Clinical Impression:  Mild diffuse cerebral dysfunction is nonspecific in etiology.    Study Date: 03/12/23 08:00 - 03/13/23 08:00; Duration: 23 hr 58 min  EEG Classification / Summary:  Abnormal EEG in the awake, drowsy, and asleep states due to generalized rhythmic delta activity (GRDA). No focal or epileptiform abnormalities are captured.   Clinical Impression:  GRDA indicates mild diffuse cerebral dysfunction of nonspecific etiology.    Study Date: 03/11/2023 14:55 - 03/12/2023 07:00; Duration: 16 hr 5 min  EEG Classification / Summary:  Abnormal EEG in the awake and drowsy states due to:  -Generalized rhythmic delta activity (GRDA) in drowsiness  -Mild diffuse slowing  Clinical Impression:  -Mild diffuse slowing and GRDA indicate mild diffuse cerebral dysfunction of nonspecific etiology      CT Abdomen and Pelvis w/ IV Cont (03.09.23 @ 00:51)  IMPRESSION:  Urinary bladder wall thickening versus underdistention. Correlate with   urinalysis.      CT Head No Cont:  (10 Mar 2023 22:11)  IMPRESSION:  No CT evidence of acute intracranial pathology.    There are a few scattered pericallosal white matter lesions on the CT scan, compatible with the patient's history of multiple sclerosis.  The full extent of disease is better visualized on MRI of 03/01/2023.   SUBJECTIVE/INTERVAL HISTORY: Patient seen and examined at bedside with Neurology team this AM. Yesterday RRT was called for an episode of transient dysarthria and adduction OD reported by patient.   Discussed with patient and family on phone, ***      ROS: As above, otherwise negative.       MEDICATIONS:  MEDICATIONS  (STANDING):  amLODIPine   Tablet 5 milliGRAM(s) Oral daily  enoxaparin Injectable 40 milliGRAM(s) SubCutaneous every 24 hours  lacosamide IVPB 100 milliGRAM(s) IV Intermittent every 12 hours  levothyroxine Injectable 50 MICROGram(s) IV Push at bedtime  pantoprazole    Tablet 40 milliGRAM(s) Oral before breakfast    MEDICATIONS  (PRN):  acetaminophen     Tablet .. 650 milliGRAM(s) Oral every 6 hours PRN Temp greater or equal to 38C (100.4F), Mild Pain (1 - 3)  aluminum hydroxide/magnesium hydroxide/simethicone Suspension 30 milliLiter(s) Oral every 4 hours PRN Dyspepsia  melatonin 3 milliGRAM(s) Oral at bedtime PRN Insomnia  ondansetron Injectable 4 milliGRAM(s) IV Push every 8 hours PRN Nausea and/or Vomiting      VITALS & EXAMINATION:  Vital Signs Last 24 Hrs  T(C): 36.3 (14 Mar 2023 20:50), Max: 36.9 (14 Mar 2023 09:57)  T(F): 97.4 (14 Mar 2023 20:50), Max: 98.4 (14 Mar 2023 09:57)  HR: 60 (14 Mar 2023 20:50) (60 - 88)  BP: 152/70 (14 Mar 2023 20:50) (130/55 - 152/70)  RR: 16 (14 Mar 2023 20:50) (16 - 18)  SpO2: 100% (14 Mar 2023 20:50) (99% - 100%)    Parameters below as of 14 Mar 2023 20:50  Patient On (Oxygen Delivery Method): room air    ***    LABS:  CBC                       8.8    5.53  )-----------( 347      ( 15 Mar 2023 05:44 )             28.9     Chem 03-15    142  |  107  |  6<L>  ----------------------------<  72  4.0   |  26  |  0.57    Ca    9.1      15 Mar 2023 05:44  Phos  3.2     03-15  Mg     1.70     03-15    TPro  5.5<L>  /  Alb  3.1<L>  /  TBili  <0.2  /  DBili  x   /  AST  25  /  ALT  16  /  AlkPhos  108  03-14    LFTs LIVER FUNCTIONS - ( 14 Mar 2023 13:43 )  Alb: 3.1 g/dL / Pro: 5.5 g/dL / ALK PHOS: 108 U/L / ALT: 16 U/L / AST: 25 U/L / GGT: x           Coagulopathy   Lipid Panel   A1c   Cardiac enzymes     U/A   CSF CSF:                    STUDIES & IMAGING:    EEG    Study Date: 03/13/23 08:00-14:28; Duration: 6 hr 27 min  EEG Classification / Summary:  Abnormal EEG in the awake state due to mild diffuse slowing. No focal or epileptiform abnormalities are captured.   Clinical Impression:  Mild diffuse cerebral dysfunction is nonspecific in etiology.    Study Date: 03/12/23 08:00 - 03/13/23 08:00; Duration: 23 hr 58 min  EEG Classification / Summary:  Abnormal EEG in the awake, drowsy, and asleep states due to generalized rhythmic delta activity (GRDA). No focal or epileptiform abnormalities are captured.   Clinical Impression:  GRDA indicates mild diffuse cerebral dysfunction of nonspecific etiology.    Study Date: 03/11/2023 14:55 - 03/12/2023 07:00; Duration: 16 hr 5 min  EEG Classification / Summary:  Abnormal EEG in the awake and drowsy states due to:  -Generalized rhythmic delta activity (GRDA) in drowsiness  -Mild diffuse slowing  Clinical Impression:  -Mild diffuse slowing and GRDA indicate mild diffuse cerebral dysfunction of nonspecific etiology      CT Abdomen and Pelvis w/ IV Cont (03.09.23 @ 00:51)  IMPRESSION:  Urinary bladder wall thickening versus underdistention. Correlate with   urinalysis.      CT Head No Cont:  (10 Mar 2023 22:11)  IMPRESSION:  No CT evidence of acute intracranial pathology.    There are a few scattered pericallosal white matter lesions on the CT scan, compatible with the patient's history of multiple sclerosis.  The full extent of disease is better visualized on MRI of 03/01/2023.   SUBJECTIVE/INTERVAL HISTORY: Patient seen and examined at bedside with Neurology team this AM. Yesterday RRT was called for an episode of transient dysarthria and adduction OD reported by patient. CTH obtained at the time was stable.   Discussed with patient and family on phone, they feel she was sleepy/drowsy at the time, and speech may have been slowed rather than slurred. She has not had recurrent events.      MEDICATIONS:  MEDICATIONS  (STANDING):  amLODIPine   Tablet 5 milliGRAM(s) Oral daily  enoxaparin Injectable 40 milliGRAM(s) SubCutaneous every 24 hours  lacosamide IVPB 100 milliGRAM(s) IV Intermittent every 12 hours  levothyroxine Injectable 50 MICROGram(s) IV Push at bedtime  pantoprazole    Tablet 40 milliGRAM(s) Oral before breakfast    MEDICATIONS  (PRN):  acetaminophen     Tablet .. 650 milliGRAM(s) Oral every 6 hours PRN Temp greater or equal to 38C (100.4F), Mild Pain (1 - 3)  aluminum hydroxide/magnesium hydroxide/simethicone Suspension 30 milliLiter(s) Oral every 4 hours PRN Dyspepsia  melatonin 3 milliGRAM(s) Oral at bedtime PRN Insomnia  ondansetron Injectable 4 milliGRAM(s) IV Push every 8 hours PRN Nausea and/or Vomiting      VITALS & EXAMINATION:  Vital Signs Last 24 Hrs  T(C): 36.3 (14 Mar 2023 20:50), Max: 36.9 (14 Mar 2023 09:57)  T(F): 97.4 (14 Mar 2023 20:50), Max: 98.4 (14 Mar 2023 09:57)  HR: 60 (14 Mar 2023 20:50) (60 - 88)  BP: 152/70 (14 Mar 2023 20:50) (130/55 - 152/70)  RR: 16 (14 Mar 2023 20:50) (16 - 18)  SpO2: 100% (14 Mar 2023 20:50) (99% - 100%)    Parameters below as of 14 Mar 2023 20:50  Patient On (Oxygen Delivery Method): room air      General:  Constitutional: Seated in bed on a phone call with her sister, NAD.    Respiratory: Breathing comfortably on room air.  Extremities: Slight LE swelling b/l.   Skin: No rashes, bruising, or discoloration.    Neurological (>12):  MS: Eyes open, alert, oriented to person, place, situation, time. Follows all commands.    Speech/Language: Clear, fluent.     CNs: PERRL. CVF intact OU. EOMI, no nystagmus, no diplopia. V1-3 intact to LT, well developed masseter muscles b/l. Facial movements normal and symmetric. Hearing grossly intact to conversation. Symmetric palate elevation in midline, no uvula deviation. Shoulder shrug intact b/l. Tongue midline on protrusion, normal movements.    Motor: Decreased muscle bulk in the UE b/l. Mild spasticity in the arms bilaterally and the LLE.      RLE: 0/5 hip flexors, 0/5 knee extensors, 0/5 ankle dorsiflexors, trace movement in toes.   LLE: 2/5 hip flexors, 2/5 knee extensors, 2/5 ankle dorsiflexors.     Reflexes: No ankle clonus b/l. Achilles mute b/l.               Biceps(C5)       BR(C6)     Triceps(C7)      Patellar(L4)    Plantar Resp  R	3+	          2+           0		3+	     Extensor  L	2+	          2+           0		3+	     Extensor    Coordination/Gait: Not assessed.       LABS:  CBC                       8.8    5.53  )-----------( 347      ( 15 Mar 2023 05:44 )             28.9     Chem 03-15    142  |  107  |  6<L>  ----------------------------<  72  4.0   |  26  |  0.57    Ca    9.1      15 Mar 2023 05:44  Phos  3.2     03-15  Mg     1.70     03-15    TPro  5.5<L>  /  Alb  3.1<L>  /  TBili  <0.2  /  DBili  x   /  AST  25  /  ALT  16  /  AlkPhos  108  03-14    LFTs LIVER FUNCTIONS - ( 14 Mar 2023 13:43 )  Alb: 3.1 g/dL / Pro: 5.5 g/dL / ALK PHOS: 108 U/L / ALT: 16 U/L / AST: 25 U/L / GGT: x           Coagulopathy   Lipid Panel   A1c   Cardiac enzymes       STUDIES & IMAGING:    EEG    Study Date: 03/13/23 08:00-14:28; Duration: 6 hr 27 min  EEG Classification / Summary:  Abnormal EEG in the awake state due to mild diffuse slowing. No focal or epileptiform abnormalities are captured.   Clinical Impression:  Mild diffuse cerebral dysfunction is nonspecific in etiology.    Study Date: 03/12/23 08:00 - 03/13/23 08:00; Duration: 23 hr 58 min  EEG Classification / Summary:  Abnormal EEG in the awake, drowsy, and asleep states due to generalized rhythmic delta activity (GRDA). No focal or epileptiform abnormalities are captured.   Clinical Impression:  GRDA indicates mild diffuse cerebral dysfunction of nonspecific etiology.    Study Date: 03/11/2023 14:55 - 03/12/2023 07:00; Duration: 16 hr 5 min  EEG Classification / Summary:  Abnormal EEG in the awake and drowsy states due to:  -Generalized rhythmic delta activity (GRDA) in drowsiness  -Mild diffuse slowing  Clinical Impression:  -Mild diffuse slowing and GRDA indicate mild diffuse cerebral dysfunction of nonspecific etiology      CT Abdomen and Pelvis w/ IV Cont (03.09.23 @ 00:51)  IMPRESSION:  Urinary bladder wall thickening versus underdistention. Correlate with   urinalysis.      CT Head No Cont:  (10 Mar 2023 22:11)  IMPRESSION:  No CT evidence of acute intracranial pathology.  There are a few scattered pericallosal white matter lesions on the CT scan, compatible with the patient's history of multiple sclerosis.  The full extent of disease is better visualized on MRI of 03/01/2023.    CT Head No Cont (03.14.23 @ 14:39)  IMPRESSION:  1.  Stable scattered white matter lesions consistent with patient's history of multiple sclerosis.  2.  No evidence of acute intracranial hemorrhage or mass effect.   SUBJECTIVE/INTERVAL HISTORY: Patient seen and examined at bedside with Neurology team this AM. Yesterday RRT was called for an episode of transient dysarthria and adduction OD reported by patient. CTH obtained at the time was stable.   Discussed with patient and family on phone, they feel she was sleepy/drowsy at the time, and speech may have been slowed rather than slurred. She has not had recurrent events.  Episode was in the setting of having gone from lying down to sitting up.      MEDICATIONS:  MEDICATIONS  (STANDING):  amLODIPine   Tablet 5 milliGRAM(s) Oral daily  enoxaparin Injectable 40 milliGRAM(s) SubCutaneous every 24 hours  lacosamide IVPB 100 milliGRAM(s) IV Intermittent every 12 hours  levothyroxine Injectable 50 MICROGram(s) IV Push at bedtime  pantoprazole    Tablet 40 milliGRAM(s) Oral before breakfast    MEDICATIONS  (PRN):  acetaminophen     Tablet .. 650 milliGRAM(s) Oral every 6 hours PRN Temp greater or equal to 38C (100.4F), Mild Pain (1 - 3)  aluminum hydroxide/magnesium hydroxide/simethicone Suspension 30 milliLiter(s) Oral every 4 hours PRN Dyspepsia  melatonin 3 milliGRAM(s) Oral at bedtime PRN Insomnia  ondansetron Injectable 4 milliGRAM(s) IV Push every 8 hours PRN Nausea and/or Vomiting      VITALS & EXAMINATION:  Vital Signs Last 24 Hrs  T(C): 36.3 (14 Mar 2023 20:50), Max: 36.9 (14 Mar 2023 09:57)  T(F): 97.4 (14 Mar 2023 20:50), Max: 98.4 (14 Mar 2023 09:57)  HR: 60 (14 Mar 2023 20:50) (60 - 88)  BP: 152/70 (14 Mar 2023 20:50) (130/55 - 152/70)  RR: 16 (14 Mar 2023 20:50) (16 - 18)  SpO2: 100% (14 Mar 2023 20:50) (99% - 100%)    Parameters below as of 14 Mar 2023 20:50  Patient On (Oxygen Delivery Method): room air      General:  Constitutional: Seated in bed on a phone call with her sister, NAD.    Respiratory: Breathing comfortably on room air.  Extremities: Slight LE swelling b/l.   Skin: No rashes, bruising, or discoloration.    Neurological (>12):  MS: Eyes open, alert, oriented to person, place, situation, time. Follows all commands.    Speech/Language: Clear, fluent.     CNs: PERRL. CVF intact OU. EOMI, no nystagmus, no diplopia. V1-3 intact to LT, well developed masseter muscles b/l. Facial movements normal and symmetric. Hearing grossly intact to conversation. Symmetric palate elevation in midline, no uvula deviation. Shoulder shrug intact b/l. Tongue midline on protrusion, normal movements.    Motor: Decreased muscle bulk in the UE b/l. Mild spasticity in the arms bilaterally and the LLE.      RLE: 0/5 hip flexors, 0/5 knee extensors, 0/5 ankle dorsiflexors, trace movement in toes.   LLE: 2/5 hip flexors, 2/5 knee extensors, 2/5 ankle dorsiflexors.     Reflexes: No ankle clonus b/l. Achilles mute b/l.               Biceps(C5)       BR(C6)     Triceps(C7)      Patellar(L4)    Plantar Resp  R	3+	          2+           0		3+	     Extensor  L	2+	          2+           0		3+	     Extensor    Coordination/Gait: Not assessed.       LABS:  CBC                       8.8    5.53  )-----------( 347      ( 15 Mar 2023 05:44 )             28.9     Chem 03-15    142  |  107  |  6<L>  ----------------------------<  72  4.0   |  26  |  0.57    Ca    9.1      15 Mar 2023 05:44  Phos  3.2     03-15  Mg     1.70     03-15    TPro  5.5<L>  /  Alb  3.1<L>  /  TBili  <0.2  /  DBili  x   /  AST  25  /  ALT  16  /  AlkPhos  108  03-14    LFTs LIVER FUNCTIONS - ( 14 Mar 2023 13:43 )  Alb: 3.1 g/dL / Pro: 5.5 g/dL / ALK PHOS: 108 U/L / ALT: 16 U/L / AST: 25 U/L / GGT: x           Coagulopathy   Lipid Panel   A1c   Cardiac enzymes       STUDIES & IMAGING:    EEG    Study Date: 03/13/23 08:00-14:28; Duration: 6 hr 27 min  EEG Classification / Summary:  Abnormal EEG in the awake state due to mild diffuse slowing. No focal or epileptiform abnormalities are captured.   Clinical Impression:  Mild diffuse cerebral dysfunction is nonspecific in etiology.    Study Date: 03/12/23 08:00 - 03/13/23 08:00; Duration: 23 hr 58 min  EEG Classification / Summary:  Abnormal EEG in the awake, drowsy, and asleep states due to generalized rhythmic delta activity (GRDA). No focal or epileptiform abnormalities are captured.   Clinical Impression:  GRDA indicates mild diffuse cerebral dysfunction of nonspecific etiology.    Study Date: 03/11/2023 14:55 - 03/12/2023 07:00; Duration: 16 hr 5 min  EEG Classification / Summary:  Abnormal EEG in the awake and drowsy states due to:  -Generalized rhythmic delta activity (GRDA) in drowsiness  -Mild diffuse slowing  Clinical Impression:  -Mild diffuse slowing and GRDA indicate mild diffuse cerebral dysfunction of nonspecific etiology      CT Abdomen and Pelvis w/ IV Cont (03.09.23 @ 00:51)  IMPRESSION:  Urinary bladder wall thickening versus underdistention. Correlate with   urinalysis.      CT Head No Cont:  (10 Mar 2023 22:11)  IMPRESSION:  No CT evidence of acute intracranial pathology.  There are a few scattered pericallosal white matter lesions on the CT scan, compatible with the patient's history of multiple sclerosis.  The full extent of disease is better visualized on MRI of 03/01/2023.    CT Head No Cont (03.14.23 @ 14:39)  IMPRESSION:  1.  Stable scattered white matter lesions consistent with patient's history of multiple sclerosis.  2.  No evidence of acute intracranial hemorrhage or mass effect.

## 2023-03-15 NOTE — CHART NOTE - NSCHARTNOTEFT_GEN_A_CORE
UA findings reviewed with Dr. Acevedo, no urine culture at this time, will continue to observe off antibiotics.      Xiao Xavier NP-BC  Department of Medicine  In House Pager #[phone]

## 2023-03-15 NOTE — PROGRESS NOTE ADULT - SUBJECTIVE AND OBJECTIVE BOX
Patient is a 68y old  Female who presents with a chief complaint of AMS (15 Mar 2023 08:38)      SUBJECTIVE / OVERNIGHT EVENTS:    MEDICATIONS  (STANDING):  amLODIPine   Tablet 5 milliGRAM(s) Oral daily  enoxaparin Injectable 40 milliGRAM(s) SubCutaneous every 24 hours  lacosamide IVPB 100 milliGRAM(s) IV Intermittent every 12 hours  levothyroxine Injectable 50 MICROGram(s) IV Push at bedtime  pantoprazole    Tablet 40 milliGRAM(s) Oral before breakfast    MEDICATIONS  (PRN):  acetaminophen     Tablet .. 650 milliGRAM(s) Oral every 6 hours PRN Temp greater or equal to 38C (100.4F), Mild Pain (1 - 3)  aluminum hydroxide/magnesium hydroxide/simethicone Suspension 30 milliLiter(s) Oral every 4 hours PRN Dyspepsia  melatonin 3 milliGRAM(s) Oral at bedtime PRN Insomnia  ondansetron Injectable 4 milliGRAM(s) IV Push every 8 hours PRN Nausea and/or Vomiting      Vital Signs Last 24 Hrs  T(C): 36.8 (15 Mar 2023 09:35), Max: 36.8 (14 Mar 2023 17:36)  T(F): 98.2 (15 Mar 2023 09:35), Max: 98.3 (14 Mar 2023 17:36)  HR: 88 (15 Mar 2023 09:35) (60 - 88)  BP: 142/77 (15 Mar 2023 09:35) (130/55 - 152/70)  BP(mean): --  RR: 17 (15 Mar 2023 09:35) (16 - 17)  SpO2: 99% (15 Mar 2023 09:35) (99% - 100%)    Parameters below as of 15 Mar 2023 09:35  Patient On (Oxygen Delivery Method): room air      CAPILLARY BLOOD GLUCOSE      POCT Blood Glucose.: 199 mg/dL (14 Mar 2023 13:41)  POCT Blood Glucose.: 86 mg/dL (14 Mar 2023 13:26)    I&O's Summary    14 Mar 2023 07:01  -  15 Mar 2023 07:00  --------------------------------------------------------  IN: 300 mL / OUT: 2800 mL / NET: -2500 mL        PHYSICAL EXAM:  GENERAL: NAD, well-developed  HEAD:  Atraumatic, Normocephalic  EYES: EOMI, PERRLA, conjunctiva and sclera clear  NECK: Supple, No JVD  CHEST/LUNG: Clear to auscultation bilaterally; No wheeze  HEART: Regular rate and rhythm; No murmurs, rubs, or gallops  ABDOMEN: Soft, Nontender, Nondistended; Bowel sounds present  EXTREMITIES:  2+ Peripheral Pulses, No clubbing, cyanosis, or edema  PSYCH: AAOx3  NEUROLOGY: non-focal  SKIN: No rashes or lesions    LABS:                        8.8    5.53  )-----------( 347      ( 15 Mar 2023 05:44 )             28.9     03-15    142  |  107  |  6<L>  ----------------------------<  72  4.0   |  26  |  0.57    Ca    9.1      15 Mar 2023 05:44  Phos  3.2     03-15  Mg     1.70     03-15    TPro  5.5<L>  /  Alb  3.1<L>  /  TBili  <0.2  /  DBili  x   /  AST  25  /  ALT  16  /  AlkPhos  108  03-14              RADIOLOGY & ADDITIONAL TESTS:    Imaging Personally Reviewed:    Consultant(s) Notes Reviewed:      Care Discussed with Consultants/Other Providers:   Patient is a 68y old  Female who presents with a chief complaint of AMS (15 Mar 2023 08:38)      SUBJECTIVE / OVERNIGHT EVENTS: patient seen and examined by bedside, pt alert and awake, denies headache, dizziness, SOB, CP, Palpitations , N/V/D, abdominal pain  Events from yesterday noted, , pt S/P RRT , CT head unchanged     MEDICATIONS  (STANDING):  amLODIPine   Tablet 5 milliGRAM(s) Oral daily  enoxaparin Injectable 40 milliGRAM(s) SubCutaneous every 24 hours  lacosamide IVPB 100 milliGRAM(s) IV Intermittent every 12 hours  levothyroxine Injectable 50 MICROGram(s) IV Push at bedtime  pantoprazole    Tablet 40 milliGRAM(s) Oral before breakfast    MEDICATIONS  (PRN):  acetaminophen     Tablet .. 650 milliGRAM(s) Oral every 6 hours PRN Temp greater or equal to 38C (100.4F), Mild Pain (1 - 3)  aluminum hydroxide/magnesium hydroxide/simethicone Suspension 30 milliLiter(s) Oral every 4 hours PRN Dyspepsia  melatonin 3 milliGRAM(s) Oral at bedtime PRN Insomnia  ondansetron Injectable 4 milliGRAM(s) IV Push every 8 hours PRN Nausea and/or Vomiting      Vital Signs Last 24 Hrs  T(C): 36.8 (15 Mar 2023 09:35), Max: 36.8 (14 Mar 2023 17:36)  T(F): 98.2 (15 Mar 2023 09:35), Max: 98.3 (14 Mar 2023 17:36)  HR: 88 (15 Mar 2023 09:35) (60 - 88)  BP: 142/77 (15 Mar 2023 09:35) (130/55 - 152/70)  BP(mean): --  RR: 17 (15 Mar 2023 09:35) (16 - 17)  SpO2: 99% (15 Mar 2023 09:35) (99% - 100%)    Parameters below as of 15 Mar 2023 09:35  Patient On (Oxygen Delivery Method): room air      CAPILLARY BLOOD GLUCOSE      POCT Blood Glucose.: 199 mg/dL (14 Mar 2023 13:41)  POCT Blood Glucose.: 86 mg/dL (14 Mar 2023 13:26)    I&O's Summary    14 Mar 2023 07:01  -  15 Mar 2023 07:00  --------------------------------------------------------  IN: 300 mL / OUT: 2800 mL / NET: -2500 mL        PHYSICAL EXAM:  GENERAL: NAD, well-developed  HEAD:  Atraumatic, Normocephalic  EYES: EOMI, PERRLA, conjunctiva and sclera clear  NECK: Supple, No JVD  CHEST/LUNG: Clear to auscultation bilaterally; No wheeze  HEART: Regular rate and rhythm; No murmurs, rubs, or gallops  ABDOMEN: Soft, Nontender, Nondistended; Bowel sounds present  EXTREMITIES:  2+ Peripheral Pulses, No clubbing, cyanosis, or edema  PSYCH: AAOx3  NEUROLOGY: non-focal  SKIN: No rashes or lesions    LABS:                        8.8    5.53  )-----------( 347      ( 15 Mar 2023 05:44 )             28.9     03-15    142  |  107  |  6<L>  ----------------------------<  72  4.0   |  26  |  0.57    Ca    9.1      15 Mar 2023 05:44  Phos  3.2     03-15  Mg     1.70     03-15    TPro  5.5<L>  /  Alb  3.1<L>  /  TBili  <0.2  /  DBili  x   /  AST  25  /  ALT  16  /  AlkPhos  108  03-14              RADIOLOGY & ADDITIONAL TESTS:    Imaging Personally Reviewed:    Consultant(s) Notes Reviewed:      Care Discussed with Consultants/Other Providers:   Patient is a 68y old  Female who presents with a chief complaint of AMS (15 Mar 2023 08:38)      SUBJECTIVE / OVERNIGHT EVENTS: patient seen and examined by bedside, pt alert and awake, denies headache, dizziness, SOB, CP, Palpitations , N/V/D, abdominal pain  Events from yesterday noted, , pt S/P RRT , CT head unchanged    on the phone concerned that pt has hypothermia ,  MEDICATIONS  (STANDING):  amLODIPine   Tablet 5 milliGRAM(s) Oral daily  enoxaparin Injectable 40 milliGRAM(s) SubCutaneous every 24 hours  lacosamide IVPB 100 milliGRAM(s) IV Intermittent every 12 hours  levothyroxine Injectable 50 MICROGram(s) IV Push at bedtime  pantoprazole    Tablet 40 milliGRAM(s) Oral before breakfast    MEDICATIONS  (PRN):  acetaminophen     Tablet .. 650 milliGRAM(s) Oral every 6 hours PRN Temp greater or equal to 38C (100.4F), Mild Pain (1 - 3)  aluminum hydroxide/magnesium hydroxide/simethicone Suspension 30 milliLiter(s) Oral every 4 hours PRN Dyspepsia  melatonin 3 milliGRAM(s) Oral at bedtime PRN Insomnia  ondansetron Injectable 4 milliGRAM(s) IV Push every 8 hours PRN Nausea and/or Vomiting      Vital Signs Last 24 Hrs  T(C): 36.8 (15 Mar 2023 09:35), Max: 36.8 (14 Mar 2023 17:36)  T(F): 98.2 (15 Mar 2023 09:35), Max: 98.3 (14 Mar 2023 17:36)  HR: 88 (15 Mar 2023 09:35) (60 - 88)  BP: 142/77 (15 Mar 2023 09:35) (130/55 - 152/70)  BP(mean): --  RR: 17 (15 Mar 2023 09:35) (16 - 17)  SpO2: 99% (15 Mar 2023 09:35) (99% - 100%)    Parameters below as of 15 Mar 2023 09:35  Patient On (Oxygen Delivery Method): room air      CAPILLARY BLOOD GLUCOSE      POCT Blood Glucose.: 199 mg/dL (14 Mar 2023 13:41)  POCT Blood Glucose.: 86 mg/dL (14 Mar 2023 13:26)    I&O's Summary    14 Mar 2023 07:01  -  15 Mar 2023 07:00  --------------------------------------------------------  IN: 300 mL / OUT: 2800 mL / NET: -2500 mL        PHYSICAL EXAM:  GENERAL: NAD, well-developed  HEAD:  Atraumatic, Normocephalic,   EYES: EOMI, PERRLA, conjunctiva and sclera clear  CHEST/LUNG: Clear to auscultation bilaterally; No wheeze  HEART: Regular rate and rhythm; No murmurs, rubs, or gallops  ABDOMEN: Soft, Nontender, Nondistended; Bowel sounds present, primafit +   EXTREMITIES:  2+ Peripheral Pulses, No clubbing, cyanosis, or edema, B/l foot drop, mild contraction in rt hand   PSYCH: AAOx3  NEUROLOGY: non-focal  SKIN: No rashes or lesions  LABS:                        8.8    5.53  )-----------( 347      ( 15 Mar 2023 05:44 )             28.9     03-15    142  |  107  |  6<L>  ----------------------------<  72  4.0   |  26  |  0.57    Ca    9.1      15 Mar 2023 05:44  Phos  3.2     03-15  Mg     1.70     03-15    TPro  5.5<L>  /  Alb  3.1<L>  /  TBili  <0.2  /  DBili  x   /  AST  25  /  ALT  16  /  AlkPhos  108  03-14              RADIOLOGY & ADDITIONAL TESTS:  < from: CT Head No Cont (03.14.23 @ 14:39) >  FINDINGS:    Redemonstration of multiple scattered white matter lesions similar in   distribution compared to recent MR. This is compatible with patient's   history of multiple sclerosis.. There is no acute intracranialhemorrhage   or mass effect. There are areas of hypodensity in the bilateral   hemispheric white matter suggesting white matter microvascular ischemic   change. The ventricles and sulci are normal in size for patient's age.    There is no extraaxial fluid collection.    There is no displaced calvarial fracture. The visualized orbits are   within normal limits. Mild right maxillary and left posterior ethmoid   sinus mucosal thickening. The mastoid air cells are well aerated.      IMPRESSION:    1.  Stable scattered white matter lesions consistent with patient's   history of multiple sclerosis.  2.  No evidence of acute intracranial hemorrhage or mass effect.    < end of copied text >    Imaging Personally Reviewed:    Consultant(s) Notes Reviewed:  Neurology     Care Discussed with Consultants/Other Providers:

## 2023-03-15 NOTE — DISCHARGE NOTE PROVIDER - NSDCCPCAREPLAN_GEN_ALL_CORE_FT
PRINCIPAL DISCHARGE DIAGNOSIS  Diagnosis: Acute metabolic encephalopathy  Assessment and Plan of Treatment:       SECONDARY DISCHARGE DIAGNOSES  Diagnosis: Multiple sclerosis  Assessment and Plan of Treatment: continue with your Baclofen pump as directed. Follow up withyour nephrologist    Diagnosis: HTN (hypertension)  Assessment and Plan of Treatment: Continue blood pressure medication regimen as directed. Monitor for any visual changes, headaches or dizziness.  Monitor blood pressure regularly.  Follow up with your PCP for further management for high blood pressure.      Diagnosis: Acute UTI  Assessment and Plan of Treatment: you were noted to have acute UTI, you were treated with antibiotics with resolution    Diagnosis: Hypothyroidism  Assessment and Plan of Treatment: Continue synthroid regimen as ordered.  Monitor for any symptoms of decreased or elevated thyroid function.       PRINCIPAL DISCHARGE DIAGNOSIS  Diagnosis: Acute metabolic encephalopathy  Assessment and Plan of Treatment: you were noted to have altered Mental status on admission . You were evaluated by Neurology and Psychaitry,  You had EEG done which did not show any Seizure activty . You were also noted to have Urinary tract infection and treated with Antibiotics, Your mental status improved to your baseline      SECONDARY DISCHARGE DIAGNOSES  Diagnosis: Multiple sclerosis  Assessment and Plan of Treatment: continue with your Baclofen pump as directed. Follow up withyour nephrologist    Diagnosis: HTN (hypertension)  Assessment and Plan of Treatment: Continue blood pressure medication regimen as directed. Monitor for any visual changes, headaches or dizziness.  Monitor blood pressure regularly.  Follow up with your PCP for further management for high blood pressure.      Diagnosis: Acute UTI  Assessment and Plan of Treatment: you were noted to have acute UTI, you were treated with antibiotics with resolution    Diagnosis: Hypothyroidism  Assessment and Plan of Treatment: Continue synthroid regimen as ordered.  Monitor for any symptoms of decreased or elevated thyroid function. Please follow up with your PMD for repeat Thyroid function test in 6 weeks as your TSH was noted to be mildly elevated

## 2023-03-15 NOTE — PROGRESS NOTE ADULT - PROBLEM SELECTOR PLAN 1
Actively visual /auditory hallucinations . no seizure like activity as per family   -MRI brain 2/17 and 3/1 reviewed-  Patient also had LP on prior visits   Consulted neuro and psych , f/w recs , Neuro recs noted, EEG show left anterior temporal epileptiform discharges -  and neuro rec to increase Vimpat to 100 mg bid,  EEG report noted from today -No focal or epileptiform abnormalities are captured. Mild diffuse cerebral dysfunction is nonspecific in etiology. Will F/u with Neurology   -completed  UTI treatment , BC negative   Identify GNR, same Citrobacter Furandi , sensitive to Merrem , completed 3 days per guidelines , now started on 3/10 on ertapenem   -hold sedative meds Trazadone, gabapentin for now   -puree diet and IVF for now.   - CT head unchanged Actively visual /auditory hallucinations . no seizure like activity as per family   -MRI brain 2/17 and 3/1 reviewed-  Patient also had LP on prior visits   Consulted neuro and psych , f/w recs , Neuro recs noted, EEG show left anterior temporal epileptiform discharges -  and neuro rec to increase Vimpat to 100 mg bid,  EEG report noted from today -No focal or epileptiform abnormalities are captured. Mild diffuse cerebral dysfunction is nonspecific in etiology. Will F/u with Neurology   -completed  UTI treatment , BC negative   Identify GNR, same Citrobacter Furandi , sensitive to Merrem , completed 3 days per guidelines , now started on 3/10 on ertapenem   -hold sedative meds Trazadone, gabapentin for now   -puree diet and IVF for now.   - CT head unchanged 3/14   pt hypothermic , will repeat UA , CXR , TFTS

## 2023-03-15 NOTE — DISCHARGE NOTE PROVIDER - NSDCFUADDAPPT_GEN_ALL_CORE_FT
You have a follow up appointment with Dr. Matias Sterling (urology) on 4/5. Please call the office for additional information.    Please follow up with your primary care provider in 1-2 weeks following discharge from the hospital for continued monitoring and management. If you do not have a primary care provider please refer to the information for the internal medicine clinic to establish yourself as a patient.

## 2023-03-16 ENCOUNTER — TRANSCRIPTION ENCOUNTER (OUTPATIENT)
Age: 68
End: 2023-03-16

## 2023-03-16 VITALS
TEMPERATURE: 97 F | SYSTOLIC BLOOD PRESSURE: 153 MMHG | OXYGEN SATURATION: 99 % | HEART RATE: 66 BPM | DIASTOLIC BLOOD PRESSURE: 63 MMHG | RESPIRATION RATE: 17 BRPM

## 2023-03-16 LAB — PYRIDOXAL PHOS SERPL-MCNC: 2.1 UG/L — LOW (ref 3.4–65.2)

## 2023-03-16 PROCEDURE — 99239 HOSP IP/OBS DSCHRG MGMT >30: CPT

## 2023-03-16 RX ORDER — TRAZODONE HCL 50 MG
0.5 TABLET ORAL
Qty: 0 | Refills: 0 | DISCHARGE

## 2023-03-16 RX ORDER — LACOSAMIDE 50 MG/1
1 TABLET ORAL
Qty: 60 | Refills: 0
Start: 2023-03-16 | End: 2023-04-14

## 2023-03-16 RX ORDER — GABAPENTIN 400 MG/1
1 CAPSULE ORAL
Qty: 0 | Refills: 0 | DISCHARGE

## 2023-03-16 RX ORDER — AMLODIPINE BESYLATE 2.5 MG/1
1 TABLET ORAL
Qty: 30 | Refills: 0
Start: 2023-03-16 | End: 2023-04-14

## 2023-03-16 RX ADMIN — ENOXAPARIN SODIUM 40 MILLIGRAM(S): 100 INJECTION SUBCUTANEOUS at 09:00

## 2023-03-16 RX ADMIN — AMLODIPINE BESYLATE 5 MILLIGRAM(S): 2.5 TABLET ORAL at 05:37

## 2023-03-16 RX ADMIN — Medication 75 MICROGRAM(S): at 05:37

## 2023-03-16 RX ADMIN — LACOSAMIDE 100 MILLIGRAM(S): 50 TABLET ORAL at 09:01

## 2023-03-16 RX ADMIN — LACOSAMIDE 100 MILLIGRAM(S): 50 TABLET ORAL at 17:12

## 2023-03-16 RX ADMIN — PANTOPRAZOLE SODIUM 40 MILLIGRAM(S): 20 TABLET, DELAYED RELEASE ORAL at 09:01

## 2023-03-16 NOTE — DISCHARGE NOTE NURSING/CASE MANAGEMENT/SOCIAL WORK - NSDCPNINST_GEN_ALL_CORE
Make a follow up appointment with your physician.  Make a follow up appointment with Dr. Acevedo. Take your medications as prescribed.

## 2023-03-16 NOTE — PROGRESS NOTE ADULT - PROBLEM SELECTOR PLAN 5
currently BP acceptable off meds   -on norvasc at home - was on hold in the setting of infection,  resumed   as pt completed abx  and infection resolved

## 2023-03-16 NOTE — DISCHARGE NOTE NURSING/CASE MANAGEMENT/SOCIAL WORK - PATIENT PORTAL LINK FT
You can access the FollowMyHealth Patient Portal offered by [facility] by registering at the following website: [email or web address]. By joining [facility]’s FollowMyHealth portal, you will also be able to view your health information using other applications (apps) compatible with our system.

## 2023-03-16 NOTE — PROGRESS NOTE ADULT - PROBLEM SELECTOR PLAN 1
Actively visual /auditory hallucinations . no seizure like activity as per family   -MRI brain 2/17 and 3/1 reviewed-  Patient also had LP on prior visits   Consulted neuro and psych , f/w recs , Neuro recs noted, EEG show left anterior temporal epileptiform discharges -  and neuro rec to increase Vimpat to 100 mg bid,  EEG report noted from today -No focal or epileptiform abnormalities are captured. Mild diffuse cerebral dysfunction is nonspecific in etiology. Will F/u with Neurology   -completed  UTI treatment , BC negative   Identify GNR, same Citrobacter Furandi , sensitive to Merrem , completed 3 days per guidelines , now started on 3/10 on ertapenem   -hold sedative meds Trazadone, gabapentin for now   -puree diet and IVF for now.   - CT head unchanged 3/14   pt hypothermic , will repeat UA , CXR , TFTS Actively visual /auditory hallucinations . no seizure like activity as per family   -MRI brain 2/17 and 3/1 reviewed-  Patient also had LP on prior visits   Consulted neuro and psych , f/w recs , Neuro recs noted, EEG show left anterior temporal epileptiform discharges -  and neuro rec to increase Vimpat to 100 mg bid,  EEG report noted from today -No focal or epileptiform abnormalities are captured. Mild diffuse cerebral dysfunction is nonspecific in etiology. Will F/u with Neurology   -completed  UTI treatment , BC negative   Identify GNR, same Citrobacter Furandi , sensitive to Merrem , completed 3 days per guidelines , now started on 3/10 on ertapenem   -hold sedative meds Trazadone, gabapentin for now   -puree diet and IVF for now.   - CT head unchanged 3/14   pt was hypothermic on 3/15  , UA , CXR  unremarkable ,   TFTS noted, normal Ft4 , TSH 1.03(3/10)-->6.33(3/15), Since FT4 wnl, will recommend repeat TFTs in 6 weeks

## 2023-03-16 NOTE — PROGRESS NOTE ADULT - PROBLEM SELECTOR PLAN 3
- TSH-1.07  -converted synthroid po 75 to iv 50,   - will transition to po - TSH-1.07  -converted synthroid po 75 to iv 50,   - transitioned  to po 75

## 2023-03-16 NOTE — PROGRESS NOTE ADULT - REASON FOR ADMISSION
AMS
Consent: The patient's consent was obtained including but not limited to risks of crusting, scabbing, blistering, scarring, darker or lighter pigmentary change, recurrence, incomplete removal and infection.
AMS
Number Of Freeze-Thaw Cycles: 1 freeze-thaw cycle
Post-Care Instructions: I reviewed with the patient in detail post-care instructions. Patient is to wear sunprotection, and avoid picking at any of the treated lesions. Pt may apply Vaseline to crusted or scabbing areas.
Render Post-Care Instructions In Note?: yes
Detail Level: Detailed
Render Note In Bullet Format When Appropriate: No
AMS
Duration Of Freeze Thaw-Cycle (Seconds): 0
AMS
Medical Necessity Information: It is in your best interest to select a reason for this procedure from the list below. All of these items fulfill various CMS LCD requirements except the new and changing color options.
AMS
Medical Necessity Clause: This procedure was medically necessary because the lesions that were treated were:
AMS

## 2023-03-16 NOTE — DISCHARGE NOTE NURSING/CASE MANAGEMENT/SOCIAL WORK - NSDCPEFALRISK_GEN_ALL_CORE
For information on Fall & Injury Prevention, visit: [email or web address] OR  [email or web address] OR  https://www.cdc.gov/steadi/patient.html

## 2023-03-16 NOTE — PROGRESS NOTE ADULT - PROVIDER SPECIALTY LIST ADULT
Neurology
Hospitalist

## 2023-03-16 NOTE — PROGRESS NOTE ADULT - PROBLEM SELECTOR PLAN 6
-lovenox sc    GOC-DNR/DNI.  MOLST verified with  by previous physician  plan of care d/w pt and ACP  plan of care d/w  on the phone -lovenox sc    GOC-DNR/DNI.  MOLST verified with  by previous physician  plan of care d/w pt and ACP  plan of care d/w  on the phone on 3/15  DC planning   Due to comorbidities, pt at high risk of readmission  Patient hemodynamically stable for discharge home

## 2023-03-16 NOTE — PROGRESS NOTE ADULT - SUBJECTIVE AND OBJECTIVE BOX
Patient is a 68y old  Female who presents with a chief complaint of AMS (15 Mar 2023 21:45)      SUBJECTIVE / OVERNIGHT EVENTS:    MEDICATIONS  (STANDING):  amLODIPine   Tablet 5 milliGRAM(s) Oral daily  enoxaparin Injectable 40 milliGRAM(s) SubCutaneous every 24 hours  lacosamide 100 milliGRAM(s) Oral two times a day  levothyroxine 75 MICROGram(s) Oral daily  pantoprazole    Tablet 40 milliGRAM(s) Oral before breakfast    MEDICATIONS  (PRN):  acetaminophen     Tablet .. 650 milliGRAM(s) Oral every 6 hours PRN Temp greater or equal to 38C (100.4F), Mild Pain (1 - 3)  aluminum hydroxide/magnesium hydroxide/simethicone Suspension 30 milliLiter(s) Oral every 4 hours PRN Dyspepsia  melatonin 3 milliGRAM(s) Oral at bedtime PRN Insomnia  ondansetron Injectable 4 milliGRAM(s) IV Push every 8 hours PRN Nausea and/or Vomiting      Vital Signs Last 24 Hrs  T(C): 36.3 (16 Mar 2023 09:38), Max: 36.7 (15 Mar 2023 23:00)  T(F): 97.3 (16 Mar 2023 09:38), Max: 98 (15 Mar 2023 23:00)  HR: 64 (16 Mar 2023 09:38) (55 - 78)  BP: 134/59 (16 Mar 2023 09:38) (112/50 - 143/56)  BP(mean): --  RR: 18 (16 Mar 2023 09:38) (17 - 18)  SpO2: 100% (16 Mar 2023 09:38) (98% - 100%)    Parameters below as of 16 Mar 2023 09:38  Patient On (Oxygen Delivery Method): room air      CAPILLARY BLOOD GLUCOSE        I&O's Summary    15 Mar 2023 07:01  -  16 Mar 2023 07:00  --------------------------------------------------------  IN: 0 mL / OUT: 1800 mL / NET: -1800 mL        PHYSICAL EXAM:  GENERAL: NAD, well-developed  HEAD:  Atraumatic, Normocephalic  EYES: EOMI, PERRLA, conjunctiva and sclera clear  NECK: Supple, No JVD  CHEST/LUNG: Clear to auscultation bilaterally; No wheeze  HEART: Regular rate and rhythm; No murmurs, rubs, or gallops  ABDOMEN: Soft, Nontender, Nondistended; Bowel sounds present  EXTREMITIES:  2+ Peripheral Pulses, No clubbing, cyanosis, or edema  PSYCH: AAOx3  NEUROLOGY: non-focal  SKIN: No rashes or lesions    LABS:                        8.8    5.53  )-----------( 347      ( 15 Mar 2023 05:44 )             28.9     03-15    142  |  107  |  6<L>  ----------------------------<  72  4.0   |  26  |  0.57    Ca    9.1      15 Mar 2023 05:44  Phos  3.2     03-15  Mg     1.70     03-15    TPro  5.5<L>  /  Alb  3.1<L>  /  TBili  <0.2  /  DBili  x   /  AST  25  /  ALT  16  /  AlkPhos  108  03-14          Urinalysis Basic - ( 15 Mar 2023 17:30 )    Color: Colorless / Appearance: Clear / S.004 / pH: x  Gluc: x / Ketone: Negative  / Bili: Negative / Urobili: <2 mg/dL   Blood: x / Protein: Negative / Nitrite: Negative   Leuk Esterase: Moderate / RBC: 1 /HPF / WBC 6 /HPF   Sq Epi: x / Non Sq Epi: 1 /HPF / Bacteria: Negative        RADIOLOGY & ADDITIONAL TESTS:    Imaging Personally Reviewed:    Consultant(s) Notes Reviewed:      Care Discussed with Consultants/Other Providers:   Patient is a 68y old  Female who presents with a chief complaint of AMS (15 Mar 2023 21:45)      SUBJECTIVE / OVERNIGHT EVENTS: patient seen and examined by bedside, pt denies headache, dizziness, SOB, CP, Palpitations , N/V/D, abdominal pain, dysuria       MEDICATIONS  (STANDING):  amLODIPine   Tablet 5 milliGRAM(s) Oral daily  enoxaparin Injectable 40 milliGRAM(s) SubCutaneous every 24 hours  lacosamide 100 milliGRAM(s) Oral two times a day  levothyroxine 75 MICROGram(s) Oral daily  pantoprazole    Tablet 40 milliGRAM(s) Oral before breakfast    MEDICATIONS  (PRN):  acetaminophen     Tablet .. 650 milliGRAM(s) Oral every 6 hours PRN Temp greater or equal to 38C (100.4F), Mild Pain (1 - 3)  aluminum hydroxide/magnesium hydroxide/simethicone Suspension 30 milliLiter(s) Oral every 4 hours PRN Dyspepsia  melatonin 3 milliGRAM(s) Oral at bedtime PRN Insomnia  ondansetron Injectable 4 milliGRAM(s) IV Push every 8 hours PRN Nausea and/or Vomiting      Vital Signs Last 24 Hrs  T(C): 36.3 (16 Mar 2023 09:38), Max: 36.7 (15 Mar 2023 23:00)  T(F): 97.3 (16 Mar 2023 09:38), Max: 98 (15 Mar 2023 23:00)  HR: 64 (16 Mar 2023 09:38) (55 - 78)  BP: 134/59 (16 Mar 2023 09:38) (112/50 - 143/56)  BP(mean): --  RR: 18 (16 Mar 2023 09:38) (17 - 18)  SpO2: 100% (16 Mar 2023 09:38) (98% - 100%)    Parameters below as of 16 Mar 2023 09:38  Patient On (Oxygen Delivery Method): room air      CAPILLARY BLOOD GLUCOSE        I&O's Summary    15 Mar 2023 07:01  -  16 Mar 2023 07:00  --------------------------------------------------------  IN: 0 mL / OUT: 1800 mL / NET: -1800 mL      PHYSICAL EXAM:  GENERAL: NAD, well-developed  HEAD:  Atraumatic, Normocephalic,   EYES: EOMI, PERRLA, conjunctiva and sclera clear  CHEST/LUNG: Clear to auscultation bilaterally; No wheeze  HEART: Regular rate and rhythm; No murmurs, rubs, or gallops  ABDOMEN: Soft, Nontender, Nondistended; Bowel sounds present, primafit +   EXTREMITIES:  2+ Peripheral Pulses, No clubbing, cyanosis, or edema, B/l foot drop, mild contraction in rt hand   PSYCH: AAOx3  NEUROLOGY: non-focal  SKIN: No rashes or lesions        LABS:                        8.8    5.53  )-----------( 347      ( 15 Mar 2023 05:44 )             28.9     03-15    142  |  107  |  6<L>  ----------------------------<  72  4.0   |  26  |  0.57    Ca    9.1      15 Mar 2023 05:44  Phos  3.2     -15  Mg     1.70     03-15    TPro  5.5<L>  /  Alb  3.1<L>  /  TBili  <0.2  /  DBili  x   /  AST  25  /  ALT  16  /  AlkPhos  108  03-14          Urinalysis Basic - ( 15 Mar 2023 17:30 )    Color: Colorless / Appearance: Clear / S.004 / pH: x  Gluc: x / Ketone: Negative  / Bili: Negative / Urobili: <2 mg/dL   Blood: x / Protein: Negative / Nitrite: Negative   Leuk Esterase: Moderate / RBC: 1 /HPF / WBC 6 /HPF   Sq Epi: x / Non Sq Epi: 1 /HPF / Bacteria: Negative        RADIOLOGY & ADDITIONAL TESTS:  < from: Xray Chest 1 View-PORTABLE IMMEDIATE (Xray Chest 1 View-PORTABLE IMMEDIATE .) (03.15.23 @ 15:05) >  FINDINGS:  Both lungs are equally aerated and free of any focal abnormalities. The   heart is not enlarged and there is no effusion.    The bones are intact.    COMPARISON:  2023        IMPRESSION: Clear lungs.    < end of copied text >    Imaging Personally Reviewed:    Consultant(s) Notes Reviewed:      Care Discussed with Consultants/Other Providers:

## 2023-03-17 LAB
% ALBUMIN: 41.7 % — SIGNIFICANT CHANGE UP
% ALPHA 1: 5.9 % — SIGNIFICANT CHANGE UP
% ALPHA 2: 14.4 % — SIGNIFICANT CHANGE UP
% BETA: 20.3 % — SIGNIFICANT CHANGE UP
% GAMMA: 17.7 % — SIGNIFICANT CHANGE UP
ALBUMIN SERPL ELPH-MCNC: 3.42 G/DL — SIGNIFICANT CHANGE UP (ref 3.3–4.4)
ALBUMIN/GLOB SERPL ELPH: 0.7 RATIO — SIGNIFICANT CHANGE UP
ALPHA1 GLOB SERPL ELPH-MCNC: 0.48 G/DL — HIGH (ref 0.1–0.3)
ALPHA2 GLOB SERPL ELPH-MCNC: 1.2 G/DL — HIGH (ref 0.6–1)
B-GLOBULIN SERPL ELPH-MCNC: 1.66 G/DL — HIGH (ref 0.6–1.1)
GAMMA GLOBULIN: 1.45 G/DL — SIGNIFICANT CHANGE UP (ref 0.7–1.7)
PROT PATTERN SERPL ELPH-IMP: SIGNIFICANT CHANGE UP
PROT SERPL-MCNC: 8.2 G/DL — SIGNIFICANT CHANGE UP

## 2023-03-18 LAB
CULTURE RESULTS: SIGNIFICANT CHANGE UP
SPECIMEN SOURCE: SIGNIFICANT CHANGE UP

## 2023-04-05 ENCOUNTER — APPOINTMENT (OUTPATIENT)
Dept: UROLOGY | Facility: CLINIC | Age: 68
End: 2023-04-05
Payer: MEDICARE

## 2023-04-05 VITALS
HEART RATE: 89 BPM | RESPIRATION RATE: 16 BRPM | DIASTOLIC BLOOD PRESSURE: 67 MMHG | OXYGEN SATURATION: 97 % | SYSTOLIC BLOOD PRESSURE: 111 MMHG

## 2023-04-05 DIAGNOSIS — N28.1 CYST OF KIDNEY, ACQUIRED: ICD-10-CM

## 2023-04-05 DIAGNOSIS — N39.0 URINARY TRACT INFECTION, SITE NOT SPECIFIED: ICD-10-CM

## 2023-04-05 DIAGNOSIS — G35 MULTIPLE SCLEROSIS: ICD-10-CM

## 2023-04-05 LAB
CULTURE RESULTS: SIGNIFICANT CHANGE UP
SPECIMEN SOURCE: SIGNIFICANT CHANGE UP

## 2023-04-05 PROCEDURE — 99204 OFFICE O/P NEW MOD 45 MIN: CPT

## 2023-04-05 RX ORDER — METHENAMINE HIPPURATE 1 G/1
1 TABLET ORAL TWICE DAILY
Qty: 180 | Refills: 3 | Status: ACTIVE | COMMUNITY
Start: 2023-04-05 | End: 1900-01-01

## 2023-04-05 RX ORDER — MULTIVIT-MIN/FOLIC/VIT K/LYCOP 400-300MCG
1000 TABLET ORAL
Qty: 180 | Refills: 3 | Status: ACTIVE | COMMUNITY
Start: 2023-04-05 | End: 1900-01-01

## 2023-04-06 PROBLEM — G35 MULTIPLE SCLEROSIS: Status: ACTIVE | Noted: 2021-10-21

## 2023-04-06 PROBLEM — N28.1 SIMPLE CYST OF KIDNEY: Status: ACTIVE | Noted: 2023-04-06

## 2023-04-06 NOTE — PHYSICAL EXAM
[General Appearance - Well Developed] : well developed [General Appearance - Well Nourished] : well nourished [Normal Appearance] : normal appearance [Well Groomed] : well groomed [General Appearance - In No Acute Distress] : no acute distress [] : no respiratory distress [Respiration, Rhythm And Depth] : normal respiratory rhythm and effort [Exaggerated Use Of Accessory Muscles For Inspiration] : no accessory muscle use [Abdomen Soft] : soft [Abdomen Tenderness] : non-tender [Urinary Bladder Findings] : the bladder was normal on palpation [Skin Color & Pigmentation] : normal skin color and pigmentation [Oriented To Time, Place, And Person] : oriented to person, place, and time [Not Anxious] : not anxious [FreeTextEntry1] : wheelchair

## 2023-04-06 NOTE — HISTORY OF PRESENT ILLNESS
[FreeTextEntry1] : 67y/o woman\par Accompanied by family members\par \par MS, wheelchair bound\par Recurrent UTI's\par Altered mental status when she has infections\par Urge incontinence\par Recent ESBL Citrobacter treated with Meropenem/Ertapenem\par CT reviewed: no stones bilaterally, large right upper pole cyst, left midpole cyst; circumferentially thickened bladder wall suggestive of cystitis vs. neurogenic bladder\par \par \par [facility] records reviewed: DISCHARGE SUMMARY BELOW:\par \par Hospital Course: \par Discharge Date	16-Mar-2023 \par Admission Date	09-Mar-2023 02:18 \par Reason for Admission	AMS \par Hospital Course	 \par 67 yo F Jehova's Witness PMH of MS with baclofen pump in place , seizure do, \par hypothyroidism, frequent UTIs  admitted for acute metabolic encephalopathy \par likely 2/2 UTI \par \par Acute metabolic encephalopathy. \par  Actively visual /auditory hallucinations . no seizure like activity as per \par family \par -MRI brain 2/17 and 3/1 reviewed- \par Patient also had LP on prior visits \par Consulted neuro and psych , f/w recs , Neuro recs noted, EEG show left anterior \par temporal epileptiform discharges -  and neuro rec to increase Vimpat to 100 mg \par bid, \par EEG report noted from today -No focal or epileptiform abnormalities are \par captured. Mild diffuse cerebral dysfunction is nonspecific in etiology. Will \par F/u with Neurology \par -completed  UTI treatment , BC negative \par Identify GNR, same Citrobacter Furandi , sensitive to Merrem , completed 3 days \par per guidelines , now started on 3/10 on ertapenem \par -hold sedative meds Trazadone, gabapentin for now \par -puree diet and IVF for now. \par - CT head unchanged 3/14 \par pt hypothermic on 3/15 , UA , CXR  unremarkable , \par TFTS noted, normal Ft4 , TSH 1.03(3/10)-->6.33(3/15), Since FT4 wnl, will \par recommend repeat TFTs in 6 weeks. \par \par  Acute UTI. \par CT a/p c/w cystitis. no e/o pyelonephritis \par -outside urine cx 3/8 reviewed- grew ESBL citrobacter. sens to ertapenem, \par Bactrim, nitrofurantoin. resistant to cephalosporin. intermediate to Levaquin \par -UA+.  f/u urine cx and blood cx \par -start Merrem x 3 days, completed , ID started Ertapenem in 3/10 \par -monitor for urinary retention \par -has urology appt 4/5 for urinary retention/frequent UTIs \par - pt with Primafit , voiding well. \par \par  Hypothyroidism. \par TSH-1.07 \par -converted synthroid po 75 to iv 50, \par - will transition to po. \par -TFTS noted, normal Ft4 , TSH 1.03(3/10)-->6.33(3/15), Since FT4 wnl, will \par recommend repeat TFTs in 6 weeks. \par \par  Multiple sclerosis. \par  -s/p baclofen bump. last refilled 3/1, next refill 5/26 \par Neuro follow up as outpt. \par \par  HTN (hypertension). \par  currently BP acceptable off meds \par -on norvasc at home - was on hold in the setting of infection,  resumed as pt \par completed abx  and infection resolved. \par Patient hemodynamically stable for discharge home \par \par Med Reconciliation: \par Override IMPROVE-DD recommendations due to:	IMPROVE-DD Application Not \par Available \par Recommended Post-Discharge VTE Prophylaxis	IMPROVE-DD Application Not Available \par Medication Reconciliation Status	Admission Reconciliation is Completed \par Discharge Reconciliation is Completed \par \par Discharge Medications	acetaminophen 325 mg oral tablet: 2 tab(s) orally every 6 \par hours, As needed, Temp greater or equal to 38C (100.4F), Mild Pain (1 - 3) \par aluminum hydroxide-magnesium hydroxide 200 mg-200 mg/5 mL oral suspension: 30 \par milliliter(s) orally every 4 hours, As needed, Dyspepsia \par amLODIPine 5 mg oral tablet: 1 tab(s) orally once a day \par lacosamide 100 mg oral tablet: 1 tab(s) orally 2 times a day MDD:2 tabs (200 \par mg) \par pantoprazole 40 mg oral delayed release tablet: 1 tab(s) orally once a day \par Synthroid 75 mcg (0.075 mg) oral tablet: 1 tab(s) orally once a day \par \par

## 2023-04-06 NOTE — ASSESSMENT
[FreeTextEntry1] : Recurrent Cystitis\par Control constipation\par Increase hydration\par Hiprex 1 gram bid plus Vit D 1000mg bid for UTI suppression\par 6 months follow up

## 2023-04-14 ENCOUNTER — INPATIENT (INPATIENT)
Facility: HOSPITAL | Age: 68
LOS: 3 days | Discharge: HOME | End: 2023-04-18
Attending: STUDENT IN AN ORGANIZED HEALTH CARE EDUCATION/TRAINING PROGRAM | Admitting: STUDENT IN AN ORGANIZED HEALTH CARE EDUCATION/TRAINING PROGRAM
Payer: MEDICARE

## 2023-04-14 VITALS
OXYGEN SATURATION: 97 % | SYSTOLIC BLOOD PRESSURE: 178 MMHG | RESPIRATION RATE: 16 BRPM | DIASTOLIC BLOOD PRESSURE: 83 MMHG | HEIGHT: 67 IN | HEART RATE: 90 BPM | TEMPERATURE: 98 F

## 2023-04-14 DIAGNOSIS — G35 MULTIPLE SCLEROSIS: ICD-10-CM

## 2023-04-14 DIAGNOSIS — N39.0 URINARY TRACT INFECTION, SITE NOT SPECIFIED: ICD-10-CM

## 2023-04-14 DIAGNOSIS — I10 ESSENTIAL (PRIMARY) HYPERTENSION: ICD-10-CM

## 2023-04-14 DIAGNOSIS — G93.49 OTHER ENCEPHALOPATHY: ICD-10-CM

## 2023-04-14 DIAGNOSIS — R56.9 UNSPECIFIED CONVULSIONS: ICD-10-CM

## 2023-04-14 DIAGNOSIS — E03.9 HYPOTHYROIDISM, UNSPECIFIED: ICD-10-CM

## 2023-04-14 LAB
ALBUMIN SERPL ELPH-MCNC: 4.2 G/DL — SIGNIFICANT CHANGE UP (ref 3.3–5)
ALP SERPL-CCNC: 161 U/L — HIGH (ref 40–120)
ALT FLD-CCNC: 33 U/L — SIGNIFICANT CHANGE UP (ref 4–33)
ANION GAP SERPL CALC-SCNC: 14 MMOL/L — SIGNIFICANT CHANGE UP (ref 7–14)
APPEARANCE UR: CLEAR — SIGNIFICANT CHANGE UP
AST SERPL-CCNC: 37 U/L — HIGH (ref 4–32)
B PERT DNA SPEC QL NAA+PROBE: SIGNIFICANT CHANGE UP
B PERT+PARAPERT DNA PNL SPEC NAA+PROBE: SIGNIFICANT CHANGE UP
BACTERIA # UR AUTO: ABNORMAL
BASE EXCESS BLDV CALC-SCNC: 3.1 MMOL/L — HIGH (ref -2–3)
BASOPHILS # BLD AUTO: 0.03 K/UL — SIGNIFICANT CHANGE UP (ref 0–0.2)
BASOPHILS NFR BLD AUTO: 0.4 % — SIGNIFICANT CHANGE UP (ref 0–2)
BILIRUB SERPL-MCNC: <0.2 MG/DL — SIGNIFICANT CHANGE UP (ref 0.2–1.2)
BILIRUB UR-MCNC: NEGATIVE — SIGNIFICANT CHANGE UP
BLOOD GAS VENOUS COMPREHENSIVE RESULT: SIGNIFICANT CHANGE UP
BORDETELLA PARAPERTUSSIS (RAPRVP): SIGNIFICANT CHANGE UP
BUN SERPL-MCNC: 18 MG/DL — SIGNIFICANT CHANGE UP (ref 7–23)
C PNEUM DNA SPEC QL NAA+PROBE: SIGNIFICANT CHANGE UP
CALCIUM SERPL-MCNC: 10.6 MG/DL — HIGH (ref 8.4–10.5)
CHLORIDE BLDV-SCNC: 98 MMOL/L — SIGNIFICANT CHANGE UP (ref 96–108)
CHLORIDE SERPL-SCNC: 94 MMOL/L — LOW (ref 98–107)
CO2 BLDV-SCNC: 29.9 MMOL/L — HIGH (ref 22–26)
CO2 SERPL-SCNC: 25 MMOL/L — SIGNIFICANT CHANGE UP (ref 22–31)
COLOR SPEC: SIGNIFICANT CHANGE UP
CREAT SERPL-MCNC: 0.63 MG/DL — SIGNIFICANT CHANGE UP (ref 0.5–1.3)
DIFF PNL FLD: ABNORMAL
EGFR: 98 ML/MIN/1.73M2 — SIGNIFICANT CHANGE UP
EOSINOPHIL # BLD AUTO: 0.04 K/UL — SIGNIFICANT CHANGE UP (ref 0–0.5)
EOSINOPHIL NFR BLD AUTO: 0.5 % — SIGNIFICANT CHANGE UP (ref 0–6)
EPI CELLS # UR: 27 /HPF — HIGH (ref 0–5)
FLUAV SUBTYP SPEC NAA+PROBE: SIGNIFICANT CHANGE UP
FLUBV RNA SPEC QL NAA+PROBE: SIGNIFICANT CHANGE UP
GAS PNL BLDV: 132 MMOL/L — LOW (ref 136–145)
GAS PNL BLDV: SIGNIFICANT CHANGE UP
GLUCOSE BLDV-MCNC: 74 MG/DL — SIGNIFICANT CHANGE UP (ref 70–99)
GLUCOSE SERPL-MCNC: 77 MG/DL — SIGNIFICANT CHANGE UP (ref 70–99)
GLUCOSE UR QL: NEGATIVE — SIGNIFICANT CHANGE UP
HADV DNA SPEC QL NAA+PROBE: SIGNIFICANT CHANGE UP
HCO3 BLDV-SCNC: 28 MMOL/L — SIGNIFICANT CHANGE UP (ref 22–29)
HCOV 229E RNA SPEC QL NAA+PROBE: SIGNIFICANT CHANGE UP
HCOV HKU1 RNA SPEC QL NAA+PROBE: SIGNIFICANT CHANGE UP
HCOV NL63 RNA SPEC QL NAA+PROBE: SIGNIFICANT CHANGE UP
HCOV OC43 RNA SPEC QL NAA+PROBE: SIGNIFICANT CHANGE UP
HCT VFR BLD CALC: 34.5 % — SIGNIFICANT CHANGE UP (ref 34.5–45)
HCT VFR BLDA CALC: 34 % — LOW (ref 34.5–46.5)
HGB BLD CALC-MCNC: 11.3 G/DL — LOW (ref 11.7–16.1)
HGB BLD-MCNC: 10.8 G/DL — LOW (ref 11.5–15.5)
HMPV RNA SPEC QL NAA+PROBE: SIGNIFICANT CHANGE UP
HPIV1 RNA SPEC QL NAA+PROBE: SIGNIFICANT CHANGE UP
HPIV2 RNA SPEC QL NAA+PROBE: SIGNIFICANT CHANGE UP
HPIV3 RNA SPEC QL NAA+PROBE: SIGNIFICANT CHANGE UP
HPIV4 RNA SPEC QL NAA+PROBE: SIGNIFICANT CHANGE UP
HYALINE CASTS # UR AUTO: 1 /LPF — SIGNIFICANT CHANGE UP (ref 0–7)
IANC: 4.79 K/UL — SIGNIFICANT CHANGE UP (ref 1.8–7.4)
IMM GRANULOCYTES NFR BLD AUTO: 1 % — HIGH (ref 0–0.9)
KETONES UR-MCNC: ABNORMAL
LACTATE BLDV-MCNC: 1.1 MMOL/L — SIGNIFICANT CHANGE UP (ref 0.5–2)
LEUKOCYTE ESTERASE UR-ACNC: ABNORMAL
LYMPHOCYTES # BLD AUTO: 1.89 K/UL — SIGNIFICANT CHANGE UP (ref 1–3.3)
LYMPHOCYTES # BLD AUTO: 25.7 % — SIGNIFICANT CHANGE UP (ref 13–44)
M PNEUMO DNA SPEC QL NAA+PROBE: SIGNIFICANT CHANGE UP
MCHC RBC-ENTMCNC: 27.8 PG — SIGNIFICANT CHANGE UP (ref 27–34)
MCHC RBC-ENTMCNC: 31.3 GM/DL — LOW (ref 32–36)
MCV RBC AUTO: 88.7 FL — SIGNIFICANT CHANGE UP (ref 80–100)
MONOCYTES # BLD AUTO: 0.52 K/UL — SIGNIFICANT CHANGE UP (ref 0–0.9)
MONOCYTES NFR BLD AUTO: 7.1 % — SIGNIFICANT CHANGE UP (ref 2–14)
NEUTROPHILS # BLD AUTO: 4.79 K/UL — SIGNIFICANT CHANGE UP (ref 1.8–7.4)
NEUTROPHILS NFR BLD AUTO: 65.3 % — SIGNIFICANT CHANGE UP (ref 43–77)
NITRITE UR-MCNC: NEGATIVE — SIGNIFICANT CHANGE UP
NRBC # BLD: 0 /100 WBCS — SIGNIFICANT CHANGE UP (ref 0–0)
NRBC # FLD: 0 K/UL — SIGNIFICANT CHANGE UP (ref 0–0)
PCO2 BLDV: 46 MMHG — SIGNIFICANT CHANGE UP (ref 39–52)
PH BLDV: 7.4 — SIGNIFICANT CHANGE UP (ref 7.32–7.43)
PH UR: 6 — SIGNIFICANT CHANGE UP (ref 5–8)
PLATELET # BLD AUTO: 206 K/UL — SIGNIFICANT CHANGE UP (ref 150–400)
PO2 BLDV: 75 MMHG — HIGH (ref 25–45)
POTASSIUM BLDV-SCNC: 4.4 MMOL/L — SIGNIFICANT CHANGE UP (ref 3.5–5.1)
POTASSIUM SERPL-MCNC: 4.3 MMOL/L — SIGNIFICANT CHANGE UP (ref 3.5–5.3)
POTASSIUM SERPL-SCNC: 4.3 MMOL/L — SIGNIFICANT CHANGE UP (ref 3.5–5.3)
PROT SERPL-MCNC: 8 G/DL — SIGNIFICANT CHANGE UP (ref 6–8.3)
PROT UR-MCNC: ABNORMAL
RAPID RVP RESULT: SIGNIFICANT CHANGE UP
RBC # BLD: 3.89 M/UL — SIGNIFICANT CHANGE UP (ref 3.8–5.2)
RBC # FLD: 17.4 % — HIGH (ref 10.3–14.5)
RBC CASTS # UR COMP ASSIST: 3 /HPF — SIGNIFICANT CHANGE UP (ref 0–4)
RSV RNA SPEC QL NAA+PROBE: SIGNIFICANT CHANGE UP
RV+EV RNA SPEC QL NAA+PROBE: SIGNIFICANT CHANGE UP
SAO2 % BLDV: 96.7 % — HIGH (ref 67–88)
SARS-COV-2 RNA SPEC QL NAA+PROBE: SIGNIFICANT CHANGE UP
SODIUM SERPL-SCNC: 133 MMOL/L — LOW (ref 135–145)
SP GR SPEC: 1.01 — SIGNIFICANT CHANGE UP (ref 1.01–1.05)
UROBILINOGEN FLD QL: SIGNIFICANT CHANGE UP
WBC # BLD: 7.34 K/UL — SIGNIFICANT CHANGE UP (ref 3.8–10.5)
WBC # FLD AUTO: 7.34 K/UL — SIGNIFICANT CHANGE UP (ref 3.8–10.5)
WBC UR QL: 61 /HPF — HIGH (ref 0–5)

## 2023-04-14 PROCEDURE — 70450 CT HEAD/BRAIN W/O DYE: CPT | Mod: 26,MA

## 2023-04-14 PROCEDURE — 99285 EMERGENCY DEPT VISIT HI MDM: CPT

## 2023-04-14 PROCEDURE — 99223 1ST HOSP IP/OBS HIGH 75: CPT

## 2023-04-14 PROCEDURE — 93010 ELECTROCARDIOGRAM REPORT: CPT

## 2023-04-14 RX ORDER — LEVOTHYROXINE SODIUM 125 MCG
75 TABLET ORAL DAILY
Refills: 0 | Status: DISCONTINUED | OUTPATIENT
Start: 2023-04-14 | End: 2023-04-18

## 2023-04-14 RX ORDER — ENOXAPARIN SODIUM 100 MG/ML
40 INJECTION SUBCUTANEOUS EVERY 24 HOURS
Refills: 0 | Status: DISCONTINUED | OUTPATIENT
Start: 2023-04-14 | End: 2023-04-18

## 2023-04-14 RX ORDER — AMLODIPINE BESYLATE 2.5 MG/1
5 TABLET ORAL DAILY
Refills: 0 | Status: DISCONTINUED | OUTPATIENT
Start: 2023-04-14 | End: 2023-04-18

## 2023-04-14 RX ORDER — ACETAMINOPHEN 500 MG
650 TABLET ORAL EVERY 6 HOURS
Refills: 0 | Status: DISCONTINUED | OUTPATIENT
Start: 2023-04-14 | End: 2023-04-18

## 2023-04-14 RX ORDER — LANOLIN ALCOHOL/MO/W.PET/CERES
3 CREAM (GRAM) TOPICAL AT BEDTIME
Refills: 0 | Status: DISCONTINUED | OUTPATIENT
Start: 2023-04-14 | End: 2023-04-18

## 2023-04-14 RX ORDER — LACOSAMIDE 50 MG/1
100 TABLET ORAL
Refills: 0 | Status: DISCONTINUED | OUTPATIENT
Start: 2023-04-14 | End: 2023-04-16

## 2023-04-14 RX ORDER — SODIUM CHLORIDE 9 MG/ML
1000 INJECTION INTRAMUSCULAR; INTRAVENOUS; SUBCUTANEOUS ONCE
Refills: 0 | Status: COMPLETED | OUTPATIENT
Start: 2023-04-14 | End: 2023-04-14

## 2023-04-14 RX ORDER — ONDANSETRON 8 MG/1
4 TABLET, FILM COATED ORAL EVERY 8 HOURS
Refills: 0 | Status: DISCONTINUED | OUTPATIENT
Start: 2023-04-14 | End: 2023-04-18

## 2023-04-14 RX ORDER — PANTOPRAZOLE SODIUM 20 MG/1
40 TABLET, DELAYED RELEASE ORAL
Refills: 0 | Status: DISCONTINUED | OUTPATIENT
Start: 2023-04-14 | End: 2023-04-18

## 2023-04-14 RX ORDER — MEROPENEM 1 G/30ML
1000 INJECTION INTRAVENOUS EVERY 8 HOURS
Refills: 0 | Status: DISCONTINUED | OUTPATIENT
Start: 2023-04-14 | End: 2023-04-17

## 2023-04-14 RX ORDER — ERTAPENEM SODIUM 1 G/1
1000 INJECTION, POWDER, LYOPHILIZED, FOR SOLUTION INTRAMUSCULAR; INTRAVENOUS ONCE
Refills: 0 | Status: COMPLETED | OUTPATIENT
Start: 2023-04-14 | End: 2023-04-14

## 2023-04-14 RX ADMIN — SODIUM CHLORIDE 1000 MILLILITER(S): 9 INJECTION INTRAMUSCULAR; INTRAVENOUS; SUBCUTANEOUS at 12:52

## 2023-04-14 RX ADMIN — ERTAPENEM SODIUM 120 MILLIGRAM(S): 1 INJECTION, POWDER, LYOPHILIZED, FOR SOLUTION INTRAMUSCULAR; INTRAVENOUS at 15:33

## 2023-04-14 NOTE — ED ADULT TRIAGE NOTE - NS ED TRIAGE AVPU SCALE
Alert-The patient is alert, awake and responds to voice. The patient is oriented to time, place, and person. The triage nurse is able to obtain subjective information.
DC instructions

## 2023-04-14 NOTE — ED ADULT NURSE REASSESSMENT NOTE - NS ED NURSE REASSESS COMMENT FT1
Pt returned from CT, resting in bed comfortably. Will continue to monitor.
Break RN: Pt received at baseline mental status. Respirations even and unlabored, NAD. Pt resting in stretcher comfortably. Pt returned from CT scan. Awaiting CT scan results
Pt medicated per MD orders. Vitals stable. Pt laying in bed, NAD, will continue to monitor.
Report received from day RNFlorence. A&Ox3, bedbound at baseline secondary to MS diagnosis as per  at bedside. Denies CP, SOB, abd pain, nausea, vomiting, headache, lightheadedness, dizziness, vision changes, fever or chills. Respirations even and unlabored. No acute distress noted. Bed in lowest position, wheels locked, safety maintained. Awaiting bed assignment as per admission orders.
Straight cath per MD orders. Urine sent to lab. Pt laying in bed, NAD, will continue to monitor.

## 2023-04-14 NOTE — H&P ADULT - NSHPPHYSICALEXAM_GEN_ALL_CORE
PHYSICAL EXAM:  GENERAL: NAD, comfortable at bedside   HEAD:  Atraumatic, Normocephalic  EYES: EOMI, PERRL, conjunctiva and sclera clear  NECK: Supple, No JVD  CHEST/LUNG: Clear to auscultation bilaterally; No wheezes, rales or rhonchi  HEART: Regular rate and rhythm; No murmurs, rubs, or gallops, (+)S1, S2  ABDOMEN: Soft, Nontender, Nondistended; Normal Bowel sounds   EXTREMITIES:  2+ Peripheral Pulses, No clubbing, cyanosis, trace bilateral pitting edema  PSYCH: normal mood and affect  NEUROLOGY: AAOx3, non-focal from baseline per patient (contracture of the upper extremities)  SKIN: No rashes or lesions

## 2023-04-14 NOTE — ED PROVIDER NOTE - CLINICAL SUMMARY MEDICAL DECISION MAKING FREE TEXT BOX
Leatha Salazar MD attending physician patient is a 68-year-old woman with MS who comes in from home as per caregiver patient with altered mental status where she has slow responsiveness some delay in her speech decreased appetite.  She has a history of recurrent UTIs has ESBL in the past.  She had an extensive neurologic work-up including EEGs and is on Vimpat and a baclofen pump.  On my exam patient is able to make eye contact has contractures of her hands vital signs are reasonable she is afebrile.  She denies any pain.  She speaks in full sentences.  She is slow to respond and vague in her answers.  It is unclear to me what the cause of this is although we will treat for ESBL urine.  She was last admitted about a month ago.  Her abdomen is soft no rebound guarding also with a history of hypothyroidism.  TSH sent and history of seizure disorders.

## 2023-04-14 NOTE — ED PROVIDER NOTE - ATTENDING APP SHARED VISIT CONTRIBUTION OF CARE
Leatha Salazar MD attending physician patient is a 68-year-old woman with MS who comes in from home as per caregiver patient with altered mental status where she has slow responsiveness some delay in her speech decreased appetite.  She has a history of recurrent UTIs has ESBL in the past.  She had an extensive neurologic work-up including EEGs and is on Vimpat and a baclofen pump.  On my exam patient is able to make eye contact has contractures of her hands vital signs are reasonable she is afebrile.  She denies any pain.  She speaks in full sentences.  She is slow to respond and vague in her answers.  It is unclear to me what the cause of this is although we will treat for ESBL urine.  She was last admitted about a month ago.  Her abdomen is soft no rebound guarding also with a history of hypothyroidism.  TSH sent and history of seizure disorders.    Leatha Salazar MD attending physician.  I attest I performed a history and physical of the patient and discussed their management with the resident and or advanced care provider.  I reviewed the resident and/or ACP's note and agree with the documented findings and plan of care with the exception of my documented changes if any.  My medical decision making and observations are found above.

## 2023-04-14 NOTE — PATIENT PROFILE ADULT - FLU SEASON?
RN cannot approve Refill Request    RN can NOT refill this medication Medication was refilled yesterday 01/18/2021. Last office visit: 10/14/2019 Leisa Navarro MD Last Physical: Visit date not found Last MTM visit: Visit date not found Last visit same specialty: 10/14/2019 Leisa Navarro MD.  Next visit within 3 mo: Visit date not found  Next physical within 3 mo: Visit date not found      [facility] Woody, [facility] Triage/Med Refill 1/19/2021    Requested Prescriptions   Pending Prescriptions Disp Refills     citalopram (CELEXA) 10 MG tablet [Pharmacy Med Name: CITALOPRAM 10MG TABLETS] 30 tablet 5     Sig: TAKE 1 TABLET(10 MG) BY MOUTH DAILY       [facility] Refill Protocol  Passed - 1/19/2021  4:00 AM        Passed - PCP or prescribing provider visit in last year     Last office visit with prescriber/PCP: 10/14/2019 Leisa Navarro MD OR same dept: Visit date not found OR same specialty: 10/14/2019 Leisa Navarro MD  Last physical: Visit date not found Last MTM visit: Visit date not found   Next visit within 3 mo: Visit date not found  Next physical within 3 mo: Visit date not found  Prescriber OR PCP: Leisa Navarro MD  Last diagnosis associated with med order: 1. Seasonal affective disorder (H)  - citalopram (CELEXA) 10 MG tablet [Pharmacy Med Name: CITALOPRAM 10MG TABLETS]; TAKE 1 TABLET(10 MG) BY MOUTH DAILY  Dispense: 30 tablet; Refill: 5    If protocol passes may refill for 12 months if within 3 months of last provider visit (or a total of 15 months).                   
No

## 2023-04-14 NOTE — ED ADULT TRIAGE NOTE - CHIEF COMPLAINT QUOTE
From home for AMS, unknown LKW, care giver states patient presents this way with recurrent UTI's, hx of MS, contracted at baseline, pt is poor historian From home for AMS, unknown LKW, care giver states patient presents this way with recurrent UTI's, hx of MS, bedbound/contracted at baseline, pt is poor historian

## 2023-04-14 NOTE — ED ADULT NURSE NOTE - OBJECTIVE STATEMENT
Pt received in room 20. Pt brought in by aide for c/c of altered mental status. Per aide pt is a&ox3 at baseline, however pt currently nonverbal and poor historian. Per aide, pt becomes altered when experiencing UTI symptoms. Pt arrives bilateral arms contracted, per aide that is patients baseline. Skin intact, respirations even and unlabored, abdomen soft, nondistended, nontender to palpation. Upon abdominal assessment, Baclofen pump noted in LLQ, per aide used for pt history of MS. VSS. Per aide, denies N/V/D, fever, chills, shortness of breath. Will draw labs per MD order and continue to monitor.

## 2023-04-14 NOTE — PATIENT PROFILE ADULT - FALL HARM RISK - HARM RISK INTERVENTIONS
Assistance OOB with selected safe patient handling equipment/Communicate Risk of Fall with Harm to all staff/Monitor for mental status changes/Reinforce activity limits and safety measures with patient and family/Reorient to person, place and time as needed/Review medications for side effects contributing to fall risk/Use of alarms - bed, chair and/or voice tab/Visual Cue: Yellow wristband and red socks/Bed in lowest position, wheels locked, appropriate side rails in place/Call bell, personal items and telephone in reach/Instruct patient to call for assistance before getting out of bed or chair/Non-slip footwear when patient is out of bed/[place] to call system/Physically safe environment - no spills, clutter or unnecessary equipment/Purposeful Proactive Rounding/Room/bathroom lighting operational, light cord in reach

## 2023-04-14 NOTE — H&P ADULT - NSHPREVIEWOFSYSTEMS_GEN_ALL_CORE
REVIEW OF SYSTEMS:    CONSTITUTIONAL: No weakness, fevers or chills + decreased po intake and less conversive   EYES/ENT: No visual changes;  No dysphagia; No sore throat; No rhinorrhea; No sinus pain/pressure  NECK: No pain or stiffness  RESPIRATORY: No cough, wheezing, hemoptysis; No shortness of breath  CARDIOVASCULAR: No chest pain or palpitations; No lower extremity edema  GASTROINTESTINAL: No abdominal or epigastric pain. No nausea, vomiting, or hematemesis; No diarrhea or constipation. No melena or hematochezia.  GENITOURINARY: No dysuria, frequency or hematuria  NEUROLOGICAL: No numbness or weakness  MSK: bedbound  SKIN: No itching, burning, rashes, or lesions   All other review of systems is negative unless indicated above.

## 2023-04-14 NOTE — ED PROVIDER NOTE - OBJECTIVE STATEMENT
67 y/o female pmh MS on baclofen pump, seizures on vimpat c/o AMS x 1 day. As per family, pt normally conversive but yesterday was not eating and not speaking. Today pt was stil acting abnormally so brought to9 the ER. As per family, this has happened multiple times over the last few months and it is usally 2/2 a UTI. Denies v/d, fever or chills at home.

## 2023-04-14 NOTE — ED ADULT NURSE NOTE - NSIMPLEMENTINTERV_GEN_ALL_ED
Implemented All Fall Risk Interventions:  [place] to call system. Call bell, personal items and telephone within reach. Instruct patient to call for assistance. Room bathroom lighting operational. Non-slip footwear when patient is off stretcher. Physically safe environment: no spills, clutter or unnecessary equipment. Stretcher in lowest position, wheels locked, appropriate side rails in place. Provide visual cue, wrist band, yellow gown, etc. Monitor gait and stability. Monitor for mental status changes and reorient to person, place, and time. Review medications for side effects contributing to fall risk. Reinforce activity limits and safety measures with patient and family.

## 2023-04-14 NOTE — H&P ADULT - NSICDXPASTMEDICALHX_GEN_ALL_CORE_FT
PAST MEDICAL HISTORY:  History of recurrent UTIs     Hypothyroid     Multiple sclerosis     Seizures

## 2023-04-14 NOTE — H&P ADULT - HISTORY OF PRESENT ILLNESS
68 yr old female with a pmh of MS bedbound on a baclofen pump, seizure disorder on vimpat who presents with a one day history of decreased PO intake and not as conversive as usual. As symptoms did not improve the pt was brought to the ED.  at bedside reports when this usually occurs she is diagnosed with a UTI.   Denies  headache, dizziness, chest pain, palpitations, SOB, abdominal pain, joint pain, diarrhea/constipation, urinary symptoms.   Vitals: T 97.5, HR 78, /76, RR 19 satting 96% RA

## 2023-04-14 NOTE — H&P ADULT - NSHPLABSRESULTS_GEN_ALL_CORE
labs personally reviewed and pertinent [facility] documents/labs/diagnostics reviewed                         10.8   7.34  )-----------( 206      ( 2023 12:54 )             34.5           133<L>  |  94<L>  |  18  ----------------------------<  77  4.3   |  25  |  0.63    Ca    10.6<H>      2023 12:54    TPro  8.0  /  Alb  4.2  /  TBili  <0.2  /  DBili  x   /  AST  37<H>  /  ALT  33  /  AlkPhos  161<H>        12:54 - VBG - pH: 7.40  | pCO2: 46    | pO2: 75    | Lactate: 1.1        Urinalysis Basic - ( 2023 14:06 )    Color: Light Yellow / Appearance: Clear / S.012 / pH: x  Gluc: x / Ketone: Small  / Bili: Negative / Urobili: <2 mg/dL   Blood: x / Protein: Trace / Nitrite: Negative   Leuk Esterase: Large / RBC: 3 /HPF / WBC 61 /HPF   Sq Epi: x / Non Sq Epi: x / Bacteria: Few        EKG interpreted by myself: nonischemic  CT head interpreted by radiology:   No acute intracranial hemorrhage or acute territorial infarction.

## 2023-04-14 NOTE — ED ADULT NURSE NOTE - NSFALLRSKASSISTTYPE_ED_ALL_ED
89 yo F with HFpEF, Vit D deficiency, Hypothyroidism, dementia and chronic alicea initially admitted for pyelonephritis/ subacute stroke, upgraded to CCU after being slurred and unable to move her R side    - Acute CVA  likely embolic in nature  2d echo: nl EF, mild LV hypertrophy  c/w plavix, ASA and zocor   PT / OT on board   c/w dysphagia I diet     - Pyelonephritis  completed course of abxs     - Hypothyroidism  c/w synthroid     - Hypertension  BP elevated  resume if BP<140s.     - Vitamin D deficiency  c/w Vit D supplementation weekly    PPx - HSQ    DNR/DNI    Progress Note Handoff  Pending:  STR placement.  PT re-eval  Patient/Family discussion: family doesn't want hospice anymore  Disposition: STR Standing/Walking/Toileting

## 2023-04-14 NOTE — ED PROVIDER NOTE - DATE/TIME 1
Chief Complaint   Patient presents with     Surgical Followup     Incision check       Vitals:    07/25/22 1215   BP: 123/88   Pulse: 94   Temp: 98.5  F (36.9  C)   TempSrc: Oral   SpO2: 99%       There is no height or weight on file to calculate BMI.          ZURI HARDEN EMT      
14-Apr-2023 15:15

## 2023-04-14 NOTE — ED ADULT NURSE NOTE - CHIEF COMPLAINT QUOTE
From home for AMS, unknown LKW, care giver states patient presents this way with recurrent UTI's, hx of MS, bedbound/contracted at baseline, pt is poor historian

## 2023-04-14 NOTE — ED PROVIDER NOTE - NS ED ATTENDING STATEMENT MOD
This was a shared visit with the TOSHA. I reviewed and verified the documentation and independently performed the documented:

## 2023-04-14 NOTE — H&P ADULT - PROBLEM SELECTOR PLAN 1
New  1 day history of decreased PO intake and less conversive  UA: LE large, WBC 61, epithelial cells 27, Bacteria Few  Prior Ucx: Citrobacter  Bcx, Ucx pending  s/p ertapenem in ED  Will continue with meropenem given previous sensitivities    Hold methenamine

## 2023-04-15 DIAGNOSIS — G93.40 ENCEPHALOPATHY, UNSPECIFIED: ICD-10-CM

## 2023-04-15 DIAGNOSIS — Z29.9 ENCOUNTER FOR PROPHYLACTIC MEASURES, UNSPECIFIED: ICD-10-CM

## 2023-04-15 LAB
ANION GAP SERPL CALC-SCNC: 18 MMOL/L — HIGH (ref 7–14)
BASOPHILS # BLD AUTO: 0.02 K/UL — SIGNIFICANT CHANGE UP (ref 0–0.2)
BASOPHILS NFR BLD AUTO: 0.2 % — SIGNIFICANT CHANGE UP (ref 0–2)
BUN SERPL-MCNC: 18 MG/DL — SIGNIFICANT CHANGE UP (ref 7–23)
CALCIUM SERPL-MCNC: 10.4 MG/DL — SIGNIFICANT CHANGE UP (ref 8.4–10.5)
CHLORIDE SERPL-SCNC: 96 MMOL/L — LOW (ref 98–107)
CO2 SERPL-SCNC: 21 MMOL/L — LOW (ref 22–31)
CREAT SERPL-MCNC: 0.57 MG/DL — SIGNIFICANT CHANGE UP (ref 0.5–1.3)
EGFR: 100 ML/MIN/1.73M2 — SIGNIFICANT CHANGE UP
EOSINOPHIL # BLD AUTO: 0.01 K/UL — SIGNIFICANT CHANGE UP (ref 0–0.5)
EOSINOPHIL NFR BLD AUTO: 0.1 % — SIGNIFICANT CHANGE UP (ref 0–6)
GLUCOSE SERPL-MCNC: 77 MG/DL — SIGNIFICANT CHANGE UP (ref 70–99)
HCT VFR BLD CALC: 34.8 % — SIGNIFICANT CHANGE UP (ref 34.5–45)
HGB BLD-MCNC: 10.9 G/DL — LOW (ref 11.5–15.5)
IANC: 6.67 K/UL — SIGNIFICANT CHANGE UP (ref 1.8–7.4)
IMM GRANULOCYTES NFR BLD AUTO: 0.5 % — SIGNIFICANT CHANGE UP (ref 0–0.9)
LYMPHOCYTES # BLD AUTO: 1.26 K/UL — SIGNIFICANT CHANGE UP (ref 1–3.3)
LYMPHOCYTES # BLD AUTO: 14.8 % — SIGNIFICANT CHANGE UP (ref 13–44)
MCHC RBC-ENTMCNC: 28.4 PG — SIGNIFICANT CHANGE UP (ref 27–34)
MCHC RBC-ENTMCNC: 31.3 GM/DL — LOW (ref 32–36)
MCV RBC AUTO: 90.6 FL — SIGNIFICANT CHANGE UP (ref 80–100)
MONOCYTES # BLD AUTO: 0.49 K/UL — SIGNIFICANT CHANGE UP (ref 0–0.9)
MONOCYTES NFR BLD AUTO: 5.8 % — SIGNIFICANT CHANGE UP (ref 2–14)
NEUTROPHILS # BLD AUTO: 6.67 K/UL — SIGNIFICANT CHANGE UP (ref 1.8–7.4)
NEUTROPHILS NFR BLD AUTO: 78.6 % — HIGH (ref 43–77)
NRBC # BLD: 0 /100 WBCS — SIGNIFICANT CHANGE UP (ref 0–0)
NRBC # FLD: 0 K/UL — SIGNIFICANT CHANGE UP (ref 0–0)
PLATELET # BLD AUTO: 212 K/UL — SIGNIFICANT CHANGE UP (ref 150–400)
POTASSIUM SERPL-MCNC: 4 MMOL/L — SIGNIFICANT CHANGE UP (ref 3.5–5.3)
POTASSIUM SERPL-SCNC: 4 MMOL/L — SIGNIFICANT CHANGE UP (ref 3.5–5.3)
RBC # BLD: 3.84 M/UL — SIGNIFICANT CHANGE UP (ref 3.8–5.2)
RBC # FLD: 17.7 % — HIGH (ref 10.3–14.5)
SODIUM SERPL-SCNC: 135 MMOL/L — SIGNIFICANT CHANGE UP (ref 135–145)
WBC # BLD: 8.49 K/UL — SIGNIFICANT CHANGE UP (ref 3.8–10.5)
WBC # FLD AUTO: 8.49 K/UL — SIGNIFICANT CHANGE UP (ref 3.8–10.5)

## 2023-04-15 PROCEDURE — 99232 SBSQ HOSP IP/OBS MODERATE 35: CPT | Mod: GC

## 2023-04-15 RX ORDER — SENNA PLUS 8.6 MG/1
2 TABLET ORAL AT BEDTIME
Refills: 0 | Status: DISCONTINUED | OUTPATIENT
Start: 2023-04-15 | End: 2023-04-18

## 2023-04-15 RX ADMIN — MEROPENEM 100 MILLIGRAM(S): 1 INJECTION INTRAVENOUS at 00:03

## 2023-04-15 RX ADMIN — MEROPENEM 100 MILLIGRAM(S): 1 INJECTION INTRAVENOUS at 08:00

## 2023-04-15 RX ADMIN — SENNA PLUS 2 TABLET(S): 8.6 TABLET ORAL at 22:32

## 2023-04-15 RX ADMIN — LACOSAMIDE 100 MILLIGRAM(S): 50 TABLET ORAL at 08:00

## 2023-04-15 RX ADMIN — LACOSAMIDE 100 MILLIGRAM(S): 50 TABLET ORAL at 17:33

## 2023-04-15 RX ADMIN — AMLODIPINE BESYLATE 5 MILLIGRAM(S): 2.5 TABLET ORAL at 06:17

## 2023-04-15 RX ADMIN — PANTOPRAZOLE SODIUM 40 MILLIGRAM(S): 20 TABLET, DELAYED RELEASE ORAL at 06:18

## 2023-04-15 RX ADMIN — ENOXAPARIN SODIUM 40 MILLIGRAM(S): 100 INJECTION SUBCUTANEOUS at 06:17

## 2023-04-15 RX ADMIN — MEROPENEM 100 MILLIGRAM(S): 1 INJECTION INTRAVENOUS at 15:41

## 2023-04-15 RX ADMIN — LACOSAMIDE 100 MILLIGRAM(S): 50 TABLET ORAL at 00:03

## 2023-04-15 RX ADMIN — Medication 75 MICROGRAM(S): at 06:17

## 2023-04-15 NOTE — PROGRESS NOTE ADULT - PROBLEM SELECTOR PLAN 5
Chronic stable  TSH 1.39  Continue levothyroxine 75mcg daily Chronic stable  Continue lacosamide 100mg BID

## 2023-04-15 NOTE — PROGRESS NOTE ADULT - SUBJECTIVE AND OBJECTIVE BOX
Internal Medicine   Cleve Collier | PGY-1    OVERNIGHT EVENTS: No acute overnight events.    SUBJECTIVE: No acute concerns. Denies fever, chest pain, shortness of breath, dysuria, hematuria. Asks this author to turn around so that she can look for a "wire" behind the left ear.    MEDICATIONS  (STANDING):  amLODIPine   Tablet 5 milliGRAM(s) Oral daily  enoxaparin Injectable 40 milliGRAM(s) SubCutaneous every 24 hours  lacosamide 100 milliGRAM(s) Oral two times a day  levothyroxine 75 MICROGram(s) Oral daily  meropenem  IVPB 1000 milliGRAM(s) IV Intermittent every 8 hours  pantoprazole    Tablet 40 milliGRAM(s) Oral before breakfast    MEDICATIONS  (PRN):  acetaminophen     Tablet .. 650 milliGRAM(s) Oral every 6 hours PRN Temp greater or equal to 38C (100.4F), Mild Pain (1 - 3)  aluminum hydroxide/magnesium hydroxide/simethicone Suspension 30 milliLiter(s) Oral every 4 hours PRN Dyspepsia  melatonin 3 milliGRAM(s) Oral at bedtime PRN Insomnia  ondansetron Injectable 4 milliGRAM(s) IV Push every 8 hours PRN Nausea and/or Vomiting    VITALS:  T(F): 97.7 (04-15-23 @ 06:02), Max: 97.7 (04-14-23 @ 12:17)  HR: 97 (04-15-23 @ 06:02) (78 - 97)  BP: 158/87 (04-15-23 @ 06:02) (147/69 - 178/83)  BP(mean): --  RR: 18 (04-15-23 @ 06:02) (16 - 19)  SpO2: 98% (04-15-23 @ 06:02) (96% - 100%)    PHYSICAL EXAM:   GENERAL: NAD, lying in bed comfortably, RUE grossly contracted, alert and interactive however slow to respond/speak  HEAD: Atraumatic, normocephalic  EYES: PERRLA, EOMI, conjunctiva and sclera clear, no nystagmus noted  ENT: Moist mucous membranes  CHEST/LUNG: Clear to auscultation bilaterally; no rales, rhonchi, wheezing, or rubs; unlabored respirations  HEART: Regular rate and rhythm; no murmurs, rubs, or gallops, normal S1/S2  ABDOMEN: Normal bowel sounds; soft, nontender, nondistended  EXTREMITIES: No pitting edema in bilateral ankles; no clubbing or cyanosis  MSK: No gross deformities noted   NEURO: ~AOx2-3 (2023, [facility]). RUE contracted at elbow, increased tone, unable to passively extend arm, finger squeeze 1/5 strength; LUE grossly normal (normal tone, normal passive ROM, finger squeeze 4/5).   SKIN: No rashes or lesions  PSYCH: Speech low volume w/ ~2s pauses before responses; patient asks to see behind this author's ear to look for a "wire", does not elaborate on concerns    LABS:                      10.9   8.49  )-----------( 212      ( 15 Apr 2023 07:41 )             34.8     04-15  135  |  96<L>  |  18  ----------------------------<  77  4.0   |  21<L>  |  0.57    Ca    10.4      15 Apr 2023 07:41    TPro  8.0  /  Alb  4.2  /  TBili  <0.2  /  DBili  x   /  AST  37<H>  /  ALT  33  /  AlkPhos  161<H>  04-14    Creatinine Trend: 0.57<--, 0.63<--    I&O's Summary  14 Apr 2023 07:01  -  15 Apr 2023 07:00  --------------------------------------------------------  IN: 290 mL / OUT: 400 mL / NET: -110 mL

## 2023-04-15 NOTE — PROGRESS NOTE ADULT - PROBLEM SELECTOR PLAN 6
DVT ppx: subQ lovenox  GI ppx: protonix  Diet: DASH/TLC; easy to chew Chronic stable  TSH 1.39  Continue levothyroxine 75mcg daily

## 2023-04-15 NOTE — PROGRESS NOTE ADULT - PROBLEM SELECTOR PLAN 3
Chronic stable  Continue baclofen pump - last filled 3/1, next refill due 5/26  Monitor Chronic stable. Observed to have RUE contractions.  Continue baclofen pump - last filled 3/1, next refill due 5/26  Monitor Chronic moderate exacerbation   SBPs elevated to 150s  Continue amlodipine 5mg daily no

## 2023-04-15 NOTE — PROGRESS NOTE ADULT - PROBLEM SELECTOR PLAN 1
New  1 day history of decreased PO intake and less conversive  UA: LE large, WBC 61, epithelial cells 27, Bacteria Few  Prior Ucx: Citrobacter  Bcx, Ucx pending  s/p ertapenem in ED  Will continue with meropenem given previous sensitivities    Hold methenamine 1d history of decreased PO intake, noted to be less conversive. Per  patient has had similar sx in the past associated w/ UTIs.  UA: LE large, WBC 61, epithelial cells 27, Bacteria Few  Prior Ucx: Citrobacter  Bcx, Ucx pending  s/p ertapenem in ED  Will continue with meropenem given previous sensitivities    Hold methenamine

## 2023-04-15 NOTE — CHART NOTE - NSCHARTNOTEFT_GEN_A_CORE
Patient assessed at bedside for routine check. Per aide at bedside, patient had strange episode ~10-15m prior where she suddenly stared at the ceiling, brought her arms to her chest, and let out a long sigh. Since then she has been looking around and not responding or speaking (earlier in the day she appeared confused however was verbal). On exam patient w/ increased tone in LUE compared to prior. Patient not following instructions for finger-squeeze, refusing to open mouth. Does appear alert and maintains brief eye contact however looking around, making fluid arm motions. Pupils equal and reactive. Vitals stable. Suspect post-ictal state, though unclear. Discussed with aide, RN, and night team to continue monitoring for repeat occurrences.

## 2023-04-15 NOTE — PROGRESS NOTE ADULT - PROBLEM SELECTOR PLAN 2
Chronic moderate exacerbation   /76  Continue amlodipine 5mg daily Chronic moderate exacerbation   SBPs elevated to 150s  Continue amlodipine 5mg daily C/f encephalopathy (pt asking to see behind ear for a wire), hallucinations. Unclear if this is baseline for patient given hx of multiple sclerosis. Acute UTI likely contributory. Will CTM.

## 2023-04-15 NOTE — PROGRESS NOTE ADULT - ASSESSMENT
68 yr old female presenting with decreased responsiveness likely 2/2 UTI  68F w/ hx multiple sclerosis (bedbound on baclofen pump), seizure disorder (on vimpat) who presents w/ 1d of decreased responsiveness and PO intake likely 2/2 UTI.

## 2023-04-15 NOTE — PHYSICAL THERAPY INITIAL EVALUATION ADULT - PERTINENT HX OF CURRENT PROBLEM, REHAB EVAL
68 yr old female with a pmh of MS bedbound on a baclofen pump, seizure disorder on vimpat who presents with a one day history of decreased PO intake and not as conversive as usual. As symptoms did not improve the pt was brought to the ED.  at bedside reports when this usually occurs she is diagnosed with a UTI.

## 2023-04-16 ENCOUNTER — TRANSCRIPTION ENCOUNTER (OUTPATIENT)
Age: 68
End: 2023-04-16

## 2023-04-16 DIAGNOSIS — R82.90 UNSPECIFIED ABNORMAL FINDINGS IN URINE: ICD-10-CM

## 2023-04-16 LAB
ALBUMIN SERPL ELPH-MCNC: 4.2 G/DL — SIGNIFICANT CHANGE UP (ref 3.3–5)
ALP SERPL-CCNC: 156 U/L — HIGH (ref 40–120)
ALT FLD-CCNC: 24 U/L — SIGNIFICANT CHANGE UP (ref 4–33)
ANION GAP SERPL CALC-SCNC: 16 MMOL/L — HIGH (ref 7–14)
AST SERPL-CCNC: 28 U/L — SIGNIFICANT CHANGE UP (ref 4–32)
BASOPHILS # BLD AUTO: 0.02 K/UL — SIGNIFICANT CHANGE UP (ref 0–0.2)
BASOPHILS NFR BLD AUTO: 0.2 % — SIGNIFICANT CHANGE UP (ref 0–2)
BILIRUB SERPL-MCNC: 0.3 MG/DL — SIGNIFICANT CHANGE UP (ref 0.2–1.2)
BUN SERPL-MCNC: 16 MG/DL — SIGNIFICANT CHANGE UP (ref 7–23)
CALCIUM SERPL-MCNC: 10.4 MG/DL — SIGNIFICANT CHANGE UP (ref 8.4–10.5)
CHLORIDE SERPL-SCNC: 92 MMOL/L — LOW (ref 98–107)
CO2 SERPL-SCNC: 23 MMOL/L — SIGNIFICANT CHANGE UP (ref 22–31)
CREAT SERPL-MCNC: 0.5 MG/DL — SIGNIFICANT CHANGE UP (ref 0.5–1.3)
CULTURE RESULTS: SIGNIFICANT CHANGE UP
EGFR: 103 ML/MIN/1.73M2 — SIGNIFICANT CHANGE UP
EOSINOPHIL # BLD AUTO: 0.05 K/UL — SIGNIFICANT CHANGE UP (ref 0–0.5)
EOSINOPHIL NFR BLD AUTO: 0.5 % — SIGNIFICANT CHANGE UP (ref 0–6)
GLUCOSE SERPL-MCNC: 74 MG/DL — SIGNIFICANT CHANGE UP (ref 70–99)
HCT VFR BLD CALC: 32.7 % — LOW (ref 34.5–45)
HGB BLD-MCNC: 10.4 G/DL — LOW (ref 11.5–15.5)
IANC: 7.17 K/UL — SIGNIFICANT CHANGE UP (ref 1.8–7.4)
IMM GRANULOCYTES NFR BLD AUTO: 0.3 % — SIGNIFICANT CHANGE UP (ref 0–0.9)
LYMPHOCYTES # BLD AUTO: 1.62 K/UL — SIGNIFICANT CHANGE UP (ref 1–3.3)
LYMPHOCYTES # BLD AUTO: 16.9 % — SIGNIFICANT CHANGE UP (ref 13–44)
MAGNESIUM SERPL-MCNC: 1.6 MG/DL — SIGNIFICANT CHANGE UP (ref 1.6–2.6)
MCHC RBC-ENTMCNC: 28.1 PG — SIGNIFICANT CHANGE UP (ref 27–34)
MCHC RBC-ENTMCNC: 31.8 GM/DL — LOW (ref 32–36)
MCV RBC AUTO: 88.4 FL — SIGNIFICANT CHANGE UP (ref 80–100)
MONOCYTES # BLD AUTO: 0.72 K/UL — SIGNIFICANT CHANGE UP (ref 0–0.9)
MONOCYTES NFR BLD AUTO: 7.5 % — SIGNIFICANT CHANGE UP (ref 2–14)
NEUTROPHILS # BLD AUTO: 7.17 K/UL — SIGNIFICANT CHANGE UP (ref 1.8–7.4)
NEUTROPHILS NFR BLD AUTO: 74.6 % — SIGNIFICANT CHANGE UP (ref 43–77)
NRBC # BLD: 0 /100 WBCS — SIGNIFICANT CHANGE UP (ref 0–0)
NRBC # FLD: 0 K/UL — SIGNIFICANT CHANGE UP (ref 0–0)
PHOSPHATE SERPL-MCNC: 3 MG/DL — SIGNIFICANT CHANGE UP (ref 2.5–4.5)
PLATELET # BLD AUTO: 221 K/UL — SIGNIFICANT CHANGE UP (ref 150–400)
POTASSIUM SERPL-MCNC: 3.8 MMOL/L — SIGNIFICANT CHANGE UP (ref 3.5–5.3)
POTASSIUM SERPL-SCNC: 3.8 MMOL/L — SIGNIFICANT CHANGE UP (ref 3.5–5.3)
PROT SERPL-MCNC: 7.5 G/DL — SIGNIFICANT CHANGE UP (ref 6–8.3)
RBC # BLD: 3.7 M/UL — LOW (ref 3.8–5.2)
RBC # FLD: 17.4 % — HIGH (ref 10.3–14.5)
SODIUM SERPL-SCNC: 131 MMOL/L — LOW (ref 135–145)
SPECIMEN SOURCE: SIGNIFICANT CHANGE UP
WBC # BLD: 9.61 K/UL — SIGNIFICANT CHANGE UP (ref 3.8–10.5)
WBC # FLD AUTO: 9.61 K/UL — SIGNIFICANT CHANGE UP (ref 3.8–10.5)

## 2023-04-16 PROCEDURE — 99232 SBSQ HOSP IP/OBS MODERATE 35: CPT | Mod: GC

## 2023-04-16 PROCEDURE — 95720 EEG PHY/QHP EA INCR W/VEEG: CPT

## 2023-04-16 RX ORDER — LACOSAMIDE 50 MG/1
100 TABLET ORAL EVERY 12 HOURS
Refills: 0 | Status: DISCONTINUED | OUTPATIENT
Start: 2023-04-16 | End: 2023-04-18

## 2023-04-16 RX ADMIN — MEROPENEM 100 MILLIGRAM(S): 1 INJECTION INTRAVENOUS at 09:25

## 2023-04-16 RX ADMIN — MEROPENEM 100 MILLIGRAM(S): 1 INJECTION INTRAVENOUS at 00:38

## 2023-04-16 RX ADMIN — LACOSAMIDE 100 MILLIGRAM(S): 50 TABLET ORAL at 06:49

## 2023-04-16 RX ADMIN — LACOSAMIDE 100 MILLIGRAM(S): 50 TABLET ORAL at 18:02

## 2023-04-16 RX ADMIN — SENNA PLUS 2 TABLET(S): 8.6 TABLET ORAL at 22:27

## 2023-04-16 RX ADMIN — Medication 75 MICROGRAM(S): at 06:49

## 2023-04-16 RX ADMIN — ENOXAPARIN SODIUM 40 MILLIGRAM(S): 100 INJECTION SUBCUTANEOUS at 06:50

## 2023-04-16 RX ADMIN — MEROPENEM 100 MILLIGRAM(S): 1 INJECTION INTRAVENOUS at 18:03

## 2023-04-16 RX ADMIN — AMLODIPINE BESYLATE 5 MILLIGRAM(S): 2.5 TABLET ORAL at 06:49

## 2023-04-16 RX ADMIN — PANTOPRAZOLE SODIUM 40 MILLIGRAM(S): 20 TABLET, DELAYED RELEASE ORAL at 06:50

## 2023-04-16 NOTE — PROGRESS NOTE ADULT - PROBLEM SELECTOR PLAN 7
DVT ppx: subQ lovenox  GI ppx: protonix  Diet: DASH/TLC; easy to chew DVT ppx: subQ lovenox  GI ppx: protonix  Diet: DASH/TLC; easy to chew    Code Status: DNR/DNI; [facility]

## 2023-04-16 NOTE — PROGRESS NOTE ADULT - PROBLEM SELECTOR PLAN 4
Chronic stable. Observed to have RUE contractions.  Continue baclofen pump - last filled 3/1, next refill due 5/26  Monitor UA: LE large, WBC 61, epithelial cells 27, Bacteria Few  Prior Ucx: Citrobacter  Current Ucx: normal urogenital marina  Bcx pending  s/p ertapenem in ED  Patient afebrile, no leukocytosis, unlikely to have acute infection given these findings  As penems were started 4/14 PM will continue for another day (thru 4/17) to avoid creating resistance  Hold methenamine

## 2023-04-16 NOTE — PROGRESS NOTE ADULT - PROBLEM SELECTOR PLAN 5
Chronic stable  Continue lacosamide 100mg BID Chronic moderate exacerbation   SBPs elevated to 150s  Increase amlodipine to 10mg daily

## 2023-04-16 NOTE — DISCHARGE NOTE PROVIDER - NSDCCPTREATMENT_GEN_ALL_CORE_FT
PRINCIPAL PROCEDURE  Procedure: CT head wo con  Findings and Treatment: IMPRESSION:  No acute intracranial hemorrhage or acute territorial infarction.     PRINCIPAL PROCEDURE  Procedure: CT head wo con  Findings and Treatment: IMPRESSION:  No acute intracranial hemorrhage or acute territorial infarction.      SECONDARY PROCEDURE  Procedure: EEG, with video recording, 36-60 hours  Findings and Treatment: FINDINGS:    Background:  Continuity: continuous  Symmetry: symmetric  PDR: 8-Hz activity that attenuated to eye opening. Symmetric low-amplitude frontal beta in wakefulness.  State change: present  Voltage: normal  Anterior Posterior Gradient: present  Other background findings: Intermittent diffuse polymorphic delta slowing in wakefulness. Occasional frontally predominant generalized rhythmic delta activity (GRDA) at 1-1.5 Hz.  Breach: absent  Background Slowing:  Generalized slowing: As above  Focal slowing: none was present.  State Changes:   -Drowsiness was characterized by slowing of the background activity and increase in prevalence of GRDA.  -Rudimentary stage 2 sleep was characterized by symmetric K complexes and rudimentary sleep spindles.  Sporadic Epileptiform Discharges:    None  Rhythmic and Periodic Patterns (RPPs):  None   Electrographic and Electroclinical seizures:  None  Other Clinical Events:  None  Activation Procedures:   -Hyperventilation was not performed.    -Photic stimulation was not performed.  Artifacts:  Intermittent myogenic and movement artifacts were present.  ECG:  Single-lead ECG showed regular rhythm at  bpm.  EEG Classification / Summary:  Abnormal EEG in the awake / drowsy / asleep state(s).  -Mild, generalized background slowing and GRDA  Clinical Impression:  -These findings indicate mild, diffuse, and nonspecific cerebral dysfunction.  -There were no epileptiform abnormalities recorded.    -------------------------------------------------------------------------------------------------------  [facility] EEG Reading Room Ph#: [phone]  Epilepsy Answering Service after 5PM and before 8:30AM: Ph#: [phone]  Kiara Gomes MD  PGY-6 Pediatric Epilepsy  Ragini Cifuentes MD  Attending Physician, [facility] [place]

## 2023-04-16 NOTE — EEG REPORT - NS EEG TEXT BOX
RAMIRO CASTAÑEDA [id number]   Location: [facility] [address]  Provider: Melania Chang      Study Start Date: 1327 4/16/23  Study End Date: 1627 4/16/23	  Duration x Hours: 3 hr   --------------------------------------------------------------------------------------------------    History:  CC/ HPI Patient is a 68y old  Female who presents with a chief complaint of UTI (16 Apr 2023 11:39)    MEDICATIONS  (STANDING):  amLODIPine   Tablet 5 milliGRAM(s) Oral daily  enoxaparin Injectable 40 milliGRAM(s) SubCutaneous every 24 hours  lacosamide IVPB 100 milliGRAM(s) IV Intermittent every 12 hours  levothyroxine 75 MICROGram(s) Oral daily  meropenem  IVPB 1000 milliGRAM(s) IV Intermittent every 8 hours  pantoprazole    Tablet 40 milliGRAM(s) Oral before breakfast  senna 2 Tablet(s) Oral at bedtime    lacosamide IVPB 100 milliGRAM(s) IV Intermittent every 12 hours, Stop order after: 30 Days    --------------------------------------------------------------------------------------------------  Study Interpretation:    [[[Abbreviation Key:  PDR=alpha rhythm/posterior dominant rhythm. A-P=anterior posterior.  Amplitude: ‘very low’:<20; ‘low’:20-49; ‘medium’:; ‘high’:>150uV.  Persistence for periodic/rhythmic patterns (% of epoch) ‘rare’:<1%; ‘occasional’:1-10%; ‘frequent’:10-50%; ‘abundant’:50-90%; ‘continuous’:>90%.  Persistence for sporadic discharges: ‘rare’:<1/hr; ‘occasional’:1/min-1/hr; ‘frequent’:>1/min; ‘abundant’:>1/10 sec.  RPP=rhythmic and periodic patterns; GRDA=generalized rhythmic delta activity; FIRDA=frontal intermittent GRDA; LRDA=lateralized rhythmic delta activity; TIRDA=temporal intermittent rhythmic delta activity;  LPD=PLED=lateralized periodic discharges; GPD=generalized periodic discharges; BIPDs =bilateral independent periodic discharges; Mf=multifocal; SIRPDs=stimulus induced rhythmic, periodic, or ictal appearing discharges; BIRDs=brief potentially ictal rhythmic discharges >4 Hz, lasting .5-10s; PFA (paroxysmal bursts >13 Hz or =8 Hz <10s).  Modifiers: +F=with fast component; +S=with spike component; +R=with rhythmic component.  S-B=burst suppression pattern.  Max=maximal. N1-drowsy; N2-stage II sleep; N3-slow wave sleep. SSS/BETS=small sharp spikes/benign epileptiform transients of sleep. HV=hyperventilation; PS=photic stimulation]]]    Daily EEG Visual Analysis    FINDINGS:      Background:  Continuous: continuous  Symmetry: symmetric  PDR: 8 Hz activity, with amplitude to 40 uV, that attenuated to eye opening.  Low amplitude frontal beta noted in wakefulness.  Reactivity: present  Voltage: normal, mostly 20-150uV  Anterior Posterior Gradient: present  Other background findings: none  Breach: absent    Background Slowing:  Generalized slowing: Intermittent polymorphic delta and frontally predominant generalized rhythmic delta slowing at 1-1.5 Hz   Focal slowing: none was present.    State Changes:   Drowsiness noted with increased slowing, attenuation of fast activity, vertex transients.  Present with abnormal stage N2 sleep transients     Sporadic Epileptiform Discharges:    None    Rhythmic and Periodic Patterns (RPPs):  None     Electrographic and Electroclinical seizures:  None    Other Clinical Events:  Rare sharp transients were seen over the left frontotemporal chains with concurrent GRDA.     Activation Procedures:   Hyperventilation was not performed.    Photic stimulation was not performed.    Artifacts:  Intermittent myogenic and movement artifacts were noted.    ECG:  The heart rate on single channel ECG was predominantly between 110-120 BPM.    ---------------------------------------------------------------------------------------------------------------------------------------------------------      EEG Classification / Summary:    Abnormal  EEG in the awake / drowsy / asleep state(s).    - Background slowing, generalized, mild to moderate  - Intermittent frontally predominant generalized rhythmic delta activity    ---------------------------------------------------------------------------------------------------------------------------------------------------------      Clinical Impression:    Abnormal EEG study.  1. Frontally predominant generalized rhythmic delta activity can be seen with non-specific encephalopathies   2. Mild nonspecific diffuse or multifocal cerebral dysfunction.   3. No clear epileptic pattern was seen.     ---------------------------------------------------------------------------------------------------------------------------------------------------------    This is a preliminary report     Eli Mcgarry M.D.   Epilepsy fellow    -------------------------------------------------------------------------------------------------------  [facility] EEG Reading Room Ph#: [phone]  Epilepsy Answering Service after 5PM and before 8:30AM: Ph#: [phone]

## 2023-04-16 NOTE — PROGRESS NOTE ADULT - PROBLEM SELECTOR PLAN 2
C/f encephalopathy (pt asking to see behind ear for a wire), hallucinations. Unclear if this is baseline for patient given hx of multiple sclerosis. Acute UTI likely contributory. Will CTM. Chronic. Suspected seizure episode 4/15 as above.  Continue lacosamide 100mg BID  Follow-up neurology recs

## 2023-04-16 NOTE — PROGRESS NOTE ADULT - PROBLEM SELECTOR PLAN 1
1d history of decreased PO intake, noted to be less conversive. Per  patient has had similar sx in the past associated w/ UTIs.  UA: LE large, WBC 61, epithelial cells 27, Bacteria Few  Prior Ucx: Citrobacter  Bcx, Ucx pending  s/p ertapenem in ED  Will continue with meropenem given previous sensitivities    Hold methenamine C/f paranoia and hallucinations, pt asked on day of admission to look behind the ear for a wire.  Per  patient has previously been paranoid during hospitalizations (concern that other patients are using her moreno bag, that her food was being poisoned, etc.)  Suspected seizure episode 4/15 PM (see chart note), patient notably less verbal since  Given low suspicion for active infection as below, suspect neuro etiology given hx of mult. sclerosis, seizure d/o  Neuro consulted for further evaluation, EEG ordered, will f/u recs  Consider psych eval however patient's  had declined on previous admission

## 2023-04-16 NOTE — DISCHARGE NOTE PROVIDER - NSDCFUADDAPPT_GEN_ALL_CORE_FT
APPTS ARE READY TO BE MADE: [X ] YES    Best Family or Patient Contact (if needed): Yoni () - [phone]    Additional Information about above appointments (if needed):    1: PCP in 2 weeks for post-hospital visit  2: Neurology in 2 weeks for management of seizures and MS  3: Urology routine followup for management of frequent UTIs    Other comments or requests:    APPTS ARE READY TO BE MADE: [X] YES    Best Family or Patient Contact (if needed): Yoni () - [phone]    Additional Information about above appointments (if needed):    1: PCP in 2 weeks for post-hospital visit  2: Neurology in 2 weeks for management of seizures and MS  3: Urology routine followup for management of frequent UTIs    Other comments or requests:    APPTS ARE READY TO BE MADE: [X] YES    Best Family or Patient Contact (if needed): Yoni () - [phone]    Additional Information about above appointments (if needed):    1: PCP in 2 weeks for post-hospital visit  2: Neurology in 2 weeks for management of seizures and MS  3: Urology routine followup for management of frequent UTIs    Other comments or requests:   Patient was previously scheduled with Matias Rudd on (10/9/2023 1 PM) at [address] [place] [address].  Patient was scheduled with Luci Camacho on (5/2/2023 1:45 PM) at [address] [address] through the provider's office. Patient advised of appointment details via email.    Patient was scheduled with Merrick Bonner on (5/4/2023 3:30 PM) at [address] [place] [address] through the provider's office. Patient advised of appointment details via email.

## 2023-04-16 NOTE — DISCHARGE NOTE PROVIDER - CARE PROVIDERS DIRECT ADDRESSES
,DirectAddress_Unknown,[email or web address],DirectAddress_Unknown

## 2023-04-16 NOTE — DISCHARGE NOTE PROVIDER - ATTENDING DISCHARGE PHYSICAL EXAMINATION:
GENERAL: no apparent distress  HEAD:  Atraumatic, Normocephalic  EYES: EOMI, PERRLA, conjunctiva and sclera clear b/l  CHEST/LUNG: on RA; Clear to auscultation bilaterally; No wheezing; No crackles  HEART: Regular rate and rhythm; S1/S2 wnl; no obvious murmurs  ABDOMEN: Soft, Nontender, Nondistended; Bowel sounds present  EXTREMITIES:  2+ Peripheral Pulses, No edema  PSYCH: normal affect, calm demeanor  NEUROLOGY: AAOX3

## 2023-04-16 NOTE — DISCHARGE NOTE PROVIDER - CARE PROVIDER_API CALL
YE MADRIGAL  [address] [facility], [place] [address]  Phone: ([phone]  Fax: ([phone]  Established Patient  Follow Up Time: 2 weeks    Matias Sterling)  Urology  [address] [place] [address], [address]  [place] [address]  Phone: [phone]  Fax: [phone]  Established Patient  Follow Up Time: Routine    JUAN PULIDO  Neuromusculoskeletal Medicine, Sports Medicine  [address] [place] [address]  Phone: [phone]  Fax: [phone]  Established Patient  Follow Up Time: 2 weeks

## 2023-04-16 NOTE — PROGRESS NOTE ADULT - ASSESSMENT
68F w/ hx multiple sclerosis (bedbound on baclofen pump), seizure disorder (on vimpat) who presents w/ 1d of decreased responsiveness and PO intake likely 2/2 UTI.

## 2023-04-16 NOTE — PROGRESS NOTE ADULT - PROBLEM SELECTOR PLAN 3
Chronic moderate exacerbation   SBPs elevated to 150s  Continue amlodipine 5mg daily Chronic. At baseline w/ contracted R elbow, now with new increased tone in LUE as well..  Continue baclofen pump - last filled 3/1, next refill due 5/26  Monitor

## 2023-04-16 NOTE — CONSULT NOTE ADULT - SUBJECTIVE AND OBJECTIVE BOX
RAMIRO CASTAÑEDA  Female  MRN-[id number]    HPI: INCOMPLETE NOTE  68 yr old female with a pmh of MS bedbound on a baclofen pump, seizure disorder on vimpat who presents with a one day history of decreased PO intake and not as conversive as usual. As symptoms did not improve the pt was brought to the ED.  at bedside reports when this usually occurs she is diagnosed with a UTI.   Denies  headache, dizziness, chest pain, palpitations, SOB, abdominal pain, joint pain, diarrhea/constipation, urinary symptoms.   Vitals: T 97.5, HR 78, /76, RR 19 satting 96% RA (14 Apr 2023 23:28)      NIHSS:  MRS:     ROS: All negative except as mentioned in HPI.    PAST MEDICAL & SURGICAL HISTORY:  Hypothyroid      Multiple sclerosis      Seizures      History of recurrent UTIs      Benign essential HTN      No significant past surgical history          FAMILY HISTORY:    SOCIAL HISTORY:    MEDICATIONS  (STANDING):  amLODIPine   Tablet 5 milliGRAM(s) Oral daily  enoxaparin Injectable 40 milliGRAM(s) SubCutaneous every 24 hours  lacosamide 100 milliGRAM(s) Oral two times a day  levothyroxine 75 MICROGram(s) Oral daily  meropenem  IVPB 1000 milliGRAM(s) IV Intermittent every 8 hours  pantoprazole    Tablet 40 milliGRAM(s) Oral before breakfast  senna 2 Tablet(s) Oral at bedtime    MEDICATIONS  (PRN):  acetaminophen     Tablet .. 650 milliGRAM(s) Oral every 6 hours PRN Temp greater or equal to 38C (100.4F), Mild Pain (1 - 3)  aluminum hydroxide/magnesium hydroxide/simethicone Suspension 30 milliLiter(s) Oral every 4 hours PRN Dyspepsia  melatonin 3 milliGRAM(s) Oral at bedtime PRN Insomnia  ondansetron Injectable 4 milliGRAM(s) IV Push every 8 hours PRN Nausea and/or Vomiting    Allergies    sulfa drugs (Hives; Rash)    Intolerances        VITAL SIGNS:  T(F): --  HR: --  BP: --  RR: --  SpO2: --    General Exam:  Constitutional: Lying on stretcher, NAD.  Head: Normocephalic, atraumatic.  Extremities: No edema.    Neuro    LABS:                          10.4   9.61  )-----------( 221      ( 16 Apr 2023 06:57 )             32.7     04-16    131<L>  |  92<L>  |  16  ----------------------------<  74  3.8   |  23  |  0.50    Ca    10.4      16 Apr 2023 06:57  Phos  3.0     04-16  Mg     1.60     04-16    TPro  7.5  /  Alb  4.2  /  TBili  0.3  /  DBili  x   /  AST  28  /  ALT  24  /  AlkPhos  156<H>  04-16        RADIOLOGY:             RAMIRO CASTAÑEDA  Female  MRN-[id number]    HPI: 69 yo female Jehova's Witness with a PMHx of MS (follows with neurologist Dr. Merrick Russell; s/p baclofen pump, not on DMT; bedbound at baseline and dependent for all ADLs), seizure disorder (on Vimpat 100MG BID), hypothyroidism, HTN, and frequent UTIs who presented ot [facility] ED on 4/14 for decreased PO intake and decreased verbal output. Patient found to have UTI on admission and was started on abx. Neurology consulted following an episode concerning for seizure that occurred on 4/15 PM, witnessed by aide at bedside. Aide reported the patient suddenly started staring at the ceiling, brought her arms to her chest, and let out a long sigh. This episode was brief (<30 seconds) but patient appeared confused afterwards, was not following commands or speaking. Patient did not return to her baseline for 20+ minutes following this event. Patient now back at her neurologic baseline. She states she has had both shaking and staring seizures in the past. She remembers that something happened last night but does not clearly remember the episode. She feels it may have been consistent with a seizure. She reports hypophonia, but notes she feels generally weak in the setting of her current infection. She denies current HA, vision changes, new numbness/tingling, new weakness.    ROS: All negative except as mentioned in HPI.    PAST MEDICAL & SURGICAL HISTORY:  Hypothyroid    Multiple sclerosis    Seizures    History of recurrent UTIs    Benign essential HTN    No significant past surgical history    FAMILY HISTORY:  No family history of MS.    SOCIAL HISTORY:  Lives with , has home health aide. Nonsmoker.    MEDICATIONS  (STANDING):  amLODIPine   Tablet 5 milliGRAM(s) Oral daily  enoxaparin Injectable 40 milliGRAM(s) SubCutaneous every 24 hours  lacosamide 100 milliGRAM(s) Oral two times a day  levothyroxine 75 MICROGram(s) Oral daily  meropenem  IVPB 1000 milliGRAM(s) IV Intermittent every 8 hours  pantoprazole    Tablet 40 milliGRAM(s) Oral before breakfast  senna 2 Tablet(s) Oral at bedtime    MEDICATIONS  (PRN):  acetaminophen     Tablet .. 650 milliGRAM(s) Oral every 6 hours PRN Temp greater or equal to 38C (100.4F), Mild Pain (1 - 3)  aluminum hydroxide/magnesium hydroxide/simethicone Suspension 30 milliLiter(s) Oral every 4 hours PRN Dyspepsia  melatonin 3 milliGRAM(s) Oral at bedtime PRN Insomnia  ondansetron Injectable 4 milliGRAM(s) IV Push every 8 hours PRN Nausea and/or Vomiting    Allergies    sulfa drugs (Hives; Rash)    Vital Signs Last 24 Hrs  T(C): 36.7 (16 Apr 2023 20:55), Max: 36.7 (16 Apr 2023 13:30)  T(F): 98.1 (16 Apr 2023 20:55), Max: 98.1 (16 Apr 2023 20:55)  HR: 97 (16 Apr 2023 20:55) (97 - 100)  BP: 150/65 (16 Apr 2023 20:55) (117/74 - 150/65)  RR: 17 (16 Apr 2023 20:55) (17 - 18)  SpO2: 99% (16 Apr 2023 20:55) (98% - 99%)    Parameters below as of 16 Apr 2023 20:55  Patient On (Oxygen Delivery Method): room air    General Exam:  Constitutional: Lying in bed, NAD.  Head: Normocephalic, atraumatic.  Extremities: No edema, no cyanosis.    Neuro Exam:   MS: Alert, eyes open spontaneously. Oriented to self, age, location ([facility], but states this is [facility]), and year. States month is March. Speech is hypophonic but fluent, not slurred. Able to name objects and repeat. Follows commands.  CN: PERRL. VFF. EOMI. V1-V3 intact. Face symmetric. Tongue midline.   Motor: RUE/LUE some effort against gravity. RLE no movement (baseline), LLE trace movement in toes. Increased tone in bilateral upper extremities.  Sensory: Intact to LT throughout.  Reflexes: 1+ throughout upper extremities. Absent in lower extremities. Toes mute.  Coordination/Gait: Unable to assess.    LABS:               10.4   9.61  )-----------( 221      ( 16 Apr 2023 06:57 )             32.7     04-16    131<L>  |  92<L>  |  16  ----------------------------<  74  3.8   |  23  |  0.50    Ca    10.4      16 Apr 2023 06:57  Phos  3.0     04-16  Mg     1.60     04-16    TPro  7.5  /  Alb  4.2  /  TBili  0.3  /  DBili  x   /  AST  28  /  ALT  24  /  AlkPhos  156<H>  04-16    RADIOLOGY:    -04/14 CTH: No acute intracranial hemorrhage or acute territorial infarction.

## 2023-04-16 NOTE — CONSULT NOTE ADULT - ASSESSMENT
Assessment: 67 yo female Jehova's Witness with a PMHx of MS (follows with neurologist Dr. Merrick Russell; s/p baclofen pump, not on DMT; bedbound at baseline and dependent for all ADLs), seizure disorder (on Vimpat 100MG BID), hypothyroidism, HTN, and frequent UTIs who presented ot [facility] ED on 4/14 for decreased PO intake and decreased verbal output. Patient found to have UTI on admission and was started on abx. Neurology consulted following an episode concerning for seizure that occurred on 4/15 PM.    Impression: Possible breakthrough seizure in setting of infection. MS symptoms can also be exacerbated by active infection.    Recommendations:  [] Neuro checks Q4HR.  [] Telemetry.  [] Seizure/Fall/Aspiration precautions.  [] vEEG.  [] Continue with home dose Vimpat 100MG Q12HR, but please switch to IV for now.  [x] CTH: results as above. No need for further neuro imaging at this time.  [] Please note that numerous antibiotics may trigger epileptic seizures or status epilepticus by decreasing inhibitory transmission in the brain, thus lowering the seizure threshold. The most potent seizurogenic effect is exerted by penicillins, cephalosporins, fluoroquinolones, and carbapenems. Would consider switching patient to different antibiotic.  [] PT/OT evals.  [] Seizure Rescue: Ativan 1MG IVP x1 for convulsions/generalized tonic clonic seizure lasting > 3 minutes. Vimpat 200MG IVP x1 for convulsions/generalized tonic clonic seizure that is refractory to Ativan.  [] Rest of care per primary team.    Case to be discussed with neurology attending Dr. Wing.    Assessment: 67 yo female Jeh[employer]'s Witness with a PMHx of MS (follows with neurologist Dr. Merrick Russell; s/p baclofen pump, not on DMT; bedbound at baseline and dependent for all ADLs), seizure disorder (on Vimpat 100MG BID), hypothyroidism, HTN, and frequent UTIs who presented ot [facility] ED on 4/14 for decreased PO intake and decreased verbal output. Patient found to have UTI on admission and was started on abx. Neurology consulted following an episode concerning for seizure that occurred on 4/15 PM.    Impression: Possible breakthrough seizure in setting of infection. MS symptoms can also be exacerbated by active infection.    Recommendations:  [] Neuro checks Q4HR.  [] Telemetry.  [] Seizure/Fall/Aspiration precautions.  [] vEEG.  [] Continue with home dose Vimpat 100MG Q12HR, but please switch to IV for now.  [x] CTH: results as above. No need for further neuro imaging at this time.  [] Please note that numerous antibiotics may trigger epileptic seizures or status epilepticus by decreasing inhibitory transmission in the brain, thus lowering the seizure threshold. The most potent seizurogenic effect is exerted by penicillins, cephalosporins, fluoroquinolones, and carbapenems. Would consider switching patient to different antibiotic.  [] PT/OT evals.  [] Measures to reduce delirium: frequent reorientation, maintain sleep/wake routine, avoid sleeping during the day, keep lights on during the day, use of hearing aids or glasses if patient needs them, regular toileting. Move patient to window bed if available.  [] Seizure Rescue: Ativan 1MG IVP x1 for convulsions/generalized tonic clonic seizure lasting > 3 minutes. Vimpat 200MG IVP x1 for convulsions/generalized tonic clonic seizure that is refractory to Ativan.  [] Rest of care per primary team.    Case to be discussed with neurology attending Dr. Wing.    Assessment: 67 yo female [employer]'s Witness with a PMHx of MS (follows with neurologist Dr. Merrick Russell; s/p baclofen pump, not on DMT; bedbound at baseline and dependent for all ADLs), seizure disorder (on Vimpat 100MG BID), hypothyroidism, HTN, and frequent UTIs who presented ot [facility] ED on 4/14 for decreased PO intake and decreased verbal output. Patient found to have UTI on admission and was started on abx. Neurology consulted following an episode concerning for seizure that occurred on 4/15 PM.  CTJ no acute findings  EEG: slowing no seiuzres     Impression: Possible breakthrough seizure in setting of infection. MS symptoms can also be exacerbated by active infection.    Recommendations:  [] Neuro checks Q4HR.  [] Telemetry.  [] Seizure/Fall/Aspiration precautions.  [] vEEG.  [] Continue with home dose Vimpat 100MG Q12HR, but please switch to IV for now.  [x] CTH: results as above. No need for further neuro imaging at this time.  [] Please note that numerous antibiotics may trigger epileptic seizures or status epilepticus by decreasing inhibitory transmission in the brain, thus lowering the seizure threshold. The most potent seizurogenic effect is exerted by penicillins, cephalosporins, fluoroquinolones, and carbapenems. Would consider switching patient to different antibiotic.  [] PT/OT evals.  [] Measures to reduce delirium: frequent reorientation, maintain sleep/wake routine, avoid sleeping during the day, keep lights on during the day, use of hearing aids or glasses if patient needs them, regular toileting. Move patient to window bed if available.  [] Seizure Rescue: Ativan 1MG IVP x1 for convulsions/generalized tonic clonic seizure lasting > 3 minutes. Vimpat 200MG IVP x1 for convulsions/generalized tonic clonic seizure that is refractory to Ativan.  [] Rest of care per primary team.    Case to be discussed with neurology attending Dr. Alexandra .

## 2023-04-16 NOTE — CONSULT NOTE ADULT - ATTENDING COMMENTS
agree with above   c/w Vimpat 100mg BID  f/u EEG, no seizures. can d/c eeg   treatment of infection per team  Orlando Alexandra MD  Vascular Neurology  Office: [phone]

## 2023-04-16 NOTE — DISCHARGE NOTE PROVIDER - NSDCMRMEDTOKEN_GEN_ALL_CORE_FT
acetaminophen 325 mg oral tablet: 2 tab(s) orally every 6 hours, As needed, Temp greater or equal to 38C (100.4F), Mild Pain (1 - 3)  aluminum hydroxide-magnesium hydroxide 200 mg-200 mg/5 mL oral suspension: 30 milliliter(s) orally every 4 hours, As needed, Dyspepsia  amLODIPine 5 mg oral tablet: 1 tab(s) orally once a day  lacosamide 100 mg oral tablet: 1 tab(s) orally 2 times a day MDD:2 tabs (200 mg)  METHENAMINE CAMRON 1 GM TABLET:   pantoprazole 40 mg oral delayed release tablet: 1 tab(s) orally once a day  Synthroid 75 mcg (0.075 mg) oral tablet: 1 tab(s) orally once a day   acetaminophen 325 mg oral tablet: 2 tab(s) orally every 6 hours, As needed, Temp greater or equal to 38C (100.4F), Mild Pain (1 - 3)  aluminum hydroxide-magnesium hydroxide 200 mg-200 mg/5 mL oral suspension: 30 milliliter(s) orally every 4 hours, As needed, Dyspepsia  amLODIPine 5 mg oral tablet: 1 tab(s) orally once a day  lacosamide 100 mg oral tablet: 1 tab(s) orally 2 times a day MDD:2 tabs (200 mg)  METHENAMINE CAMRON 1 GM TABLET: 1 tab(s) orally 2 times a day  pantoprazole 40 mg oral delayed release tablet: 1 tab(s) orally once a day  Synthroid 75 mcg (0.075 mg) oral tablet: 1 tab(s) orally once a day

## 2023-04-16 NOTE — DISCHARGE NOTE PROVIDER - HOSPITAL COURSE
68F w/ hx of MS bedbound on a baclofen pump, seizure disorder on vimpat who presents with a one day history of decreased PO intake and not as conversive as usual. As symptoms did not improve the pt was brought to the ED.  at bedside reports when this usually occurs she is diagnosed with a UTI. Initial UA w/ +LE, WBCs and culture data from prior admissions notable for resistant Citrobacter, empiric coverage w/ meropenem was started. Patient noted to exhibit unusual behavior suggestive of paranoia (asking to check for a wire, refusing to speak). Of note was suspected to have a seizure episode on 4/15 as she was reported to have a period of blank staring/sighing and subsequently appeared confused and not following instructions. UCx subsequently demonstrated normal marina, meropenem continued for 3d course to avoid creating antibiotic resistance. Neurology consulted to evaluate possible seizure and for further workup of encephalopathy, recommended vEEG which showed ____ and further workup with ____. 68F w/ hx of MS bedbound on a baclofen pump, seizure disorder on vimpat who presents with a one day history of decreased PO intake and not as conversive as usual. As symptoms did not improve the pt was brought to the ED.  at bedside reports when this usually occurs she is diagnosed with a UTI. Initial UA w/ +LE, WBCs and culture data from prior admissions notable for resistant Citrobacter, empiric coverage w/ meropenem was started. Patient noted to exhibit unusual behavior suggestive of paranoia (asking to check for a wire, refusing to speak). Of note was suspected to have a seizure episode on 4/15 as she was reported to have a period of blank staring/sighing and subsequently appeared confused and not following instructions. UCx subsequently demonstrated normal marina, meropenem continued for 3d course to avoid creating antibiotic resistance. Neurology consulted to evaluate possible seizure and for further workup of encephalopathy, recommended vEEG which showed no seizure-activity. Patient confusion spontaneously resolved, with maintenance of baseline mental status. Patient medically optimized for discharge with close followup by PCP and Neurology 68F w/ hx of MS bedbound on a baclofen pump, seizure disorder on vimpat who presents with a one day history of decreased PO intake and not as conversive as usual. As symptoms did not improve the pt was brought to the ED.  at bedside reports when this usually occurs she is diagnosed with a UTI. Initial UA w/ +LE, WBCs and culture data from prior admissions notable for resistant Citrobacter, empiric coverage w/ meropenem was started. Patient noted to exhibit unusual behavior suggestive of paranoia (asking to check for a wire, refusing to speak). Of note was suspected to have a seizure episode on 4/15 as she was reported to have a period of blank staring/sighing and subsequently appeared confused and not following instructions. UCx subsequently demonstrated normal marina, meropenem continued for 3d course to avoid creating antibiotic resistance. Neurology consulted to evaluate possible seizure and for further workup of encephalopathy, recommended vEEG which showed no seizure-activity. Patient confusion spontaneously resolved, with maintenance of baseline mental status. Patient medically optimized for discharge with close followup by PCP, Neurology, and Urology.

## 2023-04-16 NOTE — DISCHARGE NOTE PROVIDER - NSDCCPCAREPLAN_GEN_ALL_CORE_FT
PRINCIPAL DISCHARGE DIAGNOSIS  Diagnosis: Encephalopathy  Assessment and Plan of Treatment: You were brought to the hospital because you were noted to be speaking and eating less than usual. As your urine cultures did not grow any unusual bacteria, these symptoms are unlikely to be related to a urinary tract infection. A CT scan of your head did not show any acute concerns. You were evaluated by our neurology team, which recommended _____.      SECONDARY DISCHARGE DIAGNOSES  Diagnosis: Abnormal urinalysis  Assessment and Plan of Treatment: You had an initial urinalysis that was suggestive of a urinary tract infection, as well as a history of resistant UTIs, and thus you were started on a broad antibiotic to cover for a possible recurrent infection. The culture of your urine did not grow any unusual bacteria however, and thus it is unlikely that you had or have a UTI. Your antibiotics were stopped. Please follow up with your primary care doctor and/or urologist to discuss your frequent UTIs, especially if they continue to recur. If you experience fevers, malaise, abdominal/pelvic pain, or burning/bleeding with urination, please seek medical attention.     PRINCIPAL DISCHARGE DIAGNOSIS  Diagnosis: Encephalopathy  Assessment and Plan of Treatment: You were brought to the hospital because you were noted to be speaking and eating less than usual. As your urine cultures did not grow any unusual bacteria, these symptoms are unlikely to be related to a urinary tract infection. A CT scan of your head did not show any acute concerns. vEEG did not show any breathrough seizure activity. You were evaluated by our neurology team, which recommended outpatient followup.      SECONDARY DISCHARGE DIAGNOSES  Diagnosis: Abnormal urinalysis  Assessment and Plan of Treatment: You had an initial urinalysis that was suggestive of a urinary tract infection, as well as a history of resistant UTIs, and thus you were started on a broad antibiotic to cover for a possible recurrent infection. The culture of your urine did not grow any unusual bacteria however, and thus it is unlikely that you had or have a UTI. Your antibiotics were stopped. Please follow up with your primary care doctor and/or urologist to discuss your frequent UTIs, especially if they continue to recur. If you experience fevers, malaise, abdominal/pelvic pain, or burning/bleeding with urination, please seek medical attention.

## 2023-04-16 NOTE — PROGRESS NOTE ADULT - SUBJECTIVE AND OBJECTIVE BOX
Internal Medicine   Cleve Collier | PGY-1    OVERNIGHT EVENTS: Suspected seizure episode - see chart note from yesterday PM.    SUBJECTIVE: Patient assessed at bedside, appears alert, interactive, looking around. Able to follow instructions (finger squeeze, deep inspiration) however appeared nonverbal. Does not respond when referencing symptoms, patient's family, etc. When asked to open her eyes for an exam, she states "why?" - does not elaborate further.    MEDICATIONS  (STANDING):  amLODIPine   Tablet 5 milliGRAM(s) Oral daily  enoxaparin Injectable 40 milliGRAM(s) SubCutaneous every 24 hours  lacosamide 100 milliGRAM(s) Oral two times a day  levothyroxine 75 MICROGram(s) Oral daily  meropenem  IVPB 1000 milliGRAM(s) IV Intermittent every 8 hours  pantoprazole    Tablet 40 milliGRAM(s) Oral before breakfast  senna 2 Tablet(s) Oral at bedtime    MEDICATIONS  (PRN):  acetaminophen     Tablet .. 650 milliGRAM(s) Oral every 6 hours PRN Temp greater or equal to 38C (100.4F), Mild Pain (1 - 3)  aluminum hydroxide/magnesium hydroxide/simethicone Suspension 30 milliLiter(s) Oral every 4 hours PRN Dyspepsia  melatonin 3 milliGRAM(s) Oral at bedtime PRN Insomnia  ondansetron Injectable 4 milliGRAM(s) IV Push every 8 hours PRN Nausea and/or Vomiting    VITALS:  T(F): 98.4 (04-15-23 @ 21:10), Max: 98.4 (04-15-23 @ 21:10)  HR: 95 (04-15-23 @ 21:10) (95 - 96)  BP: 157/87 (04-15-23 @ 21:10) (157/87 - 173/88)  BP(mean): --  RR: 19 (04-15-23 @ 21:10) (18 - 19)  SpO2: 99% (04-15-23 @ 21:10) (97% - 99%)    PHYSICAL EXAM:   GENERAL: NAD, lying in bed comfortably, looking around, making brief eye contact  HEAD: Atraumatic, normocephalic  EYES: EOMI, conjunctiva and sclera clear, no nystagmus noted  ENT: Moist mucous membranes  CHEST/LUNG: Clear to auscultation bilaterally; no rales, rhonchi, wheezing, or rubs; unlabored respirations  HEART: Regular rate and rhythm; no murmurs, rubs, or gallops, normal S1/S2  ABDOMEN: Normal bowel sounds; soft, nontender, nondistended  EXTREMITIES: No pitting edema in bilateral ankles; no clubbing or cyanosis  MSK: No gross deformities noted   NEURO: No focal deficits   SKIN: No rashes or lesions  PSYCH: Normal mood, affect     LABS:                      10.4   9.61  )-----------( 221      ( 16 Apr 2023 06:57 )             32.7     04-16    131<L>  |  92<L>  |  16  ----------------------------<  74  3.8   |  23  |  0.50    Ca    10.4      16 Apr 2023 06:57  Phos  3.0     04-16  Mg     1.60     04-16    TPro  7.5  /  Alb  4.2  /  TBili  0.3  /  DBili  x   /  AST  28  /  ALT  24  /  AlkPhos  156<H>  04-16            Creatinine Trend: 0.50<--, 0.57<--, 0.63<--  I&O's Summary    15 Apr 2023 07:01  -  16 Apr 2023 07:00  --------------------------------------------------------  IN: 0 mL / OUT: 1100 mL / NET: -1100 mL         Internal Medicine   Cleve Collier | PGY-1    OVERNIGHT EVENTS: Suspected seizure episode - see chart note from yesterday PM.    SUBJECTIVE: Patient assessed at bedside, appears alert, interactive, looking around. Able to follow instructions (finger squeeze, deep inspiration) however appeared nonverbal. Does not respond when referencing symptoms, patient's family, etc. When asked to open her eyes for an exam, she states "why?" - does not elaborate further.    MEDICATIONS  (STANDING):  amLODIPine   Tablet 5 milliGRAM(s) Oral daily  enoxaparin Injectable 40 milliGRAM(s) SubCutaneous every 24 hours  lacosamide 100 milliGRAM(s) Oral two times a day  levothyroxine 75 MICROGram(s) Oral daily  meropenem  IVPB 1000 milliGRAM(s) IV Intermittent every 8 hours  pantoprazole    Tablet 40 milliGRAM(s) Oral before breakfast  senna 2 Tablet(s) Oral at bedtime    MEDICATIONS  (PRN):  acetaminophen     Tablet .. 650 milliGRAM(s) Oral every 6 hours PRN Temp greater or equal to 38C (100.4F), Mild Pain (1 - 3)  aluminum hydroxide/magnesium hydroxide/simethicone Suspension 30 milliLiter(s) Oral every 4 hours PRN Dyspepsia  melatonin 3 milliGRAM(s) Oral at bedtime PRN Insomnia  ondansetron Injectable 4 milliGRAM(s) IV Push every 8 hours PRN Nausea and/or Vomiting    VITALS:  T(F): 98.4 (04-15-23 @ 21:10), Max: 98.4 (04-15-23 @ 21:10)  HR: 95 (04-15-23 @ 21:10) (95 - 96)  BP: 157/87 (04-15-23 @ 21:10) (157/87 - 173/88)  BP(mean): --  RR: 19 (04-15-23 @ 21:10) (18 - 19)  SpO2: 99% (04-15-23 @ 21:10) (97% - 99%)    PHYSICAL EXAM:   GENERAL: NAD, lying in bed comfortably, looking around, making brief eye contact  HEAD: Atraumatic, normocephalic  EYES: EOMI, conjunctiva and sclera clear, no nystagmus noted  ENT: Moist mucous membranes  CHEST/LUNG: Clear to auscultation bilaterally; no rales, rhonchi, wheezing, or rubs; unlabored respirations  HEART: Regular rate and rhythm; no murmurs, rubs, or gallops, normal S1/S2  ABDOMEN: Normal bowel sounds; soft, nontender, nondistended  EXTREMITIES: No pitting edema in bilateral ankles; no clubbing or cyanosis  MSK: No gross deformities noted   NEURO: Intact L finger-squeeze, does not wiggle toes; increased UE tone R>L (LUE w/ increased tone compared to prior)  SKIN: No rashes or lesions  PSYCH: Normal mood, affect     LABS:                      10.4   9.61  )-----------( 221      ( 16 Apr 2023 06:57 )             32.7     04-16  131<L>  |  92<L>  |  16  ----------------------------<  74  3.8   |  23  |  0.50    Ca    10.4      16 Apr 2023 06:57  Phos  3.0     04-16  Mg     1.60     04-16    TPro  7.5  /  Alb  4.2  /  TBili  0.3  /  DBili  x   /  AST  28  /  ALT  24  /  AlkPhos  156<H>  04-16    Creatinine Trend: 0.50<--, 0.57<--, 0.63<--    I&O's Summary  15 Apr 2023 07:01  -  16 Apr 2023 07:00  --------------------------------------------------------  IN: 0 mL / OUT: 1100 mL / NET: -1100 mL

## 2023-04-16 NOTE — DISCHARGE NOTE PROVIDER - PROVIDER TOKENS
PROVIDER:[TOKEN:[[id number]],FOLLOWUP:[2 weeks],ESTABLISHEDPATIENT:[T]],PROVIDER:[TOKEN:[[id number]],FOLLOWUP:[Routine],ESTABLISHEDPATIENT:[T]],PROVIDER:[TOKEN:[[id number]],FOLLOWUP:[2 weeks],ESTABLISHEDPATIENT:[T]]

## 2023-04-17 LAB
ALBUMIN SERPL ELPH-MCNC: 3.9 G/DL — SIGNIFICANT CHANGE UP (ref 3.3–5)
ALP SERPL-CCNC: 141 U/L — HIGH (ref 40–120)
ALT FLD-CCNC: 21 U/L — SIGNIFICANT CHANGE UP (ref 4–33)
ANION GAP SERPL CALC-SCNC: 16 MMOL/L — HIGH (ref 7–14)
AST SERPL-CCNC: 29 U/L — SIGNIFICANT CHANGE UP (ref 4–32)
BASOPHILS # BLD AUTO: 0.02 K/UL — SIGNIFICANT CHANGE UP (ref 0–0.2)
BASOPHILS NFR BLD AUTO: 0.3 % — SIGNIFICANT CHANGE UP (ref 0–2)
BILIRUB SERPL-MCNC: 0.3 MG/DL — SIGNIFICANT CHANGE UP (ref 0.2–1.2)
BUN SERPL-MCNC: 13 MG/DL — SIGNIFICANT CHANGE UP (ref 7–23)
CALCIUM SERPL-MCNC: 10.1 MG/DL — SIGNIFICANT CHANGE UP (ref 8.4–10.5)
CHLORIDE SERPL-SCNC: 94 MMOL/L — LOW (ref 98–107)
CO2 SERPL-SCNC: 25 MMOL/L — SIGNIFICANT CHANGE UP (ref 22–31)
CREAT SERPL-MCNC: 0.55 MG/DL — SIGNIFICANT CHANGE UP (ref 0.5–1.3)
EGFR: 101 ML/MIN/1.73M2 — SIGNIFICANT CHANGE UP
EOSINOPHIL # BLD AUTO: 0.09 K/UL — SIGNIFICANT CHANGE UP (ref 0–0.5)
EOSINOPHIL NFR BLD AUTO: 1.3 % — SIGNIFICANT CHANGE UP (ref 0–6)
GLUCOSE SERPL-MCNC: 81 MG/DL — SIGNIFICANT CHANGE UP (ref 70–99)
HCT VFR BLD CALC: 34.3 % — LOW (ref 34.5–45)
HGB BLD-MCNC: 10.9 G/DL — LOW (ref 11.5–15.5)
IANC: 3.85 K/UL — SIGNIFICANT CHANGE UP (ref 1.8–7.4)
IMM GRANULOCYTES NFR BLD AUTO: 0.3 % — SIGNIFICANT CHANGE UP (ref 0–0.9)
LYMPHOCYTES # BLD AUTO: 2.03 K/UL — SIGNIFICANT CHANGE UP (ref 1–3.3)
LYMPHOCYTES # BLD AUTO: 30.4 % — SIGNIFICANT CHANGE UP (ref 13–44)
MAGNESIUM SERPL-MCNC: 1.8 MG/DL — SIGNIFICANT CHANGE UP (ref 1.6–2.6)
MCHC RBC-ENTMCNC: 27.8 PG — SIGNIFICANT CHANGE UP (ref 27–34)
MCHC RBC-ENTMCNC: 31.8 GM/DL — LOW (ref 32–36)
MCV RBC AUTO: 87.5 FL — SIGNIFICANT CHANGE UP (ref 80–100)
MONOCYTES # BLD AUTO: 0.66 K/UL — SIGNIFICANT CHANGE UP (ref 0–0.9)
MONOCYTES NFR BLD AUTO: 9.9 % — SIGNIFICANT CHANGE UP (ref 2–14)
NEUTROPHILS # BLD AUTO: 3.85 K/UL — SIGNIFICANT CHANGE UP (ref 1.8–7.4)
NEUTROPHILS NFR BLD AUTO: 57.8 % — SIGNIFICANT CHANGE UP (ref 43–77)
NRBC # BLD: 0 /100 WBCS — SIGNIFICANT CHANGE UP (ref 0–0)
NRBC # FLD: 0 K/UL — SIGNIFICANT CHANGE UP (ref 0–0)
PHOSPHATE SERPL-MCNC: 2.5 MG/DL — SIGNIFICANT CHANGE UP (ref 2.5–4.5)
PLATELET # BLD AUTO: 212 K/UL — SIGNIFICANT CHANGE UP (ref 150–400)
POTASSIUM SERPL-MCNC: 3.2 MMOL/L — LOW (ref 3.5–5.3)
POTASSIUM SERPL-SCNC: 3.2 MMOL/L — LOW (ref 3.5–5.3)
PROT SERPL-MCNC: 7.1 G/DL — SIGNIFICANT CHANGE UP (ref 6–8.3)
RBC # BLD: 3.92 M/UL — SIGNIFICANT CHANGE UP (ref 3.8–5.2)
RBC # FLD: 17 % — HIGH (ref 10.3–14.5)
SODIUM SERPL-SCNC: 135 MMOL/L — SIGNIFICANT CHANGE UP (ref 135–145)
WBC # BLD: 6.67 K/UL — SIGNIFICANT CHANGE UP (ref 3.8–10.5)
WBC # FLD AUTO: 6.67 K/UL — SIGNIFICANT CHANGE UP (ref 3.8–10.5)

## 2023-04-17 PROCEDURE — 99233 SBSQ HOSP IP/OBS HIGH 50: CPT | Mod: GC

## 2023-04-17 RX ORDER — SODIUM,POTASSIUM PHOSPHATES 278-250MG
1 POWDER IN PACKET (EA) ORAL ONCE
Refills: 0 | Status: COMPLETED | OUTPATIENT
Start: 2023-04-17 | End: 2023-04-17

## 2023-04-17 RX ORDER — POTASSIUM CHLORIDE 20 MEQ
40 PACKET (EA) ORAL ONCE
Refills: 0 | Status: COMPLETED | OUTPATIENT
Start: 2023-04-17 | End: 2023-04-17

## 2023-04-17 RX ADMIN — Medication 75 MICROGRAM(S): at 05:28

## 2023-04-17 RX ADMIN — SENNA PLUS 2 TABLET(S): 8.6 TABLET ORAL at 22:32

## 2023-04-17 RX ADMIN — Medication 40 MILLIEQUIVALENT(S): at 09:54

## 2023-04-17 RX ADMIN — LACOSAMIDE 120 MILLIGRAM(S): 50 TABLET ORAL at 05:23

## 2023-04-17 RX ADMIN — MEROPENEM 100 MILLIGRAM(S): 1 INJECTION INTRAVENOUS at 01:39

## 2023-04-17 RX ADMIN — LACOSAMIDE 120 MILLIGRAM(S): 50 TABLET ORAL at 17:17

## 2023-04-17 RX ADMIN — AMLODIPINE BESYLATE 5 MILLIGRAM(S): 2.5 TABLET ORAL at 05:28

## 2023-04-17 RX ADMIN — Medication 1 PACKET(S): at 09:54

## 2023-04-17 RX ADMIN — ENOXAPARIN SODIUM 40 MILLIGRAM(S): 100 INJECTION SUBCUTANEOUS at 05:28

## 2023-04-17 RX ADMIN — PANTOPRAZOLE SODIUM 40 MILLIGRAM(S): 20 TABLET, DELAYED RELEASE ORAL at 05:31

## 2023-04-17 NOTE — PROGRESS NOTE ADULT - PROBLEM SELECTOR PLAN 7
DVT ppx: subQ lovenox  GI ppx: protonix  Diet: DASH/TLC; easy to chew    Code Status: DNR/DNI; [place]

## 2023-04-17 NOTE — PROGRESS NOTE ADULT - PROBLEM SELECTOR PLAN 2
Chronic. Suspected seizure episode 4/15 as above.  Continue lacosamide 100mg BID  Follow-up neurology recs Chronic. Suspected seizure episode 4/15 as above.  -24hr EEG negative for seizure-like activity  Continue lacosamide 100mg BID  Follow-up neurology recs Chronic. Suspected seizure episode 4/15 as above.  -24hr EEG negative for seizure-like activity  -Continue lacosamide 100mg BID  -Neurology recs no intervention at this time

## 2023-04-17 NOTE — PROGRESS NOTE ADULT - PROBLEM SELECTOR PLAN 4
UA: LE large, WBC 61, epithelial cells 27, Bacteria Few  Prior Ucx: Citrobacter  Current Ucx: normal urogenital marina  Bcx pending  s/p ertapenem in ED  Patient afebrile, no leukocytosis, unlikely to have acute infection given these findings  As penems were started 4/14 PM will continue for another day (thru 4/17) to avoid creating resistance  Hold methenamine UA: LE large, WBC 61, epithelial cells 27, Bacteria Few  Prior Ucx: Citrobacter  Current Ucx: normal urogenital marina  Bcx pending  s/p ertapenem in ED  Patient afebrile, no leukocytosis, unlikely to have acute infection given these findings  As penems were started 4/14 PM will continue for another day (thru 4/17) to avoid creating resistance     -dc'd today  Hold methenamine

## 2023-04-17 NOTE — PROGRESS NOTE ADULT - TIME BILLING
reviewing laboratory data, consultants' recommendations, documentation in [place], performing medically appropriate examinations/evaluations, discussion with patient/family/RN/ACP/Residents and interdisciplinary staff (such as , social workers, etc), counseling patient/family/care giver, ordering medically appropriate medication, tests, or procedures. Interventions were performed as documented above.

## 2023-04-17 NOTE — PROGRESS NOTE ADULT - ASSESSMENT
68F w/ hx multiple sclerosis (bedbound on baclofen pump), seizure disorder (on vimpat) who presents w/ 1d of decreased responsiveness and PO intake likely 2/2 UTI. 68F w/ hx multiple sclerosis (bedbound on baclofen pump), seizure disorder (on vimpat) who presents w/ 1d of decreased responsiveness and PO intake thought to be 2/2 UTI. All infectious workup ngtd, mental status improved

## 2023-04-17 NOTE — PROGRESS NOTE ADULT - PROBLEM SELECTOR PLAN 1
C/f paranoia and hallucinations, pt asked on day of admission to look behind the ear for a wire.  Per  patient has previously been paranoid during hospitalizations (concern that other patients are using her moreno bag, that her food was being poisoned, etc.)  Suspected seizure episode 4/15 PM (see chart note), patient notably less verbal since  Given low suspicion for active infection as below, suspect neuro etiology given hx of mult. sclerosis, seizure d/o  Neuro consulted for further evaluation, EEG ordered, will f/u recs  Consider psych eval however patient's  had declined on previous admission C/f paranoia and hallucinations, pt asked on day of admission to look behind the ear for a wire.  -Resolved  -Per  patient has previously been paranoid during hospitalizations (concern that other patients are using her moreno bag, that her food was being poisoned, etc.)  -Suspected seizure episode 4/15 PM (see chart note), patient notably less verbal since     -24hr EEG negative  -Neuro consulted for further evaluation     -recommend no further intervention  Consider psych eval however patient's  had declined on previous admission

## 2023-04-17 NOTE — PROGRESS NOTE ADULT - SUBJECTIVE AND OBJECTIVE BOX
INCOMPLETE    INTERVAL:  SUBJECTIVE: Patient examined bedside this AM.    OBJECTIVE:  ICU Vital Signs Last 24 Hrs  T(C): 36.6 (17 Apr 2023 05:18), Max: 36.7 (16 Apr 2023 13:30)  T(F): 97.9 (17 Apr 2023 05:18), Max: 98.1 (16 Apr 2023 20:55)  HR: 100 (17 Apr 2023 05:18) (97 - 100)  BP: 161/73 (17 Apr 2023 05:18) (117/74 - 161/73)  BP(mean): --  ABP: --  ABP(mean): --  RR: 17 (17 Apr 2023 05:18) (17 - 18)  SpO2: 100% (17 Apr 2023 05:18) (98% - 100%)    O2 Parameters below as of 17 Apr 2023 05:18  Patient On (Oxygen Delivery Method): room air              04-16 @ 07:01  -  04-17 @ 07:00  --------------------------------------------------------  IN: 0 mL / OUT: 1000 mL / NET: -1000 mL      CAPILLARY BLOOD GLUCOSE          PHYSICAL EXAM:  General: Well-groomed, NAD, laying in bed, on RA  HEENT: PERRLA, EOMI, non-icteric  Neck:  symmetric,  JVD absent  Respiratory: Clear to ascultation bilaterally, no crackles/rales, no Resp distress; no accessory muscle use  Cardiovascular:  RRR, no murmurs/rubs/gallops  Abdomen: Soft, NT, ND  Extremities: No edema noted  Skin: No rashes or lesions noted  Neurological: Sensation grossly intact; strength 5/5 in all extremities.  Psychiatry: AOx3, appropriate insight/judgement, appropriate affect, recent/remote memory intact    PRN Meds:  acetaminophen     Tablet .. 650 milliGRAM(s) Oral every 6 hours PRN  aluminum hydroxide/magnesium hydroxide/simethicone Suspension 30 milliLiter(s) Oral every 4 hours PRN  melatonin 3 milliGRAM(s) Oral at bedtime PRN  ondansetron Injectable 4 milliGRAM(s) IV Push every 8 hours PRN      LABS:                        10.9   6.67  )-----------( 212      ( 17 Apr 2023 06:05 )             34.3     Hgb Trend: 10.9<--, 10.4<--, 10.9<--, 10.8<--  04-17    135  |  94<L>  |  13  ----------------------------<  81  3.2<L>   |  25  |  0.55    Ca    10.1      17 Apr 2023 06:05  Phos  2.5     04-17  Mg     1.80     04-17    TPro  7.1  /  Alb  3.9  /  TBili  0.3  /  DBili  x   /  AST  29  /  ALT  21  /  AlkPhos  141<H>  04-17    Creatinine Trend: 0.55<--, 0.50<--, 0.57<--, 0.63<--            MICROBIOLOGY:     Culture - Urine (collected 14 Apr 2023 14:06)  Source: Catheterized Catheterized  Final Report (16 Apr 2023 10:25):    <10,000 CFU/mL Normal Urogenital Dorothy    Culture - Blood (collected 14 Apr 2023 12:53)  Source: .Blood Blood-Peripheral  Preliminary Report (15 Apr 2023 19:01):    No growth to date.    Culture - Blood (collected 14 Apr 2023 12:40)  Source: .Blood Blood-Venous  Preliminary Report (15 Apr 2023 19:01):    No growth to date.        RADIOLOGY:  [ ] Reviewed and interpreted by me    EKG: INTERVAL: NAEO  SUBJECTIVE: Patient examined bedside this AM. States she is feeling well, no concerns. Denies Fevers, chills, SOB, cough, chest pain, abdominal pain, headache, dizziness, nausea, vomiting, dysuria, changes in urination, changes in BM     OBJECTIVE:  ICU Vital Signs Last 24 Hrs  T(C): 36.6 (17 Apr 2023 05:18), Max: 36.7 (16 Apr 2023 13:30)  T(F): 97.9 (17 Apr 2023 05:18), Max: 98.1 (16 Apr 2023 20:55)  HR: 100 (17 Apr 2023 05:18) (97 - 100)  BP: 161/73 (17 Apr 2023 05:18) (117/74 - 161/73)  BP(mean): --  ABP: --  ABP(mean): --  RR: 17 (17 Apr 2023 05:18) (17 - 18)  SpO2: 100% (17 Apr 2023 05:18) (98% - 100%)    O2 Parameters below as of 17 Apr 2023 05:18  Patient On (Oxygen Delivery Method): room air              04-16 @ 07:01  -  04-17 @ 07:00  --------------------------------------------------------  IN: 0 mL / OUT: 1000 mL / NET: -1000 mL      CAPILLARY BLOOD GLUCOSE          PHYSICAL EXAM:  General: Well-groomed, NAD, laying in bed, on RA  HEENT: PERRLA, EOMI, non-icteric  Neck:  symmetric,  JVD absent  Respiratory: Clear to ascultation bilaterally, no crackles/rales, no Resp distress; no accessory muscle use  Cardiovascular:  RRR, no murmurs/rubs/gallops  Abdomen: Soft, NT, ND  Extremities: No edema noted  Skin: No rashes or lesions noted  Neurological: Sensation grossly intact  Psychiatry: AOx3, appropriate insight/judgement, appropriate affect, recent/remote memory intact    PRN Meds:  acetaminophen     Tablet .. 650 milliGRAM(s) Oral every 6 hours PRN  aluminum hydroxide/magnesium hydroxide/simethicone Suspension 30 milliLiter(s) Oral every 4 hours PRN  melatonin 3 milliGRAM(s) Oral at bedtime PRN  ondansetron Injectable 4 milliGRAM(s) IV Push every 8 hours PRN      LABS:                        10.9   6.67  )-----------( 212      ( 17 Apr 2023 06:05 )             34.3     Hgb Trend: 10.9<--, 10.4<--, 10.9<--, 10.8<--  04-17    135  |  94<L>  |  13  ----------------------------<  81  3.2<L>   |  25  |  0.55    Ca    10.1      17 Apr 2023 06:05  Phos  2.5     04-17  Mg     1.80     04-17    TPro  7.1  /  Alb  3.9  /  TBili  0.3  /  DBili  x   /  AST  29  /  ALT  21  /  AlkPhos  141<H>  04-17    Creatinine Trend: 0.55<--, 0.50<--, 0.57<--, 0.63<--            MICROBIOLOGY:     Culture - Urine (collected 14 Apr 2023 14:06)  Source: Catheterized Catheterized  Final Report (16 Apr 2023 10:25):    <10,000 CFU/mL Normal Urogenital Dorothy    Culture - Blood (collected 14 Apr 2023 12:53)  Source: .Blood Blood-Peripheral  Preliminary Report (15 Apr 2023 19:01):    No growth to date.    Culture - Blood (collected 14 Apr 2023 12:40)  Source: .Blood Blood-Venous  Preliminary Report (15 Apr 2023 19:01):    No growth to date.        RADIOLOGY:  [ ] Reviewed and interpreted by me    EKG:

## 2023-04-17 NOTE — EEG REPORT - NS EEG TEXT BOX
RAMIRO CASTAÑEDA [id number]     Study Date: 04-16-23 1626 to 04-17-23 1418  Duration x Hours: 22h  --------------------------------------------------------------------------------------------------  History:  CC/ HPI Patient is a 68y old  Female who presents with a chief complaint of UTI (17 Apr 2023 09:08)    MEDICATIONS  (STANDING):  amLODIPine   Tablet 5 milliGRAM(s) Oral daily  enoxaparin Injectable 40 milliGRAM(s) SubCutaneous every 24 hours  lacosamide IVPB 100 milliGRAM(s) IV Intermittent every 12 hours  levothyroxine 75 MICROGram(s) Oral daily  pantoprazole    Tablet 40 milliGRAM(s) Oral before breakfast  senna 2 Tablet(s) Oral at bedtime    --------------------------------------------------------------------------------------------------  Study Interpretation:    [[[Abbreviation Key:  PDR=alpha rhythm/posterior dominant rhythm. A-P=anterior posterior.  Amplitude: ‘very low’:<20; ‘low’:20-49; ‘medium’:; ‘high’:>150uV.  Persistence for periodic/rhythmic patterns (% of epoch) ‘rare’:<1%; ‘occasional’:1-10%; ‘frequent’:10-50%; ‘abundant’:50-90%; ‘continuous’:>90%.  Persistence for sporadic discharges: ‘rare’:<1/hr; ‘occasional’:1/min-1/hr; ‘frequent’:>1/min; ‘abundant’:>1/10 sec.  RPP=rhythmic and periodic patterns; GRDA=generalized rhythmic delta activity; FIRDA=frontal intermittent GRDA; LRDA=lateralized rhythmic delta activity; TIRDA=temporal intermittent rhythmic delta activity;  LPD=PLED=lateralized periodic discharges; GPD=generalized periodic discharges; BIPDs =bilateral independent periodic discharges; Mf=multifocal; SIRPDs=stimulus induced rhythmic, periodic, or ictal appearing discharges; BIRDs=brief potentially ictal rhythmic discharges >4 Hz, lasting .5-10s; PFA (paroxysmal bursts >13 Hz or =8 Hz <10s).  Modifiers: +F=with fast component; +S=with spike component; +R=with rhythmic component.  S-B=burst suppression pattern.  Max=maximal. N1-drowsy; N2-stage II sleep; N3-slow wave sleep. SSS/BETS=small sharp spikes/benign epileptiform transients of sleep. HV=hyperventilation; PS=photic stimulation]]]    Daily EEG Visual Analysis    FINDINGS:      Background:  Continuity: continuous  Symmetry: symmetric  PDR: 8 Hz activity, with amplitude to 40 uV, that attenuated to eye opening.  Low amplitude frontal beta noted in wakefulness.  Reactivity: present  Voltage: normal, mostly 20-150uV  Anterior Posterior Gradient: present  Other background findings: none  Breach: absent    Background Slowing:  Generalized slowing: intermittent, generalized, rhythmic delta activity with a frontal predominance was present.  Focal slowing: none was present.    State Changes:   -Drowsiness noted with increased slowing, attenuation of fast activity, vertex transients.  -Present with N2 sleep transients with symmetric spindles and K-complexes.    Sporadic Epileptiform Discharges:    None    Rhythmic and Periodic Patterns (RPPs):  None     Electrographic and Electroclinical seizures:  None    Other Clinical Events:  None    Activation Procedures:   -Hyperventilation was not performed.    -Photic stimulation was not performed.    Artifacts:  Intermittent myogenic and movement artifacts were noted.    ECG:  The heart rate on single channel ECG was predominantly between 60-70 BPM.    EEG Classification / Summary:  Abnormal EEG in the awake / drowsy / asleep state(s).  -Intermittent, generalized, rhythmic delta activity with a frontal predominance  -Mild, generalized background slowing    Clinical Impression:  -Generalized, rhythmic delta activity with a frontal predominance is a nonspecific sign but may be seen in the setting of encephalopathy.  -Mild, diffuse, and nonspecific neuronal dysfunction.  There were no epileptiform abnormalities recorded.      -------------------------------------------------------------------------------------------------------  [facility] EEG Reading Room Ph#: [phone]  Epilepsy Answering Service after 5PM and before 8:30AM: Ph#: [phone]    Kiara Gomes MD  PGY-6 Pediatric Epilepsy    ***THIS IS A PRELIMINARY FELLOW REPORT PENDING REVIEW WITH ATTENDING EPILEPTOLOGIST***  	   RAMIRO CASTAÑEDA [id number]     Study Date: 04-16-23 13:37 to 04-17-23 14:18  Duration x Hours: 24 hr 23 min  --------------------------------------------------------------------------------------------------  History:  CC/ HPI Patient is a 68y old  Female who presents with a chief complaint of UTI (17 Apr 2023 09:08)    MEDICATIONS  (STANDING):  amLODIPine   Tablet 5 milliGRAM(s) Oral daily  enoxaparin Injectable 40 milliGRAM(s) SubCutaneous every 24 hours  lacosamide IVPB 100 milliGRAM(s) IV Intermittent every 12 hours  levothyroxine 75 MICROGram(s) Oral daily  pantoprazole    Tablet 40 milliGRAM(s) Oral before breakfast  senna 2 Tablet(s) Oral at bedtime    --------------------------------------------------------------------------------------------------  Study Interpretation:    [[[Abbreviation Key:  PDR=alpha rhythm/posterior dominant rhythm. A-P=anterior posterior.  Amplitude: ‘very low’:<20; ‘low’:20-49; ‘medium’:; ‘high’:>150uV.  Persistence for periodic/rhythmic patterns (% of epoch) ‘rare’:<1%; ‘occasional’:1-10%; ‘frequent’:10-50%; ‘abundant’:50-90%; ‘continuous’:>90%.  Persistence for sporadic discharges: ‘rare’:<1/hr; ‘occasional’:1/min-1/hr; ‘frequent’:>1/min; ‘abundant’:>1/10 sec.  RPP=rhythmic and periodic patterns; GRDA=generalized rhythmic delta activity; FIRDA=frontal intermittent GRDA; LRDA=lateralized rhythmic delta activity; TIRDA=temporal intermittent rhythmic delta activity;  LPD=PLED=lateralized periodic discharges; GPD=generalized periodic discharges; BIPDs =bilateral independent periodic discharges; Mf=multifocal; SIRPDs=stimulus induced rhythmic, periodic, or ictal appearing discharges; BIRDs=brief potentially ictal rhythmic discharges >4 Hz, lasting .5-10s; PFA (paroxysmal bursts >13 Hz or =8 Hz <10s).  Modifiers: +F=with fast component; +S=with spike component; +R=with rhythmic component.  S-B=burst suppression pattern.  Max=maximal. N1-drowsy; N2-stage II sleep; N3-slow wave sleep. SSS/BETS=small sharp spikes/benign epileptiform transients of sleep. HV=hyperventilation; PS=photic stimulation]]]    Daily EEG Visual Analysis    FINDINGS:      Background:  Continuity: continuous  Symmetry: symmetric  PDR: 8-Hz activity that attenuated to eye opening. Symmetric low-amplitude frontal beta in wakefulness.  State change: present  Voltage: normal  Anterior Posterior Gradient: present  Other background findings: Intermittent diffuse polymorphic delta slowing in wakefulness. Occasional frontally predominant generalized rhythmic delta activity (GRDA) at 1-1.5 Hz.  Breach: absent    Background Slowing:  Generalized slowing: As above  Focal slowing: none was present.    State Changes:   -Drowsiness was characterized by slowing of the background activity and increase in prevalence of GRDA.  -Rudimentary stage 2 sleep was characterized by symmetric K complexes and rudimentary sleep spindles.    Sporadic Epileptiform Discharges:    None    Rhythmic and Periodic Patterns (RPPs):  None     Electrographic and Electroclinical seizures:  None    Other Clinical Events:  None    Activation Procedures:   -Hyperventilation was not performed.    -Photic stimulation was not performed.    Artifacts:  Intermittent myogenic and movement artifacts were present.    ECG:  Single-lead ECG showed regular rhythm at  bpm.    EEG Classification / Summary:  Abnormal EEG in the awake / drowsy / asleep state(s).  -Mild, generalized background slowing and GRDA    Clinical Impression:  -These findings indicate mild, diffuse, and nonspecific cerebral dysfunction.  -There were no epileptiform abnormalities recorded.      -------------------------------------------------------------------------------------------------------  [facility] EEG Reading Room Ph#: [phone]  Epilepsy Answering Service after 5PM and before 8:30AM: Ph#: [phone]    Kiara Gomes MD  PGY-6 Pediatric Epilepsy    Ragini Cifuentes MD  Attending Physician, [facility] Epilepsy [place]

## 2023-04-17 NOTE — PROGRESS NOTE ADULT - PROBLEM SELECTOR PLAN 3
Chronic. At baseline w/ contracted R elbow, now with new increased tone in LUE as well..  Continue baclofen pump - last filled 3/1, next refill due 5/26  Monitor

## 2023-04-18 ENCOUNTER — TRANSCRIPTION ENCOUNTER (OUTPATIENT)
Age: 68
End: 2023-04-18

## 2023-04-18 VITALS
HEART RATE: 60 BPM | OXYGEN SATURATION: 98 % | RESPIRATION RATE: 17 BRPM | SYSTOLIC BLOOD PRESSURE: 126 MMHG | TEMPERATURE: 97 F | DIASTOLIC BLOOD PRESSURE: 60 MMHG

## 2023-04-18 LAB
ANION GAP SERPL CALC-SCNC: 11 MMOL/L — SIGNIFICANT CHANGE UP (ref 7–14)
BUN SERPL-MCNC: 12 MG/DL — SIGNIFICANT CHANGE UP (ref 7–23)
CALCIUM SERPL-MCNC: 9.9 MG/DL — SIGNIFICANT CHANGE UP (ref 8.4–10.5)
CHLORIDE SERPL-SCNC: 100 MMOL/L — SIGNIFICANT CHANGE UP (ref 98–107)
CO2 SERPL-SCNC: 28 MMOL/L — SIGNIFICANT CHANGE UP (ref 22–31)
CREAT SERPL-MCNC: 0.54 MG/DL — SIGNIFICANT CHANGE UP (ref 0.5–1.3)
EGFR: 101 ML/MIN/1.73M2 — SIGNIFICANT CHANGE UP
GLUCOSE SERPL-MCNC: 71 MG/DL — SIGNIFICANT CHANGE UP (ref 70–99)
HCT VFR BLD CALC: 34.3 % — LOW (ref 34.5–45)
HGB BLD-MCNC: 10.7 G/DL — LOW (ref 11.5–15.5)
MAGNESIUM SERPL-MCNC: 2 MG/DL — SIGNIFICANT CHANGE UP (ref 1.6–2.6)
MCHC RBC-ENTMCNC: 28.5 PG — SIGNIFICANT CHANGE UP (ref 27–34)
MCHC RBC-ENTMCNC: 31.2 GM/DL — LOW (ref 32–36)
MCV RBC AUTO: 91.2 FL — SIGNIFICANT CHANGE UP (ref 80–100)
NRBC # BLD: 0 /100 WBCS — SIGNIFICANT CHANGE UP (ref 0–0)
NRBC # FLD: 0 K/UL — SIGNIFICANT CHANGE UP (ref 0–0)
PHOSPHATE SERPL-MCNC: 3.2 MG/DL — SIGNIFICANT CHANGE UP (ref 2.5–4.5)
PLATELET # BLD AUTO: 235 K/UL — SIGNIFICANT CHANGE UP (ref 150–400)
POTASSIUM SERPL-MCNC: 4.3 MMOL/L — SIGNIFICANT CHANGE UP (ref 3.5–5.3)
POTASSIUM SERPL-SCNC: 4.3 MMOL/L — SIGNIFICANT CHANGE UP (ref 3.5–5.3)
RBC # BLD: 3.76 M/UL — LOW (ref 3.8–5.2)
RBC # FLD: 17.4 % — HIGH (ref 10.3–14.5)
SODIUM SERPL-SCNC: 139 MMOL/L — SIGNIFICANT CHANGE UP (ref 135–145)
WBC # BLD: 6.61 K/UL — SIGNIFICANT CHANGE UP (ref 3.8–10.5)
WBC # FLD AUTO: 6.61 K/UL — SIGNIFICANT CHANGE UP (ref 3.8–10.5)

## 2023-04-18 PROCEDURE — 99239 HOSP IP/OBS DSCHRG MGMT >30: CPT | Mod: GC

## 2023-04-18 NOTE — CHART NOTE - NSCHARTNOTEFT_GEN_A_CORE
EEG REPORT:  Study Date: 04-16-23 13:37 to 04-17-23 14:18  Duration x Hours: 24 hr 23 min    EEG Classification / Summary:  Abnormal EEG in the awake / drowsy / asleep state(s).  -Mild, generalized background slowing and GRDA (generalized rhythmic delta activity, a/w encephalopathic states due to various etiologies like toxic, metabolic, or infectious)    Clinical Impression:  -These findings indicate mild, diffuse, and nonspecific cerebral dysfunction.  -There were no epileptiform abnormalities recorded.      --  Impression: Suspect breakthrough seizure provoked by infectious etiology.     Recommendations:  - EEG discontinued  - Transition lacosamide from IV to PO after infection controlled and no longer requiring antimicrobials associated with seizures (i.e., carbapenems)  - No further inpatient neurological workup or management indicated at this time  - Routine follow-up with her neurologist. If no established neurologist, patient can follow-up at [facility] outpatient neurology clinic.    Neurology  [address], [address]  [place] [address]  Phone: [phone]  Fax: [phone]  Follow Up Time: routine      Neurology signing off. Please reconsult PRN or call [facility] [phone] with any questions.       -  Srinath Mota MD  PGY-3 Neurology EEG REPORT:  Study Date: 04-16-23 13:37 to 04-17-23 14:18  Duration x Hours: 24 hr 23 min    EEG Classification / Summary:  Abnormal EEG in the awake / drowsy / asleep state(s).  -Mild, generalized background slowing and GRDA (generalized rhythmic delta activity, a/w encephalopathic states due to various etiologies like toxic, metabolic, or infectious)    Clinical Impression:  -These findings indicate mild, diffuse, and nonspecific cerebral dysfunction.  -There were no epileptiform abnormalities recorded.      --  Impression: Suspect breakthrough seizure provoked by infectious etiology.     Recommendations:  - EEG discontinued  - Transition lacosamide from IV to PO after infection controlled and no longer requiring antimicrobials associated with seizures (i.e., carbapenems)  - No further inpatient neurological workup or management indicated at this time  - Routine follow-up with her neurologist. If no established neurologist, patient can follow-up at [facility] outpatient neurology clinic.    Neurology  [address], [address]  [place] [address]  Phone: [phone]  Fax: [phone]  Follow Up Time: routine      Neurology signing off. Please reconsult PRN or call [facility] [phone] with any questions.       -  Srinath Mota MD  PGY-3 Neurology    Case discussed with resident.  Agree with assessment and plan above.

## 2023-04-18 NOTE — PROGRESS NOTE ADULT - PROBLEM SELECTOR PLAN 7
DVT ppx: subQ lovenox  GI ppx: protonix  Diet: DASH/TLC; easy to chew    Code Status: DNR/DNI; Congregation

## 2023-04-18 NOTE — PROGRESS NOTE ADULT - PROBLEM SELECTOR PLAN 4
UA: LE large, WBC 61, epithelial cells 27, Bacteria Few  Prior Ucx: Citrobacter  Current Ucx: normal urogenital marina  Bcx pending  s/p ertapenem in ED  Patient afebrile, no leukocytosis, unlikely to have acute infection given these findings  As penems were started 4/14 PM will continue for another day (thru 4/17) to avoid creating resistance     -dc'd today  Hold methenamine

## 2023-04-18 NOTE — DISCHARGE NOTE NURSING/CASE MANAGEMENT/SOCIAL WORK - NSDCFUADDAPPT_GEN_ALL_CORE_FT
APPTS ARE READY TO BE MADE: [X ] YES    Best Family or Patient Contact (if needed): Yoni () - [phone]    Additional Information about above appointments (if needed):    1: PCP in 2 weeks for post-hospital visit  2: Neurology in 2 weeks for management of seizures and MS  3: Urology routine followup for management of frequent UTIs    Other comments or requests:

## 2023-04-18 NOTE — PROGRESS NOTE ADULT - PROBLEM SELECTOR PLAN 1
C/f paranoia and hallucinations, pt asked on day of admission to look behind the ear for a wire.  -Resolved  -Per  patient has previously been paranoid during hospitalizations (concern that other patients are using her moreno bag, that her food was being poisoned, etc.)  -Suspected seizure episode 4/15 PM (see chart note), patient notably less verbal since     -24hr EEG negative  -Neuro consulted for further evaluation     -recommend no further intervention  Consider psych eval however patient's  had declined on previous admission

## 2023-04-18 NOTE — PROGRESS NOTE ADULT - ATTENDING COMMENTS
patient seen and examined today. eeg done w/o active seizure. d/c home today. please refer to d/c sum.
68F with h/o MS and bedbound who presented with AMS and concern for seizure secondary to UTI. at this time mental status improved, back to baseline. empirically treated for UTI, but urine culture did not isolate any organism. hold further abx. f/u neuro recs. if no recs to change patient AED, will plan for d/c home.
68F w/ hx multiple sclerosis (bedbound on baclofen pump), seizure disorder (on vimpat) who presents w/ 1d of decreased responsiveness and PO intake likely 2/2 UTI.  So far , UC no growth , Blood cultures, no growth , unclear UTI , will complete 3 days atb D2/3 today.  Unclear break trough seizures, patient noted declining mentally progressive over the last month, concern of progress MS , will consult Neurology to advice further imagining, labs.  D/w  at bedside.
68F w/ hx multiple sclerosis (bedbound on baclofen pump), seizure disorder (on vimpat) who presents w/ 1d of decreased responsiveness and PO intake likely 2/2 UTI.  Patient known to me from previous admissions , d/w  at bedside, mental status  improved soon after antibiotics (  saying , this time I catch the infection on time, he shares that recently patient had visitors and hold the urine , which increased risk of UTI)  Will c/w empirical atb  F/w cultures   # Acute/ chronic encephalopathy sec to UTI  # recurrent UTI   # Multiple sclerosis, SZD  # DVT px - as bedbound

## 2023-04-18 NOTE — PROGRESS NOTE ADULT - ASSESSMENT
68F w/ hx multiple sclerosis (bedbound on baclofen pump), seizure disorder (on vimpat) who presents w/ 1d of decreased responsiveness and PO intake thought to be 2/2 UTI. All infectious workup ngtd, mental status improved

## 2023-04-18 NOTE — DISCHARGE NOTE NURSING/CASE MANAGEMENT/SOCIAL WORK - PATIENT PORTAL LINK FT
You can access the FollowMyHealth Patient Portal offered by [facility] by registering at the following website: [email or web address]. By joining [facility]’s FollowMyHealth portal, you will also be able to view your health information using other applications (apps) compatible with our system.

## 2023-04-18 NOTE — PROGRESS NOTE ADULT - PROBLEM SELECTOR PLAN 2
Chronic. Suspected seizure episode 4/15 as above.  -24hr EEG negative for seizure-like activity  -Continue lacosamide 100mg BID  -Neurology recs no intervention at this time

## 2023-04-18 NOTE — DISCHARGE NOTE NURSING/CASE MANAGEMENT/SOCIAL WORK - NSDCPEFALRISK_GEN_ALL_CORE
For information on Fall & Injury Prevention, visit: [email or web address] OR  [email or web address] OR  https://www.cdc.gov/steadi/patient.html

## 2023-04-19 ENCOUNTER — NON-APPOINTMENT (OUTPATIENT)
Age: 68
End: 2023-04-19

## 2023-05-05 ENCOUNTER — INPATIENT (INPATIENT)
Facility: HOSPITAL | Age: 68
LOS: 6 days | Discharge: HOME HEALTH | End: 2023-05-12
Attending: STUDENT IN AN ORGANIZED HEALTH CARE EDUCATION/TRAINING PROGRAM | Admitting: STUDENT IN AN ORGANIZED HEALTH CARE EDUCATION/TRAINING PROGRAM
Payer: MEDICARE

## 2023-05-05 VITALS
OXYGEN SATURATION: 100 % | HEART RATE: 82 BPM | RESPIRATION RATE: 18 BRPM | SYSTOLIC BLOOD PRESSURE: 153 MMHG | HEIGHT: 67 IN | TEMPERATURE: 97 F | DIASTOLIC BLOOD PRESSURE: 77 MMHG

## 2023-05-05 DIAGNOSIS — E87.1 HYPO-OSMOLALITY AND HYPONATREMIA: ICD-10-CM

## 2023-05-05 DIAGNOSIS — N39.0 URINARY TRACT INFECTION, SITE NOT SPECIFIED: ICD-10-CM

## 2023-05-05 DIAGNOSIS — R41.82 ALTERED MENTAL STATUS, UNSPECIFIED: ICD-10-CM

## 2023-05-05 DIAGNOSIS — K59.00 CONSTIPATION, UNSPECIFIED: ICD-10-CM

## 2023-05-05 DIAGNOSIS — R33.9 RETENTION OF URINE, UNSPECIFIED: ICD-10-CM

## 2023-05-05 DIAGNOSIS — G40.909 EPILEPSY, UNSPECIFIED, NOT INTRACTABLE, WITHOUT STATUS EPILEPTICUS: ICD-10-CM

## 2023-05-05 DIAGNOSIS — G35 MULTIPLE SCLEROSIS: ICD-10-CM

## 2023-05-05 DIAGNOSIS — Z29.9 ENCOUNTER FOR PROPHYLACTIC MEASURES, UNSPECIFIED: ICD-10-CM

## 2023-05-05 DIAGNOSIS — G93.40 ENCEPHALOPATHY, UNSPECIFIED: ICD-10-CM

## 2023-05-05 DIAGNOSIS — K21.9 GASTRO-ESOPHAGEAL REFLUX DISEASE WITHOUT ESOPHAGITIS: ICD-10-CM

## 2023-05-05 DIAGNOSIS — I10 ESSENTIAL (PRIMARY) HYPERTENSION: ICD-10-CM

## 2023-05-05 PROBLEM — Z87.440 PERSONAL HISTORY OF URINARY (TRACT) INFECTIONS: Chronic | Status: ACTIVE | Noted: 2023-04-14

## 2023-05-05 LAB
ALBUMIN SERPL ELPH-MCNC: 4 G/DL — SIGNIFICANT CHANGE UP (ref 3.3–5)
ALP SERPL-CCNC: 169 U/L — HIGH (ref 40–120)
ALT FLD-CCNC: 27 U/L — SIGNIFICANT CHANGE UP (ref 4–33)
ANION GAP SERPL CALC-SCNC: 11 MMOL/L — SIGNIFICANT CHANGE UP (ref 7–14)
ANION GAP SERPL CALC-SCNC: 12 MMOL/L — SIGNIFICANT CHANGE UP (ref 7–14)
ANION GAP SERPL CALC-SCNC: 12 MMOL/L — SIGNIFICANT CHANGE UP (ref 7–14)
ANION GAP SERPL CALC-SCNC: 15 MMOL/L — HIGH (ref 7–14)
APPEARANCE UR: ABNORMAL
AST SERPL-CCNC: 32 U/L — SIGNIFICANT CHANGE UP (ref 4–32)
B PERT DNA SPEC QL NAA+PROBE: SIGNIFICANT CHANGE UP
B PERT+PARAPERT DNA PNL SPEC NAA+PROBE: SIGNIFICANT CHANGE UP
BACTERIA # UR AUTO: ABNORMAL
BASOPHILS # BLD AUTO: 0.03 K/UL — SIGNIFICANT CHANGE UP (ref 0–0.2)
BASOPHILS NFR BLD AUTO: 0.4 % — SIGNIFICANT CHANGE UP (ref 0–2)
BILIRUB SERPL-MCNC: 0.3 MG/DL — SIGNIFICANT CHANGE UP (ref 0.2–1.2)
BILIRUB UR-MCNC: NEGATIVE — SIGNIFICANT CHANGE UP
BLOOD GAS VENOUS COMPREHENSIVE RESULT: SIGNIFICANT CHANGE UP
BORDETELLA PARAPERTUSSIS (RAPRVP): SIGNIFICANT CHANGE UP
BUN SERPL-MCNC: 10 MG/DL — SIGNIFICANT CHANGE UP (ref 7–23)
BUN SERPL-MCNC: 10 MG/DL — SIGNIFICANT CHANGE UP (ref 7–23)
BUN SERPL-MCNC: 8 MG/DL — SIGNIFICANT CHANGE UP (ref 7–23)
BUN SERPL-MCNC: 9 MG/DL — SIGNIFICANT CHANGE UP (ref 7–23)
C PNEUM DNA SPEC QL NAA+PROBE: SIGNIFICANT CHANGE UP
CALCIUM SERPL-MCNC: 10 MG/DL — SIGNIFICANT CHANGE UP (ref 8.4–10.5)
CALCIUM SERPL-MCNC: 8.9 MG/DL — SIGNIFICANT CHANGE UP (ref 8.4–10.5)
CALCIUM SERPL-MCNC: 9.4 MG/DL — SIGNIFICANT CHANGE UP (ref 8.4–10.5)
CALCIUM SERPL-MCNC: 9.4 MG/DL — SIGNIFICANT CHANGE UP (ref 8.4–10.5)
CHLORIDE SERPL-SCNC: 88 MMOL/L — LOW (ref 98–107)
CHLORIDE SERPL-SCNC: 90 MMOL/L — LOW (ref 98–107)
CHLORIDE SERPL-SCNC: 90 MMOL/L — LOW (ref 98–107)
CHLORIDE SERPL-SCNC: 91 MMOL/L — LOW (ref 98–107)
CO2 SERPL-SCNC: 20 MMOL/L — LOW (ref 22–31)
CO2 SERPL-SCNC: 22 MMOL/L — SIGNIFICANT CHANGE UP (ref 22–31)
CO2 SERPL-SCNC: 23 MMOL/L — SIGNIFICANT CHANGE UP (ref 22–31)
CO2 SERPL-SCNC: 23 MMOL/L — SIGNIFICANT CHANGE UP (ref 22–31)
COLOR SPEC: SIGNIFICANT CHANGE UP
COMMENT - URINE: SIGNIFICANT CHANGE UP
CREAT SERPL-MCNC: 0.35 MG/DL — LOW (ref 0.5–1.3)
CREAT SERPL-MCNC: 0.4 MG/DL — LOW (ref 0.5–1.3)
CREAT SERPL-MCNC: 0.42 MG/DL — LOW (ref 0.5–1.3)
CREAT SERPL-MCNC: 0.43 MG/DL — LOW (ref 0.5–1.3)
DIFF PNL FLD: NEGATIVE — SIGNIFICANT CHANGE UP
EGFR: 107 ML/MIN/1.73M2 — SIGNIFICANT CHANGE UP
EGFR: 108 ML/MIN/1.73M2 — SIGNIFICANT CHANGE UP
EGFR: 109 ML/MIN/1.73M2 — SIGNIFICANT CHANGE UP
EGFR: 113 ML/MIN/1.73M2 — SIGNIFICANT CHANGE UP
EOSINOPHIL # BLD AUTO: 0.03 K/UL — SIGNIFICANT CHANGE UP (ref 0–0.5)
EOSINOPHIL NFR BLD AUTO: 0.4 % — SIGNIFICANT CHANGE UP (ref 0–6)
EPI CELLS # UR: SIGNIFICANT CHANGE UP
FLUAV SUBTYP SPEC NAA+PROBE: SIGNIFICANT CHANGE UP
FLUBV RNA SPEC QL NAA+PROBE: SIGNIFICANT CHANGE UP
GLUCOSE SERPL-MCNC: 92 MG/DL — SIGNIFICANT CHANGE UP (ref 70–99)
GLUCOSE SERPL-MCNC: 92 MG/DL — SIGNIFICANT CHANGE UP (ref 70–99)
GLUCOSE SERPL-MCNC: 95 MG/DL — SIGNIFICANT CHANGE UP (ref 70–99)
GLUCOSE SERPL-MCNC: 99 MG/DL — SIGNIFICANT CHANGE UP (ref 70–99)
GLUCOSE UR QL: NEGATIVE — SIGNIFICANT CHANGE UP
HADV DNA SPEC QL NAA+PROBE: SIGNIFICANT CHANGE UP
HCOV 229E RNA SPEC QL NAA+PROBE: SIGNIFICANT CHANGE UP
HCOV HKU1 RNA SPEC QL NAA+PROBE: SIGNIFICANT CHANGE UP
HCOV NL63 RNA SPEC QL NAA+PROBE: SIGNIFICANT CHANGE UP
HCOV OC43 RNA SPEC QL NAA+PROBE: SIGNIFICANT CHANGE UP
HCT VFR BLD CALC: 33.1 % — LOW (ref 34.5–45)
HGB BLD-MCNC: 10.7 G/DL — LOW (ref 11.5–15.5)
HMPV RNA SPEC QL NAA+PROBE: SIGNIFICANT CHANGE UP
HPIV1 RNA SPEC QL NAA+PROBE: SIGNIFICANT CHANGE UP
HPIV2 RNA SPEC QL NAA+PROBE: SIGNIFICANT CHANGE UP
HPIV3 RNA SPEC QL NAA+PROBE: SIGNIFICANT CHANGE UP
HPIV4 RNA SPEC QL NAA+PROBE: SIGNIFICANT CHANGE UP
HYALINE CASTS # UR AUTO: 0 /LPF — SIGNIFICANT CHANGE UP (ref 0–7)
IANC: 6.47 K/UL — SIGNIFICANT CHANGE UP (ref 1.8–7.4)
IMM GRANULOCYTES NFR BLD AUTO: 0.4 % — SIGNIFICANT CHANGE UP (ref 0–0.9)
KETONES UR-MCNC: ABNORMAL
LEUKOCYTE ESTERASE UR-ACNC: ABNORMAL
LYMPHOCYTES # BLD AUTO: 1.37 K/UL — SIGNIFICANT CHANGE UP (ref 1–3.3)
LYMPHOCYTES # BLD AUTO: 16.3 % — SIGNIFICANT CHANGE UP (ref 13–44)
M PNEUMO DNA SPEC QL NAA+PROBE: SIGNIFICANT CHANGE UP
MAGNESIUM SERPL-MCNC: 1.5 MG/DL — LOW (ref 1.6–2.6)
MAGNESIUM SERPL-MCNC: 1.6 MG/DL — SIGNIFICANT CHANGE UP (ref 1.6–2.6)
MAGNESIUM SERPL-MCNC: 1.6 MG/DL — SIGNIFICANT CHANGE UP (ref 1.6–2.6)
MCHC RBC-ENTMCNC: 27.6 PG — SIGNIFICANT CHANGE UP (ref 27–34)
MCHC RBC-ENTMCNC: 32.3 GM/DL — SIGNIFICANT CHANGE UP (ref 32–36)
MCV RBC AUTO: 85.5 FL — SIGNIFICANT CHANGE UP (ref 80–100)
MONOCYTES # BLD AUTO: 0.46 K/UL — SIGNIFICANT CHANGE UP (ref 0–0.9)
MONOCYTES NFR BLD AUTO: 5.5 % — SIGNIFICANT CHANGE UP (ref 2–14)
NEUTROPHILS # BLD AUTO: 6.47 K/UL — SIGNIFICANT CHANGE UP (ref 1.8–7.4)
NEUTROPHILS NFR BLD AUTO: 77 % — SIGNIFICANT CHANGE UP (ref 43–77)
NITRITE UR-MCNC: NEGATIVE — SIGNIFICANT CHANGE UP
NRBC # BLD: 0 /100 WBCS — SIGNIFICANT CHANGE UP (ref 0–0)
NRBC # FLD: 0.02 K/UL — HIGH (ref 0–0)
OSMOLALITY SERPL: 262 MOSM/KG — LOW (ref 275–295)
OSMOLALITY UR: 496 MOSM/KG — SIGNIFICANT CHANGE UP (ref 50–1200)
PH UR: 6.5 — SIGNIFICANT CHANGE UP (ref 5–8)
PHOSPHATE SERPL-MCNC: 2.5 MG/DL — SIGNIFICANT CHANGE UP (ref 2.5–4.5)
PHOSPHATE SERPL-MCNC: 2.8 MG/DL — SIGNIFICANT CHANGE UP (ref 2.5–4.5)
PHOSPHATE SERPL-MCNC: SIGNIFICANT CHANGE UP MG/DL (ref 2.5–4.5)
PLATELET # BLD AUTO: 241 K/UL — SIGNIFICANT CHANGE UP (ref 150–400)
POTASSIUM SERPL-MCNC: 3.7 MMOL/L — SIGNIFICANT CHANGE UP (ref 3.5–5.3)
POTASSIUM SERPL-MCNC: 3.7 MMOL/L — SIGNIFICANT CHANGE UP (ref 3.5–5.3)
POTASSIUM SERPL-MCNC: 4.4 MMOL/L — SIGNIFICANT CHANGE UP (ref 3.5–5.3)
POTASSIUM SERPL-MCNC: SIGNIFICANT CHANGE UP MMOL/L (ref 3.5–5.3)
POTASSIUM SERPL-SCNC: 3.7 MMOL/L — SIGNIFICANT CHANGE UP (ref 3.5–5.3)
POTASSIUM SERPL-SCNC: 3.7 MMOL/L — SIGNIFICANT CHANGE UP (ref 3.5–5.3)
POTASSIUM SERPL-SCNC: 4.4 MMOL/L — SIGNIFICANT CHANGE UP (ref 3.5–5.3)
POTASSIUM SERPL-SCNC: SIGNIFICANT CHANGE UP MMOL/L (ref 3.5–5.3)
PROT SERPL-MCNC: 7.5 G/DL — SIGNIFICANT CHANGE UP (ref 6–8.3)
PROT UR-MCNC: ABNORMAL
RAPID RVP RESULT: SIGNIFICANT CHANGE UP
RBC # BLD: 3.87 M/UL — SIGNIFICANT CHANGE UP (ref 3.8–5.2)
RBC # FLD: 16.7 % — HIGH (ref 10.3–14.5)
RBC CASTS # UR COMP ASSIST: 1 /HPF — SIGNIFICANT CHANGE UP (ref 0–4)
RSV RNA SPEC QL NAA+PROBE: SIGNIFICANT CHANGE UP
RV+EV RNA SPEC QL NAA+PROBE: SIGNIFICANT CHANGE UP
SARS-COV-2 RNA SPEC QL NAA+PROBE: SIGNIFICANT CHANGE UP
SODIUM SERPL-SCNC: 121 MMOL/L — LOW (ref 135–145)
SODIUM SERPL-SCNC: 125 MMOL/L — LOW (ref 135–145)
SODIUM SERPL-SCNC: 125 MMOL/L — LOW (ref 135–145)
SODIUM SERPL-SCNC: 126 MMOL/L — LOW (ref 135–145)
SODIUM UR-SCNC: 89 MMOL/L — SIGNIFICANT CHANGE UP
SP GR SPEC: 1.02 — SIGNIFICANT CHANGE UP (ref 1.01–1.05)
TROPONIN T, HIGH SENSITIVITY RESULT: 32 NG/L — SIGNIFICANT CHANGE UP
UROBILINOGEN FLD QL: SIGNIFICANT CHANGE UP
WBC # BLD: 8.39 K/UL — SIGNIFICANT CHANGE UP (ref 3.8–10.5)
WBC # FLD AUTO: 8.39 K/UL — SIGNIFICANT CHANGE UP (ref 3.8–10.5)
WBC UR QL: SIGNIFICANT CHANGE UP /HPF (ref 0–5)

## 2023-05-05 PROCEDURE — 99285 EMERGENCY DEPT VISIT HI MDM: CPT

## 2023-05-05 PROCEDURE — 99223 1ST HOSP IP/OBS HIGH 75: CPT

## 2023-05-05 PROCEDURE — 99497 ADVNCD CARE PLAN 30 MIN: CPT | Mod: 25

## 2023-05-05 PROCEDURE — 99222 1ST HOSP IP/OBS MODERATE 55: CPT | Mod: GC

## 2023-05-05 PROCEDURE — 71045 X-RAY EXAM CHEST 1 VIEW: CPT | Mod: 26

## 2023-05-05 PROCEDURE — 76770 US EXAM ABDO BACK WALL COMP: CPT | Mod: 26

## 2023-05-05 RX ORDER — PANTOPRAZOLE SODIUM 20 MG/1
40 TABLET, DELAYED RELEASE ORAL
Refills: 0 | Status: DISCONTINUED | OUTPATIENT
Start: 2023-05-05 | End: 2023-05-05

## 2023-05-05 RX ORDER — LEVOTHYROXINE SODIUM 125 MCG
60 TABLET ORAL
Refills: 0 | Status: DISCONTINUED | OUTPATIENT
Start: 2023-05-05 | End: 2023-05-05

## 2023-05-05 RX ORDER — LACOSAMIDE 50 MG/1
100 TABLET ORAL
Refills: 0 | Status: DISCONTINUED | OUTPATIENT
Start: 2023-05-05 | End: 2023-05-05

## 2023-05-05 RX ORDER — MEROPENEM 1 G/30ML
1000 INJECTION INTRAVENOUS ONCE
Refills: 0 | Status: COMPLETED | OUTPATIENT
Start: 2023-05-05 | End: 2023-05-05

## 2023-05-05 RX ORDER — CEFEPIME 1 G/1
1000 INJECTION, POWDER, FOR SOLUTION INTRAMUSCULAR; INTRAVENOUS ONCE
Refills: 0 | Status: COMPLETED | OUTPATIENT
Start: 2023-05-05 | End: 2023-05-05

## 2023-05-05 RX ORDER — MAGNESIUM SULFATE 500 MG/ML
1 VIAL (ML) INJECTION ONCE
Refills: 0 | Status: COMPLETED | OUTPATIENT
Start: 2023-05-05 | End: 2023-05-05

## 2023-05-05 RX ORDER — GABAPENTIN 400 MG/1
300 CAPSULE ORAL THREE TIMES A DAY
Refills: 0 | Status: DISCONTINUED | OUTPATIENT
Start: 2023-05-05 | End: 2023-05-12

## 2023-05-05 RX ORDER — CEFEPIME 1 G/1
1000 INJECTION, POWDER, FOR SOLUTION INTRAMUSCULAR; INTRAVENOUS EVERY 8 HOURS
Refills: 0 | Status: DISCONTINUED | OUTPATIENT
Start: 2023-05-05 | End: 2023-05-11

## 2023-05-05 RX ORDER — SENNA PLUS 8.6 MG/1
2 TABLET ORAL AT BEDTIME
Refills: 0 | Status: DISCONTINUED | OUTPATIENT
Start: 2023-05-05 | End: 2023-05-12

## 2023-05-05 RX ORDER — GABAPENTIN 400 MG/1
0 CAPSULE ORAL
Qty: 0 | Refills: 3 | DISCHARGE

## 2023-05-05 RX ORDER — AMLODIPINE BESYLATE 2.5 MG/1
5 TABLET ORAL DAILY
Refills: 0 | Status: DISCONTINUED | OUTPATIENT
Start: 2023-05-05 | End: 2023-05-07

## 2023-05-05 RX ORDER — LEVOTHYROXINE SODIUM 125 MCG
75 TABLET ORAL DAILY
Refills: 0 | Status: DISCONTINUED | OUTPATIENT
Start: 2023-05-05 | End: 2023-05-05

## 2023-05-05 RX ORDER — LEVOTHYROXINE SODIUM 125 MCG
1 TABLET ORAL
Qty: 0 | Refills: 0 | DISCHARGE

## 2023-05-05 RX ORDER — SODIUM CHLORIDE 9 MG/ML
1000 INJECTION INTRAMUSCULAR; INTRAVENOUS; SUBCUTANEOUS ONCE
Refills: 0 | Status: COMPLETED | OUTPATIENT
Start: 2023-05-05 | End: 2023-05-05

## 2023-05-05 RX ORDER — ENOXAPARIN SODIUM 100 MG/ML
40 INJECTION SUBCUTANEOUS EVERY 24 HOURS
Refills: 0 | Status: DISCONTINUED | OUTPATIENT
Start: 2023-05-06 | End: 2023-05-12

## 2023-05-05 RX ORDER — SODIUM CHLORIDE 9 MG/ML
1 INJECTION INTRAMUSCULAR; INTRAVENOUS; SUBCUTANEOUS
Refills: 0 | Status: DISCONTINUED | OUTPATIENT
Start: 2023-05-05 | End: 2023-05-06

## 2023-05-05 RX ORDER — LEVOTHYROXINE SODIUM 125 MCG
60 TABLET ORAL
Refills: 0 | Status: DISCONTINUED | OUTPATIENT
Start: 2023-05-06 | End: 2023-05-11

## 2023-05-05 RX ORDER — POLYETHYLENE GLYCOL 3350 17 G/17G
17 POWDER, FOR SOLUTION ORAL
Refills: 0 | Status: DISCONTINUED | OUTPATIENT
Start: 2023-05-05 | End: 2023-05-12

## 2023-05-05 RX ORDER — PANTOPRAZOLE SODIUM 20 MG/1
40 TABLET, DELAYED RELEASE ORAL DAILY
Refills: 0 | Status: DISCONTINUED | OUTPATIENT
Start: 2023-05-06 | End: 2023-05-11

## 2023-05-05 RX ORDER — PANTOPRAZOLE SODIUM 20 MG/1
1 TABLET, DELAYED RELEASE ORAL
Qty: 0 | Refills: 0 | DISCHARGE

## 2023-05-05 RX ORDER — CEFEPIME 1 G/1
INJECTION, POWDER, FOR SOLUTION INTRAMUSCULAR; INTRAVENOUS
Refills: 0 | Status: DISCONTINUED | OUTPATIENT
Start: 2023-05-05 | End: 2023-05-11

## 2023-05-05 RX ORDER — LACOSAMIDE 50 MG/1
100 TABLET ORAL EVERY 12 HOURS
Refills: 0 | Status: DISCONTINUED | OUTPATIENT
Start: 2023-05-05 | End: 2023-05-09

## 2023-05-05 RX ORDER — METHENAMINE MANDELATE 1 G
1 TABLET ORAL
Qty: 0 | Refills: 0 | DISCHARGE

## 2023-05-05 RX ADMIN — CEFEPIME 100 MILLIGRAM(S): 1 INJECTION, POWDER, FOR SOLUTION INTRAMUSCULAR; INTRAVENOUS at 13:42

## 2023-05-05 RX ADMIN — MEROPENEM 100 MILLIGRAM(S): 1 INJECTION INTRAVENOUS at 10:16

## 2023-05-05 RX ADMIN — Medication 100 GRAM(S): at 20:58

## 2023-05-05 RX ADMIN — CEFEPIME 100 MILLIGRAM(S): 1 INJECTION, POWDER, FOR SOLUTION INTRAMUSCULAR; INTRAVENOUS at 22:04

## 2023-05-05 RX ADMIN — SODIUM CHLORIDE 1000 MILLILITER(S): 9 INJECTION INTRAMUSCULAR; INTRAVENOUS; SUBCUTANEOUS at 10:07

## 2023-05-05 RX ADMIN — LACOSAMIDE 120 MILLIGRAM(S): 50 TABLET ORAL at 17:22

## 2023-05-05 NOTE — ED ADULT NURSE REASSESSMENT NOTE - NS ED NURSE REASSESS COMMENT FT1
Pt received at change of shift in room 22. Pt alert and oriented x 0, nonverbal. Respirations even and unlabored, NAD, NSR on the monitor. Right arm contracted. No edema x 4 extremities. 20G iv in right hand, labs drawn per MD orders. Awaiting x-ray.

## 2023-05-05 NOTE — ED ADULT NURSE NOTE - OBJECTIVE STATEMENT
Pt received to room 22. A&Ox0 at this time, nonverbal currently. Pmh of hypothryoidism, HTN. Pt brought in by EMS with Home aid at bedside. Per aid, pt has been experiencing a decline in mental status since Tuesday. Per aid, pt is typically A&Ox4 at baseline and alert/verbal. Aid states tuesday noticed change in mental status, but pt was still alert and verbal at that time, since Thursday afternoon pt became nonverbal,weak and contracted. Aid noticed pt was urinating very little, able to measure output with home purewick system. Aid states pt has been experiencing chronic UTIs and had a hospital admission here 1x month ago for similar complaint. FS within normal limits, NSR on the monitor satting 100%. Resp even unlabored, abd soft nontender, pedal pulses 2+ bilaterally. Nonverbal complaint of pain present when palpating abdomen, pt guarded abdomen and displayed grimaced facial expressions. Awaiting orders at this time.

## 2023-05-05 NOTE — CHART NOTE - NSCHARTNOTEFT_GEN_A_CORE
Upon reviewing Nephrology recommendations noted recommendation for sodium chloride tablet however patient NPO 2/2 failing dysphagia screen. Discussed with on call Nephrologist  who recommended 1 L fluid restriction and repeat BMP at 6 AM.

## 2023-05-05 NOTE — H&P ADULT - ASSESSMENT
68W [employer] with past medical history of MS with baclofen pump in place, seizure, hypothyroidism, frequent UTIs presenting worsening mental status secondary to UTI  68W [employer] with PMH of MS with baclofen pump in place, seizure, hypothyroidism, frequent UTIs presenting worsening mental status secondary to UTI

## 2023-05-05 NOTE — H&P ADULT - PROBLEM SELECTOR PLAN 2
Yes Presenting with Na of 125,   - s/p 1L NS with worsening of Na to 121   - differentials include urinary rentention, SIADH, medication   - repeating BMP again given concern for possible IV infiltration,  - monitor bladder scans, renal/bladder US to eval for hydronephrosis. Will place   - monitor BMP q4hr for now, plan for slow correction 6-8 meq/24H   - Nephrology consulted, will f/u recs Presenting with Na of 125,   - s/p 1L NS with worsening of Na to 121   - differentials include urinary rentention, SIADH, medication   - repeating BMP again given concern for possible IV infiltration,  - f/u urine Na, urine osm  - monitor bladder scans, renal/bladder US to eval for hydronephrosis. Will place   - monitor BMP q4hr for now, plan for slow correction 6-8 meq/24H   - Nephrology consulted, will f/u recs

## 2023-05-05 NOTE — CONSULT NOTE ADULT - ASSESSMENT
INCOMPLETE.    Assessment: ***    Impression: ***    Recommendations:     Diagnostics:  [] EKG  [] CBC, CMP, magnesium, phosphorous daily. Replete electrolytes PRN  [] Urinalysis  [] Routine EEG for two hours  [] No CNS imaging necessary during this admission    Medications:  [] c/w home antiseizure medication - lacosamide    Management:   [] Hyponatremia management, as per Nephrology and primary team  [] Seizure, fall and aspiration precautions. Avoid sleep deprivation.   [] If clinically possible, avoid antibiotics that are associated with seizures, including carbapenems and fourth generation cephalosporins.    [] Please note: if patient has a convulsion, please document length of episode, specifically what patient was doing paying attention to eye opening vs closure, gaze deviation, shaking of extremities, tongue bite, urinary incontinence, any derangement of vital signs.      * Case and plan to be discussed w/ neurology attending * Assessment: 68-year-old right-handed female, [place]ai, w/ PMHx multiple sclerosis (> 30 years, not on DMTs, w/ baclofen pump in place, bedbound baseline, dependant for all ADLs), seizure disorder (on lacosamide 100 mg BID), hypothyroidism, HTN, and frequent UTIs, p/w worsening mental status. Semiology: blank stares, eyes primary gaze, flexing BUE, 2-3 minutes, then back to baseline. Patient w/ similar semiology on night prior to arrival.    Impression: Possibly breakthrough seizures in setting of urinary tract infection.     Recommendations:     Diagnostics:  [] EKG  [] CBC, CMP, magnesium, phosphorous daily. Replete electrolytes PRN  [] Urinalysis  [] Routine EEG for two hours  [] No CNS imaging necessary during this admission    Medications:  [] c/w home lacosamide 100 mg BID - can make this IV, particularly if NPO.    Management:   [] Would seem that patient needs further evaluation and management for recurring UTIs - as per primary team  [] Hyponatremia management, as per Nephrology and primary team  [] Telemetry  [] Seizure, fall and aspiration precautions. Avoid sleep deprivation.   [] Neurological checks and vital signs, as per unit protocol  [] Please note that numerous antibiotics may trigger epileptic seizures or status epilepticus by decreasing inhibitory transmission in the brain, thus lowering the seizure threshold. The most potent seizurogenic effect is exerted by penicillins, cephalosporins (fourth generation), fluoroquinolones, and carbapenems.     [] Please note: if patient has a convulsion, please document length of episode, specifically what patient was doing paying attention to eye opening vs closure, gaze deviation, shaking of extremities, tongue bite, urinary incontinence, any derangement of vital signs.      * Case and plan to be discussed w/ neurology attending * Assessment: 68-year-old right-handed female, [place]andaen, w/ PMHx multiple sclerosis (> 30 years, not on DMTs, w/ baclofen pump in place, bedbound baseline, dependant for all ADLs, f/b Dr[facility] Merrick Dennis at [facility]), seizure disorder (on lacosamide 100 mg BID), hypothyroidism, HTN, and frequent UTIs, p/w worsening mental status. Semiology: blank stares, eyes primary gaze, flexing BUE, 2-3 minutes, then back to baseline. Patient w/ similar semiology on night prior to arrival.    Impression: Possibly breakthrough seizures in setting of urinary tract infection.     Recommendations:     Diagnostics:  [] EKG  [] CBC, CMP, magnesium, phosphorous daily. Replete electrolytes PRN  [] Urinalysis  [] Routine EEG for two hours  [] No CNS imaging necessary during this admission    Medications:  [] c/w home lacosamide 100 mg BID - can make this IV, particularly if NPO.    Management:   [] Would seem that patient needs further evaluation and management for recurring UTIs - as per primary team  [] Hyponatremia management, as per Nephrology and primary team  [] Telemetry  [] Seizure, fall and aspiration precautions. Avoid sleep deprivation.   [] Neurological checks and vital signs, as per unit protocol  [] Please note that numerous antibiotics may trigger epileptic seizures or status epilepticus by decreasing inhibitory transmission in the brain, thus lowering the seizure threshold. The most potent seizurogenic effect is exerted by penicillins, cephalosporins (fourth generation), fluoroquinolones, and carbapenems.     [] Please note: if patient has a convulsion, please document length of episode, specifically what patient was doing paying attention to eye opening vs closure, gaze deviation, shaking of extremities, tongue bite, urinary incontinence, any derangement of vital signs.      * Case and plan to be discussed w/ neurology attending * Assessment: 68-year-old right-handed female, Bahai, w/ PMHx multiple sclerosis (> 30 years, not on DMTs, w/ baclofen pump in place, bedbound baseline, dependant for all ADLs, f/b Dr. Merirck Dennis at [facility]), seizure disorder (on lacosamide 100 mg BID), hypothyroidism, HTN, and frequent UTIs, p/w worsening mental status. Semiology: blank stares, eyes primary gaze, flexing BUE, 2-3 minutes, then back to baseline. Patient w/ similar semiology on night prior to arrival. Labs notable for Na 125, UA w/ few bacteria & large LEC.    Impression: Possibly breakthrough seizures in setting of hyponatremia and infection.    Recommendations:     Diagnostics:  [] EKG  [] CBC, CMP, magnesium, phosphorous daily. Replete electrolytes PRN  [] Urinalysis  [] Routine EEG for two hours  [] No CNS imaging necessary during this admission    Medications:  [] c/w home lacosamide 100 mg BID - can make this IV, particularly if NPO.    Management:   [] Would seem that patient needs further evaluation and management for recurring UTIs - as per primary team  [] Hyponatremia management, as per Nephrology and primary team  [] Telemetry  [] Seizure, fall and aspiration precautions. Avoid sleep deprivation.   [] Neurological checks and vital signs, as per unit protocol  [] Please note that numerous antibiotics may trigger epileptic seizures or status epilepticus by decreasing inhibitory transmission in the brain, thus lowering the seizure threshold. The most potent seizurogenic effect is exerted by penicillins, cephalosporins (fourth generation), fluoroquinolones, and carbapenems.     [] Please note: if patient has a convulsion, please document length of episode, specifically what patient was doing paying attention to eye opening vs closure, gaze deviation, shaking of extremities, tongue bite, urinary incontinence, any derangement of vital signs.      * Case and plan to be discussed w/ neurology attending * Assessment: 68-year-old right-handed female, [place], w/ PMHx multiple sclerosis (> 30 years, not on DMTs, w/ baclofen pump in place, bedbound baseline, dependant for all ADLs, f/b Dr. Merrick Dennis at [facility]), seizure disorder (on lacosamide 100 mg BID), hypothyroidism, HTN, and frequent UTIs, p/w worsening mental status. Semiology: blank stares, eyes primary gaze, flexing BUE, 2-3 minutes, then back to baseline. Patient w/ similar semiology on night prior to arrival. Labs notable for Na 125, UA w/ few bacteria & large LEC.    Impression: Possibly breakthrough seizures in setting of hyponatremia and infection.    Recommendations:     Diagnostics:  [] EKG  [] CBC, CMP, magnesium, phosphorous daily. Replete electrolytes PRN  [] Urinalysis  [] Routine EEG for two hours  [] No CNS imaging necessary during this admission    Medications:  [] c/w home lacosamide 100 mg BID - can make this IV, particularly if NPO.    Management:   [] Would seem that patient needs further evaluation and management for recurring UTIs - as per primary team  [] Hyponatremia management, as per Nephrology and primary team  [] Telemetry  [] Seizure, fall and aspiration precautions. Avoid sleep deprivation.   [] Neurological checks and vital signs, as per unit protocol  [] Please note that numerous antibiotics may trigger epileptic seizures or status epilepticus by decreasing inhibitory transmission in the brain, thus lowering the seizure threshold. The most potent seizurogenic effect is exerted by penicillins, cephalosporins (fourth generation), fluoroquinolones, and carbapenems.     [] Please note: if patient has a convulsion, please document length of episode, specifically what patient was doing paying attention to eye opening vs closure, gaze deviation, shaking of extremities, tongue bite, urinary incontinence, any derangement of vital signs.      * Case and plan to be discussed w/ neurology attending *

## 2023-05-05 NOTE — CONSULT NOTE ADULT - SUBJECTIVE AND OBJECTIVE BOX
Neurology - Consult Note - 05-05-23  -  Srinath Mota MD  PGY-3 Neurology  Spectra: [phone] ([facility]), [phone] ([facility])  -    Name: RAMIRO CASTAÑEDA; 68y (1955)    Reason for Admission: Altered mental status    Chief Complaint:     HPI:      Review of Systems:  INCOMPLETE   CONSTITUTIONAL: No fevers or chills  EYES/ENT: No visual changes or no throat pain   NECK: No pain or stiffness  RESPIRATORY: No hemoptysis or shortness of breath  CARDIOVASCULAR: No chest pain or palpitations  GASTROINTESTINAL: No melena or hematochezia  GENITOURINARY: No dysuria or hematuria  NEUROLOGICAL: +As stated in HPI above  SKIN: No itching, burning, rashes, or lesions   All other review of systems is negative unless indicated above.    Allergies:  sulfa drugs (Hives; Rash)      PMHx:   Hypothyroid  Multiple sclerosis  Seizures  History of recurrent UTIs  Benign essential HTN      PFHx:   No pertinent family history in first degree relatives        PSuHx:   No significant past surgical history        Medications:  MEDICATIONS  (STANDING):    MEDICATIONS  (PRN):      Vitals:  T(C): 35.3 (05-05-23 @ 10:00), Max: 36.4 (05-05-23 @ 08:20)  HR: 83 (05-05-23 @ 08:20) (82 - 88)  BP: 159/90 (05-05-23 @ 08:20) (153/77 - 172/71)  RR: 18 (05-05-23 @ 08:20) (17 - 18)  SpO2: 100% (05-05-23 @ 08:20) (100% - 100%)    Physical Examination: INCOMPLETE  ***    Labs:                        10.7   8.39  )-----------( 241      ( 05 May 2023 08:30 )             33.1     05-05    121<L>  |  90<L>  |  10  ----------------------------<  92  TNP   |  20<L>  |  0.35<L>    Ca    8.9      05 May 2023 11:20  Phos  TNP     05-05  Mg     1.60     05-05    TPro  7.5  /  Alb  4.0  /  TBili  0.3  /  DBili  x   /  AST  32  /  ALT  27  /  AlkPhos  169<H>  05-05    CAPILLARY BLOOD GLUCOSE      POCT Blood Glucose.: 103 mg/dL (05 May 2023 07:23)    LIVER FUNCTIONS - ( 05 May 2023 08:30 )  Alb: 4.0 g/dL / Pro: 7.5 g/dL / ALK PHOS: 169 U/L / ALT: 27 U/L / AST: 32 U/L / GGT: x             Radiology:    PRIOR CT Head No Cont (04.14.23 @ 17:14)  COMPARISON: CT head 3/14/2023    FINDINGS:    The ventricular and sulcal size and configuration is age appropriate.     There is no acute loss of gray-white differentiation. There are mild   patchy areas of hypodensity in the periventricular and subcortical white   matter which are likely related to chronic microangiopathic changes.    There is no evidence of mass effect, midline shift, acute intracranial   hemorrhage, or extra-axial collections.     The calvarium is intact. The paranasal sinuses are clear.The mastoid air   cells are predominantly clear. The orbits are unremarkable.      IMPRESSION:  No acute intracranial hemorrhage or acute territorial infarction.    Neurology - Consult Note - 05-05-23  -  Srinath Mota MD  PGY-3 Neurology  Spectra: [phone] ([facility]), [phone] ([facility]  -    Name: RAMIRO CASTAÑEDA; 68y (1955)    Reason for Admission: Altered mental status    Chief Complaint:     HPI: 68-year-old right-handed female, Yazdanism, w/ PMHx multiple sclerosis (not on DMTs, w/ baclofen pump in place), seizures (on lacosamide 100 mg BID), hypothyroidism, frequent UTIs, p/w worsening mental status. Patient's aide, who has known her for seven years, is at the bedside providing collateral. Per aide Jenise Shankar, patient's baseline is cognitively "sharp,", she communicates well, ambulates with a wheelchair, and requires assistance with all ADLs (requires aid 24 hrs). She has baseline paraplegia, with markedly contracted RUE and less contracted LUE. Per aide, patient frequently has UTIs and presents with confusion and hallucinations, both auditory and visual. Per aide, she started having seizures last December, w/ semiology described as blank stare w/ eyes in primary gaze, w/ flexing of b/l UE, lasting for about 2-3 minutes, then comes back to her baseline. Unable to assess urinary incontinence d/t wearing a diaper and having an external catheter at home.     Per aide, patient started having similar semiology of blank stare and b/l UE flexing for 2-3 minutes last night.    On arrival to ED, patient found to be hyponatremic 125, also rectal temperature of 95.2. Neurology consulted given PMHx seizures w/ presentation of altered mental status, hyponatremia, and UTI.      Review of Systems:   All other review of systems is negative unless indicated above.    Allergies:  sulfa drugs (Hives; Rash)      PMHx:   Hypothyroid  Multiple sclerosis  Seizures  History of recurrent UTIs  Benign essential HTN      PFHx:   No pertinent family history in first degree relatives        PSuHx:   No significant past surgical history        Medications:  MEDICATIONS  (STANDING):    MEDICATIONS  (PRN):      Vitals:  T(C): 35.3 (05-05-23 @ 10:00), Max: 36.4 (05-05-23 @ 08:20)  HR: 83 (05-05-23 @ 08:20) (82 - 88)  BP: 159/90 (05-05-23 @ 08:20) (153/77 - 172/71)  RR: 18 (05-05-23 @ 08:20) (17 - 18)  SpO2: 100% (05-05-23 @ 08:20) (100% - 100%)    Physical Examination: INCOMPLETE  ***    Labs:                        10.7   8.39  )-----------( 241      ( 05 May 2023 08:30 )             33.1     05-05    121<L>  |  90<L>  |  10  ----------------------------<  92  TNP   |  20<L>  |  0.35<L>    Ca    8.9      05 May 2023 11:20  Phos  TNP     05-05  Mg     1.60     05-05    TPro  7.5  /  Alb  4.0  /  TBili  0.3  /  DBili  x   /  AST  32  /  ALT  27  /  AlkPhos  169<H>  05-05    CAPILLARY BLOOD GLUCOSE      POCT Blood Glucose.: 103 mg/dL (05 May 2023 07:23)    LIVER FUNCTIONS - ( 05 May 2023 08:30 )  Alb: 4.0 g/dL / Pro: 7.5 g/dL / ALK PHOS: 169 U/L / ALT: 27 U/L / AST: 32 U/L / GGT: x             Radiology:    PRIOR CT Head No Cont (04.14.23 @ 17:14)  COMPARISON: CT head 3/14/2023    FINDINGS:    The ventricular and sulcal size and configuration is age appropriate.     There is no acute loss of gray-white differentiation. There are mild   patchy areas of hypodensity in the periventricular and subcortical white   matter which are likely related to chronic microangiopathic changes.    There is no evidence of mass effect, midline shift, acute intracranial   hemorrhage, or extra-axial collections.     The calvarium is intact. The paranasal sinuses are clear.The mastoid air   cells are predominantly clear. The orbits are unremarkable.      IMPRESSION:  No acute intracranial hemorrhage or acute territorial infarction.      PRIOR MR Head w/wo IV Cont (03.01.23 @ 12:28)  Parenchymal volume loss is seen    Abnormal T2 prolongation in the periventricular white matter region is   again identified. Some of these lesions have a Rodriguez finger type of   appearance and could be compatible with MS if patient does carry this   diagnosis. Other possibilities include migraine headaches, infection   inflammation or ischemia.    There is no evidence acute hemorrhage mass effect or abnormal   enhancement seen.    Branching area of abnormal enhancement involving the right cerebellar   region. While this could be compatible artifact, the possibility of   developmental venous anomaly must be considered.    No evidence of acute hemorrhage seen    Evaluation of the diffusion weighted sequence demonstrates no abnormal   areas of restricted diffusion to suggest acute infarct.    The large vessels demonstrate normal flow voids.    Right maxillary sinus mucosal thickening is seen. Minimal bilateral   ethmoid sinus mucosal thickening is seen.    Both mastoid and middle ear regions appear clear.    IMPRESSION: Stable areas of T2 prolongation in the periventricular white   matter region is seen as described above.    Suspicion of developmental venous anomaly involving the right cerebellar   region.   Neurology - Consult Note - 05-05-23  -  Srinath Mota MD  PGY-3 Neurology  Spectra: [phone] ([facility]), [phone] ([facility])  -    Name: RAMIRO CASTAÑEDA; 68y (1955)    Reason for Admission: Altered mental status    Chief Complaint:     HPI: 68-year-old right-handed female, Restorationist, w/ PMHx multiple sclerosis (not on DMTs, w/ baclofen pump in place), seizures (on lacosamide 100 mg BID), hypothyroidism, frequent UTIs, p/w worsening mental status. Patient's aide, who has known her for seven years, is at the bedside providing collateral. Per aide Jenise Shankar, patient's baseline is cognitively "sharp,", she communicates well, ambulates with a wheelchair, and requires assistance with all ADLs (requires aid 24 hrs). She has baseline paraplegia, with markedly contracted RUE and less contracted LUE. Per aide, patient frequently has UTIs and presents with confusion and hallucinations, both auditory and visual. Per aide, she started having seizures last December, w/ semiology described as blank stare w/ eyes in primary gaze, w/ flexing of b/l UE, lasting for about 2-3 minutes, then comes back to her baseline. Unable to assess urinary incontinence d/t wearing a diaper and having an external catheter at home.     Per aide, patient started having similar semiology of blank stare and b/l UE flexing for 2-3 minutes last night.    On arrival to ED, patient found to be hyponatremic 125, also rectal temperature of 95.2. Neurology consulted given PMHx seizures w/ presentation of altered mental status, hyponatremia, and UTI.      Review of Systems:   All other review of systems is negative unless indicated above.    Allergies:  sulfa drugs (Hives; Rash)      PMHx:   Hypothyroid  Multiple sclerosis  Seizures  History of recurrent UTIs  Benign essential HTN      PFHx:   No pertinent family history in first degree relatives        PSuHx:   No significant past surgical history        Medications:  MEDICATIONS  (STANDING):    MEDICATIONS  (PRN):      Vitals:  T(C): 35.3 (05-05-23 @ 10:00), Max: 36.4 (05-05-23 @ 08:20)  HR: 83 (05-05-23 @ 08:20) (82 - 88)  BP: 159/90 (05-05-23 @ 08:20) (153/77 - 172/71)  RR: 18 (05-05-23 @ 08:20) (17 - 18)  SpO2: 100% (05-05-23 @ 08:20) (100% - 100%)    Physical Examination:      - Mental Status: Eyes open, attends to examiner; oriented to person, age, month, year, and location; speech is hypophonic, but fluent with intact naming, intact repetition, and follows 1-step and 2-step commands; good overall fund of knowledge (aware of current events, relevant past history, and vocabulary appropriate for level of education).    - Cranial Nerves:  II: Blinks to threat bilaterally; pupils are equal, round, and reactive to light   III, IV, VI: Extraocular movements are intact without nystagmus, although appears dysconjugate at times  V: Facial sensation is intact in the V1-V3 distribution bilaterally  VII: Face is symmetric with normal eye closure and smile  VIII: Hearing is intact to conversation  IX, X: Uvula is midline and soft palate rises symmetrically  XI: Shoulder shrug intact  XII: Tongue protrudes in the midline    - Motor/Strength Testing:    - Drifts: RUE drifts and hits the bed w/ effort before 10 seconds; LUE w/ mild drift                                   Right           Left  Biceps                      4                 4+  Triceps                     4-                4  Hand                  *                 4+  (* R hand is wrapped in gauze)    Hip Flex                   0                  0  Knee Ext	      0                  0  Dorsiflex                  0                  0  Plantarflex               1                  1    - Able to wiggle toes on both feet.    - Markedly increased tone b/l UE R >> L (RUE severely contracted at the elbow)  - Flaccid tone throughout bilateral lower extremities    - Reflexes:   Bicep (C5/C6):                  R *+ --- L 3+  (*RUE contracted)  Brachioradialis (C5/C6) :   R *+ --- L 3+  (*RUE contracted)  Patella (L3/L4) :                 R 0+ --- L 0+   Ankle (S1) :                       R 1+ --- L 1+     - Plant responses up-going bilaterally.    - Sensory: Intact throughout to light touch x 4 extremities.  - Coordination: Finger-nose-finger intact in LUE (cannot test the RUE d/t contraction)  - Gait: Wheelchair at baseline        Labs:                        10.7   8.39  )-----------( 241      ( 05 May 2023 08:30 )             33.1     05-05    121<L>  |  90<L>  |  10  ----------------------------<  92  TNP   |  20<L>  |  0.35<L>    Ca    8.9      05 May 2023 11:20  Phos  TNP     05-05  Mg     1.60     05-05    TPro  7.5  /  Alb  4.0  /  TBili  0.3  /  DBili  x   /  AST  32  /  ALT  27  /  AlkPhos  169<H>  05-05    CAPILLARY BLOOD GLUCOSE      POCT Blood Glucose.: 103 mg/dL (05 May 2023 07:23)    LIVER FUNCTIONS - ( 05 May 2023 08:30 )  Alb: 4.0 g/dL / Pro: 7.5 g/dL / ALK PHOS: 169 U/L / ALT: 27 U/L / AST: 32 U/L / GGT: x             Radiology:    PRIOR CT Head No Cont (04.14.23 @ 17:14)  COMPARISON: CT head 3/14/2023    FINDINGS:    The ventricular and sulcal size and configuration is age appropriate.     There is no acute loss of gray-white differentiation. There are mild   patchy areas of hypodensity in the periventricular and subcortical white   matter which are likely related to chronic microangiopathic changes.    There is no evidence of mass effect, midline shift, acute intracranial   hemorrhage, or extra-axial collections.     The calvarium is intact. The paranasal sinuses are clear.The mastoid air   cells are predominantly clear. The orbits are unremarkable.      IMPRESSION:  No acute intracranial hemorrhage or acute territorial infarction.      PRIOR MR Head w/wo IV Cont (03.01.23 @ 12:28)  Parenchymal volume loss is seen    Abnormal T2 prolongation in the periventricular white matter region is   again identified. Some of these lesions have a Rodriguez finger type of   appearance and could be compatible with MS if patient does carry this   diagnosis. Other possibilities include migraine headaches, infection   inflammation or ischemia.    There is no evidence acute hemorrhage mass effect or abnormal   enhancement seen.    Branching area of abnormal enhancement involving the right cerebellar   region. While this could be compatible artifact, the possibility of   developmental venous anomaly must be considered.    No evidence of acute hemorrhage seen    Evaluation of the diffusion weighted sequence demonstrates no abnormal   areas of restricted diffusion to suggest acute infarct.    The large vessels demonstrate normal flow voids.    Right maxillary sinus mucosal thickening is seen. Minimal bilateral   ethmoid sinus mucosal thickening is seen.    Both mastoid and middle ear regions appear clear.    IMPRESSION: Stable areas of T2 prolongation in the periventricular white   matter region is seen as described above.    Suspicion of developmental venous anomaly involving the right cerebellar   region.   Neurology - Consult Note - 05-05-23  -  Srinath Mota MD  PGY-3 Neurology  Spectra: [phone] ([facility]), [phone] ([facility])  -    Name: RAMIRO CASTAÑEDA; 68y (1955)    Reason for Admission: Altered mental status    Chief Complaint:     HPI: 68-year-old right-handed female, Confucianism, w/ PMHx multiple sclerosis (> 30 years, not on DMTs, w/ baclofen pump in place), seizures (on lacosamide 100 mg BID), hypothyroidism, frequent UTIs, p/w worsening mental status. Patient's aide, who has known her for seven years, is at the bedside providing collateral. Per aide Jenise Shankar, patient's baseline is cognitively "sharp,", she communicates well, ambulates with a wheelchair, and requires assistance with all ADLs (requires aid 24 hrs). She has baseline paraplegia, with markedly contracted RUE and less contracted LUE. Per aide, patient frequently has UTIs and presents with confusion and hallucinations, both auditory and visual. Per aide, she started having seizures last December, w/ semiology described as blank stare w/ eyes in primary gaze, w/ flexing of b/l UE, lasting for about 2-3 minutes, then comes back to her baseline. Unable to assess urinary incontinence d/t wearing a diaper and having an external catheter at home.     Per aide, patient started having similar semiology of blank stare and b/l UE flexing for 2-3 minutes last night.    On arrival to ED, patient found to be hyponatremic 125, also rectal temperature of 95.2. Neurology consulted given PMHx seizures w/ presentation of altered mental status, hyponatremia, and UTI.    Outpatient neurologist: Dr. Merrick Dennis at [facility].      Review of Systems:   All other review of systems is negative unless indicated above.    Allergies:  sulfa drugs (Hives; Rash)      PMHx:   Hypothyroid  Multiple sclerosis  Seizures  History of recurrent UTIs  Benign essential HTN      PFHx:   No pertinent family history in first degree relatives        PSuHx:   No significant past surgical history        Medications:  MEDICATIONS  (STANDING):    MEDICATIONS  (PRN):      Vitals:  T(C): 35.3 (05-05-23 @ 10:00), Max: 36.4 (05-05-23 @ 08:20)  HR: 83 (05-05-23 @ 08:20) (82 - 88)  BP: 159/90 (05-05-23 @ 08:20) (153/77 - 172/71)  RR: 18 (05-05-23 @ 08:20) (17 - 18)  SpO2: 100% (05-05-23 @ 08:20) (100% - 100%)    Physical Examination:      - Mental Status: Eyes open, attends to examiner; oriented to person, age, month, year, and location; speech is hypophonic, but fluent with intact naming, intact repetition, and follows 1-step and 2-step commands; good overall fund of knowledge (aware of current events, relevant past history, and vocabulary appropriate for level of education).    - Cranial Nerves:  II: Blinks to threat bilaterally; pupils are equal, round, and reactive to light   III, IV, VI: Extraocular movements are intact without nystagmus, although appears dysconjugate at times  V: Facial sensation is intact in the V1-V3 distribution bilaterally  VII: Face is symmetric with normal eye closure and smile  VIII: Hearing is intact to conversation  IX, X: Uvula is midline and soft palate rises symmetrically  XI: Shoulder shrug intact  XII: Tongue protrudes in the midline    - Motor/Strength Testing:    - Drifts: RUE drifts and hits the bed w/ effort before 10 seconds; LUE w/ mild drift                                   Right           Left  Biceps                      4                 4+  Triceps                     4-                4  Hand                  *                 4+  (* R hand is wrapped in gauze)    Hip Flex                   0                  0  Knee Ext	      0                  0  Dorsiflex                  0                  0  Plantarflex               1                  1    - Able to wiggle toes on both feet.    - Markedly increased tone b/l UE R >> L (RUE severely contracted at the elbow)  - Flaccid tone throughout bilateral lower extremities    - Reflexes:   Bicep (C5/C6):                  R *+ --- L 3+  (*RUE contracted)  Brachioradialis (C5/C6) :   R *+ --- L 3+  (*RUE contracted)  Patella (L3/L4) :                 R 0+ --- L 0+   Ankle (S1) :                       R 1+ --- L 1+     - Plant responses up-going bilaterally.    - Sensory: Intact throughout to light touch x 4 extremities.  - Coordination: Finger-nose-finger intact in LUE (cannot test the RUE d/t contraction)  - Gait: Wheelchair at baseline        Labs:                        10.7   8.39  )-----------( 241      ( 05 May 2023 08:30 )             33.1     05-05    121<L>  |  90<L>  |  10  ----------------------------<  92  TNP   |  20<L>  |  0.35<L>    Ca    8.9      05 May 2023 11:20  Phos  TNP     05-05  Mg     1.60     05-05    TPro  7.5  /  Alb  4.0  /  TBili  0.3  /  DBili  x   /  AST  32  /  ALT  27  /  AlkPhos  169<H>  05-05    CAPILLARY BLOOD GLUCOSE      POCT Blood Glucose.: 103 mg/dL (05 May 2023 07:23)    LIVER FUNCTIONS - ( 05 May 2023 08:30 )  Alb: 4.0 g/dL / Pro: 7.5 g/dL / ALK PHOS: 169 U/L / ALT: 27 U/L / AST: 32 U/L / GGT: x             Radiology:    PRIOR CT Head No Cont (04.14.23 @ 17:14)  COMPARISON: CT head 3/14/2023    FINDINGS:    The ventricular and sulcal size and configuration is age appropriate.     There is no acute loss of gray-white differentiation. There are mild   patchy areas of hypodensity in the periventricular and subcortical white   matter which are likely related to chronic microangiopathic changes.    There is no evidence of mass effect, midline shift, acute intracranial   hemorrhage, or extra-axial collections.     The calvarium is intact. The paranasal sinuses are clear.The mastoid air   cells are predominantly clear. The orbits are unremarkable.      IMPRESSION:  No acute intracranial hemorrhage or acute territorial infarction.      PRIOR MR Head w/wo IV Cont (03.01.23 @ 12:28)  Parenchymal volume loss is seen    Abnormal T2 prolongation in the periventricular white matter region is   again identified. Some of these lesions have a Rodriguez finger type of   appearance and could be compatible with MS if patient does carry this   diagnosis. Other possibilities include migraine headaches, infection   inflammation or ischemia.    There is no evidence acute hemorrhage mass effect or abnormal   enhancement seen.    Branching area of abnormal enhancement involving the right cerebellar   region. While this could be compatible artifact, the possibility of   developmental venous anomaly must be considered.    No evidence of acute hemorrhage seen    Evaluation of the diffusion weighted sequence demonstrates no abnormal   areas of restricted diffusion to suggest acute infarct.    The large vessels demonstrate normal flow voids.    Right maxillary sinus mucosal thickening is seen. Minimal bilateral   ethmoid sinus mucosal thickening is seen.    Both mastoid and middle ear regions appear clear.    IMPRESSION: Stable areas of T2 prolongation in the periventricular white   matter region is seen as described above.    Suspicion of developmental venous anomaly involving the right cerebellar   region.

## 2023-05-05 NOTE — ED PROVIDER NOTE - OBJECTIVE STATEMENT
Patient is a 68-year-old female past medical history MS with baclofen pump, seizures, hypertension, hypothyroid presenting for altered mental status.  Patient aide at bedside gives history, patient unable to give history due to altered mental status.  Aide states patient for the past 2 to 3 days has had worsening mental status where at baseline she usually is conversational and speaking, now she is not answering questions, is awake but not really interacting much.  States that she gets like this very often when she has UTIs in the past.  Is able to eat and drink, taking her medications though did not take this morning, without any noticed diarrhea, vomiting, fevers at home.  She thinks she may be urinating less than usual.  No seizure-like activity witnessed, a lives with patient full-time, no falls or trauma.  No AC.

## 2023-05-05 NOTE — H&P ADULT - PROBLEM SELECTOR PLAN 10
resilient/elastic
DVTppx: LMWH   Diet: pending dysphagia screen  Code: DNR/DNI, no blood products   Dispo: likely back home with [facility] pending improvement in medical status      -  to bring in complete list of medication, completed prelim med-rec with him.

## 2023-05-05 NOTE — H&P ADULT - CONVERSATION DETAILS
Patient with advanced MS and has elected prior for DNR/DNI. She also is Catholic and would not want blood products under any circumstances.      Anibal states that he has hard time accepting her wishes for DNR/DNI, but expressed that these are her wishes and agrees with continuing with them.    Emailed [facility], will update prior MOLST once received.     Patient is DNR/DNI. NO blood products. Patient with advanced MS and has elected prior for DNR/DNI. She also is Adventist and would not want blood products under any circumstances.      Anibal states that he has hard time accepting her wishes for DNR/DNI, but expressed that these are her wishes and agrees with continuing with them.    Emailed [facility], TERESA obtained, printed, updated, and placed in chart.     Patient is DNR/DNI. NO blood products.

## 2023-05-05 NOTE — H&P ADULT - PROBLEM SELECTOR PLAN 4
Incontinent at home and uses Pure Wick per    - straight cath in ED with ~600ml of urine   - continue to monitor bladder scans q6h  - straight cath protocol, [place] if needed

## 2023-05-05 NOTE — H&P ADULT - PROBLEM SELECTOR PLAN 3
Acute Encephalopathy secondary to presumed UTI, hyponatremia likely also factor. Seizures on differential given history although seem less likely given that she maintained ability to communicate albeit delayed.   - continue with treatment of UTI and hyponatremia as above   - 24 EEG ordered   - Neuro consulted, will f/u recs Acute Encephalopathy secondary to presumed UTI, hyponatremia likely also factor. Seizures on differential given history although seem less likely given that she maintained ability to communicate albeit delayed.   - continue with treatment of UTI and hyponatremia as above   - EEG per neuro  - Neuro consulted, will f/u recs

## 2023-05-05 NOTE — ED PROVIDER NOTE - NSICDXPASTMEDICALHX_GEN_ALL_CORE_FT
PAST MEDICAL HISTORY:  Benign essential HTN     History of recurrent UTIs     Hypothyroid     Multiple sclerosis     Seizures

## 2023-05-05 NOTE — ED ADULT TRIAGE NOTE - CHIEF COMPLAINT QUOTE
AS per EMT" Her  and Aid reports she has not been talking for last few day....appears weak and urinating very little . She has hx of UTI . She has a suprapubic cather."

## 2023-05-05 NOTE — H&P ADULT - NSHPPHYSICALEXAM_GEN_ALL_CORE
PHYSICAL EXAM:  Vital Signs Last 24 Hrs  T(C): 35.3 (05-05-23 @ 10:00)  T(F): 95.5 (05-05-23 @ 10:00), Max: 97.5 (05-05-23 @ 08:20)  HR: 83 (05-05-23 @ 08:20) (82 - 88)  BP: 159/90 (05-05-23 @ 08:20)  BP(mean): --  RR: 18 (05-05-23 @ 08:20) (17 - 18)  SpO2: 100% (05-05-23 @ 08:20) (100% - 100%)  Wt(kg): --    General: woman laying in bed appears comfortable in NAD, awake and alert  Eyes: PERRLA, nonicteric sclera  HENMT: NCAT, MMM, nares patent, no tonsillar exudates  Neck: Supple, no thyromegaly, trachea midline   Respiratory: No respiratory distress, CTABL, No rales, rhonchi, wheezing.  Cardiovascular: Regular rate and rhythm; No m/g/r. 2+ peripheral pulses x4 extremities   Gastrointestinal: Soft, Nontender, Nondistended; +BS. No palpable organomegaly  : palpable bladder, mild suprapubic tenderness, no CVA tenderness   Extremities: No c/c/e; warm to touch  Neurological: face symmetric. slow to respond, able to follow commands   Skin: No rashes, No erythema   Psych: AAO to person and place, not oriented to time; appropriate mood and affect

## 2023-05-05 NOTE — H&P ADULT - PROBLEM SELECTOR PLAN 1
Hypothermic on presentation but SIRS negative   - h/o recurrent UTIs on methenamine   - prior urine cultures with Citrobacter   - monitor for urinary retention   - s/p meropenum in the ED. d/w ID pharmacist, will c/w cefepime

## 2023-05-05 NOTE — ED PROVIDER NOTE - CLINICAL SUMMARY MEDICAL DECISION MAKING FREE TEXT BOX
Patient is a 68-year-old female past medical history MS with baclofen pump, seizures, hypertension, hypothyroid presenting for altered mental status. Currently with vs notable for afebrile orally, otherwise wnl and stable. Presenting for few days ams similar to prior sxs when she has had uti without known trauma or fevers at home, no noticed cough, vomiting, diarrhea by aide. DDx includes but not limited to toxic metabolic encephalopathy likely 2/2 uti or other infection, vs electrolyte abnormality, less likely seizure. To eval with labs, cultures, urine, cxr. With comorbidities and encephalopathy likely admit. On chart review if UTI cultures sens to ertapenem.

## 2023-05-05 NOTE — PATIENT PROFILE ADULT - FUNCTIONAL ASSESSMENT - BASIC MOBILITY 6.
1-calculated by average/Not able to assess (calculate score using [facility] averaging method)

## 2023-05-05 NOTE — ED PROVIDER NOTE - ATTENDING CONTRIBUTION TO CARE
Dr. Yeung:  I have personally performed a face to face bedside history and physical examination of this patient. I have discussed the history, examination, review of systems, assessment and plan of management with the resident. I have reviewed the electronic medical record and amended it to reflect my history, review of systems, physical exam, assessment and plan.    68F h/o MS with baclofen pump, seizures, HTN, bedbound, brought in for decreased speaking and slightly more sleepy.  Decreased urine output.  Aide states patient has had similar episodes in the past when she's had UTIs.  Denies fever/chills, n/v/d.      Exam:  - nad, awake  - rrr  - ctab   -abd soft, appears uncomfortable with suprapubic palpation    A/P  - altered mental status, eval infectious/toxic/metabolic etiology  - cbc ,cmp, vbg, UA, urine culture, cxr

## 2023-05-05 NOTE — CHART NOTE - NSCHARTNOTEFT_GEN_A_CORE
Called by RN that patient failed dysphagia screen. Spoke with neuro and Vimpat is 1:1 will place on IV Vimpat 100mg BID for now. S/S ordered, if fails official S/S will consider transitioning remainder of meds to IV as indicated.  DR. Greene updated.

## 2023-05-05 NOTE — H&P ADULT - PROBLEM SELECTOR PLAN 6
Presenting with episodes of staring more consistent with acute encephalopathy over seizures   - 24h EEG pending  - c/w home lacosamide   - neurology consulted, f/u recs Presenting with episodes of staring more consistent with acute encephalopathy over seizures   - EEG pending  - c/w home lacosamide   - neurology consulted, f/u recs

## 2023-05-05 NOTE — PATIENT PROFILE ADULT - FALL HARM RISK - HARM RISK INTERVENTIONS
Assistance with ambulation/Assistance OOB with selected safe patient handling equipment/Communicate Risk of Fall with Harm to all staff/Discuss with provider need for PT consult/Monitor gait and stability/Reinforce activity limits and safety measures with patient and family/Tailored Fall Risk Interventions/Visual Cue: Yellow wristband and red socks/Bed in lowest position, wheels locked, appropriate side rails in place/Call bell, personal items and telephone in reach/Instruct patient to call for assistance before getting out of bed or chair/Non-slip footwear when patient is out of bed/[place] to call system/Physically safe environment - no spills, clutter or unnecessary equipment/Purposeful Proactive Rounding/Room/bathroom lighting operational, light cord in reach

## 2023-05-05 NOTE — CONSULT NOTE ADULT - PROBLEM SELECTOR RECOMMENDATION 9
Pt presents with hyponatremia of 125 on 5/5/2023. On lab review in [facility], the patient had Na baseline in the 130s, lowest 131 4/16/2023, and dc with Na of 135-139. Serum Osm on this admission is found to be 260s. Patient appears to be euvolemic on exam. Urine Osm of 496 and urine sodium of 89.  Likely SIADH in the setting of hx of urinary retention, no output recorded, however  straight cath of 600ml was reported by bedside nurse.  Repeat Sodium is now 126. Recommend: fluid restriction, monitor urine output and labs, avoid overcorrection > 8mEq in 24h, abx per primary team, will consider salt tabs. Pt with acute on chronic hyponatremia in setting of frequent UTIs and urinary retention. Pt with hx of intermittent episodes of hyponatremia in the past. Upon review of labs, SNa was low at 126 on 2/17/23 and improved to 142 on 3/15/23. SNa was low at 131 on 4/16/23. Pt on presentation was found to have low SNa of 125 on 5/5/23. Pt received IVF. Uosm of 496 and Stanislav of 89 noted. Pt with urinary retention in ER. Pt likely with SIADH. Last SNa low at 125. Recommend salt tabs 1 gm BID. Recommend fluid restriction 1L. Avoid overcorrection of more than 6-8 meq/24 hours. Monitor SNa q12 hours. Pt. with hyponatremia in setting of urinary retention and frequent UTIs and. On review of previous labs, pt. noted to have episodes of hyponatremia in the past. SNa was low at 126 on 2/17/23 and improved to 142 on 3/15/23. SNa was low at 131 on 4/16/23. Pt. on presentation was found to have low SNa of 125 on 5/5/23. Pt. received IVF. UOsm of 496 and Stanislav of 89 noted. Pt with urinary retention in ER. Pt. with likely SIADH. Last SNa low at 126. Recommend oral salt tabs 1 gm BID. Recommend fluid restriction 1L/day. Avoid overcorrection of more than 6-8 meq/24 hours. Monitor SNa q12 hours.

## 2023-05-05 NOTE — H&P ADULT - PROBLEM SELECTOR PLAN 5
not currently on DMT  - c/w baclofen pump # Functional Quadriplegia - completely dependent on caregivers   - not currently on DMT  - c/w baclofen pump

## 2023-05-05 NOTE — ED PROVIDER NOTE - PHYSICAL EXAMINATION
CONSTITUTIONAL: Well-developed; well-nourished; in no acute distress.   SKIN: warm, dry  HEAD: Normocephalic; atraumatic.  EYES: no conjunctival injection. no scleral icterus  ENT: No nasal discharge; airway clear.  NECK: Supple; non tender.  CARD: S1, S2 normal; no murmurs, gallops, or rubs. Regular rate and rhythm.   RESP: No wheezes, rales or rhonchi. Good air movement bilaterally.   ABD: soft, no guarding, no distention, no rigidity. When palpating suprapubically pt winces and moves to localize pain  EXT: No cyanosis or edema.  NEURO: Awake, nonverbal, tracks with eyes, does not follow commands, RUE contracted, moves ble and lue.

## 2023-05-05 NOTE — CONSULT NOTE ADULT - SUBJECTIVE AND OBJECTIVE BOX
[facility] DIVISION OF KIDNEY DISEASES AND HYPERTENSION -- [phone]  -- INITIAL CONSULT NOTE  --------------------------------------------------------------------------------  HPI:  68y o F Yarsani with PMH of MS with baclofen pump in place, seizure, hypothyroidism, frequent UTIs presenting worsening mental status of the last 4 days found to have a UTI, Nephrology consulted for hyponatremia.    Patient was seen in the ED, and was unable to give history due to AMS. Hx was obtained from the aide at bedside, who reported that she was last seen at her baseline function 4 days ago, no confusion. Also reported that the patient has AMS usually when she has bouts of UTI. When asked about seizure control, aide stated that she has been unable to exclude seizure in the AMS episodes: states that patient has episodes of staring into space, and not being unarousable before coming back to the confusion. The Aide added that the patient did not complain of any symptoms of pain, shortness of breath, burning on urination.     Of note: The Aide stated that the patient's first degree relative who is now , also had hyponatremia episodes.  No medication changes since the last admission in  for UTI.           PAST HISTORY  --------------------------------------------------------------------------------  PAST MEDICAL & SURGICAL HISTORY:  Hypothyroid  Multiple sclerosis  Seizures  History of recurrent UTIs  Benign essential HTN  No significant past surgical history        FAMILY HISTORY:  No pertinent family history in first degree relatives      PAST SOCIAL HISTORY:    ALLERGIES & MEDICATIONS  --------------------------------------------------------------------------------  Allergies    sulfa drugs (Hives; Rash)    Intolerances      Standing Inpatient Medications  amLODIPine   Tablet 5 milliGRAM(s) Oral daily  cefepime   IVPB 1000 milliGRAM(s) IV Intermittent once  cefepime   IVPB 1000 milliGRAM(s) IV Intermittent every 8 hours  cefepime   IVPB      gabapentin 300 milliGRAM(s) Oral three times a day  lacosamide 100 milliGRAM(s) Oral two times a day  levothyroxine 75 MICROGram(s) Oral daily  pantoprazole    Tablet 40 milliGRAM(s) Oral before breakfast  polyethylene glycol 3350 17 Gram(s) Oral two times a day  senna 2 Tablet(s) Oral at bedtime    PRN Inpatient Medications  bisacodyl Suppository 10 milliGRAM(s) Rectal daily PRN      REVIEW OF SYSTEMS  --------------------------------------------------------------------------------  Patient unable to give this hx due to AMS, collateral info obtained from Aide at [facility]    Gen: No fevers/chills  Skin: No rashes  Head/Eyes/Ears: Normal hearing,   Respiratory: No dyspnea, cough  CV: No chest pain  GI: No abdominal pain, diarrhea  : No dysuria, hematuria  MSK: No  edema  Heme: No easy bruising or bleeding  Psych: confusion     All other systems were reviewed and are negative, except as noted.    VITALS/PHYSICAL EXAM  --------------------------------------------------------------------------------  T(C): 35.1 (23 @ 12:30), Max: 36.4 (23 @ 08:20)  HR: 85 (23 12:30) (82 - 88)  BP: 145/70 (23 @ 12:30) (145/70 - 172/71)  RR: 18 (23 12:30) (17 - 18)  SpO2: 99% (23 12:30) (99% - 100%)  Wt(kg): --  Height (cm): 170.2 (23 @ 07:15)      Physical Exam:  	Gen: NAD, confusion   	HEENT: MMM  	Pulm: CTA B/L  	CV: S1S2  	Abd: Soft, +BS   	Ext: trace LE edema B/L  	Neuro: Awake  	Skin: Warm and dry  	Vascular access:    LABS/STUDIES  --------------------------------------------------------------------------------              10.7   8.39  >-----------<  241      [23 08:30]              33.1     126  |  91  |  9   ----------------------------<  92      [23 12:49]  3.7   |  23  |  0.40        Ca     9.4     [23 12:49]      Mg     1.60     [23 12:49]      Phos  2.8     [23 12:49]    TPro  7.5  /  Alb  4.0  /  TBili  0.3  /  DBili  x   /  AST  32  /  ALT  27  /  AlkPhos  169  [23 08:30]        Serum Osmolality 262      [23 @ 11:20]    Creatinine Trend:  SCr 0.40 [ 12:49]  SCr 0.35 [ 11:20]  SCr 0.43 [ 08:30]  SCr 0.54 [ 05:50]  SCr 0.55 [ 06:05]    Urinalysis - [23 @ 10:00]      Color Light Yellow / Appearance Slightly Turbid / SG 1.019 / pH 6.5      Gluc Negative / Ketone Small  / Bili Negative / Urobili <2 mg/dL       Blood Negative / Protein 30 mg/dL / Leuk Est Large / Nitrite Negative      RBC 1 / WBC MANY / Hyaline 0 / Gran  / Sq Epi  / Non Sq Epi  / Bacteria Few    Urine Sodium 89      [23 @ 11:25]  Urine Osmolality 496      [23 @ 11:25]    Iron 49, TIBC 178, %sat 28      [23 @ 09:15]  Ferritin 144      [23 @ 03:23]  TSH 0.56      [23 @ 08:30]    HCV 0.11, Nonreact      [22 @ 05:30]    Syphilis Screen (Treponema Pallidum Ab) Negative      [03-10-23 @ 23:45]  SPEP Interpretation: Increase in Alpha-2 fraction suggestive of acute inflammation.   IVÁN Jackson M.D.      [03-10-23 @ 23:45]   [facility] DIVISION OF KIDNEY DISEASES AND HYPERTENSION -- [phone]  -- INITIAL CONSULT NOTE  --------------------------------------------------------------------------------  HPI:  68y o F [employer] with PMH of MS with baclofen pump in place, seizure, hypothyroidism, frequent UTIs presenting worsening mental status of the last 4 days found to have a UTI, Nephrology consulted for hyponatremia.    Patient was seen in the ED, and was unable to give history due to AMS. Hx was obtained from the aide at bedside, who reported that she was last seen at her baseline function 4 days ago, no confusion. Also reported that the patient has AMS usually when she has bouts of UTI. When asked about seizure control, aide stated that she has been unable to exclude seizure in the AMS episodes: states that patient has episodes of staring into space, and not being unarousable before coming back to the confusion. The Aide added that the patient did not complain of any symptoms of pain, shortness of breath, burning on urination.     Of note: The Aide stated that the patient's first degree relative who is now , also had hyponatremia episodes.  No medication changes since the last admission in  for UTI.           PAST HISTORY  --------------------------------------------------------------------------------  PAST MEDICAL & SURGICAL HISTORY:  Hypothyroid  Multiple sclerosis  Seizures  History of recurrent UTIs  Benign essential HTN  No significant past surgical history        FAMILY HISTORY:  No pertinent family history in first degree relatives      PAST SOCIAL HISTORY:    ALLERGIES & MEDICATIONS  --------------------------------------------------------------------------------  Allergies    sulfa drugs (Hives; Rash)    Intolerances      Standing Inpatient Medications  amLODIPine   Tablet 5 milliGRAM(s) Oral daily  cefepime   IVPB 1000 milliGRAM(s) IV Intermittent once  cefepime   IVPB 1000 milliGRAM(s) IV Intermittent every 8 hours  cefepime   IVPB      gabapentin 300 milliGRAM(s) Oral three times a day  lacosamide 100 milliGRAM(s) Oral two times a day  levothyroxine 75 MICROGram(s) Oral daily  pantoprazole    Tablet 40 milliGRAM(s) Oral before breakfast  polyethylene glycol 3350 17 Gram(s) Oral two times a day  senna 2 Tablet(s) Oral at bedtime    PRN Inpatient Medications  bisacodyl Suppository 10 milliGRAM(s) Rectal daily PRN      REVIEW OF SYSTEMS  --------------------------------------------------------------------------------  Patient unable to give this hx due to AMS, collateral info obtained from Aide at [facility]    Gen: No fevers/chills  Skin: No rashes  Head/Eyes/Ears: Normal hearing,   Respiratory: No dyspnea, cough  CV: No chest pain  GI: No abdominal pain, diarrhea  : No dysuria, hematuria  MSK: No  edema  Heme: No easy bruising or bleeding  Psych: confusion     All other systems were reviewed and are negative, except as noted.    VITALS/PHYSICAL EXAM  --------------------------------------------------------------------------------  T(C): 35.1 (23 @ 12:30), Max: 36.4 (23 @ 08:20)  HR: 85 (23 12:30) (82 - 88)  BP: 145/70 (23 @ 12:30) (145/70 - 172/71)  RR: 18 (23 12:30) (17 - 18)  SpO2: 99% (23 12:30) (99% - 100%)  Wt(kg): --  Height (cm): 170.2 (23 @ 07:15)      Physical Exam:  	Gen: NAD, confusion   	HEENT: MMM  	Pulm: CTA B/L  	CV: S1S2  	Abd: Soft, +BS   	Ext: trace LE edema B/L  	Neuro: Awake  	Skin: Warm and dry  	Vascular access:    LABS/STUDIES  --------------------------------------------------------------------------------              10.7   8.39  >-----------<  241      [23 08:30]              33.1     126  |  91  |  9   ----------------------------<  92      [23 12:49]  3.7   |  23  |  0.40        Ca     9.4     [23 12:49]      Mg     1.60     [23 12:49]      Phos  2.8     [23 12:49]    TPro  7.5  /  Alb  4.0  /  TBili  0.3  /  DBili  x   /  AST  32  /  ALT  27  /  AlkPhos  169  [23 08:30]        Serum Osmolality 262      [23 @ 11:20]    Creatinine Trend:  SCr 0.40 [ 12:49]  SCr 0.35 [ 11:20]  SCr 0.43 [ 08:30]  SCr 0.54 [ 05:50]  SCr 0.55 [ 06:05]    Urinalysis - [23 @ 10:00]      Color Light Yellow / Appearance Slightly Turbid / SG 1.019 / pH 6.5      Gluc Negative / Ketone Small  / Bili Negative / Urobili <2 mg/dL       Blood Negative / Protein 30 mg/dL / Leuk Est Large / Nitrite Negative      RBC 1 / WBC MANY / Hyaline 0 / Gran  / Sq Epi  / Non Sq Epi  / Bacteria Few    Urine Sodium 89      [23 @ 11:25]  Urine Osmolality 496      [23 @ 11:25]    Iron 49, TIBC 178, %sat 28      [23 @ 09:15]  Ferritin 144      [23 @ 03:23]  TSH 0.56      [23 @ 08:30]    HCV 0.11, Nonreact      [22 @ 05:30]    Syphilis Screen (Treponema Pallidum Ab) Negative      [03-10-23 @ 23:45]  SPEP Interpretation: Increase in Alpha-2 fraction suggestive of acute inflammation.   IVÁN Jackson M.D.      [03-10-23 @ 23:45]      US of kidney and bladder 2023  IMPRESSION:  No hydronephrosis.  Bladder volumes were not assessed as patient was combative. [facility] DIVISION OF KIDNEY DISEASES AND HYPERTENSION -- [phone]  -- INITIAL CONSULT NOTE  --------------------------------------------------------------------------------  HPI:  68y o F Cheondo[place] with PMH of MS with baclofen pump in place, seizure, hypothyroidism, frequent UTIs presenting worsening mental status of the last 4 days found to have a UTI, Nephrology consulted for hyponatremia.    Patient was seen in the ED, and was unable to give history due to AMS. Hx was obtained from the aide at bedside, who reported that she was last seen at her baseline function 4 days ago, no confusion. Also reported that the patient has AMS usually when she has bouts of UTI. When asked about seizure control, aide stated that she has been unable to exclude seizure in the AMS episodes: states that patient has episodes of staring into space, and not being unarousable before coming back to the confusion. The Aide added that the patient did not complain of any symptoms of pain, shortness of breath, burning on urination.     Of note: The Aide stated that the patient has been eating and drinking however, with low urine output.  No medication changes since the last admission in 4/18 for UTI.           PAST HISTORY  --------------------------------------------------------------------------------  PAST MEDICAL & SURGICAL HISTORY:  Hypothyroid  Multiple sclerosis  Seizures  History of recurrent UTIs  Benign essential HTN  No significant past surgical history        FAMILY HISTORY:  No pertinent family history in first degree relatives      PAST SOCIAL HISTORY:    ALLERGIES & MEDICATIONS  --------------------------------------------------------------------------------  Allergies    sulfa drugs (Hives; Rash)    Intolerances      Standing Inpatient Medications  amLODIPine   Tablet 5 milliGRAM(s) Oral daily  cefepime   IVPB 1000 milliGRAM(s) IV Intermittent once  cefepime   IVPB 1000 milliGRAM(s) IV Intermittent every 8 hours  cefepime   IVPB      gabapentin 300 milliGRAM(s) Oral three times a day  lacosamide 100 milliGRAM(s) Oral two times a day  levothyroxine 75 MICROGram(s) Oral daily  pantoprazole    Tablet 40 milliGRAM(s) Oral before breakfast  polyethylene glycol 3350 17 Gram(s) Oral two times a day  senna 2 Tablet(s) Oral at bedtime    PRN Inpatient Medications  bisacodyl Suppository 10 milliGRAM(s) Rectal daily PRN      REVIEW OF SYSTEMS  --------------------------------------------------------------------------------  Patient unable to give this hx due to AMS, collateral info obtained from Bryanna SALMERON as noted in the HPI     VITALS/PHYSICAL EXAM  --------------------------------------------------------------------------------  T(C): 35.1 (05-05-23 @ 12:30), Max: 36.4 (05-05-23 @ 08:20)  HR: 85 (05-05-23 @ 12:30) (82 - 88)  BP: 145/70 (05-05-23 @ 12:30) (145/70 - 172/71)  RR: 18 (05-05-23 @ 12:30) (17 - 18)  SpO2: 99% (05-05-23 @ 12:30) (99% - 100%)  Wt(kg): --  Height (cm): 170.2 (05-05-23 @ 07:15)      Physical Exam:  	Gen: NAD, confusion   	HEENT: MMM  	Pulm: CTA B/L  	CV: S1S2  	Abd: Soft, +BS   	Ext: trace LE edema B/L  	Neuro: Awake  	Skin: Warm and dry  	Vascular access:    LABS/STUDIES  --------------------------------------------------------------------------------              10.7   8.39  >-----------<  241      [05-05-23 @ 08:30]              33.1     126  |  91  |  9   ----------------------------<  92      [05-05-23 @ 12:49]  3.7   |  23  |  0.40        Ca     9.4     [05-05-23 @ 12:49]      Mg     1.60     [05-05-23 @ 12:49]      Phos  2.8     [05-05-23 @ 12:49]    TPro  7.5  /  Alb  4.0  /  TBili  0.3  /  DBili  x   /  AST  32  /  ALT  27  /  AlkPhos  169  [05-05-23 @ 08:30]        Serum Osmolality 262      [05-05-23 @ 11:20]    Creatinine Trend:  SCr 0.40 [05-05 @ 12:49]  SCr 0.35 [05-05 @ 11:20]  SCr 0.43 [05-05 @ 08:30]  SCr 0.54 [04-18 @ 05:50]  SCr 0.55 [04-17 @ 06:05]    Urinalysis - [05-05-23 @ 10:00]      Color Light Yellow / Appearance Slightly Turbid / SG 1.019 / pH 6.5      Gluc Negative / Ketone Small  / Bili Negative / Urobili <2 mg/dL       Blood Negative / Protein 30 mg/dL / Leuk Est Large / Nitrite Negative      RBC 1 / WBC MANY / Hyaline 0 / Gran  / Sq Epi  / Non Sq Epi  / Bacteria Few    Urine Sodium 89      [05-05-23 @ 11:25]  Urine Osmolality 496      [05-05-23 @ 11:25]    Iron 49, TIBC 178, %sat 28      [03-13-23 @ 09:15]  Ferritin 144      [02-19-23 @ 03:23]  TSH 0.56      [05-05-23 @ 08:30]    HCV 0.11, Nonreact      [12-17-22 @ 05:30]    Syphilis Screen (Treponema Pallidum Ab) Negative      [03-10-23 @ 23:45]  SPEP Interpretation: Increase in Alpha-2 fraction suggestive of acute inflammation.   IVÁN Jackson M.D.      [03-10-23 @ 23:45]      US of kidney and bladder 5/5/2023  IMPRESSION:  No hydronephrosis.  Bladder volumes were not assessed as patient was combative. [facility] DIVISION OF KIDNEY DISEASES AND HYPERTENSION -- [phone]  -- INITIAL CONSULT NOTE  --------------------------------------------------------------------------------  HPI: 68-year-old female with PMH of MS, frequent UTI admitted to [facility] with AMS. ER labs showed SNa of 121. Nephrology consulted for hyponatremia.     Pt seen and examined in ER. History obtained from aid and chart review as pt is minimally verbal and confused. Upon review of labs, SNa was low at 126 on 2/17/23 and improved to 142 on 3/15/23. SNa was low at 131 on 4/16/23. Pt on presentation was found to have low SNa of 125 on 5/5/23. Pt received IVF.     Pt says sometimes her ankle gets swollen. Limited ROS obtained. Last SNa low at 125. Pt was also noted to have urinary retention in ER.     PAST HISTORY  --------------------------------------------------------------------------------  PAST MEDICAL & SURGICAL HISTORY:  Hypothyroid  Multiple sclerosis  Seizures  History of recurrent UTIs  Benign essential HTN  No significant past surgical history    FAMILY HISTORY:  No pertinent family history in first degree relatives    PAST SOCIAL HISTORY:    ALLERGIES & MEDICATIONS  --------------------------------------------------------------------------------  Allergies    sulfa drugs (Hives; Rash)    Intolerances  Standing Inpatient Medications  amLODIPine   Tablet 5 milliGRAM(s) Oral daily  cefepime   IVPB 1000 milliGRAM(s) IV Intermittent once  cefepime   IVPB 1000 milliGRAM(s) IV Intermittent every 8 hours  cefepime   IVPB      gabapentin 300 milliGRAM(s) Oral three times a day  lacosamide 100 milliGRAM(s) Oral two times a day  levothyroxine 75 MICROGram(s) Oral daily  pantoprazole    Tablet 40 milliGRAM(s) Oral before breakfast  polyethylene glycol 3350 17 Gram(s) Oral two times a day  senna 2 Tablet(s) Oral at bedtime    PRN Inpatient Medications  bisacodyl Suppository 10 milliGRAM(s) Rectal daily PRN    REVIEW OF SYSTEMS  --------------------------------------------------------------------------------  Limited ROS    Gen: No fever/chills  Cardiac: No chest pain  Resp: No chest pain, SOB    VITALS/PHYSICAL EXAM  --------------------------------------------------------------------------------  T(C): 35.1 (05-05-23 @ 12:30), Max: 36.4 (05-05-23 @ 08:20)  HR: 85 (05-05-23 @ 12:30) (82 - 88)  BP: 145/70 (05-05-23 @ 12:30) (145/70 - 172/71)  RR: 18 (05-05-23 @ 12:30) (17 - 18)  SpO2: 99% (05-05-23 @ 12:30) (99% - 100%)  Wt(kg): --  Height (cm): 170.2 (05-05-23 @ 07:15)    Physical Exam:  	Gen: elderly female, resting, NAD  	HEENT: MMM  	Pulm: CTA B/L  	CV: S1S2  	Abd: Soft, +BS   	Ext: trace LE edema B/L  	Neuro: Awake, confused, limited ROS  	Skin: Warm and dry    LABS/STUDIES  --------------------------------------------------------------------------------              10.7   8.39  >-----------<  241      [05-05-23 @ 08:30]              33.1     126  |  91  |  9   ----------------------------<  92      [05-05-23 @ 12:49]  3.7   |  23  |  0.40        Ca     9.4     [05-05-23 @ 12:49]      Mg     1.60     [05-05-23 @ 12:49]      Phos  2.8     [05-05-23 @ 12:49]    Serum Osmolality 262      [05-05-23 @ 11:20]    Creatinine Trend:  SCr 0.40 [05-05 @ 12:49]  SCr 0.35 [05-05 @ 11:20]  SCr 0.43 [05-05 @ 08:30]  SCr 0.54 [04-18 @ 05:50]  SCr 0.55 [04-17 @ 06:05]    Urinalysis - [05-05-23 @ 10:00]      Color Light Yellow / Appearance Slightly Turbid / SG 1.019 / pH 6.5      Gluc Negative / Ketone Small  / Bili Negative / Urobili <2 mg/dL       Blood Negative / Protein 30 mg/dL / Leuk Est Large / Nitrite Negative      RBC 1 / WBC MANY / Hyaline 0 / Gran  / Sq Epi  / Non Sq Epi  / Bacteria Few    Urine Sodium 89      [05-05-23 @ 11:25]  Urine Osmolality 496      [05-05-23 @ 11:25] [facility] DIVISION OF KIDNEY DISEASES AND HYPERTENSION -- [phone]  -- INITIAL CONSULT NOTE  --------------------------------------------------------------------------------  HPI: 68-year-old female with PMH of MS, frequent UTI admitted to [facility] with AMS. ER labs showed SNa of 121. Nephrology consulted for hyponatremia.     Pt seen and examined in ER. History obtained from aid and chart review as pt is minimally verbal and confused. Upon review of labs, SNa was low at 126 on 2/17/23 and improved to 142 on 3/15/23. SNa was low at 131 on 4/16/23. Pt on presentation was found to have low SNa of 125 on 5/5/23. Pt received IVF.     Pt says sometimes her ankle gets swollen. Limited ROS obtained. Last SNa low at 126. Pt was also noted to have urinary retention in ER.     PAST HISTORY  --------------------------------------------------------------------------------  PAST MEDICAL & SURGICAL HISTORY:  Hypothyroid  Multiple sclerosis  Seizures  History of recurrent UTIs  Benign essential HTN  No significant past surgical history    FAMILY HISTORY:  No pertinent family history in first degree relatives    PAST SOCIAL HISTORY:    ALLERGIES & MEDICATIONS  --------------------------------------------------------------------------------  Allergies    sulfa drugs (Hives; Rash)    Intolerances  Standing Inpatient Medications  amLODIPine   Tablet 5 milliGRAM(s) Oral daily  cefepime   IVPB 1000 milliGRAM(s) IV Intermittent once  cefepime   IVPB 1000 milliGRAM(s) IV Intermittent every 8 hours  cefepime   IVPB      gabapentin 300 milliGRAM(s) Oral three times a day  lacosamide 100 milliGRAM(s) Oral two times a day  levothyroxine 75 MICROGram(s) Oral daily  pantoprazole    Tablet 40 milliGRAM(s) Oral before breakfast  polyethylene glycol 3350 17 Gram(s) Oral two times a day  senna 2 Tablet(s) Oral at bedtime    PRN Inpatient Medications  bisacodyl Suppository 10 milliGRAM(s) Rectal daily PRN    REVIEW OF SYSTEMS  --------------------------------------------------------------------------------  Limited ROS obtained from pt.    Gen: No fever/chills  Cardiac: No chest pain  Resp: No chest pain, SOB    VITALS/PHYSICAL EXAM  --------------------------------------------------------------------------------  T(C): 35.1 (05-05-23 @ 12:30), Max: 36.4 (05-05-23 @ 08:20)  HR: 85 (05-05-23 @ 12:30) (82 - 88)  BP: 145/70 (05-05-23 @ 12:30) (145/70 - 172/71)  RR: 18 (05-05-23 @ 12:30) (17 - 18)  SpO2: 99% (05-05-23 @ 12:30) (99% - 100%)  Wt(kg): --  Height (cm): 170.2 (05-05-23 @ 07:15)    Physical Exam:  	Gen: elderly female, resting, NAD  	HEENT: MMM  	Pulm: CTA B/L  	CV: S1S2  	Abd: Soft, +BS   	Ext: trace LE edema B/L  	Neuro: Awake, confused, limited ROS  	Skin: Warm and dry    LABS/STUDIES  --------------------------------------------------------------------------------              10.7   8.39  >-----------<  241      [05-05-23 @ 08:30]              33.1     126  |  91  |  9   ----------------------------<  92      [05-05-23 @ 12:49]  3.7   |  23  |  0.40        Ca     9.4     [05-05-23 @ 12:49]      Mg     1.60     [05-05-23 @ 12:49]      Phos  2.8     [05-05-23 @ 12:49]    Serum Osmolality 262      [05-05-23 @ 11:20]    Creatinine Trend:  SCr 0.40 [05-05 @ 12:49]  SCr 0.35 [05-05 @ 11:20]  SCr 0.43 [05-05 @ 08:30]  SCr 0.54 [04-18 @ 05:50]  SCr 0.55 [04-17 @ 06:05]    Urinalysis - [05-05-23 @ 10:00]      Color Light Yellow / Appearance Slightly Turbid / SG 1.019 / pH 6.5      Gluc Negative / Ketone Small  / Bili Negative / Urobili <2 mg/dL       Blood Negative / Protein 30 mg/dL / Leuk Est Large / Nitrite Negative      RBC 1 / WBC MANY / Hyaline 0 / Gran  / Sq Epi  / Non Sq Epi  / Bacteria Few    Urine Sodium 89      [05-05-23 @ 11:25]  Urine Osmolality 496      [05-05-23 @ 11:25]

## 2023-05-05 NOTE — ED ADULT NURSE REASSESSMENT NOTE - NS ED NURSE REASSESS COMMENT FT1
Resident aware of patient rectal temperature, will medicate as per ordered, will continue to monitor.

## 2023-05-05 NOTE — H&P ADULT - NSHPLABSRESULTS_GEN_ALL_CORE
LABS:                        10.7   8.39  )-----------( 241      ( 05 May 2023 08:30 )             33.1     05 May 2023 11:20    121    |  90     |  10     ----------------------------<  92     TNP     |  20     |  0.35     Ca    8.9        05 May 2023 11:20  Phos  TNP       05 May 2023 11:20  Mg     1.60      05 May 2023 11:20    TPro  7.5    /  Alb  4.0    /  TBili  0.3    /  DBili  x      /  AST  32     /  ALT  27     /  AlkPhos  169    05 May 2023 08:30    I reviewed the labs and imaging. I independently reviewed the EKG which showed no BLAINE

## 2023-05-05 NOTE — PROVIDER CONTACT NOTE (OTHER) - ASSESSMENT
patient in no signs of acute distress at this time , patient noted to be hypothermic will be putting bear hugging warming device on when patient has room currently in hallway

## 2023-05-05 NOTE — PATIENT PROFILE ADULT - FALL HARM RISK - ATTEMPT OOB
[FreeTextEntry1] : 9/21/22: The patient returns to the office.  She just had her left foot MRI done but the report was not generated by the radiologist.  She is having left knee pain as well.  She believes the knee pain is due to the pain in the foot and her abnormal gait.  She had no trauma associated with her left knee pain.  She has no changes in the foot since she was last here just over a week ago.\par \par 9/12/22: The patient is a 60 year old female presenting for an initial evaluation of her left foot and ankle pain x 2-3 years. She is concerned with a bunion present in her left foot. She describes pain to the bunion and pain that radiates over her dorsal foot. The patient is stated to interfere with the patient's sleep. She is also concerned with medial sided ankle pain in the office today. The patient cannot attribute their pain to any injury, fall, or trauma. Of note, the patient states that she receives steroid injections with Dr. Quijano. History of bunion surgery completed to the right foot. She presents wearing sneakers and is walking without assistance. No other complaints. 
No

## 2023-05-05 NOTE — H&P ADULT - HISTORY OF PRESENT ILLNESS
68WJehova's Witness with past medical history of MS with baclofen pump in place, seizure, hypothyroidism, frequent UTIs presenting worsening mental status. Patient with poor insight and judgment and thus history provided by  and aide at bedside. Patient reportedly was in her normal state of health prior to 4 days ago when she no longer started to act like herself. She reportedly over the last 4 days has been increasingly confused, slow to respond and has been having episodes of staring spells. Over the last few days mental status has worsened and talking less. They deny any fevers, chills, chest pain, shortness of breath, abdominal pain, nausea, vomiting,

## 2023-05-06 LAB
ALBUMIN SERPL ELPH-MCNC: 3.5 G/DL — SIGNIFICANT CHANGE UP (ref 3.3–5)
ALBUMIN SERPL ELPH-MCNC: 3.9 G/DL — SIGNIFICANT CHANGE UP (ref 3.3–5)
ALP SERPL-CCNC: 142 U/L — HIGH (ref 40–120)
ALP SERPL-CCNC: 164 U/L — HIGH (ref 40–120)
ALT FLD-CCNC: 21 U/L — SIGNIFICANT CHANGE UP (ref 4–33)
ALT FLD-CCNC: 23 U/L — SIGNIFICANT CHANGE UP (ref 4–33)
ANION GAP SERPL CALC-SCNC: 13 MMOL/L — SIGNIFICANT CHANGE UP (ref 7–14)
ANION GAP SERPL CALC-SCNC: 14 MMOL/L — SIGNIFICANT CHANGE UP (ref 7–14)
ANION GAP SERPL CALC-SCNC: 15 MMOL/L — HIGH (ref 7–14)
APTT BLD: 33.3 SEC — SIGNIFICANT CHANGE UP (ref 27–36.3)
AST SERPL-CCNC: 24 U/L — SIGNIFICANT CHANGE UP (ref 4–32)
AST SERPL-CCNC: 30 U/L — SIGNIFICANT CHANGE UP (ref 4–32)
BASE EXCESS BLDV CALC-SCNC: 4.2 MMOL/L — HIGH (ref -2–3)
BASOPHILS # BLD AUTO: 0.04 K/UL — SIGNIFICANT CHANGE UP (ref 0–0.2)
BASOPHILS NFR BLD AUTO: 0.5 % — SIGNIFICANT CHANGE UP (ref 0–2)
BILIRUB SERPL-MCNC: 0.3 MG/DL — SIGNIFICANT CHANGE UP (ref 0.2–1.2)
BILIRUB SERPL-MCNC: 0.3 MG/DL — SIGNIFICANT CHANGE UP (ref 0.2–1.2)
BUN SERPL-MCNC: 10 MG/DL — SIGNIFICANT CHANGE UP (ref 7–23)
BUN SERPL-MCNC: 7 MG/DL — SIGNIFICANT CHANGE UP (ref 7–23)
BUN SERPL-MCNC: 9 MG/DL — SIGNIFICANT CHANGE UP (ref 7–23)
CA-I SERPL-SCNC: 1.26 MMOL/L — SIGNIFICANT CHANGE UP (ref 1.15–1.33)
CALCIUM SERPL-MCNC: 9.2 MG/DL — SIGNIFICANT CHANGE UP (ref 8.4–10.5)
CALCIUM SERPL-MCNC: 9.5 MG/DL — SIGNIFICANT CHANGE UP (ref 8.4–10.5)
CALCIUM SERPL-MCNC: 9.5 MG/DL — SIGNIFICANT CHANGE UP (ref 8.4–10.5)
CHLORIDE BLDV-SCNC: 97 MMOL/L — SIGNIFICANT CHANGE UP (ref 96–108)
CHLORIDE SERPL-SCNC: 86 MMOL/L — LOW (ref 98–107)
CHLORIDE SERPL-SCNC: 94 MMOL/L — LOW (ref 98–107)
CHLORIDE SERPL-SCNC: 95 MMOL/L — LOW (ref 98–107)
CK MB BLD-MCNC: 10 % — HIGH (ref 0–2.5)
CK MB CFR SERPL CALC: 12.1 NG/ML — HIGH
CK SERPL-CCNC: 121 U/L — SIGNIFICANT CHANGE UP (ref 25–170)
CO2 BLDV-SCNC: 31.2 MMOL/L — HIGH (ref 22–26)
CO2 SERPL-SCNC: 22 MMOL/L — SIGNIFICANT CHANGE UP (ref 22–31)
CO2 SERPL-SCNC: 24 MMOL/L — SIGNIFICANT CHANGE UP (ref 22–31)
CO2 SERPL-SCNC: 25 MMOL/L — SIGNIFICANT CHANGE UP (ref 22–31)
CREAT SERPL-MCNC: 0.41 MG/DL — LOW (ref 0.5–1.3)
CREAT SERPL-MCNC: 0.75 MG/DL — SIGNIFICANT CHANGE UP (ref 0.5–1.3)
CREAT SERPL-MCNC: 0.78 MG/DL — SIGNIFICANT CHANGE UP (ref 0.5–1.3)
CULTURE RESULTS: NO GROWTH — SIGNIFICANT CHANGE UP
EGFR: 108 ML/MIN/1.73M2 — SIGNIFICANT CHANGE UP
EGFR: 84 ML/MIN/1.73M2 — SIGNIFICANT CHANGE UP
EGFR: 88 ML/MIN/1.73M2 — SIGNIFICANT CHANGE UP
EOSINOPHIL # BLD AUTO: 0.08 K/UL — SIGNIFICANT CHANGE UP (ref 0–0.5)
EOSINOPHIL NFR BLD AUTO: 1.1 % — SIGNIFICANT CHANGE UP (ref 0–6)
GAS PNL BLDV: 129 MMOL/L — LOW (ref 136–145)
GAS PNL BLDV: SIGNIFICANT CHANGE UP
GAS PNL BLDV: SIGNIFICANT CHANGE UP
GLUCOSE BLDC GLUCOMTR-MCNC: 77 MG/DL — SIGNIFICANT CHANGE UP (ref 70–99)
GLUCOSE BLDV-MCNC: 83 MG/DL — SIGNIFICANT CHANGE UP (ref 70–99)
GLUCOSE SERPL-MCNC: 80 MG/DL — SIGNIFICANT CHANGE UP (ref 70–99)
GLUCOSE SERPL-MCNC: 87 MG/DL — SIGNIFICANT CHANGE UP (ref 70–99)
GLUCOSE SERPL-MCNC: 94 MG/DL — SIGNIFICANT CHANGE UP (ref 70–99)
HCO3 BLDV-SCNC: 30 MMOL/L — HIGH (ref 22–29)
HCT VFR BLD CALC: 31 % — LOW (ref 34.5–45)
HCT VFR BLD CALC: 31.2 % — LOW (ref 34.5–45)
HCT VFR BLDA CALC: 32 % — LOW (ref 34.5–46.5)
HGB BLD CALC-MCNC: 10.5 G/DL — LOW (ref 11.7–16.1)
HGB BLD-MCNC: 10 G/DL — LOW (ref 11.5–15.5)
HGB BLD-MCNC: 10.1 G/DL — LOW (ref 11.5–15.5)
IANC: 5.24 K/UL — SIGNIFICANT CHANGE UP (ref 1.8–7.4)
IMM GRANULOCYTES NFR BLD AUTO: 0.3 % — SIGNIFICANT CHANGE UP (ref 0–0.9)
INR BLD: 0.97 RATIO — SIGNIFICANT CHANGE UP (ref 0.88–1.16)
LACTATE BLDV-MCNC: 1.5 MMOL/L — SIGNIFICANT CHANGE UP (ref 0.5–2)
LYMPHOCYTES # BLD AUTO: 1.61 K/UL — SIGNIFICANT CHANGE UP (ref 1–3.3)
LYMPHOCYTES # BLD AUTO: 21.4 % — SIGNIFICANT CHANGE UP (ref 13–44)
MAGNESIUM SERPL-MCNC: 1.8 MG/DL — SIGNIFICANT CHANGE UP (ref 1.6–2.6)
MAGNESIUM SERPL-MCNC: 2.1 MG/DL — SIGNIFICANT CHANGE UP (ref 1.6–2.6)
MCHC RBC-ENTMCNC: 27.4 PG — SIGNIFICANT CHANGE UP (ref 27–34)
MCHC RBC-ENTMCNC: 28.5 PG — SIGNIFICANT CHANGE UP (ref 27–34)
MCHC RBC-ENTMCNC: 32.1 GM/DL — SIGNIFICANT CHANGE UP (ref 32–36)
MCHC RBC-ENTMCNC: 32.6 GM/DL — SIGNIFICANT CHANGE UP (ref 32–36)
MCV RBC AUTO: 84 FL — SIGNIFICANT CHANGE UP (ref 80–100)
MCV RBC AUTO: 88.9 FL — SIGNIFICANT CHANGE UP (ref 80–100)
MONOCYTES # BLD AUTO: 0.54 K/UL — SIGNIFICANT CHANGE UP (ref 0–0.9)
MONOCYTES NFR BLD AUTO: 7.2 % — SIGNIFICANT CHANGE UP (ref 2–14)
NEUTROPHILS # BLD AUTO: 5.24 K/UL — SIGNIFICANT CHANGE UP (ref 1.8–7.4)
NEUTROPHILS NFR BLD AUTO: 69.5 % — SIGNIFICANT CHANGE UP (ref 43–77)
NRBC # BLD: 0 /100 WBCS — SIGNIFICANT CHANGE UP (ref 0–0)
NRBC # BLD: 0 /100 WBCS — SIGNIFICANT CHANGE UP (ref 0–0)
NRBC # FLD: 0 K/UL — SIGNIFICANT CHANGE UP (ref 0–0)
NRBC # FLD: 0 K/UL — SIGNIFICANT CHANGE UP (ref 0–0)
PCO2 BLDV: 48 MMHG — SIGNIFICANT CHANGE UP (ref 39–52)
PH BLDV: 7.4 — SIGNIFICANT CHANGE UP (ref 7.32–7.43)
PHOSPHATE SERPL-MCNC: 2.3 MG/DL — LOW (ref 2.5–4.5)
PHOSPHATE SERPL-MCNC: 4 MG/DL — SIGNIFICANT CHANGE UP (ref 2.5–4.5)
PLATELET # BLD AUTO: 191 K/UL — SIGNIFICANT CHANGE UP (ref 150–400)
PLATELET # BLD AUTO: 210 K/UL — SIGNIFICANT CHANGE UP (ref 150–400)
PO2 BLDV: 78 MMHG — HIGH (ref 25–45)
POTASSIUM BLDV-SCNC: 4.1 MMOL/L — SIGNIFICANT CHANGE UP (ref 3.5–5.1)
POTASSIUM SERPL-MCNC: 3.9 MMOL/L — SIGNIFICANT CHANGE UP (ref 3.5–5.3)
POTASSIUM SERPL-MCNC: 4 MMOL/L — SIGNIFICANT CHANGE UP (ref 3.5–5.3)
POTASSIUM SERPL-MCNC: 4.1 MMOL/L — SIGNIFICANT CHANGE UP (ref 3.5–5.3)
POTASSIUM SERPL-SCNC: 3.9 MMOL/L — SIGNIFICANT CHANGE UP (ref 3.5–5.3)
POTASSIUM SERPL-SCNC: 4 MMOL/L — SIGNIFICANT CHANGE UP (ref 3.5–5.3)
POTASSIUM SERPL-SCNC: 4.1 MMOL/L — SIGNIFICANT CHANGE UP (ref 3.5–5.3)
PROT SERPL-MCNC: 6.5 G/DL — SIGNIFICANT CHANGE UP (ref 6–8.3)
PROT SERPL-MCNC: 7.2 G/DL — SIGNIFICANT CHANGE UP (ref 6–8.3)
PROTHROM AB SERPL-ACNC: 11.2 SEC — SIGNIFICANT CHANGE UP (ref 10.5–13.4)
RBC # BLD: 3.51 M/UL — LOW (ref 3.8–5.2)
RBC # BLD: 3.69 M/UL — LOW (ref 3.8–5.2)
RBC # FLD: 16 % — HIGH (ref 10.3–14.5)
RBC # FLD: 16.9 % — HIGH (ref 10.3–14.5)
SAO2 % BLDV: 96.5 % — HIGH (ref 67–88)
SODIUM SERPL-SCNC: 123 MMOL/L — LOW (ref 135–145)
SODIUM SERPL-SCNC: 132 MMOL/L — LOW (ref 135–145)
SODIUM SERPL-SCNC: 133 MMOL/L — LOW (ref 135–145)
SPECIMEN SOURCE: SIGNIFICANT CHANGE UP
TROPONIN T, HIGH SENSITIVITY RESULT: 137 NG/L — CRITICAL HIGH
WBC # BLD: 7.53 K/UL — SIGNIFICANT CHANGE UP (ref 3.8–10.5)
WBC # BLD: 7.77 K/UL — SIGNIFICANT CHANGE UP (ref 3.8–10.5)
WBC # FLD AUTO: 7.53 K/UL — SIGNIFICANT CHANGE UP (ref 3.8–10.5)
WBC # FLD AUTO: 7.77 K/UL — SIGNIFICANT CHANGE UP (ref 3.8–10.5)

## 2023-05-06 PROCEDURE — 99222 1ST HOSP IP/OBS MODERATE 55: CPT

## 2023-05-06 PROCEDURE — 99232 SBSQ HOSP IP/OBS MODERATE 35: CPT | Mod: GC

## 2023-05-06 PROCEDURE — 70496 CT ANGIOGRAPHY HEAD: CPT | Mod: 26

## 2023-05-06 PROCEDURE — 70498 CT ANGIOGRAPHY NECK: CPT | Mod: 26

## 2023-05-06 PROCEDURE — 70450 CT HEAD/BRAIN W/O DYE: CPT | Mod: 26,XU

## 2023-05-06 PROCEDURE — 99233 SBSQ HOSP IP/OBS HIGH 50: CPT

## 2023-05-06 PROCEDURE — 95816 EEG AWAKE AND DROWSY: CPT | Mod: 26

## 2023-05-06 PROCEDURE — 0042T: CPT

## 2023-05-06 RX ORDER — SODIUM CHLORIDE 9 MG/ML
250 INJECTION, SOLUTION INTRAVENOUS ONCE
Refills: 0 | Status: DISCONTINUED | OUTPATIENT
Start: 2023-05-06 | End: 2023-05-06

## 2023-05-06 RX ORDER — SODIUM CHLORIDE 9 MG/ML
1000 INJECTION, SOLUTION INTRAVENOUS
Refills: 0 | Status: DISCONTINUED | OUTPATIENT
Start: 2023-05-06 | End: 2023-05-07

## 2023-05-06 RX ORDER — BUMETANIDE 0.25 MG/ML
1 INJECTION INTRAMUSCULAR; INTRAVENOUS ONCE
Refills: 0 | Status: COMPLETED | OUTPATIENT
Start: 2023-05-06 | End: 2023-05-06

## 2023-05-06 RX ADMIN — POLYETHYLENE GLYCOL 3350 17 GRAM(S): 17 POWDER, FOR SOLUTION ORAL at 17:52

## 2023-05-06 RX ADMIN — LACOSAMIDE 120 MILLIGRAM(S): 50 TABLET ORAL at 05:09

## 2023-05-06 RX ADMIN — CEFEPIME 100 MILLIGRAM(S): 1 INJECTION, POWDER, FOR SOLUTION INTRAMUSCULAR; INTRAVENOUS at 05:14

## 2023-05-06 RX ADMIN — GABAPENTIN 300 MILLIGRAM(S): 400 CAPSULE ORAL at 14:10

## 2023-05-06 RX ADMIN — Medication 63.75 MILLIMOLE(S): at 11:17

## 2023-05-06 RX ADMIN — AMLODIPINE BESYLATE 5 MILLIGRAM(S): 2.5 TABLET ORAL at 14:11

## 2023-05-06 RX ADMIN — PANTOPRAZOLE SODIUM 40 MILLIGRAM(S): 20 TABLET, DELAYED RELEASE ORAL at 08:24

## 2023-05-06 RX ADMIN — CEFEPIME 100 MILLIGRAM(S): 1 INJECTION, POWDER, FOR SOLUTION INTRAMUSCULAR; INTRAVENOUS at 14:14

## 2023-05-06 RX ADMIN — Medication 60 MICROGRAM(S): at 05:14

## 2023-05-06 RX ADMIN — BUMETANIDE 1 MILLIGRAM(S): 0.25 INJECTION INTRAMUSCULAR; INTRAVENOUS at 15:31

## 2023-05-06 RX ADMIN — ENOXAPARIN SODIUM 40 MILLIGRAM(S): 100 INJECTION SUBCUTANEOUS at 14:11

## 2023-05-06 RX ADMIN — LACOSAMIDE 120 MILLIGRAM(S): 50 TABLET ORAL at 17:45

## 2023-05-06 RX ADMIN — SODIUM CHLORIDE 1 GRAM(S): 9 INJECTION INTRAMUSCULAR; INTRAVENOUS; SUBCUTANEOUS at 17:52

## 2023-05-06 NOTE — PROGRESS NOTE ADULT - PROBLEM SELECTOR PLAN 6
Presenting with episodes of staring more consistent with acute encephalopathy over seizures   - EEG no seizure, Mild diffuse/multifocal cerebral dysfunction, not specific  - c/w home lacosamide   - neurology consulted, f/u recs

## 2023-05-06 NOTE — PROGRESS NOTE ADULT - PROBLEM SELECTOR PLAN 1
Pt. with hyponatremia in setting of urinary retention and frequent UTIs. On review of previous labs, pt. noted to have episodes of hyponatremia in the past. SNa was low at 126 on 2/17/23 and improved to 142 on 3/15/23. SNa was low at 131 on 4/16/23. Pt. on presentation was found to have low SNa of 125 on 5/5/23. Pt. received IVF. UOsm of 496 and Stanislav of 89 noted. Pt with urinary retention in ER. Pt. with likely SIADH. Pt with dysphagia and unable to tolerate PO salt tabs. Last SNa low at 123 today. Recommend 2% IV HTS at 30 cc/hr x 4 hours. Recommend IV Bumex 1 mg after infusion. Recommend fluid restriction 1L/day. Avoid overcorrection of more than 6-8 meq/24 hours. Monitor SNa q12 hours. Pt. with hyponatremia in setting of urinary retention and frequent UTIs. On review of previous labs, pt. noted to have episodes of hyponatremia in the past. SNa was low at 126 on 2/17/23 and improved to 142 on 3/15/23. SNa was low at 131 on 4/16/23. Pt. on presentation was found to have low SNa of 125 on 5/5/23. Pt. received IVF. UOsm of 496 and Stanislav of 89 noted. Pt with urinary retention in ER. Pt. with likely SIADH. Pt with dysphagia and unable to tolerate PO salt tabs. SNa low at 123 today. Recommend 2% IV HTS at 30 cc/hr x 4 hours. Recommend IV Bumex 1 mg after infusion. Recommend fluid restriction 1L/day. Avoid overcorrection of more than 6-8 meq/24 hours. Monitor SNa q12 hours.

## 2023-05-06 NOTE — SWALLOW BEDSIDE ASSESSMENT ADULT - ADDITIONAL RECOMMENDATIONS
1). Modified diet indicated - Soft and Bite sized with Mildly thick liquids  2). Given 1 episode of weak s/s of airway compromise, multiple swallows per bolus trial and history of MS, further objective testing indicated to r/o aspiration and further assess pharyngeal stage of swallowing.   3). This service to f/u as scheduling permits upon MD order for Cine Esophagram   4). Medical team advised to re-consult if concern for diet tolerance and/or change in medical status occurs

## 2023-05-06 NOTE — PROGRESS NOTE ADULT - SUBJECTIVE AND OBJECTIVE BOX
[facility] DIVISION OF KIDNEY DISEASES AND HYPERTENSION   FOLLOW UP NOTE    --------------------------------------------------------------------------------  HPI: 68-year-old female with PMH of MS, frequent UTI admitted to [facility] with AMS. ER labs showed SNa of 121. Pt. being seen for hyponatremia.     Upon review of labs, SNa was low at 126 on 2/17/23 and improved to 142 on 3/15/23. SNa was low at 131 on 4/16/23. Pt on presentation was found to have low SNa of 125 on 5/5/23. Pt received IVF.  Pt was also noted to have urinary retention in ER.     Pt seen and examined today. Pt is lethargic and unable to provide ROS. Last SNa low at 123 today.    PAST HISTORY  --------------------------------------------------------------------------------  No significant changes to PMH, PSH, FHx, SHx, unless otherwise noted    ALLERGIES & MEDICATIONS  --------------------------------------------------------------------------------  Allergies    sulfa drugs (Hives; Rash)    Intolerances    Standing Inpatient Medications  amLODIPine   Tablet 5 milliGRAM(s) Oral daily  cefepime   IVPB 1000 milliGRAM(s) IV Intermittent every 8 hours  cefepime   IVPB      enoxaparin Injectable 40 milliGRAM(s) SubCutaneous every 24 hours  gabapentin 300 milliGRAM(s) Oral three times a day  lacosamide IVPB 100 milliGRAM(s) IV Intermittent every 12 hours  levothyroxine Injectable 60 MICROGram(s) IV Push <User Schedule>  pantoprazole  Injectable 40 milliGRAM(s) IV Push daily  polyethylene glycol 3350 17 Gram(s) Oral two times a day  senna 2 Tablet(s) Oral at bedtime  sodium chloride 1 Gram(s) Oral two times a day    PRN Inpatient Medications  bisacodyl Suppository 10 milliGRAM(s) Rectal daily PRN    REVIEW OF SYSTEMS  --------------------------------------------------------------------------------  Unable to obtain ROS as pt is lethargic    VITALS/PHYSICAL EXAM  --------------------------------------------------------------------------------  T(C): 36.7 (05-06-23 @ 08:00), Max: 36.7 (05-06-23 @ 08:00)  HR: 77 (05-06-23 @ 08:00) (77 - 97)  BP: 125/64 (05-06-23 @ 08:00) (125/64 - 154/75)  RR: 18 (05-06-23 @ 08:00) (17 - 18)  SpO2: 99% (05-06-23 @ 08:00) (98% - 99%)  Wt(kg): --  Height (cm): 170.2 (05-05-23 @ 07:15)    05-05-23 @ 07:01  -  05-06-23 @ 07:00  --------------------------------------------------------  IN: 150 mL / OUT: 1000 mL / NET: -850 mL    Physical Exam:  	Gen: elderly female, resting, NAD  	HEENT: MMM  	Pulm: CTA B/L  	CV: S1S2  	Abd: Soft, +BS   	Ext: mild LE edema B/L  	Neuro: lethargic, unable to provide ROS  	Skin: Warm and dry    LABS/STUDIES  --------------------------------------------------------------------------------              10.1   7.77  >-----------<  210      [05-06-23 @ 03:00]              31.0     123  |  86  |  7   ----------------------------<  80      [05-06-23 @ 03:00]  3.9   |  22  |  0.41        Ca     9.5     [05-06-23 @ 03:00]      Mg     1.80     [05-06-23 @ 03:00]      Phos  2.3     [05-06-23 @ 03:00]    Serum Osmolality 262      [05-05-23 @ 11:20]    Creatinine Trend:  SCr 0.41 [05-06 @ 03:00]  SCr 0.42 [05-05 @ 17:47]  SCr 0.40 [05-05 @ 12:49]  SCr 0.35 [05-05 @ 11:20]  SCr 0.43 [05-05 @ 08:30]    Urinalysis - [05-05-23 @ 10:00]      Color Light Yellow / Appearance Slightly Turbid / SG 1.019 / pH 6.5      Gluc Negative / Ketone Small  / Bili Negative / Urobili <2 mg/dL       Blood Negative / Protein 30 mg/dL / Leuk Est Large / Nitrite Negative      RBC 1 / WBC MANY / Hyaline 0 / Gran  / Sq Epi  / Non Sq Epi  / Bacteria Few    Urine Sodium 89      [05-05-23 @ 11:25]  Urine Osmolality 496      [05-05-23 @ 11:25] [facility] DIVISION OF KIDNEY DISEASES AND HYPERTENSION   FOLLOW UP NOTE    --------------------------------------------------------------------------------  HPI: 68-year-old female with PMH of MS, frequent UTI admitted to [facility] with AMS. ER labs showed SNa of 121. Pt. being seen for hyponatremia.     Upon review of labs, SNa was low at 126 on 2/17/23 and improved to 142 on 3/15/23. SNa was low at 131 on 4/16/23. Pt on presentation was found to have low SNa of 125 on 5/5/23. Pt received IVF.  Pt was also noted to have urinary retention in ER.     Pt seen and examined today. Pt is lethargic and unable to provide ROS. Last SNa low at 123 today.    PAST HISTORY  --------------------------------------------------------------------------------  No significant changes to PMH, PSH, FHx, SHx, unless otherwise noted    ALLERGIES & MEDICATIONS  --------------------------------------------------------------------------------  Allergies    sulfa drugs (Hives; Rash)    Intolerances    Standing Inpatient Medications  amLODIPine   Tablet 5 milliGRAM(s) Oral daily  cefepime   IVPB 1000 milliGRAM(s) IV Intermittent every 8 hours  cefepime   IVPB      enoxaparin Injectable 40 milliGRAM(s) SubCutaneous every 24 hours  gabapentin 300 milliGRAM(s) Oral three times a day  lacosamide IVPB 100 milliGRAM(s) IV Intermittent every 12 hours  levothyroxine Injectable 60 MICROGram(s) IV Push <User Schedule>  pantoprazole  Injectable 40 milliGRAM(s) IV Push daily  polyethylene glycol 3350 17 Gram(s) Oral two times a day  senna 2 Tablet(s) Oral at bedtime  sodium chloride 1 Gram(s) Oral two times a day    PRN Inpatient Medications  bisacodyl Suppository 10 milliGRAM(s) Rectal daily PRN    REVIEW OF SYSTEMS  --------------------------------------------------------------------------------  Unable to obtain ROS as pt is lethargic    VITALS/PHYSICAL EXAM  --------------------------------------------------------------------------------  T(C): 36.7 (05-06-23 @ 08:00), Max: 36.7 (05-06-23 @ 08:00)  HR: 77 (05-06-23 @ 08:00) (77 - 97)  BP: 125/64 (05-06-23 @ 08:00) (125/64 - 154/75)  RR: 18 (05-06-23 @ 08:00) (17 - 18)  SpO2: 99% (05-06-23 @ 08:00) (98% - 99%)  Wt(kg): --  Height (cm): 170.2 (05-05-23 @ 07:15)    05-05-23 @ 07:01  -  05-06-23 @ 07:00  --------------------------------------------------------  IN: 150 mL / OUT: 1000 mL / NET: -850 mL    Physical Exam:  	Gen: ill-appearing  	HEENT: MMM  	Pulm: CTA B/L  	CV: S1S2+  	Abd: Soft, +BS   	Ext: B/L LE mild pitting edema+  	Neuro: Lethargic, unable to provide ROS  	Skin: Warm and dry    LABS/STUDIES  --------------------------------------------------------------------------------              10.1   7.77  >-----------<  210      [05-06-23 @ 03:00]              31.0     123  |  86  |  7   ----------------------------<  80      [05-06-23 @ 03:00]  3.9   |  22  |  0.41        Ca     9.5     [05-06-23 @ 03:00]      Mg     1.80     [05-06-23 @ 03:00]      Phos  2.3     [05-06-23 @ 03:00]    Serum Osmolality 262      [05-05-23 @ 11:20]    Creatinine Trend:  SCr 0.41 [05-06 @ 03:00]  SCr 0.42 [05-05 @ 17:47]  SCr 0.40 [05-05 @ 12:49]  SCr 0.35 [05-05 @ 11:20]  SCr 0.43 [05-05 @ 08:30]    Urinalysis - [05-05-23 @ 10:00]      Color Light Yellow / Appearance Slightly Turbid / SG 1.019 / pH 6.5      Gluc Negative / Ketone Small  / Bili Negative / Urobili <2 mg/dL       Blood Negative / Protein 30 mg/dL / Leuk Est Large / Nitrite Negative      RBC 1 / WBC MANY / Hyaline 0 / Gran  / Sq Epi  / Non Sq Epi  / Bacteria Few    Urine Sodium 89      [05-05-23 @ 11:25]  Urine Osmolality 496      [05-05-23 @ 11:25]

## 2023-05-06 NOTE — DIETITIAN INITIAL EVALUATION ADULT - PROBLEM SELECTOR PLAN 6
Presenting with episodes of staring more consistent with acute encephalopathy over seizures   - EEG pending  - c/w home lacosamide   - neurology consulted, f/u recs

## 2023-05-06 NOTE — DIETITIAN INITIAL EVALUATION ADULT - PROBLEM SELECTOR PLAN 2
Presenting with Na of 125,   - s/p 1L NS with worsening of Na to 121   - differentials include urinary rentention, SIADH, medication   - repeating BMP again given concern for possible IV infiltration,  - f/u urine Na, urine osm  - monitor bladder scans, renal/bladder US to eval for hydronephrosis. Will place   - monitor BMP q4hr for now, plan for slow correction 6-8 meq/24H   - Nephrology consulted, will f/u recs

## 2023-05-06 NOTE — PROGRESS NOTE ADULT - PROBLEM SELECTOR PLAN 2
Presenting with Na of 125,   - s/p 1L NS w/ persistent hyponatremia likely SIADH  - hypophosphatemia and hyponatremia Na 123, phos 2.3  replete with iv phos 15mmol and hypertonic saline 2% 30ml/h x4h  - encourage po intake  - BMP q12h  -no hydro on renal US  -slow correction 6-8 meq/24H   - f/u nephrology recs Presenting with Na of 125,   - s/p 1L NS w/ persistent hyponatremia likely SIADH (urine Na 89, urine osm 496)  - hypophosphatemia and hyponatremia Na 123, phos 2.3  replete with iv phos 15mmol and hypertonic saline 2% 30ml/h x4h  - encourage po intake, soft diet per SLP eval  - BMP q12h  -no hydro on renal US  - consider salt tab tomorrow if Na still low  -slow correction 6-8 meq/24H   - f/u nephrology recs

## 2023-05-06 NOTE — PHYSICAL THERAPY INITIAL EVALUATION ADULT - ADDITIONAL COMMENTS
Pt  by bedside to provide social history.  Pt lives in a house with  and has a chair lift from outside to the upstairs. Pt is wheelchair bound, utilizes a power wheelchair, requires 1 person assist, and has a home health aide 24/7.   Post PT evaluation, pt left semi-supine, alarm on, call bell and remote within reach, all precautions maintained, NAD. RN aware.

## 2023-05-06 NOTE — SWALLOW BEDSIDE ASSESSMENT ADULT - NS ASR SWALLOW FINDINGS DISCUS
ACP attempted to be contacted at [phone]/Nursing/Patient ALIS Wade was given results/Physician/Nursing/Patient

## 2023-05-06 NOTE — DIETITIAN INITIAL EVALUATION ADULT - PROBLEM SELECTOR PLAN 5
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

## 2023-05-06 NOTE — PROGRESS NOTE ADULT - SUBJECTIVE AND OBJECTIVE BOX
Dr. Jyotsna Lyman  Pager [phone]    PROGRESS NOTE:     Patient is a 68y old  Female who presents with a chief complaint of Altered mental status     (06 May 2023 14:05)      SUBJECTIVE / OVERNIGHT EVENTS: per  at bedside, pt was confused, had prior uti and confusion in past  ADDITIONAL REVIEW OF SYSTEMS: at home mainly wheelchair/bed bound, denies dysuria     MEDICATIONS  (STANDING):  amLODIPine   Tablet 5 milliGRAM(s) Oral daily  buMETAnide Injectable 1 milliGRAM(s) IV Push once  cefepime   IVPB 1000 milliGRAM(s) IV Intermittent every 8 hours  cefepime   IVPB      enoxaparin Injectable 40 milliGRAM(s) SubCutaneous every 24 hours  gabapentin 300 milliGRAM(s) Oral three times a day  lacosamide IVPB 100 milliGRAM(s) IV Intermittent every 12 hours  levothyroxine Injectable 60 MICROGram(s) IV Push <User Schedule>  pantoprazole  Injectable 40 milliGRAM(s) IV Push daily  polyethylene glycol 3350 17 Gram(s) Oral two times a day  senna 2 Tablet(s) Oral at bedtime  sodium chloride 1 Gram(s) Oral two times a day  Sodium Chloride 2% 500 milliLiter(s) 500 milliLiter(s) (30 mL/Hr) IV Continuous <Continuous>    MEDICATIONS  (PRN):  bisacodyl Suppository 10 milliGRAM(s) Rectal daily PRN Constipation      CAPILLARY BLOOD GLUCOSE        I&O's Summary    05 May 2023 07:01  -  06 May 2023 07:00  --------------------------------------------------------  IN: 150 mL / OUT: 1000 mL / NET: -850 mL    06 May 2023 07:01  -  06 May 2023 15:13  --------------------------------------------------------  IN: 0 mL / OUT: 720 mL / NET: -720 mL        PHYSICAL EXAM:  Vital Signs Last 24 Hrs  T(C): 36.6 (06 May 2023 12:00), Max: 36.7 (06 May 2023 08:00)  T(F): 97.8 (06 May 2023 12:00), Max: 98 (06 May 2023 08:00)  HR: 81 (06 May 2023 12:00) (77 - 97)  BP: 137/71 (06 May 2023 12:00) (125/64 - 154/75)  BP(mean): --  RR: 18 (06 May 2023 12:00) (17 - 18)  SpO2: 98% (06 May 2023 12:00) (98% - 99%)    Parameters below as of 06 May 2023 12:00  Patient On (Oxygen Delivery Method): room air      General: woman laying in bed appears comfortable in NAD, awake and alert  Eyes: PERRLA, nonicteric sclera  HENMT: NCAT, MMM  Neck: Supple, no thyromegaly, trachea midline   Respiratory: No respiratory distress, CTABL, No rales, rhonchi, wheezing.  Cardiovascular: Regular rate and rhythm; No m/g/r. 2+ peripheral pulses x4 extremities   Gastrointestinal: Soft, Nontender, Nondistended; +BS. No palpable organomegaly  : palpable bladder, mild suprapubic tenderness, no CVA tenderness   Extremities: No c/c/e; warm to touch  Neurological: face symmetric. slow to respond, able to follow commands   Skin: No rashes, No erythema   Psych: AAO to person and place, not oriented to time; appropriate mood and affect    LABS:                        10.1   7.77  )-----------( 210      ( 06 May 2023 03:00 )             31.0     05-    123<L>  |  86<L>  |  7   ----------------------------<  80  3.9   |  22  |  0.41<L>    Ca    9.5      06 May 2023 03:00  Phos  2.3     05-06  Mg     1.80     05-    TPro  7.2  /  Alb  3.9  /  TBili  0.3  /  DBili  x   /  AST  30  /  ALT  23  /  AlkPhos  164<H>  05-06          Urinalysis Basic - ( 05 May 2023 10:00 )    Color: Light Yellow / Appearance: Slightly Turbid / S.019 / pH: x  Gluc: x / Ketone: Small  / Bili: Negative / Urobili: <2 mg/dL   Blood: x / Protein: 30 mg/dL / Nitrite: Negative   Leuk Esterase: Large / RBC: 1 /HPF / WBC MANY /HPF   Sq Epi: x / Non Sq Epi: x / Bacteria: Few        Culture - Blood (collected 05 May 2023 08:45)  Source: .Blood Blood  Preliminary Report (06 May 2023 13:01):    No growth to date.    Culture - Blood (collected 05 May 2023 08:30)  Source: .Blood Blood  Preliminary Report (06 May 2023 13:01):    No growth to date.        RADIOLOGY & ADDITIONAL TESTS:  Results Reviewed:   Imaging Personally Reviewed:  < from: US Kidney and Bladder (23 @ 13:18) >  IMPRESSION:  No hydronephrosis.    Bladder volumes were not assessed as patient was combative.    < from: Xray Chest 1 View AP/PA (23 @ 09:41) >    IMPRESSION:  Clear lungs.    Electrocardiogram Personally Reviewed:    COORDINATION OF CARE:  Care Discussed with Consultants/Other Providers [Y/N]:  Prior or Outpatient Records Reviewed [Y/N]:

## 2023-05-06 NOTE — DIETITIAN INITIAL EVALUATION ADULT - ORAL INTAKE PTA/DIET HISTORY
Pt lives at home with her family and has HHA who helps with cooking and grocery shopping. No specific diet followed PTA, pt was eating a regular texture diet. No supplements/Vitamins taken PTA.
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
## 2023-05-06 NOTE — DIETITIAN INITIAL EVALUATION ADULT - NS FNS DIET ORDER
Diet, Soft and Bite Sized:   DASH/TLC {Sodium & Cholesterol Restricted} (DASH)  1000mL Fluid Restriction ([id number])  Mildly Thick Liquids (MILDTHICKLIQS) (05-06-23 @ 10:46)

## 2023-05-06 NOTE — CONSULT NOTE ADULT - ASSESSMENT
Assessment: 68-year-old right-handed female, [employer], w/ PMHx multiple sclerosis (> 30 years, not on DMTs, w/ baclofen pump in place), seizures (on lacosamide 100 mg BID), hypothyroidism, frequent UTIs, p/w worsening mental status. Patient's aide, who has known her for seven years, is at the bedside providing collateral. Per aide Jenise Shankar, patient's baseline is cognitively "sharp,", she communicates well, ambulates with a wheelchair, and requires assistance with all ADLs (requires aid 24 hrs). She has baseline paraplegia, with markedly contracted RUE and less contracted LUE. Per aide, patient frequently has UTIs and presents with confusion and hallucinations, both auditory and visual. Per aide, she started having seizures last December, w/ semiology described as blank stare w/ eyes in primary gaze, w/ flexing of b/l UE, lasting for about 2-3 minutes, then comes back to her baseline. Code stroke called for acute mental status change. LKN 2pm. NIHSS 9. Most notably different since this morning assessment is slurred speech. EEG from today showed mild nonspecific diffuse or multifocal cerebral dysfunction but no epileptiform abnormalities were recorded.    LKW 2pm  NIHSS 9    Impression: transient unresponsive & slurred speech 2/2 unknown etiology. Maybe be postictal vs CVA vs AMS due to UTI & electrolyte abnormalities.    Plan  [] repeat CBC, CMP, magnesium, phosphorous. Replete electrolytes PRN  [] c/w home lacosamide 100 mg BID - can make this IV, particularly if NPO.  [] follow up Ct, CTA reads   [] MRI brain with and without contrast  [] Neurological checks and vital signs, as per unit protocol  [] continue to manage UTI per primary team    Management:   [] Would seem that patient needs further evaluation and management for recurring UTIs - as per primary team  [] Hyponatremia management, as per Nephrology and primary team  [] Telemetry  [] Seizure, fall and aspiration precautions. Avoid sleep deprivation.       Case to be seen by neuro attending in am    Assessment: 68-year-old right-handed female, [employer], w/ PMHx multiple sclerosis (> 30 years, not on DMTs, w/ baclofen pump in place), seizures (on lacosamide 100 mg BID), hypothyroidism, frequent UTIs, p/w worsening mental status. Patient's aide, who has known her for seven years, is at the bedside providing collateral. Per aide Jenise Shankar, patient's baseline is cognitively "sharp,", she communicates well, ambulates with a wheelchair, and requires assistance with all ADLs (requires aid 24 hrs). She has baseline paraplegia, with markedly contracted RUE and less contracted LUE. Per aide, patient frequently has UTIs and presents with confusion and hallucinations, both auditory and visual. Per aide, she started having seizures last December, w/ semiology described as blank stare w/ eyes in primary gaze, w/ flexing of b/l UE, lasting for about 2-3 minutes, then comes back to her baseline. Code stroke called for acute mental status change. LKN 2pm. NIHSS 9. Most notably different since this morning assessment is slurred speech. EEG from today showed mild nonspecific diffuse or multifocal cerebral dysfunction but no epileptiform abnormalities were recorded.    LKW 2pm  NIHSS 9    Impression: transient unresponsive & slurred speech 2/2 unknown etiology. Maybe be postictal vs CVA vs AMS due to UTI & electrolyte abnormalities.    Plan  [x] repeat CBC, CMP, magnesium, phosphorous. Replete electrolytes PRN  [x] c/w home lacosamide 100 mg BID - can make this IV, particularly if NPO.  [] follow up Ct, CTA reads   [] MRI brain with and without contrast  [] EKG  [] Neurological checks and vital signs, as per unit protocol  [] continue to manage UTI per primary team    Management:   [] Would seem that patient needs further evaluation and management for recurring UTIs - as per primary team  [] Hyponatremia management, as per Nephrology and primary team  [] Telemetry  [] Seizure, fall and aspiration precautions. Avoid sleep deprivation.       Case to be seen by neuro attending in am    Assessment: 68-year-old right-handed female, [employer], w/ PMHx multiple sclerosis (> 30 years, not on DMTs, w/ baclofen pump in place), seizures (on lacosamide 100 mg BID), hypothyroidism, frequent UTIs, p/w worsening mental status. Patient's aide, who has known her for seven years, is at the bedside providing collateral. Per aide Jenise Shankar, patient's baseline is cognitively "sharp,", she communicates well, ambulates with a wheelchair, and requires assistance with all ADLs (requires aid 24 hrs). She has baseline paraplegia, with markedly contracted RUE and less contracted LUE. Per aide, patient frequently has UTIs and presents with confusion and hallucinations, both auditory and visual. Per aide, she started having seizures last December, w/ semiology described as blank stare w/ eyes in primary gaze, w/ flexing of b/l UE, lasting for about 2-3 minutes, then comes back to her baseline. Code stroke called for acute mental status change. LKN 2pm. NIHSS 9. Most notably different since this morning assessment is slurred speech. EEG from today showed mild nonspecific diffuse or multifocal cerebral dysfunction but no epileptiform abnormalities were recorded.    LKW 2pm  NIHSS 9    Impression: transient unresponsive & slurred speech 2/2 unknown etiology. Maybe be postictal vs CVA vs AMS due to UTI & electrolyte abnormalities.    Plan  [x] repeat CBC, CMP, magnesium, phosphorous. Replete electrolytes PRN  [x] c/w home lacosamide 100 mg BID - can make this IV, particularly if NPO.  [] follow up Ct, CTA reads   [] EKG  [] Neurological checks and vital signs, as per unit protocol  [] continue to manage UTI per primary team    Management:   [] Would seem that patient needs further evaluation and management for recurring UTIs - as per primary team  [] Hyponatremia management, as per Nephrology and primary team  [] Telemetry  [] Seizure, fall and aspiration precautions. Avoid sleep deprivation.       Case to be seen by neuro attending in am

## 2023-05-06 NOTE — RAPID RESPONSE TEAM SUMMARY - NSSITUATIONBACKGROUNDRRT_GEN_ALL_CORE
68W [employer] with PMH of MS with baclofen pump in place, seizure, hypothyroidism, frequent UTIs presenting worsening mental status secondary to UTI. RRT called for hypotension and AMS. Per nurse, when she tried to awaken patient, patient unresponsive, SBP 70s. Upon arrival to rapid, blood pressure improved (), HR and O2 WNL, afebrile. Patient Withdraws to pain, lethargic but arouseable. Contracted on R arm (baseline). Pupils equal and reactive bilaterally. Answering questions with slurred speech. Currently on cefepime for UTI. Currently being treated for hyponatremia, most recent Na 133 (increased from 123 earlier in the day). Was in the process of receiving D5, which had been ordered by primary team for overcorrection.   Code stroke called given slurred speech and AMS, patient taken for urgent CT head.

## 2023-05-06 NOTE — DIETITIAN INITIAL EVALUATION ADULT - PERTINENT MEDS FT
MEDICATIONS  (STANDING):  amLODIPine   Tablet 5 milliGRAM(s) Oral daily  buMETAnide Injectable 1 milliGRAM(s) IV Push once  cefepime   IVPB 1000 milliGRAM(s) IV Intermittent every 8 hours  cefepime   IVPB      enoxaparin Injectable 40 milliGRAM(s) SubCutaneous every 24 hours  gabapentin 300 milliGRAM(s) Oral three times a day  lacosamide IVPB 100 milliGRAM(s) IV Intermittent every 12 hours  levothyroxine Injectable 60 MICROGram(s) IV Push <User Schedule>  pantoprazole  Injectable 40 milliGRAM(s) IV Push daily  polyethylene glycol 3350 17 Gram(s) Oral two times a day  senna 2 Tablet(s) Oral at bedtime  sodium chloride 1 Gram(s) Oral two times a day  Sodium Chloride 2% 500 milliLiter(s) 500 milliLiter(s) (30 mL/Hr) IV Continuous <Continuous>    MEDICATIONS  (PRN):  bisacodyl Suppository 10 milliGRAM(s) Rectal daily PRN Constipation

## 2023-05-06 NOTE — DIETITIAN INITIAL EVALUATION ADULT - OTHER INFO
Medical course: Per chart 68 year old woman, [employer] with PMH of MS with baclofen pump in place, seizure, hypothyroidism, frequent UTIs presenting worsening mental status secondary to UTI.    Nutrition interview: Pt A&Ox2, answering all question appropriately. No recent episodes of nausea, vomiting, or diarrhea. Per RN flowsheets, pt with constipation, last BM on 5/1. Pt on bowel regimen. Pt was NPO 2/2 failed dysphagia screen. Formal swallow eval completed today with recommendations for soft and bite sized, mildly thick liquids. No known food allergies. Pt unsure of current weight but Stated UBW: 150#. Has lost weight due to 11 hospitalizations over the past year. Was given puree texture food during previous hospital stays resulting in poor PO intake because she did not like it. No current weight to assess. Food preferences explored and noted. No intake recorded 2/2 NPO, diet order now in place. Will recommend Magic cup 2/2 concern for poor PO intake with modified texture in the past.  Pt with hyponatremia, 1000ml fluid restriction per nephrology. Pt also receiving sodium chloride. Electrolytes repleted prn. Pt with low B6 likely due to MS, consider supplementation and monitor lab levels.

## 2023-05-06 NOTE — DIETITIAN INITIAL EVALUATION ADULT - PROBLEM SELECTOR PLAN 10
DVTppx: LMWH   Diet: pending dysphagia screen  Code: DNR/DNI, no blood products   Dispo: likely back home with [facility] pending improvement in medical status      -  to bring in complete list of medication, completed prelim med-rec with him.

## 2023-05-06 NOTE — CONSULT NOTE ADULT - ATTENDING COMMENTS
Episodes of lethargy in the setting of hypotension, hypothermia. No witnessed seizures. Per nurse - she was awake and eating breakfast this AM.     < from: CT Perfusion w/ Maps w/ IV Cont (05.06.23 @ 23:29) >    CTA NECK:  No significant stenosis of the cervical carotid arteries based   on NASCET criteria. Patent cervical vertebral arteries. No evidence of   cervical carotid or vertebral artery dissection.    CTA HEAD:  No high-grade stenosis or occlusion of the major proximal   arterial branches. No evidence of an intracranial arterial aneurysm or   arteriovenous malformation on CTA.    CT PERFUSION: Perfusion imaging shows no evidence of a core infarct or   tissue at risk.    < end of copied text >    < from: CT Brain Stroke Protocol (05.06.23 @ 23:29) >    IMPRESSION:    No acute intracranial hemorrhage, mass effect, or CT evidence of an acute   transcortical infarct. Mild chronic ischemic changes in the   frontoparietal white matter.      < end of copied text >    A/P  Ms. Edwards is a 69 yo woman with a h/o multiple sclerosis and epilepsy initially admitted with breakthrough seizure in setting of hyponatremia and infection.   Now with encephalopathy in the setting of septic encephalopathy and hypotension.   Further treatment per primary team.   No further neurological work up or change in management recommended at this time.     Neurology signing off. Please reconsult PRN or call [employer] [phone] with any questions.  Thank you.
5/6/23  EEG Classification / Summary:  Abnormal EEG study  Mild generalized background slowing    -----------------------------------------------------------------------------------------------------    Clinical Impression:  Mild diffuse/multifocal cerebral dysfunction, not specific as to etiology.  There were no epileptiform abnormalities recorded.        A/P  Ms. Edwards is a 69 yo woman with a h/o multiple sclerosis and epilepsy with breakthrough seizure in setting of hyponatremia and infection.   I agree with work up and management as above.   Neurology signing off. Please reconsult PRN or call [employer] [phone] with any questions.  Thank you.
Pt. with hyponatremia. Last SNa low/stable at 126 today. Assessment and plan for hyponatremia as outlined above. Monitor SNa closely. Avoid overcorrection of SNa.

## 2023-05-06 NOTE — DIETITIAN INITIAL EVALUATION ADULT - CALCULATED FROM (ML/KG)
[facility]    [address]    [place] [address]    Phone:  [phone]    Fax:  [phone]       Thank You for choosing us for your health care visit. We are glad to serve you and happy to provide you with this summary of your visit. Please help us to ensure we have accurate records. If you find anything that needs to be changed, please let our staff know as soon as possible.          Your Demographic Information     Patient Name Sex     Brittany Dunbar Female 1988       Ethnic Group Patient Race    Not of  or  Origin Unknown      Your Visit Details     Date & Time Provider Department    2017 1:30 PM Nell Wan MD [facility]      Your Upcoming Appointment*(Max 10)     2017  8:15 AM CST   Lab Visit with [facility]   [facility] [place] [facility] ([facility]    [address] [place], [place] [address] [address] [place] [address]   [phone]            2017  9:00 AM CST   Ultrasound with [facility]    [facility] [place] [facility] ([facility])    [address] [place] [address] [address] [place] [address]   [phone]            2017  9:00 AM CST   Obstetric Check with Nell Wan MD   [facility] [place] ([facility])    [address] [place] [address] [address] [place] [address]   [phone]              Your To Do List     Future Orders Please Complete On or Around Expires    ABO GROUP/RH /SCREEN OUTPATIENT  2017 Mar 17, 2017    CBC & AUTO DIFFERENTIAL  2017 Mar 17, 2017    CHLAMYDIA/GC BY NUCLEIC ACID AMPLIFICATION  2017 (Approximate) Mar 17, 2017    HEPATITIS B SURFACE ANTIGEN  2017 Mar 17, 2017    RUBELLA ANTIBODY IGG  2017 Mar 17, 2017    TREPONEMA PALLIDUM ANTIBODY IGG    2017 Mar 17, 2017    URINALYSIS WITH MICRO EXAM W/O C/S  Jan 16, 2017 (Approximate) Mar 17, 2017    URINE CULTURE  Jan 16, 2017 Mar 17, 2017    US OB FETAL NUCHAL TRANSLUCENCY 1ST TRIMESTER  Jan 16, 2017 Apr 16, 2017    FIRST TRIMESTER PRENATAL SCR  Jan 17, 2017 Mar 17, 2017    HIV ANTIGEN/ANTIBODY SCREEN  Jan 23, 2017 (Approximate) Mar 17, 2017    Follow-Up    Return for first trimester screening.      Conditions Discussed Today or Order-Related Diagnoses        Comments    Encounter for supervision of normal first pregnancy in first trimester    -  Primary     First trimester screening           Your Vitals Were     BP Pulse Height Weight LMP BMI    98/60 90 5' 4\" (1.626 m) 126 lb (57.2 kg) 11/18/2016 (Exact Date) 21.63 kg/m2    Smoking Status                   Never Smoker           Medications Prescribed or Re-Ordered Today     None      Your Current Medications Are        Disp Refills Start End    Prenatal Vit-Fe Fumarate-FA (MULTIVITAMIN & MINERAL W/FOLIC ACID- PRENATAL) 27-1 MG Tab        Sig - Route: Take 1 tablet by mouth daily. - Oral    Class: Historical Med      Allergies     No Known Allergies      Problem List as of 1/16/2017     Encounter for supervision of normal first pregnancy in first trimester      Patient Portal Signup     Manage health care for you and your family anytime, anywhere with the new [employer], your free online resource for quick and easy access to personal health information, scheduling appointments, refilling prescriptions, viewing test results, paying bills and more.  Sign up for a free and secure account. Please follow the instructions below to securely complete your enrollment.     1. Go to [email or web address]  2. Click Sign Up Now   3. Enter the Activation Code when prompted     Activation Code: [email or web address]  Expires: 2/15/2017  1:13 PM    If you have questions related to [email or web address], you can email [email or web address] or call [phone] to talk to our [email or web address] staff.   For questions related to your health, contact your physician’s office.  Please remember to dial 911 for medical emergencies.              Patient Instructions     None       [id number]

## 2023-05-06 NOTE — SWALLOW BEDSIDE ASSESSMENT ADULT - ASR SWALLOW RECOMMEND DIAG
Instrumental study indicated given suspected pharyngeal dysphagia and 1 episode of weak s/s of airway compromise/VFSS/MBS

## 2023-05-06 NOTE — EEG REPORT - NS EEG TEXT BOX
[facility]   COMPREHENSIVE EPILEPSY CENTER   REPORT OF ROUTINE VIDEO EEG     [facility]: [address] [place] [address] [address], [place] [address], Ph#: [phone]  [facility]: [id number] [address], [place] [address], Ph#: [phone]  Office: [address] [place] [address], [place] [address], [place] [address] Ph#: [phone]    Patient Name: RAMIRO CASTAÑEDA  Age and : 68y (59)  MRN #: [id number]  Location: [facility] [address]  Referring Physician: Marco A Mchugh    Study Date: 23    _____________________________________________________________  TECHNICAL INFORMATION    Placement and Labeling of Electrodes:  The EEG was performed utilizing 20 channels referential EEG connections (coronal over temporal over parasagittal montage) using all standard 10-20 electrode placements with EKG.  Recording was at a sampling rate of 256 samples per second per channel.  Time synchronized digital video recording was done simultaneously with EEG recording.  A low light infrared camera was used for low light recording.  Rito and seizure detection algorithms were utilized.    _____________________________________________________________  HISTORY    Patient is a 68y old  Female who presents with a chief complaint of UTI (06 May 2023 15:12)      PERTINENT MEDICATION:  MEDICATIONS  (STANDING):  amLODIPine   Tablet 5 milliGRAM(s) Oral daily  cefepime   IVPB 1000 milliGRAM(s) IV Intermittent every 8 hours  cefepime   IVPB      enoxaparin Injectable 40 milliGRAM(s) SubCutaneous every 24 hours  gabapentin 300 milliGRAM(s) Oral three times a day  lacosamide IVPB 100 milliGRAM(s) IV Intermittent every 12 hours  levothyroxine Injectable 60 MICROGram(s) IV Push <User Schedule>  pantoprazole  Injectable 40 milliGRAM(s) IV Push daily  polyethylene glycol 3350 17 Gram(s) Oral two times a day  senna 2 Tablet(s) Oral at bedtime  sodium chloride 1 Gram(s) Oral two times a day  Sodium Chloride 2% 500 milliLiter(s) 500 milliLiter(s) (30 mL/Hr) IV Continuous <Continuous>    _____________________________________________________________  STUDY INTERPRETATION    Findings: The background was continuous, spontaneously variable and reactive. During wakefulness, the posterior dominant rhythm consisted of symmetric, well-modulated 7.5 Hz activity, with amplitude to 30 uV, that attenuated to eye opening.  Low amplitude frontal beta was noted in wakefulness.    Background Slowing:  -Posterior dominant background rhythm    Focal Slowing:   None was present.    Sleep Background:  Drowsiness was characterized by fragmentation, attenuation, and slowing of the background activity.      Other Non-Epileptiform Findings:  none    Interictal Epileptiform Activity:   None were present.    Events:  Clinical events: None recorded.  Seizures: None recorded.    Activation Procedures:   Hyperventilation was not performed.    Photic stimulation was performed and did not elicit any abnormality.     Artifacts:  Intermittent myogenic and movement artifacts were noted.    ECG:  The heart rate on single channel ECG was predominantly between 60-80 BPM.    _____________________________________________________________  EEG SUMMARY/CLASSIFICATION  Abnormal EEG in the awake state.  -Posterior dominant background rhythm  _____________________________________________________________  EEG IMPRESSION/CLINICAL CORRELATE  Abnormal EEG study.  1. Mild nonspecific diffuse or multifocal cerebral dysfunction.   2. No epileptiform abnormalities were recorded.    Micah Ash MD  Neurology Attending Physician      
RAMIRO CASTAÑEDA [id number]     Study Date: 		05-06-23    --------------------------------------------------------------------------------------------------  History:  CC/ HPI Patient is a 68y old  Female who presents with a chief complaint of Altered mental status     (06 May 2023 14:05)    MEDICATIONS  (STANDING):  amLODIPine   Tablet 5 milliGRAM(s) Oral daily  buMETAnide Injectable 1 milliGRAM(s) IV Push once  cefepime   IVPB 1000 milliGRAM(s) IV Intermittent every 8 hours  cefepime   IVPB      enoxaparin Injectable 40 milliGRAM(s) SubCutaneous every 24 hours  gabapentin 300 milliGRAM(s) Oral three times a day  lacosamide IVPB 100 milliGRAM(s) IV Intermittent every 12 hours  levothyroxine Injectable 60 MICROGram(s) IV Push <User Schedule>  pantoprazole  Injectable 40 milliGRAM(s) IV Push daily  polyethylene glycol 3350 17 Gram(s) Oral two times a day  senna 2 Tablet(s) Oral at bedtime  sodium chloride 1 Gram(s) Oral two times a day  Sodium Chloride 2% 500 milliLiter(s) 500 milliLiter(s) (30 mL/Hr) IV Continuous <Continuous>    --------------------------------------------------------------------------------------------------  Study Interpretation:    [Abbreviation Key:  PDR=alpha rhythm/posterior dominant rhythm. A-P=anterior posterior.  Amplitude: ‘very low’:<20; ‘low’:20-49; ‘medium’:; ‘high’:>150uV.  Persistence for periodic/rhythmic patterns (% of epoch) ‘rare’:<1%; ‘occasional’:1-10%; ‘frequent’:10-50%; ‘abundant’:50-90%; ‘continuous’:>90%.  Persistence for sporadic discharges: ‘rare’:<1/hr; ‘occasional’:1/min-1/hr; ‘frequent’:>1/min; ‘abundant’:>1/10 sec.  RPP=rhythmic and periodic patterns; GRDA=generalized rhythmic delta activity; FIRDA=frontal intermittent GRDA; LRDA=lateralized rhythmic delta activity; TIRDA=temporal intermittent rhythmic delta activity;  LPD=PLED=lateralized periodic discharges; GPD=generalized periodic discharges; BIPDs =bilateral independent periodic discharges; Mf=multifocal; SIRPDs=stimulus induced rhythmic, periodic, or ictal appearing discharges; BIRDs=brief potentially ictal rhythmic discharges >4 Hz, lasting .5-10s; PFA (paroxysmal bursts >13 Hz or =8 Hz <10s).  Modifiers: +F=with fast component; +S=with spike component; +R=with rhythmic component.  S-B=burst suppression pattern.  Max=maximal. N1-drowsy; N2-stage II sleep; N3-slow wave sleep. SSS/BETS=small sharp spikes/benign epileptiform transients of sleep. HV=hyperventilation; PS=photic stimulation]    Daily EEG Visual Analysis  FINDINGS:      Background:  Continuity: continuous  Symmetry: symmetric  PDR: 7.5 Hz activity, with amplitude to 40 uV, that attenuated to eye opening.    Reactivity: present  Voltage: normal (between 20-150uV)  Anterior Posterior Gradient: present  Other background findings: none  Breach: absent    Background Slowing:  Generalized slowing: diffuse irregular delta and theta activity.  Focal slowing: none was present.    State Changes:   -Drowsiness noted with increased slowing, attenuation of fast activity, vertex transients.    Sporadic Epileptiform Discharges:    None    Rhythmic and Periodic Patterns (RPPs):  None     Electrographic and Electroclinical seizures:  None    Other Clinical Events:  None    Activation Procedures:   -Hyperventilation was not performed.    -Photic stimulation was performed and did not elicit any abnormalities.       Artifacts:  Intermittent myogenic and movement artifacts were noted.    ECG:  The heart rate on single channel ECG was predominantly between 70-80 BPM.    EEG Classification / Summary:  Abnormal EEG study  Mild generalized background slowing    -----------------------------------------------------------------------------------------------------    Clinical Impression:  Mild diffuse/multifocal cerebral dysfunction, not specific as to etiology.  There were no epileptiform abnormalities recorded.      -------------------------------------------------------------------------------------------------------  Jeremiah Valderrama MD  Epilepsy Fellow , [facility]  Department of Neurology, [facility] [employer]    [facility] EEG Reading Room Ph#: [phone]  Epilepsy Answering Service after 5PM and before 8:30AM: Ph#: [phone]

## 2023-05-06 NOTE — PROGRESS NOTE ADULT - PROBLEM SELECTOR PLAN 1
Hypothermic on presentation but SIRS negative   - h/o recurrent UTIs on methenamine   - prior urine cultures with Citrobacter   - monitor for urinary retention   - s/p meropenum in the ED. c/w cefepime for now, f/u cultures (Bcx (5/5) ngtd)

## 2023-05-06 NOTE — PHYSICAL THERAPY INITIAL EVALUATION ADULT - NSPTDISCHREC_GEN_A_CORE
Home with home PT services to address current functional limitations to optimize safety within the home environment/Home PT

## 2023-05-06 NOTE — CONSULT NOTE ADULT - SUBJECTIVE AND OBJECTIVE BOX
Neurology Progress Note    SUBJECTIVE/OBJECTIVE/INTERVAL EVENTS: Patient seen and examined at bedside. Patient answers questions appropriately but speech sounds slurred. She cannot recall the past few hours and does not know what happened.    INTERVAL HISTORY: 68-year-old right-handed female, Gnosticism, w/ PMHx multiple sclerosis (> 30 years, not on DMTs, w/ baclofen pump in place), seizures (on lacosamide 100 mg BID), hypothyroidism, frequent UTIs, p/w worsening mental status. Patient's aide, who has known her for seven years, is at the bedside providing collateral. Per aide Jenise Shankar, patient's baseline is cognitively "sharp,", she communicates well, ambulates with a wheelchair, and requires assistance with all ADLs (requires aid 24 hrs). She has baseline paraplegia, with markedly contracted RUE and less contracted LUE. Per aide, patient frequently has UTIs and presents with confusion and hallucinations, both auditory and visual. Per aide, she started having seizures last December, w/ semiology described as blank stare w/ eyes in primary gaze, w/ flexing of b/l UE, lasting for about 2-3 minutes, then comes back to her baseline. Code stroke called for acute mental status change. LKN 2pm. NIHSS 9. Most notably different since this morning assessment is slurred speech.    REVIEW OF SYSTEMS: Otherwise denies fever, chills, headaches, vision changes, blurry vision, double vision, nausea, vomiting, hearing change, focal weakness, focal numbness, parasthesias, bowel/ bladder incontinence.  Few questions of a 10-system ROS was performed and is negative except for those items noted above and/or in the HPI.    MEDICATIONS  (STANDING):  amLODIPine   Tablet 5 milliGRAM(s) Oral daily  cefepime   IVPB 1000 milliGRAM(s) IV Intermittent every 8 hours  cefepime   IVPB      enoxaparin Injectable 40 milliGRAM(s) SubCutaneous every 24 hours  gabapentin 300 milliGRAM(s) Oral three times a day  lacosamide IVPB 100 milliGRAM(s) IV Intermittent every 12 hours  levothyroxine Injectable 60 MICROGram(s) IV Push <User Schedule>  pantoprazole  Injectable 40 milliGRAM(s) IV Push daily  polyethylene glycol 3350 17 Gram(s) Oral two times a day  senna 2 Tablet(s) Oral at bedtime  sodium chloride 1 Gram(s) Oral two times a day  Sodium Chloride 2% 500 milliLiter(s) 500 milliLiter(s) (30 mL/Hr) IV Continuous <Continuous>    MEDICATIONS  (PRN):  bisacodyl Suppository 10 milliGRAM(s) Rectal daily PRN Constipation      VITALS & EXAMINATION:  Vital Signs Last 24 Hrs  T(C): 36.3 (06 May 2023 16:00), Max: 36.7 (06 May 2023 08:00)  T(F): 97.3 (06 May 2023 16:00), Max: 98 (06 May 2023 08:00)  HR: 65 (06 May 2023 16:00) (65 - 92)  BP: 134/63 (06 May 2023 16:00) (125/64 - 154/75)  BP(mean): --  RR: 17 (06 May 2023 16:00) (17 - 18)  SpO2: 100% (06 May 2023 16:00) (98% - 100%)    Parameters below as of 06 May 2023 16:00  Patient On (Oxygen Delivery Method): room air        General:  Constitutional: Female, appears stated age, nontoxic, not in distress,    Head: Normocephalic;   Eyes: clear sclera;   Extremities: No cyanosis;   Resp: breathing comfortably     Neurological (>12):  MS: Awake, alert.  Oriented person place situation. Follows commands. Attends to examiner  Language: Speech is slurred fluent, good repetition,  comprehension.  CNs: PERRL (R 3mm, L 3mm). VFF. EOMI. V1-3 intact LT, No facial asymmetry b/l. Tongue midline.   Motor -   L/R            Biceps   3/4-      Triceps  3/4-           3/3+   L/R              Knee Extension  0/0  Dorsiflexion  0/0      Plantar Flexion 0/0    increased tone b/l UE R >> L (RUE severely contracted at the elbow)  Sensation: Intact to LT b/l.   Coordination: lethargic unable to assess  Gait: deferred    LABORATORY:  CBC                       10.1   7.77  )-----------( 210      ( 06 May 2023 03:00 )             31.0     Chem 05-06    133<L>  |  95<L>  |  9   ----------------------------<  94  4.0   |  24  |  0.75    Ca    9.2      06 May 2023 21:20  Phos  4.0     05-06  Mg     2.10     -    TPro  7.2  /  Alb  3.9  /  TBili  0.3  /  DBili  x   /  AST  30  /  ALT  23  /  AlkPhos  164<H>  -    LFTs LIVER FUNCTIONS - ( 06 May 2023 03:00 )  Alb: 3.9 g/dL / Pro: 7.2 g/dL / ALK PHOS: 164 U/L / ALT: 23 U/L / AST: 30 U/L / GGT: x           Coagulopathy   Lipid Panel   A1c   Cardiac enzymes     U/A Urinalysis Basic - ( 05 May 2023 10:00 )    Color: Light Yellow / Appearance: Slightly Turbid / S.019 / pH: x  Gluc: x / Ketone: Small  / Bili: Negative / Urobili: <2 mg/dL   Blood: x / Protein: 30 mg/dL / Nitrite: Negative   Leuk Esterase: Large / RBC: 1 /HPF / WBC MANY /HPF   Sq Epi: x / Non Sq Epi: x / Bacteria: Few      CSF  Immunological  Other    STUDIES & IMAGING: (EEG, CT, MR, U/S, TTE/EDGAR):  PRIOR CT Head No Cont (23 @ 17:14)  COMPARISON: CT head 3/14/2023  FINDINGS:  The ventricular and sulcal size and configuration is age appropriate.     There is no acute loss of gray-white differentiation. There are mild   patchy areas of hypodensity in the periventricular and subcortical white   matter which are likely related to chronic microangiopathic changes.  There is no evidence of mass effect, midline shift, acute intracranial   hemorrhage, or extra-axial collections.   The calvarium is intact. The paranasal sinuses are clear.The mastoid air   cells are predominantly clear. The orbits are unremarkable.    IMPRESSION:  No acute intracranial hemorrhage or acute territorial infarction.    PRIOR MR Head w/wo IV Cont (23 @ 12:28)  Parenchymal volume loss is see  Abnormal T2 prolongation in the periventricular white matter region is   again identified. Some of these lesions have a Rodriguez finger type of   appearance and could be compatible with MS if patient does carry this   diagnosis. Other possibilities include migraine headaches, infection   inflammation or ischemia.  There is no evidence acute hemorrhage mass effect or abnormal   enhancement seen.    Branching area of abnormal enhancement involving the right cerebellar   region. While this could be compatible artifact, the possibility of   developmental venous anomaly must be considered.    No evidence of acute hemorrhage seen    Evaluation of the diffusion weighted sequence demonstrates no abnormal   areas of restricted diffusion to suggest acute infarct.    The large vessels demonstrate normal flow voids.    Right maxillary sinus mucosal thickening is seen. Minimal bilateral   ethmoid sinus mucosal thickening is seen.    Both mastoid and middle ear regions appear clear.    IMPRESSION: Stable areas of T2 prolongation in the periventricular white   matter region is seen as described above.    Suspicion of developmental venous anomaly involving the right cerebellar   region.

## 2023-05-06 NOTE — DIETITIAN INITIAL EVALUATION ADULT - PROBLEM SELECTOR PLAN 3
Acute Encephalopathy secondary to presumed UTI, hyponatremia likely also factor. Seizures on differential given history although seem less likely given that she maintained ability to communicate albeit delayed.   - continue with treatment of UTI and hyponatremia as above   - EEG per neuro  - Neuro consulted, will f/u recs

## 2023-05-06 NOTE — SWALLOW BEDSIDE ASSESSMENT ADULT - COMMENTS
Pt. seated upright in bed upon SLP arrival. Pt. awake, alert and cooperative. EEG being placed by tech during assessment.     Per chart:   "68WJehova's Witness with past medical history of MS with baclofen pump in place, seizure, hypothyroidism, frequent UTIs presenting worsening mental status. Patient with poor insight and judgment and thus history provided by  and aide at bedside. Patient reportedly was in her normal state of health prior to 4 days ago when she no longer started to act like herself. She reportedly over the last 4 days has been increasingly confused, slow to respond and has been having episodes of staring spells. Over the last few days mental status has worsened and talking less. They deny any fevers, chills, chest pain, shortness of breath, abdominal pain, nausea, vomiting"    -CXR revealed: "clear lungs"  -WBC WNL per lab review     Pt. known to service from previous admissions - see chart for reports and details.     Neurology notes reviewed and appreciated.     Pt. provided minimal report of swallow function. Pt. stated she has "no difficulty swallowing" and was requesting Regular food.

## 2023-05-06 NOTE — PROGRESS NOTE ADULT - PROBLEM SELECTOR PLAN 3
Acute Encephalopathy secondary to presumed UTI, hyponatremia likely also factor. Seizures on differential given history although seem less likely given that she maintained ability to communicate albeit delayed.   - continue with treatment of UTI and hyponatremia as above   - EEG Mild diffuse/multifocal cerebral dysfunction, not specific, no seizure  - Neuro consulted, will f/u recs

## 2023-05-06 NOTE — PROGRESS NOTE ADULT - ASSESSMENT
68W Mormon with PMH of MS with baclofen pump in place, seizure, hypothyroidism, frequent UTIs presenting worsening mental status secondary to UTI

## 2023-05-06 NOTE — DIETITIAN INITIAL EVALUATION ADULT - ADD RECOMMEND
1) Recommend liberalize to regular diet   2)  department to provide Magic Cup 1x/day (290 kcal, 9 gm protein) to promote optimal PO intake   3) Obtain current weight, trend weekly weights   4) Consider B6 supplementation, monitor lab levels.  5) Monitor weights, labs, BM's, skin integrity, p.o. intake. Please document % PO intake in RN flowsheets   6) Please provide assistance with meals.

## 2023-05-06 NOTE — SWALLOW BEDSIDE ASSESSMENT ADULT - SWALLOW EVAL: DIAGNOSIS
Pt. was given the following trials: Mildly thick liquids, Thin liquids, Puree, Minced and Moist, Soft and Bite sized and Regular. 1. Oral dysphagia across trials marked by adequate bolus retrieval with complete oral grading of tsp./labial seal around cup lip. No anterior labial escape noted. Prompt swallow initiation with slow and prolonged bolus manipulation/formation. Subsequent delayed posterior transfer with insignificant oral residue cleared with spontaneous dry swallow. 2. Suspected pharyngeal dysphagia across trials marked by timely swallow trigger judged via hyolaryngeal digital palpation. Delayed weak cough 1x with Thin liquids. Multiple swallows (3+) with all trials provided suggestive of pharyngeal stasis vs. penetration/aspiration.

## 2023-05-06 NOTE — PHYSICAL THERAPY INITIAL EVALUATION ADULT - LEVEL OF INDEPENDENCE: SUPINE/SIT, REHAB EVAL
Pt unable to maintain sitting balance 2/2 generalized weakness/dependent (less than 25% patients effort)

## 2023-05-07 DIAGNOSIS — I95.9 HYPOTENSION, UNSPECIFIED: ICD-10-CM

## 2023-05-07 LAB
ANION GAP SERPL CALC-SCNC: 11 MMOL/L — SIGNIFICANT CHANGE UP (ref 7–14)
ANION GAP SERPL CALC-SCNC: 12 MMOL/L — SIGNIFICANT CHANGE UP (ref 7–14)
APPEARANCE UR: CLEAR — SIGNIFICANT CHANGE UP
B PERT DNA SPEC QL NAA+PROBE: SIGNIFICANT CHANGE UP
B PERT+PARAPERT DNA PNL SPEC NAA+PROBE: SIGNIFICANT CHANGE UP
BILIRUB UR-MCNC: NEGATIVE — SIGNIFICANT CHANGE UP
BORDETELLA PARAPERTUSSIS (RAPRVP): SIGNIFICANT CHANGE UP
BUN SERPL-MCNC: 10 MG/DL — SIGNIFICANT CHANGE UP (ref 7–23)
BUN SERPL-MCNC: 14 MG/DL — SIGNIFICANT CHANGE UP (ref 7–23)
C PNEUM DNA SPEC QL NAA+PROBE: SIGNIFICANT CHANGE UP
CALCIUM SERPL-MCNC: 9.2 MG/DL — SIGNIFICANT CHANGE UP (ref 8.4–10.5)
CALCIUM SERPL-MCNC: 9.3 MG/DL — SIGNIFICANT CHANGE UP (ref 8.4–10.5)
CHLORIDE SERPL-SCNC: 94 MMOL/L — LOW (ref 98–107)
CHLORIDE SERPL-SCNC: 96 MMOL/L — LOW (ref 98–107)
CO2 SERPL-SCNC: 25 MMOL/L — SIGNIFICANT CHANGE UP (ref 22–31)
CO2 SERPL-SCNC: 26 MMOL/L — SIGNIFICANT CHANGE UP (ref 22–31)
COLOR SPEC: COLORLESS — SIGNIFICANT CHANGE UP
CORTIS AM PEAK SERPL-MCNC: 11.8 UG/DL — SIGNIFICANT CHANGE UP (ref 6–18.4)
CORTIS AM PEAK SERPL-MCNC: 15.4 UG/DL — SIGNIFICANT CHANGE UP (ref 6–18.4)
CREAT SERPL-MCNC: 0.73 MG/DL — SIGNIFICANT CHANGE UP (ref 0.5–1.3)
CREAT SERPL-MCNC: 0.73 MG/DL — SIGNIFICANT CHANGE UP (ref 0.5–1.3)
DIFF PNL FLD: NEGATIVE — SIGNIFICANT CHANGE UP
EGFR: 91 ML/MIN/1.73M2 — SIGNIFICANT CHANGE UP
EGFR: 91 ML/MIN/1.73M2 — SIGNIFICANT CHANGE UP
FLUAV SUBTYP SPEC NAA+PROBE: SIGNIFICANT CHANGE UP
FLUBV RNA SPEC QL NAA+PROBE: SIGNIFICANT CHANGE UP
GLUCOSE BLDC GLUCOMTR-MCNC: 80 MG/DL — SIGNIFICANT CHANGE UP (ref 70–99)
GLUCOSE BLDC GLUCOMTR-MCNC: 84 MG/DL — SIGNIFICANT CHANGE UP (ref 70–99)
GLUCOSE BLDC GLUCOMTR-MCNC: 85 MG/DL — SIGNIFICANT CHANGE UP (ref 70–99)
GLUCOSE SERPL-MCNC: 68 MG/DL — LOW (ref 70–99)
GLUCOSE SERPL-MCNC: 85 MG/DL — SIGNIFICANT CHANGE UP (ref 70–99)
GLUCOSE UR QL: NEGATIVE — SIGNIFICANT CHANGE UP
HADV DNA SPEC QL NAA+PROBE: SIGNIFICANT CHANGE UP
HCOV 229E RNA SPEC QL NAA+PROBE: SIGNIFICANT CHANGE UP
HCOV HKU1 RNA SPEC QL NAA+PROBE: SIGNIFICANT CHANGE UP
HCOV NL63 RNA SPEC QL NAA+PROBE: SIGNIFICANT CHANGE UP
HCOV OC43 RNA SPEC QL NAA+PROBE: SIGNIFICANT CHANGE UP
HCT VFR BLD CALC: 31.2 % — LOW (ref 34.5–45)
HGB BLD-MCNC: 10.1 G/DL — LOW (ref 11.5–15.5)
HMPV RNA SPEC QL NAA+PROBE: SIGNIFICANT CHANGE UP
HPIV1 RNA SPEC QL NAA+PROBE: SIGNIFICANT CHANGE UP
HPIV2 RNA SPEC QL NAA+PROBE: SIGNIFICANT CHANGE UP
HPIV3 RNA SPEC QL NAA+PROBE: SIGNIFICANT CHANGE UP
HPIV4 RNA SPEC QL NAA+PROBE: SIGNIFICANT CHANGE UP
KETONES UR-MCNC: NEGATIVE — SIGNIFICANT CHANGE UP
LEUKOCYTE ESTERASE UR-ACNC: ABNORMAL
M PNEUMO DNA SPEC QL NAA+PROBE: SIGNIFICANT CHANGE UP
MAGNESIUM SERPL-MCNC: 2 MG/DL — SIGNIFICANT CHANGE UP (ref 1.6–2.6)
MCHC RBC-ENTMCNC: 28.6 PG — SIGNIFICANT CHANGE UP (ref 27–34)
MCHC RBC-ENTMCNC: 32.4 GM/DL — SIGNIFICANT CHANGE UP (ref 32–36)
MCV RBC AUTO: 88.4 FL — SIGNIFICANT CHANGE UP (ref 80–100)
NITRITE UR-MCNC: NEGATIVE — SIGNIFICANT CHANGE UP
NRBC # BLD: 0 /100 WBCS — SIGNIFICANT CHANGE UP (ref 0–0)
NRBC # FLD: 0 K/UL — SIGNIFICANT CHANGE UP (ref 0–0)
PH UR: 6 — SIGNIFICANT CHANGE UP (ref 5–8)
PHOSPHATE SERPL-MCNC: 3.9 MG/DL — SIGNIFICANT CHANGE UP (ref 2.5–4.5)
PLATELET # BLD AUTO: 188 K/UL — SIGNIFICANT CHANGE UP (ref 150–400)
POTASSIUM SERPL-MCNC: 4 MMOL/L — SIGNIFICANT CHANGE UP (ref 3.5–5.3)
POTASSIUM SERPL-MCNC: 4.7 MMOL/L — SIGNIFICANT CHANGE UP (ref 3.5–5.3)
POTASSIUM SERPL-SCNC: 4 MMOL/L — SIGNIFICANT CHANGE UP (ref 3.5–5.3)
POTASSIUM SERPL-SCNC: 4.7 MMOL/L — SIGNIFICANT CHANGE UP (ref 3.5–5.3)
PROT UR-MCNC: NEGATIVE — SIGNIFICANT CHANGE UP
RAPID RVP RESULT: SIGNIFICANT CHANGE UP
RBC # BLD: 3.53 M/UL — LOW (ref 3.8–5.2)
RBC # FLD: 16.8 % — HIGH (ref 10.3–14.5)
RSV RNA SPEC QL NAA+PROBE: SIGNIFICANT CHANGE UP
RV+EV RNA SPEC QL NAA+PROBE: SIGNIFICANT CHANGE UP
SARS-COV-2 RNA SPEC QL NAA+PROBE: SIGNIFICANT CHANGE UP
SODIUM SERPL-SCNC: 131 MMOL/L — LOW (ref 135–145)
SODIUM SERPL-SCNC: 133 MMOL/L — LOW (ref 135–145)
SP GR SPEC: 1.01 — SIGNIFICANT CHANGE UP (ref 1.01–1.05)
TROPONIN T, HIGH SENSITIVITY RESULT: 110 NG/L — CRITICAL HIGH
UROBILINOGEN FLD QL: SIGNIFICANT CHANGE UP
WBC # BLD: 6.94 K/UL — SIGNIFICANT CHANGE UP (ref 3.8–10.5)
WBC # FLD AUTO: 6.94 K/UL — SIGNIFICANT CHANGE UP (ref 3.8–10.5)

## 2023-05-07 PROCEDURE — 71045 X-RAY EXAM CHEST 1 VIEW: CPT | Mod: 26

## 2023-05-07 PROCEDURE — 99233 SBSQ HOSP IP/OBS HIGH 50: CPT

## 2023-05-07 PROCEDURE — 99232 SBSQ HOSP IP/OBS MODERATE 35: CPT | Mod: GC

## 2023-05-07 PROCEDURE — 99222 1ST HOSP IP/OBS MODERATE 55: CPT | Mod: GC

## 2023-05-07 PROCEDURE — 99232 SBSQ HOSP IP/OBS MODERATE 35: CPT

## 2023-05-07 RX ORDER — LACOSAMIDE 50 MG/1
200 TABLET ORAL ONCE
Refills: 0 | Status: DISCONTINUED | OUTPATIENT
Start: 2023-05-07 | End: 2023-05-07

## 2023-05-07 RX ORDER — MIDODRINE HYDROCHLORIDE 2.5 MG/1
10 TABLET ORAL EVERY 8 HOURS
Refills: 0 | Status: DISCONTINUED | OUTPATIENT
Start: 2023-05-07 | End: 2023-05-09

## 2023-05-07 RX ORDER — SODIUM CHLORIDE 9 MG/ML
1000 INJECTION, SOLUTION INTRAVENOUS
Refills: 0 | Status: DISCONTINUED | OUTPATIENT
Start: 2023-05-07 | End: 2023-05-08

## 2023-05-07 RX ADMIN — SODIUM CHLORIDE 100 MILLILITER(S): 9 INJECTION, SOLUTION INTRAVENOUS at 17:24

## 2023-05-07 RX ADMIN — CEFEPIME 100 MILLIGRAM(S): 1 INJECTION, POWDER, FOR SOLUTION INTRAMUSCULAR; INTRAVENOUS at 09:00

## 2023-05-07 RX ADMIN — MIDODRINE HYDROCHLORIDE 10 MILLIGRAM(S): 2.5 TABLET ORAL at 14:45

## 2023-05-07 RX ADMIN — CEFEPIME 100 MILLIGRAM(S): 1 INJECTION, POWDER, FOR SOLUTION INTRAMUSCULAR; INTRAVENOUS at 17:24

## 2023-05-07 RX ADMIN — SODIUM CHLORIDE 100 MILLILITER(S): 9 INJECTION, SOLUTION INTRAVENOUS at 00:01

## 2023-05-07 RX ADMIN — CEFEPIME 100 MILLIGRAM(S): 1 INJECTION, POWDER, FOR SOLUTION INTRAMUSCULAR; INTRAVENOUS at 22:13

## 2023-05-07 RX ADMIN — PANTOPRAZOLE SODIUM 40 MILLIGRAM(S): 20 TABLET, DELAYED RELEASE ORAL at 09:00

## 2023-05-07 RX ADMIN — GABAPENTIN 300 MILLIGRAM(S): 400 CAPSULE ORAL at 22:09

## 2023-05-07 RX ADMIN — POLYETHYLENE GLYCOL 3350 17 GRAM(S): 17 POWDER, FOR SOLUTION ORAL at 17:25

## 2023-05-07 RX ADMIN — CEFEPIME 100 MILLIGRAM(S): 1 INJECTION, POWDER, FOR SOLUTION INTRAMUSCULAR; INTRAVENOUS at 00:01

## 2023-05-07 RX ADMIN — LACOSAMIDE 140 MILLIGRAM(S): 50 TABLET ORAL at 05:02

## 2023-05-07 RX ADMIN — GABAPENTIN 300 MILLIGRAM(S): 400 CAPSULE ORAL at 14:41

## 2023-05-07 RX ADMIN — SENNA PLUS 2 TABLET(S): 8.6 TABLET ORAL at 22:08

## 2023-05-07 RX ADMIN — LACOSAMIDE 120 MILLIGRAM(S): 50 TABLET ORAL at 11:15

## 2023-05-07 RX ADMIN — ENOXAPARIN SODIUM 40 MILLIGRAM(S): 100 INJECTION SUBCUTANEOUS at 14:41

## 2023-05-07 RX ADMIN — MIDODRINE HYDROCHLORIDE 10 MILLIGRAM(S): 2.5 TABLET ORAL at 22:08

## 2023-05-07 RX ADMIN — Medication 60 MICROGRAM(S): at 06:30

## 2023-05-07 NOTE — CONSULT NOTE ADULT - NS ATTEND AMEND GEN_ALL_CORE FT
Nina Edwards is a 68-year-old woman with history of multiple sclerosis, baclofen pump in place, seizure, hypothyroidism and frequent UTIs. She now presented with worsening mental status; noted to have elevation of HSTropT, (32 ng/L on 5/5/23; 137 ng/L on 5/6/23; 110 ng/L on 5/7/23) in setting of anemia (Hgb 10.1 g/dL / Hct 31.2% on 5/7/23). Serum creatinine 0.73 mg/dL on 5/7/23 (normal 0.5 – 1.3).  The patient is Jehovah’s Witness. CK MB 12.1 ng/mL on 5/6/23 (normal <= 4.7).TSH 0.56 uIU/mL on 5/6/23 (normal 0.27 – 4.3); thyroperoxidase Ab 49.5 IU/mL on 3/10/23 (normal <= 34.9). RRT called 5/6/23 for hypotension and AMS. The patient was unresponsive to nurse, with SBP 70s mmHg. Upon arrival to RRT,  mm Hg. Neurological evaluation suggested unclear etiology of AMS; transient unresponsive & slurred speech due to unknown etiology, possibly postictal vs CVA vs AMS due to UTI & electrolyte abnormalities. Given these events, suspect elevated HSTropT is secondary metabolic and hypotensive factors and not acute coronary syndrome. As such, agree with initial plan to trend CE (troponin, CK/CKMB), obtain serial ECG, address electrolyte disorders and obtain TTE to assess overall LV function and segmental wall motion.

## 2023-05-07 NOTE — PROGRESS NOTE ADULT - PROBLEM SELECTOR PLAN 2
Hypothermic on presentation but SIRS negative   - h/o recurrent UTIs on methenamine   - prior urine cultures with Citrobacter   - monitor for urinary retention   - s/p meropenum in the ED. c/w cefepime for now, f/u cultures (Bcx (5/5) ngtd, urine cx no growth)

## 2023-05-07 NOTE — CONSULT NOTE ADULT - CONSULT REASON
PMHx epilepsy p/w hyponatremia, UTI and altered mental status. Neurology consultation for evaluation and management.
Elevated troponin
acute change in mental status
Hyponatremia

## 2023-05-07 NOTE — CHART NOTE - NSCHARTNOTEFT_GEN_A_CORE
ACP MEDICINE COVERAGE - Medicine Subsequent Hospital Care Note    HPI/Subjective: Contacted by RN due to patient being hypotensive 70/30 and minimally arousable. Advised the RN     ROS:  Denies fever, chills, diaphoresis, malaise, night sweats, generalized weakness, headache, changes in vision, dizziness, cough, sputum production, wheezing, hemoptysis, chest pain, palpitations, shortness of breath, dyspnea on exertion, PND, dysphagia, new abdominal pain, nausea, vomiting, diarrhea, constipation, melena, hematochezia, dysuria, increased urinary frequency/urgency, hematuria, nocturia, numbness/weakness/tingling, any other complaints.    [  ] I spoke with the attending, nurse, and family to obtain the history.  [  ] Unable to obtain history due to ___________.    Vital Signs Last 24 Hrs  T(C): 36.3 (23 @ 16:00), Max: 36.7 (23 @ 08:00)  T(F): 97.3 (23 @ 16:00), Max: 98 (23 @ 08:00)  HR: 65 (23 @ 16:00) (65 - 92)  BP: 134/63 (23 @ 16:00) (125/64 - 154/75)  RR: 17 (23 @ 16:00) (17 - 18)  SpO2: 100% (23 @ 16:00) (98% - 100%) on (O2)    PHYSICAL EXAM:  Constitutional: NAD, well-developed, well-nourished  Ears, nose, Mouth, and Throat: normal external ears and nose, normal hearing, moist oral mucosa  Eyes: normal conjunctiva, EOMI, PEERL  Neck: supple, no JVD  Respiratory: Clear to auscultation bilaterally. No wheezes, rales or rhonchi. Normal respiratory effort  Cardiovascular: regular rate and rhythm, S1 and S2, no murmurs, rubs or gallops, no edema, 2+ peripheral pulses  Gastrointestinal: soft, nontender, nondistended, +bowel sounds, no hernia  Skin: warm, dry, no rash  Neurologic: sensation grossly intact, CN grossly intact, non-focal exam  Musculoskeletal: no clubbing, no cyanosis, no joint swelling  Psychiatric: A&Ox3, appropriate mood, affect    LABS:                        10.0   7.53  )-----------( 191      ( 06 May 2023 22:52 )             31.2         132<L>  |  94<L>  |  10  ----------------------------<  87  4.1   |  25  |  0.78    Ca    9.5      06 May 2023 22:52  Phos  4.0       Mg     2.10         TPro  6.5  /  Alb  3.5  /  TBili  0.3  /  DBili  x   /  AST  24  /  ALT  21  /  AlkPhos  142<H>      PT/INR: PT: 11.2 sec | INR: 0.97 ratio (23 @ 22:52)  PTT: 33.3 sec (23 @ 22:52)    CARDIAC ENZYMES    Creatine Kinase, Serum: 121 U/L (23 @ 22:52)          Serum Pro-Brain Natriuretic Peptide:   D-Dimer Assay:     Urinanalysis Basic (23 @ 00:18):  Color: Light Yellow / Appearance: Slightly Turbid / S.019 / pH: --  Gluc: -- / Ketone: Small / Bili: Negative / Urobili: <2 mg/dL  Blood: -- / Protein: 30 mg/dL / Nitrite: Negative  Leuk Esterase: Large / RBC: 1 / WBC: MANY  Sq Epi: -- / Non Sq Epi: -- / Bacteria: Few      Blood Gas Venous (23 @ 00:18):  pH: 7.40 | HCO3: 30 | pCO2: 48 | pO2: 78 | Lactate: 1.5  pH: 7.44 | HCO3: 26 | pCO2: 39 | pO2: 93 | Lactate: 1.1  pH: 7.40 | HCO3: 28 | pCO2: 46 | pO2: 75 | Lactate: 1.1      Blood Gas Arterial (23 @ 00:18):      CAPILLARY BLOOD GLUCOSE:  POCT Blood Glucose: 77 mg/dL (23 @ 22:35)  POCT Blood Glucose: 103 mg/dL (23 @ 07:23)  POCT Blood Glucose: 74 mg/dL (23 @ 11:34)      COVID PCR:      RADIOLOGY:    MEDICATIONS  (STANDING):  amLODIPine   Tablet 5 milliGRAM(s) Oral daily  cefepime   IVPB 1000 milliGRAM(s) IV Intermittent every 8 hours  cefepime   IVPB      dextrose 5%. 1000 milliLiter(s) (100 mL/Hr) IV Continuous <Continuous>  enoxaparin Injectable 40 milliGRAM(s) SubCutaneous every 24 hours  gabapentin 300 milliGRAM(s) Oral three times a day  lacosamide IVPB 100 milliGRAM(s) IV Intermittent every 12 hours  levothyroxine Injectable 60 MICROGram(s) IV Push <User Schedule>  pantoprazole  Injectable 40 milliGRAM(s) IV Push daily  polyethylene glycol 3350 17 Gram(s) Oral two times a day  senna 2 Tablet(s) Oral at bedtime  Sodium Chloride 2% 500 milliLiter(s) 500 milliLiter(s) (30 mL/Hr) IV Continuous <Continuous>    MEDICATIONS  (PRN):  bisacodyl Suppository 10 milliGRAM(s) Rectal daily PRN Constipation    I&O's Summary    05 May 2023 07:01  -  06 May 2023 07:00  --------------------------------------------------------  IN: 150 mL / OUT: 1000 mL / NET: -850 mL    06 May 2023 07:01  -  07 May 2023 00:18  --------------------------------------------------------  IN: 360 mL / OUT: 1620 mL / NET: -1260 mL      I reviewed the above labs, radiology, medications, tests, telemetry, and EKG interpretation.    ASSESSMENT/PLAN:    - Clinical findings, labs, tests, telemetry, and ekg reviewed with attending. Will monitor patient closely.     Low complexity/risk (30min)  medium complexity/ risk (45 min)  high complexity ( >60 min) [facility] MEDICINE COVERAGE - Medicine Subsequent Hospital Care Note    HPI/Subjective: Contacted by RN due to patient being hypotensive 70/30 and minimally arousable. Advised the RN to call a rapid. Upon arriving patient responsive to sternal rub but speech incomprehensible. Vital signs stable as documented below during rapid. Code stroke initiated. Approximately 8-10 minutes after initiation of rapid patient became more responsive with moderate improvement of speech. Patient denies chest pain, SOB, pain however admits to feeling tired and weak.     Vitals    PHYSICAL EXAM:  Constitutional: lethargic, responsive to tactile stimulation   Ears, nose, Mouth, and Throat: normal external ears and nose, normal hearing, moist oral mucosa  Eyes: normal conjunctiva, EOMI, PEERL  Neck: supple, no JVD  Respiratory: Clear to auscultation bilaterally. No wheezes, rales or rhonchi. Normal respiratory effort  Cardiovascular: regular rate and rhythm, S1 and S2, no murmurs, rubs or gallops, no edema, 2+ peripheral pulses  Gastrointestinal: soft, nontender, nondistended, +bowel sounds, no hernia  Skin: warm, dry, no rash  Neurologic: sensation grossly intact, CN grossly intact, non-focal exam  Musculoskeletal: no clubbing, no cyanosis, no joint swelling  Psychiatric: A&Ox3 towards end of rapid     ASSESSMENT/PLAN:  -Rapid response called due to concern for change in mental status. Of note last known well ~ 2 PM.  by the bedside noted wife was A&O x4.     Low complexity/risk (30min) [facility] MEDICINE COVERAGE - Medicine Subsequent Hospital Care Note    HPI/Subjective: Contacted by RN due to patient being hypotensive 70/30 and minimally arousable. Advised the RN to call a rapid. Upon arriving patient responsive to sternal rub but speech incomprehensible. Vital signs stable as documented below during rapid. Code stroke initiated. Approximately 8-10 minutes after initiation of rapid patient became more responsive with moderate improvement of speech. Patient denies chest pain, SOB, pain however admits to feeling tired and weak.     Vitals    PHYSICAL EXAM:  Constitutional: lethargic, responsive to tactile stimulation   Ears, nose, Mouth, and Throat: normal external ears and nose, normal hearing, moist oral mucosa  Eyes: normal conjunctiva, EOMI, PEERL  Neck: supple, no JVD  Respiratory: Clear to auscultation bilaterally. No wheezes, rales or rhonchi. Normal respiratory effort  Cardiovascular: regular rate and rhythm, S1 and S2, no murmurs, rubs or gallops, no edema, 2+ peripheral pulses  Gastrointestinal: soft, nontender, nondistended, +bowel sounds, no hernia  Skin: warm, dry, no rash  Neurologic: sensation grossly intact, CN grossly intact, non-focal exam  Musculoskeletal: no clubbing, no cyanosis, no joint swelling  Psychiatric: A&Ox3 towards end of rapid     ASSESSMENT/PLAN:  -Rapid response called due to concern for change in mental status. Of note last known well ~ 2 PM.  by the bedside noted wife was A&O x4. Concern for seizure.   - Code stroke initiated  -Neurology evaluated the patient  -Writer transported patient for CTH, CTA- negative; no acute findings  -Seizure labs- prolactin and lactate- negative   -Troponin, CKMB elevated, EKG: Normal sinus rhythm  -Cardiology consult called- will follow up recommendations  -Patient to remain on tele  -Seizure precautions to remain in place  -Neurologic checks q 4 hours  -Dr.Muneer MARE made aware of above and agreed with plan  -Prior to rapid noted rapid correction of sodium 10 point increase in less than 24 hours  -Nephrologist,  contacted advised starting D5 at a rate of 100 cc/hr x 5 hour which was initiated during the rapid  -Bladder scan- 301; straight cath ordered. Advised to continue with BS q 8 hours  -Will continue to monitor patient closely  -Night events to be conveyed to day provider to ensure continuity of care     Low complexity/risk (30min) [facility] MEDICINE COVERAGE - Medicine Subsequent Hospital Care Note    HPI/Subjective: Contacted by RN due to patient being hypotensive 70/30 and minimally arousable. Advised the RN to call a rapid. Upon arriving patient responsive to sternal rub but speech incomprehensible. Vital signs stable as documented below during rapid. Code stroke initiated. Approximately 8-10 minutes after initiation of rapid patient became more responsive with moderate improvement of speech. Patient denies chest pain, SOB, pain however admits to feeling tired and weak.     Vitals    PHYSICAL EXAM:  Constitutional: lethargic, responsive to tactile stimulation   Ears, nose, Mouth, and Throat: normal external ears and nose, normal hearing, moist oral mucosa  Eyes: normal conjunctiva, EOMI, PEERL  Neck: supple, no JVD  Respiratory: Clear to auscultation bilaterally. No wheezes, rales or rhonchi. Normal respiratory effort  Cardiovascular: regular rate and rhythm, S1 and S2, no murmurs, rubs or gallops, no edema, 2+ peripheral pulses  Gastrointestinal: soft, nontender, nondistended, +bowel sounds, no hernia  Skin: warm, dry, no rash  Neurologic: sensation grossly intact, CN grossly intact, non-focal exam  Musculoskeletal: no clubbing, no cyanosis, no joint swelling  Psychiatric: A&Ox3 towards end of rapid     ASSESSMENT/PLAN:  -Rapid response called due to concern for change in mental status. Of note last known well ~ 2 PM.  by the bedside noted wife was A&O x4. Concern for seizure vs stroke vs demyelination 2/2 sodium shift however lower on differentials   - Code stroke initiated  -Neurology evaluated the patient  -Writer transported patient for CTH, CTA- negative; no acute findings  -Seizure labs- prolactin and lactate- negative   -Troponin, CKMB elevated, EKG: Normal sinus rhythm repeat labs ordered x 4 hours   -Cardiology consult called- will follow up recommendations  -Patient to remain on tele  -Seizure precautions to remain in place  -Neurologic checks q 4 hours  -Dr.Muneer MARE made aware of above and agreed with plan  -Prior to rapid noted rapid correction of sodium 10 point increase in less than 24 hours  -Nephrologist,  contacted advised starting D5 at a rate of 100 cc/hr x 5 hour which was initiated during the rapid  -Repeat BMP ordered for 5 AM to assess Na level   -Bladder scan- 301; straight cath ordered. Advised to continue with BS q 8 hours  -Will continue to monitor patient closely  -Night events to be conveyed to day provider to ensure continuity of care     Low complexity/risk (30min) [facility] MEDICINE COVERAGE - Medicine Subsequent Hospital Care Note    HPI/Subjective: Contacted by RN due to patient being hypotensive 70/30 and minimally arousable. Advised the RN to call a rapid. Upon arriving patient responsive to sternal rub but speech incomprehensible. Vital signs stable as documented below during rapid. Code stroke initiated. Approximately 8-10 minutes after initiation of rapid patient became more responsive with moderate improvement of speech. Patient denies chest pain, SOB, pain however admits to feeling tired and weak.     Vitals  BP-107/53  Temp:96 F   Pulse Ox: 97%    PHYSICAL EXAM:  Constitutional: lethargic, responsive to tactile stimulation   Ears, nose, Mouth, and Throat: normal external ears and nose, normal hearing, moist oral mucosa  Eyes: normal conjunctiva, EOMI, PEERL  Neck: supple, no JVD  Respiratory: Clear to auscultation bilaterally. No wheezes, rales or rhonchi. Normal respiratory effort  Cardiovascular: regular rate and rhythm, S1 and S2, no murmurs, rubs or gallops, no edema, 2+ peripheral pulses  Gastrointestinal: soft, nontender, nondistended, +bowel sounds, no hernia  Skin: warm, dry, no rash  Neurologic: sensation grossly intact, CN grossly intact, non-focal exam  Musculoskeletal: no clubbing, no cyanosis, no joint swelling  Psychiatric: A&Ox3 towards end of rapid     ASSESSMENT/PLAN:  -Rapid response called due to concern for change in mental status. Of note last known well ~ 2 PM.  by the bedside noted wife was A&O x4. Concern for seizure vs stroke vs demyelination 2/2 sodium shift however lower on differentials   - Code stroke initiated  -Neurology evaluated the patient  -Writer transported patient for CTH, CTA- negative; no acute findings  -Seizure labs- prolactin and lactate- negative   -Troponin, CKMB elevated, EKG: Normal sinus rhythm repeat labs ordered x 4 hours   -Cardiology consult called- will follow up recommendations  -Hypothermia noted- warming blanket ordered and infectious workup initiated: RVP, CXR, UA, blood cultures   -Patient to remain on tele  -Seizure precautions to remain in place  -Neurologic checks q 4 hours  -[facility] made aware of above and agreed with plan  -Prior to rapid noted rapid correction of sodium 10 point increase in less than 24 hours  -Nephrologist,  contacted advised starting D5 at a rate of 100 cc/hr x 5 hour which was initiated during the rapid  -Repeat BMP ordered for 5 AM to assess Na level   -Bladder scan- 301; straight cath ordered. Advised to continue with BS q 8 hours  -Will continue to monitor patient closely  -Night events to be conveyed to day provider to ensure continuity of care     Low complexity/risk (30min) ACP MEDICINE COVERAGE - Medicine Subsequent Hospital Care Note    HPI/Subjective: Contacted by RN due to patient being hypotensive 70/30 and minimally arousable. Advised the RN to call a rapid. Upon arriving patient responsive to sternal rub but speech incomprehensible. Vital signs stable as documented below during rapid. Code stroke initiated. Approximately 8-10 minutes after initiation of rapid patient became more responsive with moderate improvement of speech. Patient denies chest pain, SOB, pain however admits to feeling tired and weak.     Vitals  BP-107/53  Temp:96 F   Pulse Ox: 97%    PHYSICAL EXAM:  Constitutional: lethargic, responsive to tactile stimulation   Ears, nose, Mouth, and Throat: normal external ears and nose, normal hearing, moist oral mucosa  Eyes: normal conjunctiva, EOMI, PEERL  Neck: supple, no JVD  Respiratory: Clear to auscultation bilaterally. No wheezes, rales or rhonchi. Normal respiratory effort  Cardiovascular: regular rate and rhythm, S1 and S2, no murmurs, rubs or gallops, no edema, 2+ peripheral pulses  Gastrointestinal: soft, nontender, nondistended, +bowel sounds, no hernia  Skin: warm, dry, no rash  Neurologic: sensation grossly intact, CN grossly intact, non-focal exam  Musculoskeletal: no clubbing, no cyanosis, no joint swelling  Psychiatric: A&Ox3 towards end of rapid     ASSESSMENT/PLAN:  -Rapid response called due to concern for change in mental status. Of note last known well ~ 2 PM.  by the bedside noted wife was A&O x4. Concern for seizure vs stroke vs demyelination 2/2 sodium shift however lower on differentials   - Code stroke initiated  -Neurology evaluated the patient  -Writer transported patient for CTH, CTA- negative; no acute findings  -Seizure labs- prolactin and lactate- negative   -Troponin, CKMB elevated, EKG: Normal sinus rhythm repeat labs ordered x 4 hours   -Cardiology consult called- will follow up recommendations  -Hypothermia noted- warming blanket ordered and infectious workup initiated: RVP, CXR, UA, blood cultures   -Patient to remain on tele  -Seizure precautions to remain in place  -Neurologic checks q 4 hours  -[facility] made aware of above and agreed with plan  -Prior to rapid noted rapid correction of sodium 10 point increase in less than 24 hours  -Nephrologist,  contacted advised starting D5 at a rate of 100 cc/hr x 5 hour which was initiated during the rapid  -Per chart review noted that sodium chloride tablets were not discontinued; patient was given both hypertonic and sodium tablets during the day which may be what is attributing to the rapid increase  -Sodium chloride tablets discontinued   -Repeat BMP ordered for 5 AM to assess Na level   -Bladder scan- 301; straight cath ordered. Advised to continue with BS q 8 hours  -Will continue to monitor patient closely  -Night events to be conveyed to day provider to ensure continuity of care     Low complexity/risk (30min)

## 2023-05-07 NOTE — PROVIDER CONTACT NOTE (SEPSIS SCREENING) - ACTION/TREATMENT ORDERED:
sepsis workup ordered ( blood cultures, Urine, swab) and completed   Hypothermia blanket ordered and placed on   care continued

## 2023-05-07 NOTE — PROGRESS NOTE ADULT - PROBLEM SELECTOR PLAN 3
Presenting with Na of 125,   - s/p 1L NS w/ persistent hyponatremia likely SIADH (urine Na 89, urine osm 496)  -s/p hypertonic saline 2% 30ml/h x4h on 5/7, overcorrected a bit to 133, given d5w, now off, Na 131  - encourage po intake, soft diet per SLP eval  - BMP q12h  -no hydro on renal US  - f/u nephro recs  -slow correction 6-8 meq/24h

## 2023-05-07 NOTE — CONSULT NOTE ADULT - SUBJECTIVE AND OBJECTIVE BOX
CC:  Patient is a 68y old  Female who presents with a chief complaint of UTI (06 May 2023 23:04)    HPI:  68W [employer] with past medical history of MS with baclofen pump in place, seizure, hypothyroidism, frequent UTIs presenting worsening mental status. Patient with poor insight and judgment and thus history provided by  and aide at bedside. Patient reportedly was in her normal state of health prior to 4 days ago when she no longer started to act like herself. She reportedly over the last 4 days has been increasingly confused, slow to respond and has been having episodes of staring spells. Over the last few days mental status has worsened and talking less. They deny any fevers, chills, chest pain, shortness of breath, abdominal pain, nausea, vomiting,  (05 May 2023 11:32)    As per the chart review, patient is currently being treated for hyponatremia, most recent Na 133 (increased from 123 earlier in the day), in the process of receiving D5, which had been ordered by primary team for overcorrection. Around 23:00, rapid response team called for hypotension SBP~70's-80's and unresponsiveness. Code stroke called given slurred speech and AMS, patient taken for urgent CT head, CT shows no acute ICH, mass effect, or CT evidence of an acute transcortical infarct. Cardiology called for elevated troponin. At the time of my encounter, patient is conversational and denies CP.       Allergies    sulfa drugs (Hives; Rash)    Intolerances    	    MEDICATIONS    amLODIPine   Tablet 5 milliGRAM(s) Oral daily  enoxaparin Injectable 40 milliGRAM(s) SubCutaneous every 24 hours    cefepime   IVPB 1000 milliGRAM(s) IV Intermittent every 8 hours  cefepime   IVPB          gabapentin 300 milliGRAM(s) Oral three times a day  lacosamide IVPB 100 milliGRAM(s) IV Intermittent every 12 hours    bisacodyl Suppository 10 milliGRAM(s) Rectal daily PRN  pantoprazole  Injectable 40 milliGRAM(s) IV Push daily  polyethylene glycol 3350 17 Gram(s) Oral two times a day  senna 2 Tablet(s) Oral at bedtime    levothyroxine Injectable 60 MICROGram(s) IV Push <User Schedule>    dextrose 5%. 1000 milliLiter(s) IV Continuous <Continuous>            PAST MEDICAL & SURGICAL HISTORY:  Hypothyroid      Multiple sclerosis      Seizures      History of recurrent UTIs      Benign essential HTN      No significant past surgical history          FAMILY HISTORY:  No pertinent family history in first degree relatives        SOCIAL HISTORY    Marital Status:   Occupation:   Lives with:     SUBSTANCE USE  Tobacco Usage:  ( ) None ( ) never smoked   ( ) former smoker  ( ) current smoker; Packs per day:   Alcohol Usage: ( ) none  ( ) occasional ( ) 2-3 times a week ( ) daily; Last drink:   Recreational drugs ( ) None    CONSTITUTIONAL: No fevers, No chills, No fatigue, No weight gain  EYES: No vision changes   ENT: No congestion, No ear pain, No sore throat.  NECK: No pain, No stiffness  RESPIRATORY: No shortness of breath, No cough, No wheezing, No hemoptysis  CARDIOVASCULAR: No chest pain. No palpitations, No PINON, No orthopnea, No paroxysmal nocturnal dyspnea, No pleuritic pain  GASTROINTESTINAL: No abdominal pain, No nausea, No vomiting, No hematemesis, No diarrhea No constipation. No melena  GENITOURINARY: No dysuria, No frequency, No incontinence, No hematuria  NEUROLOGICAL: No dizziness, No lightheadedness, No syncope, No LOC, No headache, No numbness or weakness  MUSCULOSKELETAL: No Edema, No joint pain, No joint swelling.  PSYCHIATRIC: No anxiety, No depression  DERMATOLOGY: No diaphoresis. No itching, No rashes, No pressure ulcers  HEME/LYMPH: No easy bruising, or bleeding gums    All other review of systems is negative unless indicated above.    VITAL SIGNS  T(C): 36.3 (23 @ 16:00), Max: 36.7 (23 @ 08:00)  HR: 65 (23 @ 16:00) (65 - 92)  BP: 134/63 (23 @ 16:00) (125/64 - 154/75)  RR: 17 (23 @ 16:00) (17 - 18)  SpO2: 100% (23 @ 16:00) (98% - 100%)  Wt(kg): --    Appearance: NAD, no distress  HEENT: Moist Mucous Membranes  Cardiovascular: Regular rate and rhythm, Normal S1 S2, No JVD, No murmurs  Respiratory: Lungs clear to auscultation. No rales, No rhonchi, No wheezing. No tenderness to palpation  Gastrointestinal:  Soft, Non-tender, + BS  Neurologic: Non-focal, A&Ox3  Skin: Warm and dry, No rashes, No ecchymosis, No cyanosis  Musculoskeletal: No clubbing, No cyanosis, No edema  Vascular: Peripheral pulses palpable 2+ bilaterally  Psychiatry: Mood & affect appropriate      	    		        I&O's Summary    05 May 2023 07:01  -  06 May 2023 07:00  --------------------------------------------------------  IN: 150 mL / OUT: 1000 mL / NET: -850 mL    06 May 2023 07:01  -  07 May 2023 00:26  --------------------------------------------------------  IN: 360 mL / OUT: 1620 mL / NET: -1260 mL        LABORATORY VALUES	 	                          10.0   7.53  )-----------( 191      ( 06 May 2023 22:52 )             31.2       05-06    132<L>  |  94<L>  |  10  ----------------------------<  87  4.1   |  25  |  0.78  05-06    133<L>  |  95<L>  |  9   ----------------------------<  94  4.0   |  24  |  0.75    Ca    9.5      06 May 2023 22:52  Ca    9.2      06 May 2023 21:20  Phos  4.0     05-06  Phos  2.3     05-06  Mg     2.10     05-06  Mg     1.80     05-06    TPro  6.5  /  Alb  3.5  /  TBili  0.3  /  DBili  x   /  AST  24  /  ALT  21  /  AlkPhos  142<H>  05-06  TPro  7.2  /  Alb  3.9  /  TBili  0.3  /  DBili  x   /  AST  30  /  ALT  23  /  AlkPhos  164<H>  05-06    LIVER FUNCTIONS - ( 06 May 2023 22:52 )  Alb: 3.5 g/dL / Pro: 6.5 g/dL / ALK PHOS: 142 U/L / ALT: 21 U/L / AST: 24 U/L / GGT: x           Activated Partial Thromboplastin Time: 33.3 sec ( @ 22:52)      CARDIAC MARKERS:  Creatine Kinase, Serum: 121 U/L ( @ 22:52)    troponin 137    CKMB 12.1        Blood Gas Venous - Lactate: 1.5 mmol/L ( @ 22:52)  Blood Gas Venous - Lactate: 1.1 mmol/L ( @ 08:30)        Thyroid Stimulating Hormone, Serum: 0.56 uIU/mL ( @ 08:30)      Urinalysis Basic - ( 05 May 2023 10:00 )    Color: Light Yellow / Appearance: Slightly Turbid / S.019 / pH: x  Gluc: x / Ketone: Small  / Bili: Negative / Urobili: <2 mg/dL   Blood: x / Protein: 30 mg/dL / Nitrite: Negative   Leuk Esterase: Large / RBC: 1 /HPF / WBC MANY /HPF   Sq Epi: x / Non Sq Epi: x / Bacteria: Few      CAPILLARY BLOOD GLUCOSE      POCT Blood Glucose.: 77 mg/dL (06 May 2023 22:35)       @ 09:45  229E Corona Virus --  Adenovirus NotDetec  Bordetella pertussis NotDetec  Chlamydia pneumoniae NotDetec  Entero/Rhino Virus NotDetec  HKU1 Coronavirus --  hMPV NotDetec  Influenza A NotDetec  Influenza AH1 --  Influenza AH1  --  Influenza AH3 --  Influenza B NotDetec  Mycoplasma pneumoniae NotDetec  NL63 Coronavirus --  OC43 Corornavirus --  Parainfluenza 1 NotDetec  Parainfluenza 2 NotDetec          ECG: NSR VR61, 1st degree AVB 	      RADIOLOGY:  	  < from: CT Brain Stroke Protocol (23 @ 23:29) >    IMPRESSION:    No acute intracranial hemorrhage, mass effect, or CT evidence of an acute   transcortical infarct. Mild chronic ischemic changes in the   frontoparietal white matter.    Evaluation of the ezekiel is limited due to beam hardening artifact.  Please   note that osmotic demyelination syndrome is better evaluated on MRI brain.    These findings were discussed with Dr Schroeder at 2023 11:30 PM by Dr. Sylvester.          < from: CT Perfusion w/ Maps w/ IV Cont (23 @ 23:29) >    IMPRESSION:    CTA NECK:  No significant stenosis of the cervical carotid arteries based   on NASCET criteria. Patent cervical vertebral arteries. No evidence of   cervical carotid or vertebral artery dissection.    CTA HEAD:  No high-grade stenosis or occlusion of the major proximal   arterial branches. No evidence of an intracranial arterial aneurysm or   arteriovenous malformation on CTA.    CT PERFUSION: Perfusion imaging shows no evidence of a core infarct or   tissue at risk.    NAYE BERGMAN DO; Attending Radiologist  This document has been electronically signed. May  6 2023 11:48PM        < from: Xray Chest 1 View AP/PA (23 @ 09:41) >    FINDINGS:  The heart is normal in size.  The lungs are clear.  There is no pneumothorax or pleural effusion.    IMPRESSION:  Clear lungs.    --- End of Report ---    < end of copied text >

## 2023-05-07 NOTE — PROGRESS NOTE ADULT - ASSESSMENT
68W [employer] with PMH of MS with baclofen pump in place, seizure, hypothyroidism, frequent UTIs presenting worsening mental status secondary to UTI

## 2023-05-07 NOTE — PROGRESS NOTE ADULT - PROBLEM SELECTOR PLAN 1
pt hypotensive and hypothermic, r/o sepsis vs. autonomic dysfunction   add midodrine 10mg tid  -IVF hydration LR 100ml/h x10h  -check TTE  -f/u cardiology, d/w Cards fellow about her troponin leak and hypotension, no chest pain, likely demand ischemia, repeat EKG/troponin if develops if cardiac symptoms  -f/u cultures  -checked cortisol 11.8--> no gross adrenal insufficiency  -monitor vitals closely

## 2023-05-07 NOTE — CHART NOTE - NSCHARTNOTEFT_GEN_A_CORE
Rapid response called by RN 2/2 to patient being hypotensive: SBP 80s and unresponsive. Upon rapid response team arriving patient's SBP ~115. Patient arousable to tactile stimulation. Patient hypothermic previously and placed on warming blanket. Temperature 94 previously now 95. Will remain on warming blanket. Has had bouts of hypothermia in the past. Infectious workup initiated: RVP, CXR, blood cultures, urine culture. AM cortisol and thyroid studies ordered. Neuro contacted to make aware that mentating the same as she was during initial rapid. Neurology residentAlexandre advised loading with Vimpat 200 mg X1 and subsequent Vimpat 100 to be rescheduled to 6 hours later. Will continue to monitor. Rapid response called by RN 2/2 to patient being hypotensive: SBP 80s and unresponsive. Upon rapid response team arriving patient's SBP ~115. Patient arousable to tactile stimulation. Patient hypothermic previously and placed on warming blanket. Temperature 94 previously now 95. Will remain on warming blanket. Has had bouts of hypothermia in the past. Infectious workup initiated: RVP, CXR, blood cultures, urine culture. AM cortisol and thyroid studies ordered. Neuro contacted to make aware that mentating the same as she was during initial rapid. Neurology residentAlexandre advised loading with Vimpat 200 mg X1 and subsequent Vimpat 100 to be rescheduled to 6 hours later. AM labs to be drawn now. RN made aware of medication regimen and agreed to plan. Will continue to monitor.

## 2023-05-07 NOTE — PROGRESS NOTE ADULT - PROBLEM SELECTOR PLAN 9
hypotension  dc norvasc  -started on midodrine 10 tid ,   IVF hydration LR 100ml/h x10h  - monitor BP

## 2023-05-07 NOTE — PROGRESS NOTE ADULT - SUBJECTIVE AND OBJECTIVE BOX
[facility] DIVISION OF KIDNEY DISEASES AND HYPERTENSION   FOLLOW UP NOTE    --------------------------------------------------------------------------------  HPI: 68-year-old female with PMH of MS, frequent UTI admitted to [facility] with AMS. ER labs showed SNa of 121. Pt. being seen for hyponatremia.     Upon review of labs, SNa was low at 126 on 2/17/23 and improved to 142 on 3/15/23. SNa was low at 131 on 4/16/23. Pt on presentation was found to have low SNa of 125 on 5/5/23. Pt received IVF.  Pt was also noted to have urinary retention in ER. SNa was low at 123 on AM labs done on 5/6/23. Pt received IVF 2% HTS and Bumex. Pt also received one dose of salt tabs as per primary team. SNa overcorrected to 133 on repeat labs done on 5/6/23. Pt received IVF hypotonic fluids D5W overnight. Pt also noted to have AMS and rapid response was called.     Pt seen and examined today. Pt is awake, confused and requesting for water. Limited ROS obtained. Last SNa improved/low at 131 today.    PAST HISTORY  --------------------------------------------------------------------------------  No significant changes to PMH, PSH, FHx, SHx, unless otherwise noted    ALLERGIES & MEDICATIONS  --------------------------------------------------------------------------------  Allergies    sulfa drugs (Hives; Rash)    Intolerances    Standing Inpatient Medications  cefepime   IVPB 1000 milliGRAM(s) IV Intermittent every 8 hours  cefepime   IVPB      enoxaparin Injectable 40 milliGRAM(s) SubCutaneous every 24 hours  gabapentin 300 milliGRAM(s) Oral three times a day  lacosamide IVPB 100 milliGRAM(s) IV Intermittent every 12 hours  levothyroxine Injectable 60 MICROGram(s) IV Push <User Schedule>  midodrine. 10 milliGRAM(s) Oral every 8 hours  pantoprazole  Injectable 40 milliGRAM(s) IV Push daily  polyethylene glycol 3350 17 Gram(s) Oral two times a day  senna 2 Tablet(s) Oral at bedtime  Sodium Chloride 2% 500 milliLiter(s) 500 milliLiter(s) IV Continuous <Continuous>    PRN Inpatient Medications  bisacodyl Suppository 10 milliGRAM(s) Rectal daily PRN    REVIEW OF SYSTEMS  --------------------------------------------------------------------------------  Limited ROS obtained from pt as pt is confused    Gen: No fevers/chills  Head/Eyes/Ears: No HA   Respiratory: No dyspnea, cough  CV: No chest pain  GI: No abdominal pain, diarrhea    VITALS/PHYSICAL EXAM  --------------------------------------------------------------------------------  T(C): 36.5 (05-07-23 @ 06:00), Max: 36.6 (05-06-23 @ 12:00)  HR: 80 (05-07-23 @ 07:46) (58 - 81)  BP: 101/57 (05-07-23 @ 07:46) (71/39 - 137/71)  RR: 18 (05-07-23 @ 07:46) (17 - 19)  SpO2: 95% (05-07-23 @ 07:46) (95% - 100%)  Wt(kg): --      05-06-23 @ 07:01  -  05-07-23 @ 07:00  --------------------------------------------------------  IN: 360 mL / OUT: 1720 mL / NET: -1360 mL    Physical Exam:  	Gen: ill-appearing  	HEENT: MMM  	Pulm: CTA B/L  	CV: S1S2+  	Abd: Soft, +BS   	Ext: No LE edema B/L  	Neuro: awake, confused  	Skin: Warm and dry    LABS/STUDIES  --------------------------------------------------------------------------------              10.1   6.94  >-----------<  188      [05-07-23 @ 06:30]              31.2     131  |  94  |  10  ----------------------------<  68      [05-07-23 @ 06:30]  4.0   |  25  |  0.73        Ca     9.2     [05-07-23 @ 06:30]      Mg     2.00     [05-07-23 @ 06:30]      Phos  3.9     [05-07-23 @ 06:30]          [05-06-23 @ 22:52]  Serum Osmolality 262      [05-05-23 @ 11:20]    Creatinine Trend:  SCr 0.73 [05-07 @ 06:30]  SCr 0.78 [05-06 @ 22:52]  SCr 0.75 [05-06 @ 21:20]  SCr 0.41 [05-06 @ 03:00]  SCr 0.42 [05-05 @ 17:47]    Urinalysis - [05-07-23 @ 01:30]      Color Colorless / Appearance Clear / SG 1.013 / pH 6.0      Gluc Negative / Ketone Negative  / Bili Negative / Urobili <2 mg/dL       Blood Negative / Protein Negative / Leuk Est Moderate / Nitrite Negative      RBC 3 / WBC 15 / Hyaline 2 / Gran  / Sq Epi  / Non Sq Epi  / Bacteria Occasional    Urine Sodium 89      [05-05-23 @ 11:25]  Urine Osmolality 496      [05-05-23 @ 11:25]

## 2023-05-07 NOTE — CHART NOTE - NSCHARTNOTEFT_GEN_A_CORE
Repeat Sodium noted to be 131, result discussed with nephrology, no need for further D5 IVF, will continue with fluid restriction and check BMP Q12

## 2023-05-07 NOTE — PROGRESS NOTE ADULT - PROBLEM SELECTOR PLAN 1
Pt. with hyponatremia in setting of urinary retention and frequent UTIs. On review of previous labs, pt. noted to have episodes of hyponatremia in the past. SNa was low at 126 on 2/17/23 and improved to 142 on 3/15/23. SNa was low at 131 on 4/16/23. Pt. on presentation was found to have low SNa of 125 on 5/5/23. Pt. received IVF. UOsm of 496 and Stanislav of 89 noted. Pt with urinary retention in ER. SNa was low at 123 on AM labs done on 5/6/23. Pt received IVF 2% HTS and Bumex. SNa overcorrected to 133 on repeat labs done on 5/6/23. Pt received IVF hypotonic fluids D5W overnight. Pt. with likely SIADH. Last SNa improved/low at 131 today. Recommend fluid restriction 1L/day. Avoid overcorrection of more than 6-8 meq/24 hours. Monitor SNa q12 hours.

## 2023-05-07 NOTE — PROGRESS NOTE ADULT - SUBJECTIVE AND OBJECTIVE BOX
Dr. Jyotsna Lyman  Pager [phone]    PROGRESS NOTE:     Patient is a 68y old  Female who presents with a chief complaint of UTI (07 May 2023 09:06)      SUBJECTIVE / OVERNIGHT EVENTS: denies chest pain or sob  ADDITIONAL REVIEW OF SYSTEMS: RRT last night for hypothermia and hypotension bp 80s', given fluids, cultures sent, now on warming blanket, bp 100's    MEDICATIONS  (STANDING):  cefepime   IVPB 1000 milliGRAM(s) IV Intermittent every 8 hours  cefepime   IVPB      enoxaparin Injectable 40 milliGRAM(s) SubCutaneous every 24 hours  gabapentin 300 milliGRAM(s) Oral three times a day  lacosamide IVPB 100 milliGRAM(s) IV Intermittent every 12 hours  levothyroxine Injectable 60 MICROGram(s) IV Push <User Schedule>  midodrine. 10 milliGRAM(s) Oral every 8 hours  pantoprazole  Injectable 40 milliGRAM(s) IV Push daily  polyethylene glycol 3350 17 Gram(s) Oral two times a day  senna 2 Tablet(s) Oral at bedtime  Sodium Chloride 2% 500 milliLiter(s) 500 milliLiter(s) (30 mL/Hr) IV Continuous <Continuous>    MEDICATIONS  (PRN):  bisacodyl Suppository 10 milliGRAM(s) Rectal daily PRN Constipation      CAPILLARY BLOOD GLUCOSE      POCT Blood Glucose.: 80 mg/dL (07 May 2023 13:53)  POCT Blood Glucose.: 84 mg/dL (07 May 2023 08:29)  POCT Blood Glucose.: 85 mg/dL (07 May 2023 03:29)  POCT Blood Glucose.: 77 mg/dL (06 May 2023 22:35)    I&O's Summary    06 May 2023 07:  -  07 May 2023 07:00  --------------------------------------------------------  IN: 360 mL / OUT: 1720 mL / NET: -1360 mL    07 May 2023 07:01  -  07 May 2023 15:08  --------------------------------------------------------  IN: 90 mL / OUT: 200 mL / NET: -110 mL        PHYSICAL EXAM:  Vital Signs Last 24 Hrs  T(C): 36.8 (07 May 2023 11:50), Max: 37.1 (07 May 2023 07:46)  T(F): 98.2 (07 May 2023 11:50), Max: 98.8 (07 May 2023 07:46)  HR: 61 (07 May 2023 13:30) (58 - 80)  BP: 104/49 (07 May 2023 13:30) (71/39 - 134/63)  BP(mean): --  RR: 17 (07 May 2023 13:30) (16 - 19)  SpO2: 97% (07 May 2023 13:30) (95% - 100%)    Parameters below as of 07 May 2023 13:30  Patient On (Oxygen Delivery Method): room air      General: woman laying in bed appears comfortable in NAD, awake and alert  Eyes: PERRLA, nonicteric sclera  HENMT: NCAT, MMM  Neck: Supple, no thyromegaly, trachea midline   Respiratory: No respiratory distress, CTABL, No rales, rhonchi, wheezing.  Cardiovascular: Regular rate and rhythm; No m/g/r. 2+ peripheral pulses x4 extremities   Gastrointestinal: Soft, Nontender, Nondistended; +BS. No palpable organomegaly  : palpable bladder, mild suprapubic tenderness, no CVA tenderness   Extremities: No c/c/e; warm to touch  Neurological: face symmetric. slow to respond, able to follow commands   Skin: No rashes, No erythema   Psych: AAO to person and place, not oriented to time; appropriate mood and affect    LABS:                        10.1   6.94  )-----------( 188      ( 07 May 2023 06:30 )             31.2     05-07    131<L>  |  94<L>  |  10  ----------------------------<  68<L>  4.0   |  25  |  0.73    Ca    9.2      07 May 2023 06:30  Phos  3.9     05-  Mg     2.00     05-07    TPro  6.5  /  Alb  3.5  /  TBili  0.3  /  DBili  x   /  AST  24  /  ALT  21  /  AlkPhos  142<H>  05-06    PT/INR - ( 06 May 2023 22:52 )   PT: 11.2 sec;   INR: 0.97 ratio         PTT - ( 06 May 2023 22:52 )  PTT:33.3 sec  CARDIAC MARKERS ( 06 May 2023 22:52 )  x     / x     / 121 U/L / x     / 12.1 ng/mL      Urinalysis Basic - ( 07 May 2023 01:30 )    Color: Colorless / Appearance: Clear / S.013 / pH: x  Gluc: x / Ketone: Negative  / Bili: Negative / Urobili: <2 mg/dL   Blood: x / Protein: Negative / Nitrite: Negative   Leuk Esterase: Moderate / RBC: 3 /HPF / WBC 15 /HPF   Sq Epi: x / Non Sq Epi: x / Bacteria: Occasional        Culture - Urine (collected 05 May 2023 10:49)  Source: Catheterized Catheterized  Final Report (06 May 2023 19:14):    No growth    Culture - Blood (collected 05 May 2023 08:45)  Source: .Blood Blood  Preliminary Report (06 May 2023 13:01):    No growth to date.    Culture - Blood (collected 05 May 2023 08:30)  Source: .Blood Blood  Preliminary Report (06 May 2023 13:01):    No growth to date.        RADIOLOGY & ADDITIONAL TESTS:  Results Reviewed:   Imaging Personally Reviewed:  Electrocardiogram Personally Reviewed:    COORDINATION OF CARE:  Care Discussed with Consultants/Other Providers [Y/N]: cardiology consult, f/u for troponin leak and hypotension  Prior or Outpatient Records Reviewed [Y/N]:

## 2023-05-07 NOTE — CONSULT NOTE ADULT - ASSESSMENT
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 Cardiology consult called for elevated troponin.           PLAN  #troponemia- Pt CP free, EKG with NSR with 1degree AVB, troponin 137 ( was 32 at yesterday 8am), Ck/CKMB 121/12.1. Likely demand in setting of hypotension.  -continuous tele monitoring  -trend CE (troponin, CK/CKMB)  -repeat EKG prn for CP and in am  -replete electrolyte to keep K>4, mg >2  -echo in am  -house cardiology will follow, call # [phone] for questions.    Case discussed with Dr. Kartik Portillo, cardiology fellow  Attending attestation to follow.

## 2023-05-07 NOTE — CONSULT NOTE ADULT - NS ATTEST RISK PROBLEM GEN_ALL_CORE FT
68-year-old woman with history of multiple sclerosis, baclofen pump in place, seizure, hypothyroidism and frequent UTIs, now with worsening mental status,  elevation of HSTropT, (32 ng/L on 5/5/23; 137 ng/L on 5/6/23; 110 ng/L on 5/7/23) in setting of anemia (Hgb 10.1 g/dL / Hct 31.2% on 5/7/23). RRT called 5/6/23 for hypotension and AMS. The patient was unresponsive to nurse, with SBP 70s mmHg. Suspect elevated HSTropT is secondary metabolic and hypotensive factors rather than ACS.

## 2023-05-07 NOTE — PROVIDER CONTACT NOTE (CHANGE IN STATUS NOTIFICATION) - ASSESSMENT
pt BP 81/39, glucose 85, rectal 95.8. unable to arouse pt at this time
pt unable to sustain arousal. electronic BP 71/39 electronic

## 2023-05-07 NOTE — CONSULT NOTE ADULT - TIME BILLING
68-year-old woman with history of multiple sclerosis, baclofen pump in place, seizure, hypothyroidism and frequent UTIs, now with worsening mental status,  elevation of HSTropT, (32 ng/L on 5/5/23; 137 ng/L on 5/6/23; 110 ng/L on 5/7/23) in setting of anemia (Hgb 10.1 g/dL / Hct 31.2% on 5/7/23). RRT called 5/6/23 for hypotension and AMS. The patient was unresponsive to nurse, with SBP 70s mmHg. Chart records reviewed in detail.

## 2023-05-08 LAB
ANION GAP SERPL CALC-SCNC: 10 MMOL/L — SIGNIFICANT CHANGE UP (ref 7–14)
BUN SERPL-MCNC: 15 MG/DL — SIGNIFICANT CHANGE UP (ref 7–23)
CALCIUM SERPL-MCNC: 9.5 MG/DL — SIGNIFICANT CHANGE UP (ref 8.4–10.5)
CHLORIDE SERPL-SCNC: 99 MMOL/L — SIGNIFICANT CHANGE UP (ref 98–107)
CO2 SERPL-SCNC: 28 MMOL/L — SIGNIFICANT CHANGE UP (ref 22–31)
CREAT SERPL-MCNC: 0.62 MG/DL — SIGNIFICANT CHANGE UP (ref 0.5–1.3)
CULTURE RESULTS: NO GROWTH — SIGNIFICANT CHANGE UP
EGFR: 98 ML/MIN/1.73M2 — SIGNIFICANT CHANGE UP
GLUCOSE SERPL-MCNC: 63 MG/DL — LOW (ref 70–99)
GLUCOSE SERPL-MCNC: 87 MG/DL — SIGNIFICANT CHANGE UP (ref 70–99)
HCT VFR BLD CALC: 28.6 % — LOW (ref 34.5–45)
HGB BLD-MCNC: 8.9 G/DL — LOW (ref 11.5–15.5)
MAGNESIUM SERPL-MCNC: 2.4 MG/DL — SIGNIFICANT CHANGE UP (ref 1.6–2.6)
MCHC RBC-ENTMCNC: 28.4 PG — SIGNIFICANT CHANGE UP (ref 27–34)
MCHC RBC-ENTMCNC: 31.1 GM/DL — LOW (ref 32–36)
MCV RBC AUTO: 91.4 FL — SIGNIFICANT CHANGE UP (ref 80–100)
NRBC # BLD: 0 /100 WBCS — SIGNIFICANT CHANGE UP (ref 0–0)
NRBC # FLD: 0.02 K/UL — HIGH (ref 0–0)
PHOSPHATE SERPL-MCNC: 3.4 MG/DL — SIGNIFICANT CHANGE UP (ref 2.5–4.5)
PLATELET # BLD AUTO: 180 K/UL — SIGNIFICANT CHANGE UP (ref 150–400)
POTASSIUM SERPL-MCNC: 4.6 MMOL/L — SIGNIFICANT CHANGE UP (ref 3.5–5.3)
POTASSIUM SERPL-SCNC: 4.6 MMOL/L — SIGNIFICANT CHANGE UP (ref 3.5–5.3)
RBC # BLD: 3.13 M/UL — LOW (ref 3.8–5.2)
RBC # FLD: 17 % — HIGH (ref 10.3–14.5)
SODIUM SERPL-SCNC: 137 MMOL/L — SIGNIFICANT CHANGE UP (ref 135–145)
SPECIMEN SOURCE: SIGNIFICANT CHANGE UP
WBC # BLD: 6.04 K/UL — SIGNIFICANT CHANGE UP (ref 3.8–10.5)
WBC # FLD AUTO: 6.04 K/UL — SIGNIFICANT CHANGE UP (ref 3.8–10.5)

## 2023-05-08 PROCEDURE — 99233 SBSQ HOSP IP/OBS HIGH 50: CPT

## 2023-05-08 PROCEDURE — 93306 TTE W/DOPPLER COMPLETE: CPT | Mod: 26

## 2023-05-08 PROCEDURE — 74230 X-RAY XM SWLNG FUNCJ C+: CPT | Mod: 26

## 2023-05-08 PROCEDURE — 99232 SBSQ HOSP IP/OBS MODERATE 35: CPT

## 2023-05-08 RX ORDER — DEXTROSE 50 % IN WATER 50 %
25 SYRINGE (ML) INTRAVENOUS ONCE
Refills: 0 | Status: DISCONTINUED | OUTPATIENT
Start: 2023-05-08 | End: 2023-05-12

## 2023-05-08 RX ORDER — GLUCAGON INJECTION, SOLUTION 0.5 MG/.1ML
1 INJECTION, SOLUTION SUBCUTANEOUS ONCE
Refills: 0 | Status: DISCONTINUED | OUTPATIENT
Start: 2023-05-08 | End: 2023-05-12

## 2023-05-08 RX ORDER — DEXTROSE 50 % IN WATER 50 %
15 SYRINGE (ML) INTRAVENOUS ONCE
Refills: 0 | Status: DISCONTINUED | OUTPATIENT
Start: 2023-05-08 | End: 2023-05-12

## 2023-05-08 RX ADMIN — MIDODRINE HYDROCHLORIDE 10 MILLIGRAM(S): 2.5 TABLET ORAL at 13:23

## 2023-05-08 RX ADMIN — GABAPENTIN 300 MILLIGRAM(S): 400 CAPSULE ORAL at 13:23

## 2023-05-08 RX ADMIN — MIDODRINE HYDROCHLORIDE 10 MILLIGRAM(S): 2.5 TABLET ORAL at 21:21

## 2023-05-08 RX ADMIN — Medication 60 MICROGRAM(S): at 06:03

## 2023-05-08 RX ADMIN — SENNA PLUS 2 TABLET(S): 8.6 TABLET ORAL at 21:21

## 2023-05-08 RX ADMIN — LACOSAMIDE 120 MILLIGRAM(S): 50 TABLET ORAL at 15:03

## 2023-05-08 RX ADMIN — POLYETHYLENE GLYCOL 3350 17 GRAM(S): 17 POWDER, FOR SOLUTION ORAL at 17:37

## 2023-05-08 RX ADMIN — POLYETHYLENE GLYCOL 3350 17 GRAM(S): 17 POWDER, FOR SOLUTION ORAL at 06:08

## 2023-05-08 RX ADMIN — CEFEPIME 100 MILLIGRAM(S): 1 INJECTION, POWDER, FOR SOLUTION INTRAMUSCULAR; INTRAVENOUS at 21:28

## 2023-05-08 RX ADMIN — GABAPENTIN 300 MILLIGRAM(S): 400 CAPSULE ORAL at 21:21

## 2023-05-08 RX ADMIN — ENOXAPARIN SODIUM 40 MILLIGRAM(S): 100 INJECTION SUBCUTANEOUS at 13:23

## 2023-05-08 RX ADMIN — MIDODRINE HYDROCHLORIDE 10 MILLIGRAM(S): 2.5 TABLET ORAL at 06:05

## 2023-05-08 RX ADMIN — CEFEPIME 100 MILLIGRAM(S): 1 INJECTION, POWDER, FOR SOLUTION INTRAMUSCULAR; INTRAVENOUS at 06:05

## 2023-05-08 RX ADMIN — CEFEPIME 100 MILLIGRAM(S): 1 INJECTION, POWDER, FOR SOLUTION INTRAMUSCULAR; INTRAVENOUS at 13:27

## 2023-05-08 RX ADMIN — LACOSAMIDE 120 MILLIGRAM(S): 50 TABLET ORAL at 02:48

## 2023-05-08 RX ADMIN — GABAPENTIN 300 MILLIGRAM(S): 400 CAPSULE ORAL at 06:05

## 2023-05-08 RX ADMIN — PANTOPRAZOLE SODIUM 40 MILLIGRAM(S): 20 TABLET, DELAYED RELEASE ORAL at 11:59

## 2023-05-08 NOTE — CHART NOTE - NSCHARTNOTEFT_GEN_A_CORE
Labs reviewed this am. SNa improved and WNL at 137 today. Avoid further hypotonic fluids. Encourage PO intake. Fluid restrict to ~1L per day. Monitor SNa.     05-08    137  |  99  |  15  ----------------------------<  63<L>  4.6   |  28  |  0.62    Ca    9.5      08 May 2023 05:45  Phos  3.4     05-08  Mg     2.40     05-08    TPro  6.5  /  Alb  3.5  /  TBili  0.3  /  DBili  x   /  AST  24  /  ALT  21  /  AlkPhos  142<H>  05-06    Will discontinue follow up. Reconsult as needed.     If you have any questions, please feel free to contact me  Loc Mann  Nephrology Fellow  [phone]/ [employer] Teams(Preferred)  (After 5pm or on weekends please page the on-call fellow).

## 2023-05-08 NOTE — SWALLOW VFSS/MBS ASSESSMENT ADULT - DIAGNOSTIC IMPRESSIONS
1) Mild Oral Stage for Puree, Regular Solids, Moderately Thick Liquids, Mildly Thick Liquids, and Thin Liquids characterized by adequate oral containment, adequate mastication for regular solids, adequate anterior to posterior transfer, premature spillage to the hypopharynx (vallecular and pyriforms) for Moderately Thick, Mildly Thick and Thin Liquids, with adequate oral clearance.   2) Mild Pharyngeal Stage for Puree, Regular Solids, Moderately Thick Liquids characterized by delayed initiation of the pharyngeal swallow (Bolus head at the vallecular for Moderately Thick Liquids), adequate hyolaryngeal excursion, reduced base of tongue retraction, adequate laryngeal vestibule closure, adequate epiglottic deflection, adequate pharyngeal contractility. There is Trace/Mild Pharyngeal Clearance deficits at the vallecular and pyriforms post primary swallow. Spontaneous rewallow benefits to improve pharyngeal clearance. There is No Aspiration before, during, or after the swallow.   3) Moderate to Severe Pharyngeal Stage for Mildly Thick and Thin Liquids characterized by delayed initiation of the pharyngeal swallow (Bolus head at the vallecular), reduced hyolaryngeal excursion, reduced base of tongue retraction, reduced laryngeal vestibule closure, reduced epiglottic deflection, and reduced pharyngeal contractility. There is Trace/Mild Pharyngeal Clearance deficits at the vallecular and pyriforms post primary swallow. Spontaneous reswallow benefits to improve pharyngeal clearance. There is Laryngeal Penetration before the swallow without retrieval leading to the level of the vocal folds during the swallow for Mildly Thick Liquids. Spontaneous reswallow does not retrieve contrast from the laryngeal vestibule. There is Deep Laryngeal Penetration before the swallow leading to delayed Aspiration for Thin Liquids. Of note, when flouroscopy was turned on, contrast was visible in the posterior trachea. 1) Mild Oral Stage for Puree, Regular Solids, Moderately Thick Liquids, Mildly Thick Liquids, and Thin Liquids characterized by adequate oral containment, adequate mastication for regular solids, adequate anterior to posterior transfer, premature spillage to the hypopharynx (vallecular and pyriforms) greatest with Thin Liquids, with adequate oral clearance.   2) Mild Pharyngeal Stage for Puree, Regular Solids, Moderately Thick Liquids characterized by delayed initiation of the pharyngeal swallow (Bolus head at the vallecular for Moderately Thick Liquids), adequate hyolaryngeal excursion, reduced base of tongue retraction, adequate laryngeal vestibule closure, adequate epiglottic deflection, reduced pharyngeal contractility. There is Trace/Mild Pharyngeal Clearance deficits at the vallecular and pyriforms post primary swallow. Spontaneous rewallow benefits to improve pharyngeal clearance. There is No Aspiration before, during, or after the swallow.   3) Moderate to Severe Pharyngeal Stage for Mildly Thick and Thin Liquids characterized by delayed initiation of the pharyngeal swallow (Bolus head at the vallecular), reduced hyolaryngeal excursion, reduced base of tongue retraction, reduced laryngeal vestibule closure, reduced epiglottic deflection, and reduced pharyngeal contractility. There is Trace/Mild Pharyngeal Clearance deficits at the vallecular and pyriforms post primary swallow. Spontaneous reswallow benefits to improve pharyngeal clearance. There is Laryngeal Penetration before the swallow without retrieval to the level of the vocal folds during the swallow for Mildly Thick Liquids. There is Deep Laryngeal Penetration at the level of the vocal folds before the swallow leading to delayed Aspiration during the swallow for Thin Liquids. Of note, when flouroscopy was turned on, increased contrast was visible in the posterior trachea likely exacerbated from pyriform residue spilling over into trachea. Patient is not sensate given no cough response.

## 2023-05-08 NOTE — PROGRESS NOTE ADULT - ASSESSMENT
68W Jehova's Witness with past medical history of MS with baclofen pump in place, seizure, hypothyroidism, frequent UTIs presenting worsening mental status. Cardiology consult called for elevated troponin.     1. Elevated troponin  68W Jehova's Witness with past medical history of MS with baclofen pump in place, seizure, hypothyroidism, frequent UTIs presenting worsening mental status. Cardiology consult called for elevated troponin.     1. Elevated troponin Likely in the setting of demand ischemia due to hypotension though could also be secondary to stress cardiomyopathy   - Trop peaked 137, now down trending  - Obtain TTE  - Telemetry monitoring    Recommendations are preliminary until attending attestation.    Wilfrido Gracia MD  Cardiology Fellow- PGY 4

## 2023-05-08 NOTE — PROGRESS NOTE ADULT - SUBJECTIVE AND OBJECTIVE BOX
[facility] Division of Hospital Medicine  Jennifer Oconnor MD  Pager (M-F, 8A-5P): [phone]  Other Times:  [id number]      SUBJECTIVE / OVERNIGHT EVENTS: Pt conversive this morning. Oriented x 3, though is slow to respond to questions. Does not endorse any pain.    MEDICATIONS  (STANDING):  cefepime   IVPB 1000 milliGRAM(s) IV Intermittent every 8 hours  cefepime   IVPB      dextrose 50% Injectable 25 Gram(s) IV Push once  dextrose Oral Gel 15 Gram(s) Oral once  enoxaparin Injectable 40 milliGRAM(s) SubCutaneous every 24 hours  gabapentin 300 milliGRAM(s) Oral three times a day  glucagon  Injectable 1 milliGRAM(s) IntraMuscular once  lacosamide IVPB 100 milliGRAM(s) IV Intermittent every 12 hours  levothyroxine Injectable 60 MICROGram(s) IV Push <User Schedule>  midodrine. 10 milliGRAM(s) Oral every 8 hours  pantoprazole  Injectable 40 milliGRAM(s) IV Push daily  polyethylene glycol 3350 17 Gram(s) Oral two times a day  senna 2 Tablet(s) Oral at bedtime  Sodium Chloride 2% 500 milliLiter(s) 500 milliLiter(s) (30 mL/Hr) IV Continuous <Continuous>    MEDICATIONS  (PRN):  bisacodyl Suppository 10 milliGRAM(s) Rectal daily PRN Constipation      I&O's Summary    07 May 2023 07:01  -  08 May 2023 07:00  --------------------------------------------------------  IN: 870 mL / OUT: 1550 mL / NET: -680 mL        PHYSICAL EXAM:  Vital Signs Last 24 Hrs  T(C): 36.7 (08 May 2023 14:00), Max: 36.9 (08 May 2023 06:01)  T(F): 98 (08 May 2023 14:00), Max: 98.5 (08 May 2023 06:01)  HR: 55 (08 May 2023 14:00) (48 - 75)  BP: 119/62 (08 May 2023 14:00) (100/44 - 121/57)  BP(mean): --  RR: 18 (08 May 2023 14:00) (16 - 18)  SpO2: 100% (08 May 2023 14:00) (96% - 100%)    Parameters below as of 08 May 2023 14:00  Patient On (Oxygen Delivery Method): room air        General: woman laying in bed appears comfortable in NAD, awake and alert  Eyes: PERRLA, nonicteric sclera  HENMT: NCAT, MMM  Neck: Supple, no thyromegaly, trachea midline   Respiratory: No respiratory distress, CTABL, No rales, rhonchi, wheezing.  Cardiovascular: Regular rate and rhythm; No m/g/r. 2+ peripheral pulses x4 extremities   Gastrointestinal: Soft, Nontender, Nondistended; +BS. No palpable organomegaly  : palpable bladder, mild suprapubic tenderness, no CVA tenderness   Extremities: No c/c/e; warm to touch  Neurological: face symmetric. slow to respond, able to follow commands, wiggles toes on the L foot, cannot wiggle toes on the R foot, otherwise cannot move legs. Full strength of upper extremities.   Skin: No rashes, No erythema   Psych: AAO to person and place, is now oriented to time  LABS:                        8.9    6.04  )-----------( 180      ( 08 May 2023 05:45 )             28.6     05-08    137  |  99  |  15  ----------------------------<  63<L>  4.6   |  28  |  0.62    Ca    9.5      08 May 2023 05:45  Phos  3.4     05-08  Mg     2.40     05-08    TPro  6.5  /  Alb  3.5  /  TBili  0.3  /  DBili  x   /  AST  24  /  ALT  21  /  AlkPhos  142<H>  05-06    PT/INR - ( 06 May 2023 22:52 )   PT: 11.2 sec;   INR: 0.97 ratio         PTT - ( 06 May 2023 22:52 )  PTT:33.3 sec  CARDIAC MARKERS ( 06 May 2023 22:52 )  x     / x     / 121 U/L / x     / 12.1 ng/mL      Urinalysis Basic - ( 07 May 2023 01:30 )    Color: Colorless / Appearance: Clear / S.013 / pH: x  Gluc: x / Ketone: Negative  / Bili: Negative / Urobili: <2 mg/dL   Blood: x / Protein: Negative / Nitrite: Negative   Leuk Esterase: Moderate / RBC: 3 /HPF / WBC 15 /HPF   Sq Epi: x / Non Sq Epi: x / Bacteria: Occasional        Culture - Blood (collected 07 May 2023 01:30)  Source: .Blood Blood-Venous  Preliminary Report (08 May 2023 10:10):    No growth to date.        RADIOLOGY & ADDITIONAL TESTS:  Results Reviewed:   Imaging Personally Reviewed:  Electrocardiogram Personally Reviewed:    COORDINATION OF CARE:  Care Discussed with Consultants/Other Providers [Y/N]: Cards  Prior or Outpatient Records Reviewed [Y/N]:

## 2023-05-08 NOTE — PROVIDER CONTACT NOTE (OTHER) - ASSESSMENT
Pt noreen to the 40s, /44. pt able to be aroused with voice and slight stimulation and was more alert than earlier, still slightly lethargic. pt was awake and alert to take am medications. Pt noreen to the 40s, /44. pt able to be aroused with voice and slight stimulation and was more alert than earlier. pt was awake and alert to take am medications.

## 2023-05-08 NOTE — SWALLOW VFSS/MBS ASSESSMENT ADULT - COMMENTS
Progress Note Adult- Hospitalist Attending 5/7: "68W Zoroastrian with PMH of MS with baclofen pump in place, seizure, hypothyroidism, frequent UTIs presenting worsening mental status secondary to UTI."    Neuro Note 5/6: "Impression: transient unresponsive & slurred speech 2/2 unknown etiology. Maybe be postictal vs CVA vs AMS due to UTI & electrolyte abnormalities."    Patient seen for a swallow evaluation on 5/6 with recommendations of Soft and Bite Size Solids with Mildly Thick Liquids. (see full report for details)     Patient received in Radiology Suite this AM for a Cinesophragram. Patient transferred to a specialized seating unit with lateral view projection. Progress Note Adult- Hospitalist Attending 5/7: "68W Gnosticism with PMH of MS with baclofen pump in place, seizure, hypothyroidism, frequent UTIs presenting worsening mental status secondary to UTI."    Chart Note- Rapid Response 5/7: "Rapid response called by RN 2/2 to patient being hypotensive: SBP 80s and unresponsive. Upon rapid response team arriving patient's SBP ~115. Patient arousable to tactile stimulation. Patient hypothermic previously and placed on warming blanket. Temperature 94 previously now 95. Will remain on warming blanket. Has had bouts of hypothermia in the past. Infectious workup initiated: RVP, CXR, blood cultures, urine culture. AM cortisol and thyroid studies ordered. Neuro contacted to make aware that mentating the same as she was during initial rapid. Neurology residentAlexandre advised loading with Vimpat 200 mg X1 and subsequent Vimpat 100 to be rescheduled to 6 hours later. AM labs to be drawn now. RN made aware of medication regimen and agreed to plan. Will continue to monitor."    Neuro Note 5/6: "Impression: transient unresponsive & slurred speech 2/2 unknown etiology. Maybe be postictal vs CVA vs AMS due to UTI & electrolyte abnormalities."    Patient seen for a swallow evaluation on 5/6 with recommendations of Soft and Bite Size Solids with Mildly Thick Liquids. (see full report for details)     Patient received in Radiology Suite this AM for a Cinesophragram. Patient transferred to a specialized seating unit with lateral view projection.

## 2023-05-08 NOTE — PROGRESS NOTE ADULT - ASSESSMENT
68W Jehovah's witness with PMH of MS with baclofen pump in place, seizure, hypothyroidism, frequent UTIs presenting worsening mental status secondary to UTI

## 2023-05-08 NOTE — SWALLOW VFSS/MBS ASSESSMENT ADULT - RECOMMENDED CONSISTENCY
1. Regular Solids with Moderately Thick Liquids  2. Swallowing Guidelines: Maintain Upright Position during PO Intake, Slow Pacing, Chew Solids Well, Single Cup Sips for Moderately Thick Liquids, Maintain Oral Hygiene.   3. Aspiration and Reflux Precautions

## 2023-05-08 NOTE — SWALLOW VFSS/MBS ASSESSMENT ADULT - ADDITIONAL RECOMMENDATIONS
This department to follow up as schedule permits or diet tolerance/swallow therapy. This patient will benefit swallow therapy pending discharge planning (e.g. Rehab vs. Homecare vs. [facility] [phone])

## 2023-05-08 NOTE — PROGRESS NOTE ADULT - SUBJECTIVE AND OBJECTIVE BOX
Cardiology Progress Note  ------------------------------------------------------------------------------------------  SUBJECTIVE:   No events overnight. Denies CP, SOB or Palpitations.   -------------------------------------------------------------------------------------------  ROS:  CV: chest pain (-), palpitation (-), orthopnea (-), PND (-), edema (-)  PULM: SOB (-), cough (-), wheezing (-), hemoptysis (-).   CONST: fever (-), chills (-) or fatigue (-)  GI: abdominal distension (-), abdominal pain (-) , nausea/vomiting (-), hematemesis, (-), melena (-), hematochezia (-)  : dysuria (-), frequency (-), hematuria (-).   NEURO: numbness (-), weakness (-), dizziness (-)  MSK: myalgia (-), joint pain (-)   SKIN: itching (-), rash (-)  HEENT:  visual changes (-); vertigo or throat pain (-);  neck stiffness (-)   Psych: change in mood (-), anxiety (-), depression (-)     All other review of systems is negative unless indicated above.   -------------------------------------------------------------------------------------------  VS:  T(F): 98.2 (05-08), Max: 98.5 (05-08)  HR: 56 (05-08) (48 - 75)  BP: 105/51 (05-08) (100/44 - 121/57)  RR: 18 (05-08)  SpO2: 99% (05-08)  I&O's Summary    07 May 2023 07:01  -  08 May 2023 07:00  --------------------------------------------------------  IN: 870 mL / OUT: 1550 mL / NET: -680 mL      ------------------------------------------------------------------------------------------  PHYSICAL EXAM:  GENERAL: NAD  HEAD:  Atraumatic, Normocephalic.  EYES: EOMI, PERRLA, conjunctiva and sclera clear.  ENT: Moist mucous membranes.  NECK: Supple, No JVD.  CHEST/LUNG: Clear to auscultation bilaterally; No rales, rhonchi, wheezing, or rubs. Unlabored respirations.  HEART: Regular rate and rhythm; No murmurs, rubs, or gallops.  ABDOMEN: Bowel sounds present; Soft, Nontender, Nondistended.   EXTREMITIES: No edema. 2+ Peripheral Pulses, brisk capillary refill. No clubbing or cyanosis.   PSYCH: Normal affect.  SKIN: No rashes or lesions.  -------------------------------------------------------------------------------------------  LABS:                          8.9    6.04  )-----------( 180      ( 08 May 2023 05:45 )             28.6     05-08    137  |  99  |  15  ----------------------------<  63<L>  4.6   |  28  |  0.62    Ca    9.5      08 May 2023 05:45  Phos  3.4     05-08  Mg     2.40     05-08    TPro  6.5  /  Alb  3.5  /  TBili  0.3  /  DBili  x   /  AST  24  /  ALT  21  /  AlkPhos  142<H>  05-06    PT/INR - ( 06 May 2023 22:52 )   PT: 11.2 sec;   INR: 0.97 ratio         PTT - ( 06 May 2023 22:52 )  PTT:33.3 sec  CARDIAC MARKERS ( 07 May 2023 06:30 )  110 ng/L / x     / x     / x     / x     / x      CARDIAC MARKERS ( 06 May 2023 22:52 )  137 ng/L / x     / x     / 121 U/L / x     / 12.1 ng/mL  CARDIAC MARKERS ( 05 May 2023 08:30 )  32 ng/L / x     / x     / x     / x     / x                -------------------------------------------------------------------------------------------  Meds:  bisacodyl Suppository 10 milliGRAM(s) Rectal daily PRN  cefepime   IVPB 1000 milliGRAM(s) IV Intermittent every 8 hours  cefepime   IVPB      dextrose 50% Injectable 25 Gram(s) IV Push once  dextrose Oral Gel 15 Gram(s) Oral once  enoxaparin Injectable 40 milliGRAM(s) SubCutaneous every 24 hours  gabapentin 300 milliGRAM(s) Oral three times a day  glucagon  Injectable 1 milliGRAM(s) IntraMuscular once  lacosamide IVPB 100 milliGRAM(s) IV Intermittent every 12 hours  lactated ringers. 1000 milliLiter(s) IV Continuous <Continuous>  levothyroxine Injectable 60 MICROGram(s) IV Push <User Schedule>  midodrine. 10 milliGRAM(s) Oral every 8 hours  pantoprazole  Injectable 40 milliGRAM(s) IV Push daily  polyethylene glycol 3350 17 Gram(s) Oral two times a day  senna 2 Tablet(s) Oral at bedtime  Sodium Chloride 2% 500 milliLiter(s) 500 milliLiter(s) IV Continuous <Continuous>    -------------------------------------------------------------------------------------------  Cardiovascular Diagnostic Testing:    ECG:     Echo:     Stress Testing:    Cath:    Imaging:    CXR:  reviewed  ------------------------------------------------------------------------------------------- Cardiology Progress Note  ------------------------------------------------------------------------------------------  SUBJECTIVE:   No events overnight. Patient was very somnolent with AMS difficult to assess    Telemetry: sinus bradycardia  -------------------------------------------------------------------------------------------  ROS:  Unable to assess due to mental status    All other review of systems is negative unless indicated above.   -------------------------------------------------------------------------------------------  VS:  T(F): 98.2 (05-08), Max: 98.5 (05-08)  HR: 56 (05-08) (48 - 75)  BP: 105/51 (05-08) (100/44 - 121/57)  RR: 18 (05-08)  SpO2: 99% (05-08)  I&O's Summary    07 May 2023 07:01  -  08 May 2023 07:00  --------------------------------------------------------  IN: 870 mL / OUT: 1550 mL / NET: -680 mL      ------------------------------------------------------------------------------------------  PHYSICAL EXAM:  GENERAL: Somnolent  HEAD:  Atraumatic, Normocephalic.  EYES: EOMI, PERRLA, conjunctiva and sclera clear.  ENT: Moist mucous membranes.  NECK: Supple, No JVD.  CHEST/LUNG: Clear to auscultation bilaterally; No rales, rhonchi, wheezing, or rubs. Unlabored respirations.  HEART: Regular rate and rhythm; No murmurs, rubs, or gallops.  ABDOMEN: Bowel sounds present; Soft, Nontender, Nondistended.   EXTREMITIES: No edema. 2+ Peripheral Pulses, brisk capillary refill. No clubbing or cyanosis.   PSYCH: AMS  SKIN: No rashes or lesions.  -------------------------------------------------------------------------------------------  LABS:                          8.9    6.04  )-----------( 180      ( 08 May 2023 05:45 )             28.6     05-08    137  |  99  |  15  ----------------------------<  63<L>  4.6   |  28  |  0.62    Ca    9.5      08 May 2023 05:45  Phos  3.4     05-08  Mg     2.40     05-08    TPro  6.5  /  Alb  3.5  /  TBili  0.3  /  DBili  x   /  AST  24  /  ALT  21  /  AlkPhos  142<H>  05-06    PT/INR - ( 06 May 2023 22:52 )   PT: 11.2 sec;   INR: 0.97 ratio         PTT - ( 06 May 2023 22:52 )  PTT:33.3 sec  CARDIAC MARKERS ( 07 May 2023 06:30 )  110 ng/L / x     / x     / x     / x     / x      CARDIAC MARKERS ( 06 May 2023 22:52 )  137 ng/L / x     / x     / 121 U/L / x     / 12.1 ng/mL  CARDIAC MARKERS ( 05 May 2023 08:30 )  32 ng/L / x     / x     / x     / x     / x        -------------------------------------------------------------------------------------------  Meds:  bisacodyl Suppository 10 milliGRAM(s) Rectal daily PRN  cefepime   IVPB 1000 milliGRAM(s) IV Intermittent every 8 hours  cefepime   IVPB      dextrose 50% Injectable 25 Gram(s) IV Push once  dextrose Oral Gel 15 Gram(s) Oral once  enoxaparin Injectable 40 milliGRAM(s) SubCutaneous every 24 hours  gabapentin 300 milliGRAM(s) Oral three times a day  glucagon  Injectable 1 milliGRAM(s) IntraMuscular once  lacosamide IVPB 100 milliGRAM(s) IV Intermittent every 12 hours  lactated ringers. 1000 milliLiter(s) IV Continuous <Continuous>  levothyroxine Injectable 60 MICROGram(s) IV Push <User Schedule>  midodrine. 10 milliGRAM(s) Oral every 8 hours  pantoprazole  Injectable 40 milliGRAM(s) IV Push daily  polyethylene glycol 3350 17 Gram(s) Oral two times a day  senna 2 Tablet(s) Oral at bedtime  Sodium Chloride 2% 500 milliLiter(s) 500 milliLiter(s) IV Continuous <Continuous>    -------------------------------------------------------------------------------------------  Cardiovascular Diagnostic Testing:    ECG:   NSR VR61, 1st degree AVB 	    Echo:   Pending    Stress Testing:  None on file    Cath:  None of file     Imaging:    CT Head  IMPRESSION:    No acute intracranial hemorrhage, mass effect, or CT evidence of an acute transcortical infarct. Mild chronic ischemic changes in the frontoparietal white matter.    Evaluation of the ezekiel is limited due to beam hardening artifact. 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      CXR:  reviewed  -------------------------------------------------------------------------------------------

## 2023-05-08 NOTE — PROVIDER CONTACT NOTE (OTHER) - ASSESSMENT
Pt 4 dipped down to 80%s while asleep at times, non sustaining. pt asleep at time. pt able to be aroused with stimulation.

## 2023-05-08 NOTE — PROVIDER CONTACT NOTE (OTHER) - ASSESSMENT
pt able to be aroused with stimulation but pt is lethargic. All other vitals stable. no s/s of distress noted.

## 2023-05-08 NOTE — SWALLOW VFSS/MBS ASSESSMENT ADULT - ORAL PHASE
within functional limits Premature spillage to the hypopharynx (vallecular and pyriforms)/Laryngeal penetration before swallow - silent Premature spillage to the hypopharynx (vallecular)/Laryngeal penetration before swallow - silent Premature spillage to the hypopharynx (vallecular)

## 2023-05-08 NOTE — PROGRESS NOTE ADULT - PROBLEM SELECTOR PLAN 3
Presenting with Na of 125,   - s/p 1L NS w/ persistent hyponatremia likely SIADH (urine Na 89, urine osm 496)  -s/p hypertonic saline 2% 30ml/h x4h on 5/7, overcorrected a bit to 133, given d5w, now off, Na 131  - encourage po intake, soft diet per SLP eval  - BMP q24 hrs  -no hydro on renal US  - f/u nephro recs  -slow correction 6-8 meq/24h

## 2023-05-09 LAB
ANION GAP SERPL CALC-SCNC: 9 MMOL/L — SIGNIFICANT CHANGE UP (ref 7–14)
BUN SERPL-MCNC: 13 MG/DL — SIGNIFICANT CHANGE UP (ref 7–23)
CALCIUM SERPL-MCNC: 9.5 MG/DL — SIGNIFICANT CHANGE UP (ref 8.4–10.5)
CHLORIDE SERPL-SCNC: 105 MMOL/L — SIGNIFICANT CHANGE UP (ref 98–107)
CO2 SERPL-SCNC: 29 MMOL/L — SIGNIFICANT CHANGE UP (ref 22–31)
CREAT SERPL-MCNC: 0.61 MG/DL — SIGNIFICANT CHANGE UP (ref 0.5–1.3)
EGFR: 99 ML/MIN/1.73M2 — SIGNIFICANT CHANGE UP
GLUCOSE BLDC GLUCOMTR-MCNC: 70 MG/DL — SIGNIFICANT CHANGE UP (ref 70–99)
GLUCOSE BLDC GLUCOMTR-MCNC: 74 MG/DL — SIGNIFICANT CHANGE UP (ref 70–99)
GLUCOSE SERPL-MCNC: 63 MG/DL — LOW (ref 70–99)
HCT VFR BLD CALC: 30.3 % — LOW (ref 34.5–45)
HGB BLD-MCNC: 9.6 G/DL — LOW (ref 11.5–15.5)
MAGNESIUM SERPL-MCNC: 2.2 MG/DL — SIGNIFICANT CHANGE UP (ref 1.6–2.6)
MCHC RBC-ENTMCNC: 28.7 PG — SIGNIFICANT CHANGE UP (ref 27–34)
MCHC RBC-ENTMCNC: 31.7 GM/DL — LOW (ref 32–36)
MCV RBC AUTO: 90.4 FL — SIGNIFICANT CHANGE UP (ref 80–100)
MRSA PCR RESULT.: SIGNIFICANT CHANGE UP
NRBC # BLD: 0 /100 WBCS — SIGNIFICANT CHANGE UP (ref 0–0)
NRBC # FLD: 0 K/UL — SIGNIFICANT CHANGE UP (ref 0–0)
PHOSPHATE SERPL-MCNC: 3.3 MG/DL — SIGNIFICANT CHANGE UP (ref 2.5–4.5)
PLATELET # BLD AUTO: 224 K/UL — SIGNIFICANT CHANGE UP (ref 150–400)
POTASSIUM SERPL-MCNC: 4.9 MMOL/L — SIGNIFICANT CHANGE UP (ref 3.5–5.3)
POTASSIUM SERPL-SCNC: 4.9 MMOL/L — SIGNIFICANT CHANGE UP (ref 3.5–5.3)
RBC # BLD: 3.35 M/UL — LOW (ref 3.8–5.2)
RBC # FLD: 17 % — HIGH (ref 10.3–14.5)
S AUREUS DNA NOSE QL NAA+PROBE: SIGNIFICANT CHANGE UP
SODIUM SERPL-SCNC: 143 MMOL/L — SIGNIFICANT CHANGE UP (ref 135–145)
WBC # BLD: 6.1 K/UL — SIGNIFICANT CHANGE UP (ref 3.8–10.5)
WBC # FLD AUTO: 6.1 K/UL — SIGNIFICANT CHANGE UP (ref 3.8–10.5)

## 2023-05-09 PROCEDURE — 99233 SBSQ HOSP IP/OBS HIGH 50: CPT

## 2023-05-09 RX ORDER — LACOSAMIDE 50 MG/1
100 TABLET ORAL
Refills: 0 | Status: DISCONTINUED | OUTPATIENT
Start: 2023-05-09 | End: 2023-05-12

## 2023-05-09 RX ORDER — ACETAMINOPHEN 500 MG
650 TABLET ORAL EVERY 6 HOURS
Refills: 0 | Status: DISCONTINUED | OUTPATIENT
Start: 2023-05-09 | End: 2023-05-12

## 2023-05-09 RX ORDER — MIDODRINE HYDROCHLORIDE 2.5 MG/1
5 TABLET ORAL THREE TIMES A DAY
Refills: 0 | Status: DISCONTINUED | OUTPATIENT
Start: 2023-05-09 | End: 2023-05-10

## 2023-05-09 RX ORDER — CHLORHEXIDINE GLUCONATE 213 G/1000ML
1 SOLUTION TOPICAL DAILY
Refills: 0 | Status: DISCONTINUED | OUTPATIENT
Start: 2023-05-09 | End: 2023-05-12

## 2023-05-09 RX ADMIN — GABAPENTIN 300 MILLIGRAM(S): 400 CAPSULE ORAL at 06:27

## 2023-05-09 RX ADMIN — Medication 60 MICROGRAM(S): at 05:35

## 2023-05-09 RX ADMIN — CEFEPIME 100 MILLIGRAM(S): 1 INJECTION, POWDER, FOR SOLUTION INTRAMUSCULAR; INTRAVENOUS at 06:27

## 2023-05-09 RX ADMIN — LACOSAMIDE 100 MILLIGRAM(S): 50 TABLET ORAL at 17:28

## 2023-05-09 RX ADMIN — CHLORHEXIDINE GLUCONATE 1 APPLICATION(S): 213 SOLUTION TOPICAL at 13:01

## 2023-05-09 RX ADMIN — MIDODRINE HYDROCHLORIDE 10 MILLIGRAM(S): 2.5 TABLET ORAL at 06:27

## 2023-05-09 RX ADMIN — POLYETHYLENE GLYCOL 3350 17 GRAM(S): 17 POWDER, FOR SOLUTION ORAL at 17:29

## 2023-05-09 RX ADMIN — MIDODRINE HYDROCHLORIDE 5 MILLIGRAM(S): 2.5 TABLET ORAL at 22:30

## 2023-05-09 RX ADMIN — CEFEPIME 100 MILLIGRAM(S): 1 INJECTION, POWDER, FOR SOLUTION INTRAMUSCULAR; INTRAVENOUS at 13:20

## 2023-05-09 RX ADMIN — GABAPENTIN 300 MILLIGRAM(S): 400 CAPSULE ORAL at 13:00

## 2023-05-09 RX ADMIN — SENNA PLUS 2 TABLET(S): 8.6 TABLET ORAL at 22:05

## 2023-05-09 RX ADMIN — POLYETHYLENE GLYCOL 3350 17 GRAM(S): 17 POWDER, FOR SOLUTION ORAL at 06:27

## 2023-05-09 RX ADMIN — GABAPENTIN 300 MILLIGRAM(S): 400 CAPSULE ORAL at 22:04

## 2023-05-09 RX ADMIN — PANTOPRAZOLE SODIUM 40 MILLIGRAM(S): 20 TABLET, DELAYED RELEASE ORAL at 13:01

## 2023-05-09 RX ADMIN — MIDODRINE HYDROCHLORIDE 5 MILLIGRAM(S): 2.5 TABLET ORAL at 13:00

## 2023-05-09 RX ADMIN — LACOSAMIDE 120 MILLIGRAM(S): 50 TABLET ORAL at 02:47

## 2023-05-09 RX ADMIN — ENOXAPARIN SODIUM 40 MILLIGRAM(S): 100 INJECTION SUBCUTANEOUS at 13:02

## 2023-05-09 RX ADMIN — CEFEPIME 100 MILLIGRAM(S): 1 INJECTION, POWDER, FOR SOLUTION INTRAMUSCULAR; INTRAVENOUS at 22:09

## 2023-05-09 NOTE — PROGRESS NOTE ADULT - SUBJECTIVE AND OBJECTIVE BOX
[facility] Division of Hospital Medicine  Jennifer Oconnor MD  Pager (M-F, 8A-5P): [phone]  Other Times:  [phone]      SUBJECTIVE / OVERNIGHT EVENTS: Pt lethargic this morning, arouses to painful stimuli, then able to answer questions. Later this am, hypothermic, sinus noreen to the 30s.    MEDICATIONS  (STANDING):  cefepime   IVPB 1000 milliGRAM(s) IV Intermittent every 8 hours  cefepime   IVPB      chlorhexidine 2% Cloths 1 Application(s) Topical daily  dextrose 50% Injectable 25 Gram(s) IV Push once  dextrose Oral Gel 15 Gram(s) Oral once  enoxaparin Injectable 40 milliGRAM(s) SubCutaneous every 24 hours  gabapentin 300 milliGRAM(s) Oral three times a day  glucagon  Injectable 1 milliGRAM(s) IntraMuscular once  lacosamide 100 milliGRAM(s) Oral two times a day  levothyroxine Injectable 60 MICROGram(s) IV Push <User Schedule>  midodrine. 5 milliGRAM(s) Oral three times a day  pantoprazole  Injectable 40 milliGRAM(s) IV Push daily  polyethylene glycol 3350 17 Gram(s) Oral two times a day  senna 2 Tablet(s) Oral at bedtime    MEDICATIONS  (PRN):  bisacodyl Suppository 10 milliGRAM(s) Rectal daily PRN Constipation      I&O's Summary    08 May 2023 07:01  -  09 May 2023 07:00  --------------------------------------------------------  IN: 720 mL / OUT: 2100 mL / NET: -1380 mL    09 May 2023 07:01  -  09 May 2023 12:43  --------------------------------------------------------  IN: 0 mL / OUT: 360 mL / NET: -360 mL        PHYSICAL EXAM:  Vital Signs Last 24 Hrs  T(C): 33.7 (09 May 2023 11:00), Max: 36.9 (08 May 2023 18:00)  T(F): 92.6 (09 May 2023 11:00), Max: 98.4 (08 May 2023 18:00)  HR: 54 (09 May 2023 11:00) (51 - 68)  BP: 101/57 (09 May 2023 11:00) (101/57 - 128/52)  BP(mean): --  RR: 17 (09 May 2023 11:00) (16 - 18)  SpO2: 100% (09 May 2023 11:00) (96% - 100%)    Parameters below as of 09 May 2023 11:00  Patient On (Oxygen Delivery Method): room air      General: woman laying in bed appears comfortable in NAD, lethargic, cachectic  Eyes: PERRLA, nonicteric sclera  HENMT: NCAT, MMM  Neck: Supple, no thyromegaly, trachea midline   Respiratory: No respiratory distress, CTABL, No rales, rhonchi, wheezing.  Cardiovascular: Regular rate and rhythm; No m/g/r. 2+ peripheral pulses x4 extremities   Gastrointestinal: Soft, Nontender, Nondistended; +BS. No palpable organomegaly  : palpable bladder, mild suprapubic tenderness, moreno in place, no CVA tenderness   Extremities: No c/c/e; warm to touch  Neurological: face symmetric. slow to respond, able to follow commands, wiggles toes on the L foot, cannot wiggle toes on the R foot, otherwise cannot move legs. Full strength of upper extremities.   Skin: No rashes, No erythema   Psych: AAO to person and place, is now oriented to time and place, but slow to respond to commands  LABS:    LABS:                        9.6    6.10  )-----------( 224      ( 09 May 2023 06:43 )             30.3     05-09    143  |  105  |  13  ----------------------------<  63<L>  4.9   |  29  |  0.61    Ca    9.5      09 May 2023 06:43  Phos  3.3     05-09  Mg     2.20     05-09                Culture - Urine (collected 07 May 2023 11:00)  Source: Clean Catch Clean Catch (Midstream)  Final Report (08 May 2023 16:10):    No growth    Culture - Blood (collected 07 May 2023 09:20)  Source: .Blood Blood-Venous  Preliminary Report (08 May 2023 18:01):    No growth to date.    Culture - Blood (collected 07 May 2023 01:30)  Source: .Blood Blood-Venous  Preliminary Report (08 May 2023 10:10):    No growth to date.        RADIOLOGY & ADDITIONAL TESTS:  Results Reviewed:   Imaging Personally Reviewed:  Electrocardiogram Personally Reviewed:    Tele reviewed, bradycardic to the 40s overnight.    COORDINATION OF CARE:  Care Discussed with Consultants/Other Providers [Y/N]: Neuro, NSG  Prior or Outpatient Records Reviewed [Y/N]:

## 2023-05-09 NOTE — PROGRESS NOTE ADULT - ATTENDING COMMENTS
1. Acute encephalopathy  2. Sepsis   3. Myocardial injury with mild elevation in troponin  4. Bradycardia: avoid AV-maris agents.     Plan:   Echo reviewed: mildly reduced systolic function, no significant valvular disease noted.   Troponin likely my in setting of sepsis. Mild elevation - now plateaued. Recommend outpatient stress testing once sepsis resolves.   Currently patient is borderline blood pressure and bradycardia. Can consider low dose beta blocker and ACE inhibitor once HR persistently >60 and BP > 95/60.
Follow up Echo.
Pt. with hyponatremia. SNa decreased to 123 today. Assessment and plan for hyponatremia as outlined above. Monitor SNa closely. Avoid overcorrection of SNa.
Na 131. acceptable   stay off fluid or diuretics today   Na check q12   Further detailed plan of management and recommendation as outlined above.

## 2023-05-09 NOTE — PROGRESS NOTE ADULT - PROBLEM SELECTOR PLAN 6
# Functional Quadriplegia - completely dependent on caregivers   - not currently on DMT  - c/w baclofen pump # Functional Quadriplegia - completely dependent on caregivers   - not currently on DMT  - c/w baclofen pump --> consult NSG to see if it needs to be refilled? Last filled in March 2023, gets it refilled every 3 months

## 2023-05-09 NOTE — PROGRESS NOTE ADULT - PROBLEM SELECTOR PLAN 5
Incontinent at home and uses Pure Wick per    - straight cath in ED with ~600ml of urine   - continue to monitor bladder scans q6h  - straight cath protocol, Slater if needed Incontinent at home and uses Pure Wick per    - straight cath in ED with ~600ml of urine   - continue to monitor bladder scans q6h  - moreno placed

## 2023-05-09 NOTE — PROGRESS NOTE ADULT - SUBJECTIVE AND OBJECTIVE BOX
Cardiology Progress Note  ------------------------------------------------------------------------------------------  SUBJECTIVE:   No events overnight. Patient nodding yes/no to questions without verbalizing   -------------------------------------------------------------------------------------------  ROS:  CV: chest pain (-), palpitation (-), orthopnea (-), PND (-), edema (-)  PULM: SOB (-), cough (-), wheezing (-), hemoptysis (-).   CONST: fever (-), chills (-) or fatigue (-)  GI: abdominal distension (-), abdominal pain (-) , nausea/vomiting (-), hematemesis, (-), melena (-), hematochezia (-)  : dysuria (-), frequency (-), hematuria (-).   NEURO: numbness (-), weakness (-), dizziness (-)  MSK: myalgia (-), joint pain (-)   SKIN: itching (-), rash (-)  HEENT:  visual changes (-); vertigo or throat pain (-);  neck stiffness (-)   Psych: change in mood (-), anxiety (-), depression (-)     All other review of systems is negative unless indicated above.   -------------------------------------------------------------------------------------------  VS:  T(F): 97.3 (05-09), Max: 98.4 (05-08)  HR: 51 (05-09) (51 - 68)  BP: 123/68 (05-09) (102/51 - 128/52)  RR: 16 (05-09)  SpO2: 97% (05-09)  I&O's Summary    08 May 2023 07:01  -  09 May 2023 07:00  --------------------------------------------------------  IN: 720 mL / OUT: 2100 mL / NET: -1380 mL      ------------------------------------------------------------------------------------------  PHYSICAL EXAM:  GENERAL: Somnolent  HEAD:  Atraumatic, Normocephalic.  EYES: EOMI, PERRLA, conjunctiva and sclera clear.  ENT: Moist mucous membranes.  NECK: Supple, No JVD.  CHEST/LUNG: Clear to auscultation bilaterally; No rales, rhonchi, wheezing, or rubs. Unlabored respirations.  HEART: Regular rate and rhythm; No murmurs, rubs, or gallops.  ABDOMEN: Bowel sounds present; Soft, Nontender, Nondistended.   EXTREMITIES: No edema. 2+ Peripheral Pulses, brisk capillary refill. No clubbing or cyanosis.   PSYCH: AMS  SKIN: No rashes or lesions.GENERAL: Somnolent  .-------------------------------------------------------------------------------------------  LABS:                          9.6    6.10  )-----------( 224      ( 09 May 2023 06:43 )             30.3     05-09    x   |  x   |  13  ----------------------------<  63<L>  x    |  29  |  0.61    Ca    9.5      09 May 2023 06:43  Phos  3.3     05-09  Mg     2.20     05-09    CARDIAC MARKERS ( 07 May 2023 06:30 )  110 ng/L / x     / x     / x     / x     / x      CARDIAC MARKERS ( 06 May 2023 22:52 )  137 ng/L / x     / x     / 121 U/L / x     / 12.1 ng/mL  CARDIAC MARKERS ( 05 May 2023 08:30 )  32 ng/L / x     / x     / x     / x     / x          -------------------------------------------------------------------------------------------  Meds:  bisacodyl Suppository 10 milliGRAM(s) Rectal daily PRN  cefepime   IVPB 1000 milliGRAM(s) IV Intermittent every 8 hours  cefepime   IVPB      chlorhexidine 2% Cloths 1 Application(s) Topical daily  dextrose 50% Injectable 25 Gram(s) IV Push once  dextrose Oral Gel 15 Gram(s) Oral once  enoxaparin Injectable 40 milliGRAM(s) SubCutaneous every 24 hours  gabapentin 300 milliGRAM(s) Oral three times a day  glucagon  Injectable 1 milliGRAM(s) IntraMuscular once  lacosamide IVPB 100 milliGRAM(s) IV Intermittent every 12 hours  levothyroxine Injectable 60 MICROGram(s) IV Push <User Schedule>  midodrine. 10 milliGRAM(s) Oral every 8 hours  pantoprazole  Injectable 40 milliGRAM(s) IV Push daily  polyethylene glycol 3350 17 Gram(s) Oral two times a day  senna 2 Tablet(s) Oral at bedtime  Sodium Chloride 2% 500 milliLiter(s) 500 milliLiter(s) IV Continuous <Continuous>    -------------------------------------------------------------------------------------------  Cardiovascular Diagnostic Testing:    ECG:   NSR VR61, 1st degree AVB 	    Echo:   Ejection Fraction (Visual Estimate): 50 %    CONCLUSIONS:  1. Mitral annular calcification, otherwise normal mitral  valve. Mild mitral regurgitation.  2. Calcified trileaflet aortic valve with normal opening.  Mild aortic regurgitation.  3. Normal left ventricular internal dimensions and wall  thicknesses.  4. Endocardium not well visualized; grossly mild global  left ventricular systolic dysfunction.  5. The right ventricle is not well visualized.      Stress Testing:  None on file    Cath:  None of file     Imaging:    CT Head  IMPRESSION:    No acute intracranial hemorrhage, mass effect, or CT evidence of an acute transcortical infarct. Mild chronic ischemic changes in the frontoparietal white matter.    Evaluation of the ezekiel is limited due to beam hardening artifact. 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    CXR:  reviewed

## 2023-05-09 NOTE — PROGRESS NOTE ADULT - PROBLEM SELECTOR PLAN 8
last BM was 5/1   - start on senna and miraliax BID with dulcolax suppository PRN  - monitor BMs last BM was 5/1   - start on senna and miraliax BID with dulcolax suppository PRN  - monitor BMs  - Enema once temperatures improve

## 2023-05-09 NOTE — PROGRESS NOTE ADULT - PROBLEM SELECTOR PLAN 1
pt hypotensive and hypothermic, r/o sepsis vs. autonomic dysfunction   add midodrine 10mg tid  -IVF hydration LR 100ml/h x10h  -check TTE  -f/u cardiology, d/w Cards fellow about her troponin leak and hypotension, no chest pain, likely demand ischemia, repeat EKG/troponin if develops if cardiac symptoms  -f/u cultures  -checked cortisol 11.8--> no gross adrenal insufficiency  -monitor vitals closely pt hypotensive and hypothermic, r/o sepsis vs. autonomic dysfunction   add midodrine 10mg tid--> decr to 5 mg TID iso of bradycardia  -IVF hydration LR 100ml/h x10h  -check TTE - preserved EF, RV and LV not well visualized  -f/u cardiology, d/w Cards fellow about her troponin leak and hypotension, no chest pain, likely demand ischemia,   -f/u cultures  -checked cortisol 11.8--> no gross adrenal insufficiency  -monitor vitals closely pt hypotensive and hypothermic, r/o sepsis vs. autonomic dysfunction   add midodrine 10mg tid--> decr to 5 mg TID iso of bradycardia  -IVF hydration LR 100ml/h x10h  -check TTE - preserved EF, RV and LV not well visualized  -f/u cardiology, d/w Cards fellow about her troponin leak and hypotension, no chest pain, likely demand ischemia,   -f/u cultures  -checked cortisol 11.8--> no gross adrenal insufficiency, adrenal glans wnl on CT imaging March 2023  -monitor vitals closely

## 2023-05-09 NOTE — PROGRESS NOTE ADULT - PROBLEM SELECTOR PLAN 9
hypotension  dc norvasc  -started on midodrine 10 tid ,   IVF hydration LR 100ml/h x10h  - monitor BP hypotension  dc norvasc  downtitrating midodrine as above

## 2023-05-09 NOTE — PROGRESS NOTE ADULT - ASSESSMENT
68W [employer] with PMH of MS (follows w Dr. Merrick Dennis at [facility], currently off treatment) with baclofen pump in place, seizure, hypothyroidism, frequent UTIs presenting worsening mental status secondary to UTI.     Pt has had multiple hospitalizations at [facility] since Dec. 2022 (for AMS 2/2 covid, UTI). Prior to this she was hospitalized multiple times at [facility], but prefers the care she gets at [facility] as per caregiver, Deloris. Last remained out of the hospital prior to 2022.  68W [employer] with PMH of MS (follows w Dr. Merrick Dennis at [facility], currently off treatment) with baclofen pump in place, seizure, hypothyroidism, frequent UTIs presenting worsening mental status secondary to UTI. Acute urinary retention, s/p moreno placement on 5/9. Recurring hypothermia.     Pt has had multiple hospitalizations at [facility] since Dec. 2022 (for AMS 2/2 covid, UTI). Prior to this she was hospitalized multiple times at [facility], but prefers the care she gets at [facility] as per caregiver, Deloris. Last remained out of the hospital prior to 2022.     Attempted to reach Dr. Dennis multiple times ([phone] number does not work, [phone] number works but physician does not work at that office anymore.) 68W [employer] with PMH of MS (follows w Dr. Merrick Dennis at [facility], currently off treatment) with baclofen pump in place, seizure, hypothyroidism, frequent UTIs presenting worsening mental status secondary to UTI. Acute urinary retention, s/p moreno placement on 5/9. Recurring hypothermia. F/u CT a/p wo to r/o nephrolithiasis iso of recurrent admissions for UTI (with likely neurogenic bladder), last CT scan reviewed from March 2023 was w contrast.     Pt has had multiple hospitalizations at [facility] since Dec. 2022 (for AMS 2/2 covid, UTI). Prior to this she was hospitalized multiple times at [facility], but prefers the care she gets at [facility] as per caregiver, Deloris. Last remained out of the hospital prior to 2022.     Attempted to reach Dr. Dennis multiple times ([phone] number does not work, [phone] number works but physician does not work at that office anymore.)

## 2023-05-09 NOTE — CHART NOTE - NSCHARTNOTEFT_GEN_A_CORE
Late entry:  Notified by RN earlier this AM that she was hypothermic and bradycardic HR 39. Pt seen and examined at bedside - AAOx2 able to tell me her name, , and the year however did not know where she was. Per  at bedside, she has been like this since admission where her mental status waxes and wanes. No focal neurological deficits noted. Bcx from  neg. Ucx neg. Previous AM cortisol 11.8--> no gross adrenal insufficiency. adrenal glans wnl on CT imaging 2023. Pt already being treated w/ IV Cefepime (day 5) however no improvement in hypothermia. Bear hugger has been in place. CTAP noncon ordered by attending to r/o stones given recurrent UTIs. Will f/u CT results. Of note, moreno placed this AM for persistent urinary retention (per RN, pt had multiple different straight caths each draining >300cc). Discussed with Dr. Oconnor.

## 2023-05-09 NOTE — PROGRESS NOTE ADULT - PROBLEM SELECTOR PLAN 3
Presenting with Na of 125,   - s/p 1L NS w/ persistent hyponatremia likely SIADH (urine Na 89, urine osm 496)  -s/p hypertonic saline 2% 30ml/h x4h on 5/7, overcorrected a bit to 133, given d5w, now off, Na 131  - encourage po intake, soft diet per SLP eval  - BMP q24 hrs  -no hydro on renal US  - f/u nephro recs  -slow correction 6-8 meq/24h RESOLVED  Presenting with Na of 125,   - s/p 1L NS w/ persistent hyponatremia likely SIADH (urine Na 89, urine osm 496)  -s/p hypertonic saline 2% 30ml/h x4h on 5/7, overcorrected a bit to 133, given d5w, now off, Na 131  - encourage po intake, soft diet per SLP eval  - BMP q24 hrs  -no hydro on renal US  - f/u nephro recs  -slow correction 6-8 meq/24h

## 2023-05-09 NOTE — PROGRESS NOTE ADULT - ASSESSMENT
68W [employer] with past medical history of MS with baclofen pump in place, seizure, hypothyroidism, frequent UTIs presenting worsening mental status. Cardiology consult called for elevated troponin.     1. Elevated troponin Likely in the setting of demand ischemia due to hypotension though could also be secondary to stress cardiomyopathy   - Trop peaked 137, now down trending  - Obtain TTE  - Telemetry monitoring    2. Low-normal LV systolic function  - No role for beta-blockers given bradycardia  - If able to tolerate can start low dose lisinopril    Recommendations are preliminary until attending attestation.    Wilfrido Gracia MD  Cardiology Fellow- PGY 4

## 2023-05-09 NOTE — PROGRESS NOTE ADULT - PROBLEM SELECTOR PLAN 7
Presenting with episodes of staring more consistent with acute encephalopathy over seizures   - EEG no seizure, Mild diffuse/multifocal cerebral dysfunction, not specific  - c/w home lacosamide   - neurology consulted, f/u recs Presenting with episodes of staring more consistent with acute encephalopathy over seizures   - EEG no seizure, Mild diffuse/multifocal cerebral dysfunction, not specific  - c/w home lacosamide   - neurology consulted, f/u recs--> they do not think this is an MS flare, signed off after EEG

## 2023-05-10 LAB
ANION GAP SERPL CALC-SCNC: 10 MMOL/L — SIGNIFICANT CHANGE UP (ref 7–14)
BUN SERPL-MCNC: 14 MG/DL — SIGNIFICANT CHANGE UP (ref 7–23)
CALCIUM SERPL-MCNC: 9.3 MG/DL — SIGNIFICANT CHANGE UP (ref 8.4–10.5)
CHLORIDE SERPL-SCNC: 102 MMOL/L — SIGNIFICANT CHANGE UP (ref 98–107)
CO2 SERPL-SCNC: 27 MMOL/L — SIGNIFICANT CHANGE UP (ref 22–31)
CREAT SERPL-MCNC: 0.64 MG/DL — SIGNIFICANT CHANGE UP (ref 0.5–1.3)
CULTURE RESULTS: SIGNIFICANT CHANGE UP
CULTURE RESULTS: SIGNIFICANT CHANGE UP
EGFR: 97 ML/MIN/1.73M2 — SIGNIFICANT CHANGE UP
GLUCOSE BLDC GLUCOMTR-MCNC: 107 MG/DL — HIGH (ref 70–99)
GLUCOSE BLDC GLUCOMTR-MCNC: 82 MG/DL — SIGNIFICANT CHANGE UP (ref 70–99)
GLUCOSE SERPL-MCNC: 67 MG/DL — LOW (ref 70–99)
HCT VFR BLD CALC: 30 % — LOW (ref 34.5–45)
HGB BLD-MCNC: 9.3 G/DL — LOW (ref 11.5–15.5)
LACOSAMIDE (VIMPAT) RESULT: 3.47 UG/ML — SIGNIFICANT CHANGE UP (ref 1–10)
MAGNESIUM SERPL-MCNC: 2.1 MG/DL — SIGNIFICANT CHANGE UP (ref 1.6–2.6)
MCHC RBC-ENTMCNC: 28.9 PG — SIGNIFICANT CHANGE UP (ref 27–34)
MCHC RBC-ENTMCNC: 31 GM/DL — LOW (ref 32–36)
MCV RBC AUTO: 93.2 FL — SIGNIFICANT CHANGE UP (ref 80–100)
NRBC # BLD: 0 /100 WBCS — SIGNIFICANT CHANGE UP (ref 0–0)
NRBC # FLD: 0 K/UL — SIGNIFICANT CHANGE UP (ref 0–0)
PHOSPHATE SERPL-MCNC: 2.7 MG/DL — SIGNIFICANT CHANGE UP (ref 2.5–4.5)
PLATELET # BLD AUTO: 250 K/UL — SIGNIFICANT CHANGE UP (ref 150–400)
POTASSIUM SERPL-MCNC: 4.7 MMOL/L — SIGNIFICANT CHANGE UP (ref 3.5–5.3)
POTASSIUM SERPL-SCNC: 4.7 MMOL/L — SIGNIFICANT CHANGE UP (ref 3.5–5.3)
RBC # BLD: 3.22 M/UL — LOW (ref 3.8–5.2)
RBC # FLD: 17.2 % — HIGH (ref 10.3–14.5)
SODIUM SERPL-SCNC: 139 MMOL/L — SIGNIFICANT CHANGE UP (ref 135–145)
SPECIMEN SOURCE: SIGNIFICANT CHANGE UP
SPECIMEN SOURCE: SIGNIFICANT CHANGE UP
WBC # BLD: 7.16 K/UL — SIGNIFICANT CHANGE UP (ref 3.8–10.5)
WBC # FLD AUTO: 7.16 K/UL — SIGNIFICANT CHANGE UP (ref 3.8–10.5)

## 2023-05-10 PROCEDURE — 74176 CT ABD & PELVIS W/O CONTRAST: CPT | Mod: 26

## 2023-05-10 PROCEDURE — 99233 SBSQ HOSP IP/OBS HIGH 50: CPT

## 2023-05-10 RX ADMIN — CHLORHEXIDINE GLUCONATE 1 APPLICATION(S): 213 SOLUTION TOPICAL at 13:14

## 2023-05-10 RX ADMIN — POLYETHYLENE GLYCOL 3350 17 GRAM(S): 17 POWDER, FOR SOLUTION ORAL at 17:30

## 2023-05-10 RX ADMIN — LACOSAMIDE 100 MILLIGRAM(S): 50 TABLET ORAL at 05:39

## 2023-05-10 RX ADMIN — Medication 60 MICROGRAM(S): at 05:38

## 2023-05-10 RX ADMIN — GABAPENTIN 300 MILLIGRAM(S): 400 CAPSULE ORAL at 21:28

## 2023-05-10 RX ADMIN — ENOXAPARIN SODIUM 40 MILLIGRAM(S): 100 INJECTION SUBCUTANEOUS at 13:15

## 2023-05-10 RX ADMIN — GABAPENTIN 300 MILLIGRAM(S): 400 CAPSULE ORAL at 13:15

## 2023-05-10 RX ADMIN — MIDODRINE HYDROCHLORIDE 5 MILLIGRAM(S): 2.5 TABLET ORAL at 05:38

## 2023-05-10 RX ADMIN — CEFEPIME 100 MILLIGRAM(S): 1 INJECTION, POWDER, FOR SOLUTION INTRAMUSCULAR; INTRAVENOUS at 21:28

## 2023-05-10 RX ADMIN — SENNA PLUS 2 TABLET(S): 8.6 TABLET ORAL at 21:28

## 2023-05-10 RX ADMIN — GABAPENTIN 300 MILLIGRAM(S): 400 CAPSULE ORAL at 05:38

## 2023-05-10 RX ADMIN — POLYETHYLENE GLYCOL 3350 17 GRAM(S): 17 POWDER, FOR SOLUTION ORAL at 05:38

## 2023-05-10 RX ADMIN — CEFEPIME 100 MILLIGRAM(S): 1 INJECTION, POWDER, FOR SOLUTION INTRAMUSCULAR; INTRAVENOUS at 13:14

## 2023-05-10 RX ADMIN — PANTOPRAZOLE SODIUM 40 MILLIGRAM(S): 20 TABLET, DELAYED RELEASE ORAL at 13:15

## 2023-05-10 RX ADMIN — LACOSAMIDE 100 MILLIGRAM(S): 50 TABLET ORAL at 17:30

## 2023-05-10 RX ADMIN — CEFEPIME 100 MILLIGRAM(S): 1 INJECTION, POWDER, FOR SOLUTION INTRAMUSCULAR; INTRAVENOUS at 05:37

## 2023-05-10 NOTE — PROVIDER CONTACT NOTE (OTHER) - DATE AND TIME:
05-May-2023 12:55
05-May-2023 14:42
06-May-2023 21:00
09-May-2023 11:14
09-May-2023 17:47
10-May-2023 18:18
08-May-2023 02:01
08-May-2023 04:02
08-May-2023 06:09
08-May-2023 00:58
08-May-2023 21:30

## 2023-05-10 NOTE — PROVIDER CONTACT NOTE (OTHER) - RECOMMENDATIONS
notify provider
notify provider
Provider made aware. Hyperthermia blanket placed on pt.
notify provider
notify the provider

## 2023-05-10 NOTE — PROVIDER CONTACT NOTE (OTHER) - NAME OF MD/NP/PA/DO NOTIFIED:
Meg Clemente
Benja Trent
Tele DAVID Peña
Benja Trent
Sandra Clemente
Tele DAVDI Peña
Tele DAVID Peña
Meg Clemente
Sheron Valenzuela

## 2023-05-10 NOTE — PROGRESS NOTE ADULT - PROBLEM SELECTOR PLAN 7
Presenting with episodes of staring more consistent with acute encephalopathy over seizures   - EEG no seizure, Mild diffuse/multifocal cerebral dysfunction, not specific  - c/w home lacosamide   - neurology consulted, f/u recs--> they do not think this is an MS flare, signed off after EEG

## 2023-05-10 NOTE — PROGRESS NOTE ADULT - SUBJECTIVE AND OBJECTIVE BOX
[facility] Division of Hospital Medicine  Jennifer Oconnor MD  Pager (GABRIEL-F, 8A-5P): [phone]  Other Times:  [id number]      SUBJECTIVE / OVERNIGHT EVENTS: Pt feels scared, now normothermic, off the fito hugger. Says "I don't want to be changed, their too rough with me." Does not have her phone at the bedside anymore.    MEDICATIONS  (STANDING):  cefepime   IVPB      cefepime   IVPB 1000 milliGRAM(s) IV Intermittent every 8 hours  chlorhexidine 2% Cloths 1 Application(s) Topical daily  dextrose 50% Injectable 25 Gram(s) IV Push once  dextrose Oral Gel 15 Gram(s) Oral once  enoxaparin Injectable 40 milliGRAM(s) SubCutaneous every 24 hours  gabapentin 300 milliGRAM(s) Oral three times a day  glucagon  Injectable 1 milliGRAM(s) IntraMuscular once  lacosamide 100 milliGRAM(s) Oral two times a day  levothyroxine Injectable 60 MICROGram(s) IV Push <User Schedule>  pantoprazole  Injectable 40 milliGRAM(s) IV Push daily  polyethylene glycol 3350 17 Gram(s) Oral two times a day  senna 2 Tablet(s) Oral at bedtime    MEDICATIONS  (PRN):  acetaminophen     Tablet .. 650 milliGRAM(s) Oral every 6 hours PRN Temp greater or equal to 38C (100.4F), Mild Pain (1 - 3), Moderate Pain (4 - 6)  bisacodyl Suppository 10 milliGRAM(s) Rectal daily PRN Constipation      I&O's Summary    09 May 2023 07:01  -  10 May 2023 07:00  --------------------------------------------------------  IN: 470 mL / OUT: 1160 mL / NET: -690 mL    10 May 2023 07:01  -  10 May 2023 12:15  --------------------------------------------------------  IN: 200 mL / OUT: 225 mL / NET: -25 mL        PHYSICAL EXAM:  Vital Signs Last 24 Hrs  T(C): 36.2 (10 May 2023 09:00), Max: 37 (10 May 2023 05:00)  T(F): 97.2 (10 May 2023 09:00), Max: 98.6 (10 May 2023 05:00)  HR: 58 (10 May 2023 09:00) (53 - 58)  BP: 128/58 (10 May 2023 09:00) (126/47 - 128/59)  BP(mean): --  RR: 17 (10 May 2023 09:00) (16 - 18)  SpO2: 99% (10 May 2023 09:00) (97% - 100%)    Parameters below as of 10 May 2023 09:00  Patient On (Oxygen Delivery Method): room air      CONSTITUTIONAL: NAD, well-developed, well-groomed  EYES: PERRLA; conjunctiva and sclera clear  ENMT: Moist oral mucosa, no pharyngeal injection or exudates; normal dentition  RESPIRATORY: Normal respiratory effort; lungs are clear to auscultation bilaterally  CARDIOVASCULAR: Regular rate and rhythm, normal S1 and S2, no murmur/rub/gallop; No lower extremity edema; Peripheral pulses are 2+ bilaterally  ABDOMEN: Nontender to palpation, normoactive bowel sounds, no rebound/guarding; No hepatosplenomegaly  PSYCH: A+O to person, place, and time; affect appropriate  SKIN: No rashes; no palpable lesions    LABS:                        9.3    7.16  )-----------( 250      ( 10 May 2023 05:32 )             30.0     05-10    139  |  102  |  14  ----------------------------<  67<L>  4.7   |  27  |  0.64    Ca    9.3      10 May 2023 05:32  Phos  2.7     05-10  Mg     2.10     05-10                  RADIOLOGY & ADDITIONAL TESTS:  Results Reviewed:   Imaging Personally Reviewed:  Electrocardiogram Personally Reviewed:    COORDINATION OF CARE:  Care Discussed with Consultants/Other Providers [Y/N]:   Prior or Outpatient Records Reviewed [Y/N]:

## 2023-05-10 NOTE — PROVIDER CONTACT NOTE (OTHER) - ACTION/TREATMENT ORDERED:
PA made aware
straight cath ordered
Provider notified.
PA aware patient placed on bear hugger
Provider made aware. No further orders at this time.
Provider notified.
Provider notified. As per ACP to continue to monitor pt for increased lethargy and to reassess vitals in the am.
provider aware of PO Medication refusal cont to monitor
provider aware of temp will order heating blanket/ patient already received round of iv abx
PA made aware patient placed back on bear hugger
Provider notified.

## 2023-05-10 NOTE — PROGRESS NOTE ADULT - PROBLEM SELECTOR PLAN 2
Hypothermic on presentation but SIRS negative   - h/o recurrent UTIs on methenamine   - prior urine cultures with Citrobacter   - monitor for urinary retention   - s/p meropenum in the ED. c/w cefepime for now, f/u cultures (Bcx (5/5) ngtd, urine cx no growth)  - cefepime day 6/7

## 2023-05-10 NOTE — PROGRESS NOTE ADULT - PROBLEM SELECTOR PLAN 3
RESOLVED  Presenting with Na of 125,   - s/p 1L NS w/ persistent hyponatremia likely SIADH (urine Na 89, urine osm 496)  -s/p hypertonic saline 2% 30ml/h x4h on 5/7, overcorrected a bit to 133, given d5w, now off, Na 131  - encourage po intake, soft diet per SLP eval  - BMP q24 hrs  -no hydro on renal US  - f/u nephro recs  -slow correction 6-8 meq/24h

## 2023-05-10 NOTE — PROGRESS NOTE ADULT - PROBLEM SELECTOR PLAN 5
Incontinent at home and uses Pure Wick per    - straight cath in ED with ~600ml of urine   - continue to monitor bladder scans q6h  - moreno placed

## 2023-05-10 NOTE — PROGRESS NOTE ADULT - ASSESSMENT
68W [employer] with PMH of MS (follows w Dr. Merrick Dennis at [facility], currently off treatment) with baclofen pump in place, seizure, hypothyroidism, frequent UTIs presenting worsening mental status secondary to UTI. Acute urinary retention, s/p moreno placement on 5/9. Recurring hypothermia. F/u CT a/p wo [ ] to r/o nephrolithiasis iso of recurrent admissions for UTI (with likely neurogenic bladder, retention is most likely  of recurrent UTIs), last CT scan reviewed from March 2023 was w contrast.     Pt has had multiple hospitalizations at [facility] since Dec. 2022 (for AMS 2/2 covid, UTI). Prior to this she was hospitalized multiple times at [facility], but prefers the care she gets at [facility] as per caregiver, Deloris. Last remained out of the hospital prior to 2022.     Attempted to reach Dr. Dennis multiple times ([phone] number does not work, [phone] number works but physician does not work at that office anymore.)

## 2023-05-10 NOTE — PROGRESS NOTE ADULT - PROBLEM SELECTOR PLAN 8
last BM was 5/1   - start on senna and miraliax BID with dulcolax suppository PRN  - monitor BMs  - Enema once temperatures improve

## 2023-05-10 NOTE — PROVIDER CONTACT NOTE (OTHER) - REASON
95.7 rectal
Pt 02 dipped down to 80%s while asleep at times, non sustaining
axiliary temp 92.6
Hypothermic
Pt temp 95.4 F. /51 HR 68 RR 16 spo2 100%.
/48 and temp 95.6
refusal of PO Medication
Pt noreen to the 40s, /46
bladder scan 301
Pt noreen to the high 40s, non sustaining, and goes back up to the low 50s.
Pt noreen to the 40s, /44

## 2023-05-10 NOTE — PROGRESS NOTE ADULT - PROBLEM SELECTOR PLAN 1
pt hypotensive and hypothermic, r/o sepsis vs. autonomic dysfunction   add midodrine 10mg tid--> decr to 5 mg TID iso of bradycardia  -IVF hydration LR 100ml/h x10h  -check TTE - preserved EF, RV and LV not well visualized  -f/u cardiology, d/w Cards fellow about her troponin leak and hypotension, no chest pain, likely demand ischemia,   -f/u cultures  -checked cortisol 11.8--> no gross adrenal insufficiency, adrenal glans wnl on CT imaging March 2023  -monitor vitals closely

## 2023-05-10 NOTE — PROVIDER CONTACT NOTE (OTHER) - SITUATION
Pt temp 95.4 F. /51 HR 68 RR 16 spo2 100%.
Pt noreen to the high 40s, non sustaining, and goes back up to the low 50s.
Pt noreen to the 40s, /44
95.7 rectal
/48 and temp 95.6
patient axo2 refusing PO medication including
patient rectal temp 95.2
Pt 02 dipped down to 80%s while asleep at times, non sustaining
Pt noreen to the 40s, /46
axiliary temp 92.6
bladder scan 301

## 2023-05-10 NOTE — PROGRESS NOTE ADULT - PROBLEM SELECTOR PLAN 6
# Functional Quadriplegia - completely dependent on caregivers   - not currently on DMT  - c/w baclofen pump --> consult NSG 5/9 to see if it needs to be refilled? Last filled in March 2023, gets it refilled every 3 months

## 2023-05-11 LAB
BASOPHILS # BLD AUTO: 0.1 K/UL — SIGNIFICANT CHANGE UP (ref 0–0.2)
BASOPHILS NFR BLD AUTO: 1.6 % — SIGNIFICANT CHANGE UP (ref 0–2)
EOSINOPHIL # BLD AUTO: 0.07 K/UL — SIGNIFICANT CHANGE UP (ref 0–0.5)
EOSINOPHIL NFR BLD AUTO: 1.1 % — SIGNIFICANT CHANGE UP (ref 0–6)
GLUCOSE BLDC GLUCOMTR-MCNC: 92 MG/DL — SIGNIFICANT CHANGE UP (ref 70–99)
HCT VFR BLD CALC: 30.5 % — LOW (ref 34.5–45)
HGB BLD-MCNC: 9.4 G/DL — LOW (ref 11.5–15.5)
IANC: 3.03 K/UL — SIGNIFICANT CHANGE UP (ref 1.8–7.4)
IMM GRANULOCYTES NFR BLD AUTO: 0.3 % — SIGNIFICANT CHANGE UP (ref 0–0.9)
LYMPHOCYTES # BLD AUTO: 2.62 K/UL — SIGNIFICANT CHANGE UP (ref 1–3.3)
LYMPHOCYTES # BLD AUTO: 40.8 % — SIGNIFICANT CHANGE UP (ref 13–44)
MCHC RBC-ENTMCNC: 28.4 PG — SIGNIFICANT CHANGE UP (ref 27–34)
MCHC RBC-ENTMCNC: 30.8 GM/DL — LOW (ref 32–36)
MCV RBC AUTO: 92.1 FL — SIGNIFICANT CHANGE UP (ref 80–100)
MONOCYTES # BLD AUTO: 0.58 K/UL — SIGNIFICANT CHANGE UP (ref 0–0.9)
MONOCYTES NFR BLD AUTO: 9 % — SIGNIFICANT CHANGE UP (ref 2–14)
NEUTROPHILS # BLD AUTO: 3.03 K/UL — SIGNIFICANT CHANGE UP (ref 1.8–7.4)
NEUTROPHILS NFR BLD AUTO: 47.2 % — SIGNIFICANT CHANGE UP (ref 43–77)
NRBC # BLD: 0 /100 WBCS — SIGNIFICANT CHANGE UP (ref 0–0)
NRBC # FLD: 0 K/UL — SIGNIFICANT CHANGE UP (ref 0–0)
PHOSPHATE SERPL-MCNC: 3 MG/DL — SIGNIFICANT CHANGE UP (ref 2.5–4.5)
PLATELET # BLD AUTO: 160 K/UL — SIGNIFICANT CHANGE UP (ref 150–400)
RBC # BLD: 3.31 M/UL — LOW (ref 3.8–5.2)
RBC # FLD: 17.2 % — HIGH (ref 10.3–14.5)
WBC # BLD: 6.42 K/UL — SIGNIFICANT CHANGE UP (ref 3.8–10.5)
WBC # FLD AUTO: 6.42 K/UL — SIGNIFICANT CHANGE UP (ref 3.8–10.5)

## 2023-05-11 PROCEDURE — 99233 SBSQ HOSP IP/OBS HIGH 50: CPT

## 2023-05-11 RX ORDER — PANTOPRAZOLE SODIUM 20 MG/1
40 TABLET, DELAYED RELEASE ORAL
Refills: 0 | Status: DISCONTINUED | OUTPATIENT
Start: 2023-05-11 | End: 2023-05-12

## 2023-05-11 RX ORDER — LEVOTHYROXINE SODIUM 125 MCG
75 TABLET ORAL DAILY
Refills: 0 | Status: DISCONTINUED | OUTPATIENT
Start: 2023-05-12 | End: 2023-05-12

## 2023-05-11 RX ADMIN — GABAPENTIN 300 MILLIGRAM(S): 400 CAPSULE ORAL at 12:41

## 2023-05-11 RX ADMIN — POLYETHYLENE GLYCOL 3350 17 GRAM(S): 17 POWDER, FOR SOLUTION ORAL at 05:32

## 2023-05-11 RX ADMIN — POLYETHYLENE GLYCOL 3350 17 GRAM(S): 17 POWDER, FOR SOLUTION ORAL at 17:41

## 2023-05-11 RX ADMIN — Medication 60 MICROGRAM(S): at 06:43

## 2023-05-11 RX ADMIN — PANTOPRAZOLE SODIUM 40 MILLIGRAM(S): 20 TABLET, DELAYED RELEASE ORAL at 12:40

## 2023-05-11 RX ADMIN — LACOSAMIDE 100 MILLIGRAM(S): 50 TABLET ORAL at 05:34

## 2023-05-11 RX ADMIN — ENOXAPARIN SODIUM 40 MILLIGRAM(S): 100 INJECTION SUBCUTANEOUS at 12:41

## 2023-05-11 RX ADMIN — SENNA PLUS 2 TABLET(S): 8.6 TABLET ORAL at 21:23

## 2023-05-11 RX ADMIN — LACOSAMIDE 100 MILLIGRAM(S): 50 TABLET ORAL at 17:40

## 2023-05-11 RX ADMIN — GABAPENTIN 300 MILLIGRAM(S): 400 CAPSULE ORAL at 21:23

## 2023-05-11 RX ADMIN — CEFEPIME 100 MILLIGRAM(S): 1 INJECTION, POWDER, FOR SOLUTION INTRAMUSCULAR; INTRAVENOUS at 05:34

## 2023-05-11 RX ADMIN — CHLORHEXIDINE GLUCONATE 1 APPLICATION(S): 213 SOLUTION TOPICAL at 12:40

## 2023-05-11 RX ADMIN — GABAPENTIN 300 MILLIGRAM(S): 400 CAPSULE ORAL at 05:33

## 2023-05-11 NOTE — PROGRESS NOTE ADULT - ASSESSMENT
68W [employer] with PMH of MS (follows w Dr. Merrick Dennis at [facility], currently off treatment) with baclofen pump in place, seizure, hypothyroidism, frequent UTIs presenting worsening mental status secondary to UTI. Acute urinary retention, s/p moreno placement on 5/9. Recurring hypothermia. CT a/p wo to r/o nephrolithiasis iso of recurrent admissions for UTI (with likely neurogenic bladder, retention is most likely  of recurrent UTIs), last CT scan reviewed from March 2023 was w contrast. No stones visualized on 5/10.     Pt has had multiple hospitalizations at [facility] since Dec. 2022 (for AMS 2/2 covid, UTI). Prior to this she was hospitalized multiple times at [facility], but prefers the care she gets at [facility] as per caregiver, Deloris. Last remained out of the hospital prior to 2022.     Attempted to reach Dr. Dennis multiple times ([phone] number does not work, [phone] number works but physician does not work at that office anymore.)    Pt want to go home. Attempted to reach Yoni to discuss on 5/11, could not reach. Spoke w Deloris on 5/10. They will need to come to the hospital to go over moreno education prior to d/c.

## 2023-05-11 NOTE — PROGRESS NOTE ADULT - PROBLEM SELECTOR PROBLEM 5
Urinary retention
Multiple sclerosis
Urinary retention
Urinary retention

## 2023-05-11 NOTE — PROGRESS NOTE ADULT - PROVIDER SPECIALTY LIST ADULT
Nephrology
Nephrology
Cardiology
Cardiology
Hospitalist

## 2023-05-11 NOTE — CHART NOTE - NSCHARTNOTESELECT_GEN_ALL_CORE
Event Note
Event Note
Nephrology Recommendations/Event Note
Nutrition Services
Event Note
Event Note
Nephrology/Event Note
Rapid Response Team/Event Note
Rapid Response/Event Note

## 2023-05-11 NOTE — PROGRESS NOTE ADULT - PROBLEM SELECTOR PLAN 2
Hypothermic on presentation but SIRS negative   - h/o recurrent UTIs on methenamine; believed due to neurogenic bladder causing acute urinary retention, moreno in place. Will plan to d/c her w this and w close OP f/u w URology  - prior urine cultures with Citrobacter   - monitor for urinary retention   - s/p meropenum in the ED. c/w cefepime for now, f/u cultures (Bcx (5/5) ngtd, urine cx no growth)  - cefepime day 7/7

## 2023-05-11 NOTE — PROGRESS NOTE ADULT - SUBJECTIVE AND OBJECTIVE BOX
[facility] Division of Hospital Medicine  Jennifer Oconnor MD  Pager (CELIA, 8A-5P): [phone]  Other Times:  [phone]      SUBJECTIVE / OVERNIGHT EVENTS:  Pt says "I want to go home. My family needs to come to terms with the fact that this is what end stage MS looks like. I don't want to be in the hospital anymore." More coherent today. HR NSR on tele in the 80s.    Attempted to reach family (Yoni) on 5/11 to discuss, no answer.    MEDICATIONS  (STANDING):  chlorhexidine 2% Cloths 1 Application(s) Topical daily  dextrose 50% Injectable 25 Gram(s) IV Push once  dextrose Oral Gel 15 Gram(s) Oral once  enoxaparin Injectable 40 milliGRAM(s) SubCutaneous every 24 hours  gabapentin 300 milliGRAM(s) Oral three times a day  glucagon  Injectable 1 milliGRAM(s) IntraMuscular once  lacosamide 100 milliGRAM(s) Oral two times a day  levothyroxine Injectable 60 MICROGram(s) IV Push <User Schedule>  pantoprazole  Injectable 40 milliGRAM(s) IV Push daily  polyethylene glycol 3350 17 Gram(s) Oral two times a day  senna 2 Tablet(s) Oral at bedtime    MEDICATIONS  (PRN):  acetaminophen     Tablet .. 650 milliGRAM(s) Oral every 6 hours PRN Temp greater or equal to 38C (100.4F), Mild Pain (1 - 3), Moderate Pain (4 - 6)  bisacodyl Suppository 10 milliGRAM(s) Rectal daily PRN Constipation      I&O's Summary    10 May 2023 07:01  -  11 May 2023 07:00  --------------------------------------------------------  IN: 500 mL / OUT: 805 mL / NET: -305 mL    11 May 2023 07:01  -  11 May 2023 12:49  --------------------------------------------------------  IN: 120 mL / OUT: 1600 mL / NET: -1480 mL        PHYSICAL EXAM:  Vital Signs Last 24 Hrs  T(C): 36.8 (11 May 2023 09:30), Max: 37.1 (11 May 2023 05:30)  T(F): 98.2 (11 May 2023 09:30), Max: 98.7 (11 May 2023 05:30)  HR: 77 (11 May 2023 09:30) (57 - 80)  BP: 132/63 (11 May 2023 09:30) (121/57 - 146/56)  BP(mean): --  RR: 18 (11 May 2023 09:30) (17 - 18)  SpO2: 99% (11 May 2023 09:30) (99% - 99%)    Parameters below as of 11 May 2023 09:30  Patient On (Oxygen Delivery Method): room air          General: woman laying in bed appears comfortable in NAD, alert, cachectic  Eyes: PERRLA, nonicteric sclera  HENMT: NCAT, MMM  Neck: Supple, no thyromegaly, trachea midline   Respiratory: No respiratory distress, CTABL, No rales, rhonchi, wheezing.  Cardiovascular: Regular rate and rhythm; No m/g/r. 2+ peripheral pulses x4 extremities   Gastrointestinal: Soft, Nontender, Nondistended; +BS. No palpable organomegaly  : palpable bladder, mild suprapubic tenderness, moreno in place, no CVA tenderness   Extremities: No c/c/e; warm to touch  Neurological: face symmetric. slow to respond, able to follow commands, wiggles toes on the L foot, cannot wiggle toes on the R foot, otherwise cannot move legs. Full strength of upper extremities.   Skin: No rashes, No erythema   Psych: AAO to person and place, is now oriented to time and place, but slow to respond to commands  LABS:                        9.4    6.42  )-----------( 160      ( 11 May 2023 05:11 )             30.5     05-10    139  |  102  |  14  ----------------------------<  67<L>  4.7   |  27  |  0.64    Ca    9.3      10 May 2023 05:32  Phos  TNP     05-11  Mg     2.10     05-10                  RADIOLOGY & ADDITIONAL TESTS:  Results Reviewed:   Imaging Personally Reviewed:  Electrocardiogram Personally Reviewed:    COORDINATION OF CARE:  Care Discussed with Consultants/Other Providers [Y/N]:  Prior or Outpatient Records Reviewed [Y/N]:

## 2023-05-11 NOTE — PROGRESS NOTE ADULT - PROBLEM SELECTOR PROBLEM 6
Multiple sclerosis
Multiple sclerosis
Seizure disorder
Multiple sclerosis

## 2023-05-11 NOTE — PROGRESS NOTE ADULT - PROBLEM SELECTOR PLAN 8
IMPROVING  last BM was 5/10  - start on senna and miraliax BID with dulcolax suppository PRN  - monitor BMs

## 2023-05-11 NOTE — PROGRESS NOTE ADULT - NSPROGADDITIONALINFOA_GEN_ALL_CORE
Greater than 50 minutes spent with patient and on patient's care plan.     Awaiting callback from /Deloris on 5/11 to discuss dispo. Spoke evette Puentes in days prior.

## 2023-05-11 NOTE — PROGRESS NOTE ADULT - PROBLEM SELECTOR PLAN 11
DVTppx: LMWH   Diet: pending dysphagia screen  Code: DNR/DNI, no blood products   Dispo: likely back home with 24HHA pending improvement in medical status      -  to bring in complete list of medication, completed prelim med-rec with him.
DVTppx: LMWH   Diet: pending dysphagia screen  Code: DNR/DNI, no blood products   Dispo: likely back home with 24HHA pending improvement in medical status

## 2023-05-11 NOTE — PROGRESS NOTE ADULT - PROBLEM SELECTOR PROBLEM 10
GERD (gastroesophageal reflux disease)
Prophylactic measure
GERD (gastroesophageal reflux disease)

## 2023-05-11 NOTE — PROGRESS NOTE ADULT - PROBLEM SELECTOR PROBLEM 9
HTN (hypertension)
GERD (gastroesophageal reflux disease)
HTN (hypertension)

## 2023-05-11 NOTE — PROGRESS NOTE ADULT - PROBLEM SELECTOR PLAN 4
Acute Encephalopathy secondary to presumed UTI, hyponatremia likely also factor. Seizures on differential given history although seem less likely given that she maintained ability to communicate albeit delayed.   - continue with treatment of UTI and hyponatremia as above   - EEG Mild diffuse/multifocal cerebral dysfunction, not specific, no seizure  - start on Vimpat per neuro, f/u vimpat level
Incontinent at home and uses Pure Wick per    - straight cath in ED with ~600ml of urine   - continue to monitor bladder scans q6h  - straight cath protocol, Slater if needed
Acute Encephalopathy secondary to presumed UTI, hyponatremia likely also factor. Seizures on differential given history although seem less likely given that she maintained ability to communicate albeit delayed.   - continue with treatment of UTI and hyponatremia as above   - EEG Mild diffuse/multifocal cerebral dysfunction, not specific, no seizure  - start on Vimpat per neuro, f/u vimpat level

## 2023-05-11 NOTE — PROGRESS NOTE ADULT - PROBLEM SELECTOR PLAN 10
c/w home PPI
c/w home PPI
DVTppx: LMWH   Diet: pending dysphagia screen  Code: DNR/DNI, no blood products   Dispo: likely back home with 24HHA pending improvement in medical status      -  to bring in complete list of medication, completed prelim med-rec with him.
c/w home PPI

## 2023-05-12 ENCOUNTER — TRANSCRIPTION ENCOUNTER (OUTPATIENT)
Age: 68
End: 2023-05-12

## 2023-05-12 VITALS
DIASTOLIC BLOOD PRESSURE: 72 MMHG | TEMPERATURE: 98 F | SYSTOLIC BLOOD PRESSURE: 133 MMHG | RESPIRATION RATE: 18 BRPM | HEART RATE: 77 BPM | OXYGEN SATURATION: 100 %

## 2023-05-12 PROCEDURE — 99239 HOSP IP/OBS DSCHRG MGMT >30: CPT

## 2023-05-12 RX ORDER — LACOSAMIDE 50 MG/1
1 TABLET ORAL
Qty: 60 | Refills: 0
Start: 2023-05-12 | End: 2023-06-10

## 2023-05-12 RX ADMIN — ENOXAPARIN SODIUM 40 MILLIGRAM(S): 100 INJECTION SUBCUTANEOUS at 12:28

## 2023-05-12 RX ADMIN — Medication 75 MICROGRAM(S): at 05:52

## 2023-05-12 RX ADMIN — GABAPENTIN 300 MILLIGRAM(S): 400 CAPSULE ORAL at 05:51

## 2023-05-12 RX ADMIN — LACOSAMIDE 100 MILLIGRAM(S): 50 TABLET ORAL at 17:39

## 2023-05-12 RX ADMIN — PANTOPRAZOLE SODIUM 40 MILLIGRAM(S): 20 TABLET, DELAYED RELEASE ORAL at 09:12

## 2023-05-12 RX ADMIN — LACOSAMIDE 100 MILLIGRAM(S): 50 TABLET ORAL at 05:52

## 2023-05-12 RX ADMIN — CHLORHEXIDINE GLUCONATE 1 APPLICATION(S): 213 SOLUTION TOPICAL at 12:28

## 2023-05-12 RX ADMIN — POLYETHYLENE GLYCOL 3350 17 GRAM(S): 17 POWDER, FOR SOLUTION ORAL at 05:51

## 2023-05-12 NOTE — DISCHARGE NOTE PROVIDER - CARE PROVIDER_API CALL
Magaly Hopson  INTERNAL MEDICINE  [address] [place] [address]  Phone: [phone]  Fax: [phone]  Established Patient  Follow Up Time: 1 week

## 2023-05-12 NOTE — DISCHARGE NOTE NURSING/CASE MANAGEMENT/SOCIAL WORK - PATIENT PORTAL LINK FT
You can access the FollowMyHealth Patient Portal offered by [facility] by registering at the following website: [email or web address]. By joining [employer]’s FollowMyHealth portal, you will also be able to view your health information using other applications (apps) compatible with our system.

## 2023-05-12 NOTE — DISCHARGE NOTE PROVIDER - NSDCCPCAREPLAN_GEN_ALL_CORE_FT
PRINCIPAL DISCHARGE DIAGNOSIS  Diagnosis: Altered mental status  Assessment and Plan of Treatment: You were admitted with acute encephalopathy (altered mental status) from another urinary tract infection. You had acute urinary retention (you were not fully emptying your bladder) and had to have a moreno catheter inserted. You completed 7 days of IV antibiotics (cefepime). During this time you were evaluated by Neurology, they did not suspect any breakthrough seizures.   You should continue to take your same medications (including the new methenamine for UTI prevention) and follow up closely with your Urologist and Primary Care Provider. You should follow up with your MS specialist.      SECONDARY DISCHARGE DIAGNOSES  Diagnosis: Hyponatremia  Assessment and Plan of Treatment: Your sodium was low on admission. This corrected with IV saline and is now in the normal range.

## 2023-05-12 NOTE — DISCHARGE NOTE PROVIDER - HOSPITAL COURSE
Admission diagnoses:   ***    Discharge diagnoses:   ***    Hospital Course:   For full details, please see H&P, progress notes, consult notes and ancillary notes. Briefly, *** name is a 68yFemale     On day of discharge, patient is clinically stable with no new exam findings or acute symptoms compared to prior. The patient was seen by the attending physician on the date of discharge and deemed satable and acceptable for discharge. The patient's chronic medical conditions were treated accordingly per the patient's home medication regimen. The patient's medication reconciliation (with changes made to chronic medications), follow up appointments, discharge orders, instructions, and significant lab and diagnostic studies are as noted. Greater than 35 minutes was spent on patient on day of d/c arrangements.    Discharge follow up action items:     1. Follow up with PCP in 1-2 weeks.   2. Follow up labs ***  3. Medication changes ***  4. On hold medications ***  5. Incidentalomas    Patient's ordered code status: ***    Patient disposition: ***     Admission diagnoses:   Acute metabolic encephalopathy  Acute hyponatremia  UTI   Acute urinary retention  Hypothermia   Sinus bradycardia    Discharge diagnoses:   MS    Hospital Course:   For full details, please see H&P, progress notes, consult notes and ancillary notes. Briefly, 68W [employer] with PMH of MS (follows w Dr. Merrick Dennis at [facility], currently off treatment) with baclofen pump in place, seizure, hypothyroidism, frequent UTIs presenting worsening mental status secondary to UTI. Acute urinary retention, s/p moreno placement on 5/9. Recurring hypothermia. CT a/p wo to r/o nephrolithiasis iso of recurrent admissions for UTI (with likely neurogenic bladder, retention is most likely  of recurrent UTIs), last CT scan reviewed from March 2023 was w contrast. No stones visualized on 5/10. Evaluated by Neuro on admission, had no additional recs.     Pt has had multiple hospitalizations at [facility] since Dec. 2022 (for AMS 2/2 covid, UTI). Prior to this she was hospitalized multiple times at [facility], but prefers the care she gets at [facility] as per caregiver, Deloris. Last remained out of the hospital prior to 2022.     Attempted to reach Dr. Dennis multiple times ([phone] number does not work, [phone] number works but physician does not work at that office anymore.)    Pt passed her TOV on 5/12. Sending home without moreno with close follow up with Urology. She would also benefit from Home Health. Updated Jenise SPEARS) at the bedside on 5/12. Tried to call Yoni multiple times but could not reach.    On day of discharge, patient is clinically stable with no new exam findings or acute symptoms compared to prior. The patient was seen by the attending physician on the date of discharge and deemed satable and acceptable for discharge. The patient's chronic medical conditions were treated accordingly per the patient's home medication regimen. The patient's medication reconciliation (with changes made to chronic medications), follow up appointments, discharge orders, instructions, and significant lab and diagnostic studies are as noted. Greater than 35 minutes was spent on patient on day of d/c arrangements.    Discharge follow up action items:     1. Follow up with PCP in 1-2 weeks. Follow up with Neuro and Urology   3. Medication changes: cont methenamine BID. No significant medication changes.    Patient's ordered code status: DNR    Patient disposition: Home w A

## 2023-05-12 NOTE — DISCHARGE NOTE PROVIDER - ATTENDING DISCHARGE PHYSICAL EXAMINATION:
General: woman laying in bed appears comfortable in NAD, alert, cachectic  Eyes: PERRLA, nonicteric sclera  HENMT: NCAT, MMM  Neck: Supple, no thyromegaly, trachea midline   Respiratory: No respiratory distress, CTABL, No rales, rhonchi, wheezing.  Cardiovascular: Regular rate and rhythm; No m/g/r. 2+ peripheral pulses x4 extremities   Gastrointestinal: Soft, Nontender, Nondistended; +BS. No palpable organomegaly  : purewic in place draining clear yellow urine, no suprapubic tenderness, no CVA tenderness   Extremities: No c/c/e; warm to touch  Neurological: face symmetric. alert, conversive, follows all commands a x o x 4, wiggles toes on the L foot, cannot wiggle toes on the R foot, otherwise cannot move legs. Full strength of upper extremities.   Skin: No rashes, No erythema   Psych: AAO to person and place, is now oriented to time and place, but slow to respond to commands

## 2023-05-12 NOTE — DISCHARGE NOTE NURSING/CASE MANAGEMENT/SOCIAL WORK - NSDCFUADDAPPT_GEN_ALL_CORE_FT
Follow up with Urology within 1 week of d/c.    Follow up with Neurology within 1 week of d/c.    F/U with PMD In 1 week.

## 2023-05-12 NOTE — DISCHARGE NOTE PROVIDER - NSDCFUADDAPPT_GEN_ALL_CORE_FT
Follow up with Urology within 1 week of d/c.    Follow up with Neurology within 1 week of d/c.   Follow up with Urology within 1 week of d/c.    Follow up with Neurology within 1 week of d/c.    F/U with PMD In 1 week.

## 2023-05-16 ENCOUNTER — NON-APPOINTMENT (OUTPATIENT)
Age: 68
End: 2023-05-16

## 2023-06-26 NOTE — DISCHARGE NOTE PROVIDER - NSDCDCMDCOMP_GEN_ALL_CORE
Detail Level: Detailed Body Location Override (Optional - Billing Will Still Be Based On Selected Body Map Location If Applicable): left upper cutaneous lip Size Of Lesion: 0.8 X Size Of Lesion In Cm (Optional): 0.6 Name Of The Referring Provider For Procedure: MEIR Murphy PA-C Incorporate Mauc In Note: Yes This document is complete and the patient is ready for discharge.

## 2023-10-09 ENCOUNTER — APPOINTMENT (OUTPATIENT)
Dept: UROLOGY | Facility: CLINIC | Age: 68
End: 2023-10-09

## 2023-10-23 NOTE — PATIENT PROFILE ADULT - LEGAL HELP
You can access the FollowMyHealth Patient Portal offered by [facility] by registering at the following website: [email or web address]. By joining [employer]’s FollowMyHealth portal, you will also be able to view your health information using other applications (apps) compatible with our system.
no

## 2023-11-02 NOTE — PHYSICAL THERAPY INITIAL EVALUATION ADULT - PHYSICAL ASSIST/NONPHYSICAL ASSIST, REHAB EVAL
ILD New Patient Referral: Pre visit communication    Patient: Malachi Domínguez  Reason for Referral: ILD  Referring Physician: Keny Florez MD   [id number] [place] [address]   [place] [id number] [phone] (Work)    Chest CT scan: 10/13/23 [facility]   Biopsy: no  PFT's:Will be done on 11/27/23 with [facility]    Additional testing:     Current symptoms: Coughing, fatigue  Current related prescriptions: no    Supplemental oxygen? No    In review of apparent records and conversation with patient; recommend first visit with ILD provider be conducted :  In person visit  Testing needed: CT scan and Full PFT's and walk    Additional notes:                                                                 
verbal cues/nonverbal cues (demo/gestures)/1 person assist

## 2024-04-24 NOTE — ED ADULT NURSE NOTE - TEMPLATE LIST FOR HEAD TO TOE ASSESSMENT
General Toño Velásquez  Gastroenterology  [address] [place] [id number]  Phone: [phone]  Fax: [phone]  Follow Up Time:
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
## 2024-09-24 NOTE — PROGRESS NOTE ADULT - PROBLEM SELECTOR PLAN 4
Report to Pete, no further at this time, aware drawing APTT, to adjust heparin if needed.   Chronic stable  Continue lacosamide 100mg BID Chronic stable. Observed to have RUE contractions.  Continue baclofen pump - last filled 3/1, next refill due 5/26  Monitor

## 2025-01-02 ENCOUNTER — INPATIENT (INPATIENT)
Facility: HOSPITAL | Age: 70
LOS: 8 days | Discharge: HOME | End: 2025-01-11
Attending: STUDENT IN AN ORGANIZED HEALTH CARE EDUCATION/TRAINING PROGRAM | Admitting: STUDENT IN AN ORGANIZED HEALTH CARE EDUCATION/TRAINING PROGRAM
Payer: MEDICARE

## 2025-01-02 VITALS
SYSTOLIC BLOOD PRESSURE: 183 MMHG | DIASTOLIC BLOOD PRESSURE: 99 MMHG | RESPIRATION RATE: 18 BRPM | WEIGHT: 139.99 LBS | HEART RATE: 83 BPM | OXYGEN SATURATION: 96 % | TEMPERATURE: 98 F

## 2025-01-02 DIAGNOSIS — I10 ESSENTIAL (PRIMARY) HYPERTENSION: ICD-10-CM

## 2025-01-02 DIAGNOSIS — E03.9 HYPOTHYROIDISM, UNSPECIFIED: ICD-10-CM

## 2025-01-02 DIAGNOSIS — E78.5 HYPERLIPIDEMIA, UNSPECIFIED: ICD-10-CM

## 2025-01-02 DIAGNOSIS — G40.909 EPILEPSY, UNSPECIFIED, NOT INTRACTABLE, WITHOUT STATUS EPILEPTICUS: ICD-10-CM

## 2025-01-02 DIAGNOSIS — Z29.9 ENCOUNTER FOR PROPHYLACTIC MEASURES, UNSPECIFIED: ICD-10-CM

## 2025-01-02 DIAGNOSIS — E87.1 HYPO-OSMOLALITY AND HYPONATREMIA: ICD-10-CM

## 2025-01-02 DIAGNOSIS — N39.0 URINARY TRACT INFECTION, SITE NOT SPECIFIED: ICD-10-CM

## 2025-01-02 DIAGNOSIS — G35 MULTIPLE SCLEROSIS: ICD-10-CM

## 2025-01-02 LAB
ALBUMIN SERPL ELPH-MCNC: 4.4 G/DL — SIGNIFICANT CHANGE UP (ref 3.3–5)
ALP SERPL-CCNC: 132 U/L — HIGH (ref 40–120)
ALT FLD-CCNC: 27 U/L — SIGNIFICANT CHANGE UP (ref 4–33)
ANION GAP SERPL CALC-SCNC: 13 MMOL/L — SIGNIFICANT CHANGE UP (ref 7–14)
APAP SERPL-MCNC: <10 UG/ML — LOW (ref 15–25)
APPEARANCE UR: ABNORMAL
AST SERPL-CCNC: 30 U/L — SIGNIFICANT CHANGE UP (ref 4–32)
BACTERIA # UR AUTO: ABNORMAL /HPF
BASOPHILS # BLD AUTO: 0.04 K/UL — SIGNIFICANT CHANGE UP (ref 0–0.2)
BASOPHILS NFR BLD AUTO: 0.5 % — SIGNIFICANT CHANGE UP (ref 0–2)
BILIRUB SERPL-MCNC: 0.3 MG/DL — SIGNIFICANT CHANGE UP (ref 0.2–1.2)
BILIRUB UR-MCNC: NEGATIVE — SIGNIFICANT CHANGE UP
BLOOD GAS VENOUS COMPREHENSIVE RESULT: SIGNIFICANT CHANGE UP
BUN SERPL-MCNC: 14 MG/DL — SIGNIFICANT CHANGE UP (ref 7–23)
CALCIUM SERPL-MCNC: 10.3 MG/DL — SIGNIFICANT CHANGE UP (ref 8.4–10.5)
CHLORIDE SERPL-SCNC: 90 MMOL/L — LOW (ref 98–107)
CO2 SERPL-SCNC: 23 MMOL/L — SIGNIFICANT CHANGE UP (ref 22–31)
COLOR SPEC: YELLOW — SIGNIFICANT CHANGE UP
CREAT SERPL-MCNC: 0.4 MG/DL — LOW (ref 0.5–1.3)
DIFF PNL FLD: ABNORMAL
EGFR: 108 ML/MIN/1.73M2 — SIGNIFICANT CHANGE UP
EGFR: 108 ML/MIN/1.73M2 — SIGNIFICANT CHANGE UP
EOSINOPHIL # BLD AUTO: 0.07 K/UL — SIGNIFICANT CHANGE UP (ref 0–0.5)
EOSINOPHIL NFR BLD AUTO: 0.9 % — SIGNIFICANT CHANGE UP (ref 0–6)
EPI CELLS # UR: PRESENT
ETHANOL SERPL-MCNC: <10 MG/DL — SIGNIFICANT CHANGE UP
FLUAV AG NPH QL: SIGNIFICANT CHANGE UP
FLUBV AG NPH QL: SIGNIFICANT CHANGE UP
GLUCOSE SERPL-MCNC: 80 MG/DL — SIGNIFICANT CHANGE UP (ref 70–99)
GLUCOSE UR QL: NEGATIVE MG/DL — SIGNIFICANT CHANGE UP
HCT VFR BLD CALC: 34.7 % — SIGNIFICANT CHANGE UP (ref 34.5–45)
HGB BLD-MCNC: 11.5 G/DL — SIGNIFICANT CHANGE UP (ref 11.5–15.5)
IANC: 5.25 K/UL — SIGNIFICANT CHANGE UP (ref 1.8–7.4)
IMM GRANULOCYTES NFR BLD AUTO: 0.1 % — SIGNIFICANT CHANGE UP (ref 0–0.9)
KETONES UR-MCNC: 80 MG/DL
LEUKOCYTE ESTERASE UR-ACNC: ABNORMAL
LYMPHOCYTES # BLD AUTO: 1.55 K/UL — SIGNIFICANT CHANGE UP (ref 1–3.3)
LYMPHOCYTES # BLD AUTO: 20.8 % — SIGNIFICANT CHANGE UP (ref 13–44)
MCHC RBC-ENTMCNC: 28.6 PG — SIGNIFICANT CHANGE UP (ref 27–34)
MCHC RBC-ENTMCNC: 33.1 G/DL — SIGNIFICANT CHANGE UP (ref 32–36)
MCV RBC AUTO: 86.3 FL — SIGNIFICANT CHANGE UP (ref 80–100)
MONOCYTES # BLD AUTO: 0.53 K/UL — SIGNIFICANT CHANGE UP (ref 0–0.9)
MONOCYTES NFR BLD AUTO: 7.1 % — SIGNIFICANT CHANGE UP (ref 2–14)
NEUTROPHILS # BLD AUTO: 5.25 K/UL — SIGNIFICANT CHANGE UP (ref 1.8–7.4)
NEUTROPHILS NFR BLD AUTO: 70.6 % — SIGNIFICANT CHANGE UP (ref 43–77)
NITRITE UR-MCNC: POSITIVE
NRBC # BLD AUTO: 0 K/UL — SIGNIFICANT CHANGE UP (ref 0–0)
NRBC # BLD: 0 /100 WBCS — SIGNIFICANT CHANGE UP (ref 0–0)
NRBC # FLD: 0 K/UL — SIGNIFICANT CHANGE UP (ref 0–0)
NRBC BLD-RTO: 0 /100 WBCS — SIGNIFICANT CHANGE UP (ref 0–0)
OSMOLALITY UR: 670 MOSM/KG — SIGNIFICANT CHANGE UP (ref 50–1200)
PH UR: 6.5 — SIGNIFICANT CHANGE UP (ref 5–8)
PLATELET # BLD AUTO: 270 K/UL — SIGNIFICANT CHANGE UP (ref 150–400)
POTASSIUM SERPL-MCNC: 4.4 MMOL/L — SIGNIFICANT CHANGE UP (ref 3.5–5.3)
POTASSIUM SERPL-SCNC: 4.4 MMOL/L — SIGNIFICANT CHANGE UP (ref 3.5–5.3)
PROT SERPL-MCNC: 8 G/DL — SIGNIFICANT CHANGE UP (ref 6–8.3)
PROT UR-MCNC: 300 MG/DL
RBC # BLD: 4.02 M/UL — SIGNIFICANT CHANGE UP (ref 3.8–5.2)
RBC # FLD: 14.5 % — SIGNIFICANT CHANGE UP (ref 10.3–14.5)
RBC CASTS # UR COMP ASSIST: >50 /HPF — SIGNIFICANT CHANGE UP (ref 0–4)
RSV RNA NPH QL NAA+NON-PROBE: SIGNIFICANT CHANGE UP
SALICYLATES SERPL-MCNC: <0.3 MG/DL — LOW (ref 15–30)
SARS-COV-2 RNA SPEC QL NAA+PROBE: SIGNIFICANT CHANGE UP
SODIUM SERPL-SCNC: 126 MMOL/L — LOW (ref 135–145)
SODIUM UR-SCNC: 122 MMOL/L — SIGNIFICANT CHANGE UP
SP GR SPEC: 1.02 — SIGNIFICANT CHANGE UP (ref 1–1.03)
TSH SERPL-MCNC: 1.82 UIU/ML — SIGNIFICANT CHANGE UP (ref 0.27–4.2)
UROBILINOGEN FLD QL: 1 MG/DL — SIGNIFICANT CHANGE UP (ref 0.2–1)
WBC # BLD: 7.45 K/UL — SIGNIFICANT CHANGE UP (ref 3.8–10.5)
WBC # FLD AUTO: 7.45 K/UL — SIGNIFICANT CHANGE UP (ref 3.8–10.5)
WBC UR QL: >50 /HPF — SIGNIFICANT CHANGE UP (ref 0–5)

## 2025-01-02 PROCEDURE — 99285 EMERGENCY DEPT VISIT HI MDM: CPT

## 2025-01-02 PROCEDURE — 99223 1ST HOSP IP/OBS HIGH 75: CPT

## 2025-01-02 PROCEDURE — 99497 ADVNCD CARE PLAN 30 MIN: CPT | Mod: 25

## 2025-01-02 RX ORDER — CLONAZEPAM 0.5 MG/1
0.25 TABLET ORAL DAILY
Refills: 0 | Status: DISCONTINUED | OUTPATIENT
Start: 2025-01-02 | End: 2025-01-06

## 2025-01-02 RX ORDER — MAGNESIUM, ALUMINUM HYDROXIDE 200-200 MG
30 TABLET,CHEWABLE ORAL EVERY 4 HOURS
Refills: 0 | Status: DISCONTINUED | OUTPATIENT
Start: 2025-01-02 | End: 2025-01-11

## 2025-01-02 RX ORDER — LISINOPRIL 5 MG/1
1 TABLET ORAL
Refills: 0 | DISCHARGE

## 2025-01-02 RX ORDER — ONDANSETRON HCL/PF 4 MG/2 ML
4 VIAL (ML) INJECTION ONCE
Refills: 0 | Status: COMPLETED | OUTPATIENT
Start: 2025-01-02 | End: 2025-01-02

## 2025-01-02 RX ORDER — CEFEPIME 2 G/20ML
1000 INJECTION, POWDER, FOR SOLUTION INTRAVENOUS ONCE
Refills: 0 | Status: COMPLETED | OUTPATIENT
Start: 2025-01-02 | End: 2025-01-02

## 2025-01-02 RX ORDER — LISINOPRIL 5 MG/1
2.5 TABLET ORAL DAILY
Refills: 0 | Status: DISCONTINUED | OUTPATIENT
Start: 2025-01-02 | End: 2025-01-11

## 2025-01-02 RX ORDER — LEVOTHYROXINE SODIUM 300 MCG
75 TABLET ORAL DAILY
Refills: 0 | Status: DISCONTINUED | OUTPATIENT
Start: 2025-01-02 | End: 2025-01-11

## 2025-01-02 RX ORDER — CEFEPIME 2 G/20ML
2000 INJECTION, POWDER, FOR SOLUTION INTRAVENOUS EVERY 12 HOURS
Refills: 0 | Status: DISCONTINUED | OUTPATIENT
Start: 2025-01-03 | End: 2025-01-05

## 2025-01-02 RX ORDER — ENOXAPARIN SODIUM 100 MG/ML
40 INJECTION SUBCUTANEOUS EVERY 24 HOURS
Refills: 0 | Status: DISCONTINUED | OUTPATIENT
Start: 2025-01-02 | End: 2025-01-11

## 2025-01-02 RX ORDER — ACETAMINOPHEN 500 MG/5ML
975 LIQUID (ML) ORAL ONCE
Refills: 0 | Status: COMPLETED | OUTPATIENT
Start: 2025-01-02 | End: 2025-01-02

## 2025-01-02 RX ORDER — MELATONIN 5 MG
3 TABLET ORAL AT BEDTIME
Refills: 0 | Status: DISCONTINUED | OUTPATIENT
Start: 2025-01-02 | End: 2025-01-03

## 2025-01-02 RX ORDER — ACETAMINOPHEN 500 MG/5ML
650 LIQUID (ML) ORAL EVERY 6 HOURS
Refills: 0 | Status: DISCONTINUED | OUTPATIENT
Start: 2025-01-02 | End: 2025-01-03

## 2025-01-02 RX ORDER — ONDANSETRON HCL/PF 4 MG/2 ML
4 VIAL (ML) INJECTION EVERY 8 HOURS
Refills: 0 | Status: DISCONTINUED | OUTPATIENT
Start: 2025-01-02 | End: 2025-01-11

## 2025-01-02 RX ADMIN — Medication 4 MILLIGRAM(S): at 21:50

## 2025-01-02 RX ADMIN — Medication 975 MILLIGRAM(S): at 17:17

## 2025-01-02 RX ADMIN — Medication 4 MILLIGRAM(S): at 15:21

## 2025-01-02 RX ADMIN — Medication 500 MILLILITER(S): at 21:50

## 2025-01-02 RX ADMIN — CEFEPIME 100 MILLIGRAM(S): 2 INJECTION, POWDER, FOR SOLUTION INTRAVENOUS at 18:16

## 2025-01-03 DIAGNOSIS — R53.2 FUNCTIONAL QUADRIPLEGIA: ICD-10-CM

## 2025-01-03 LAB
-  STAPHYLOCOCCUS EPIDERMIDIS, METHICILLIN RESISTANT: SIGNIFICANT CHANGE UP
ANION GAP SERPL CALC-SCNC: 12 MMOL/L — SIGNIFICANT CHANGE UP (ref 7–14)
ANION GAP SERPL CALC-SCNC: 14 MMOL/L — SIGNIFICANT CHANGE UP (ref 7–14)
BASOPHILS # BLD AUTO: 0.03 K/UL — SIGNIFICANT CHANGE UP (ref 0–0.2)
BASOPHILS NFR BLD AUTO: 0.4 % — SIGNIFICANT CHANGE UP (ref 0–2)
BUN SERPL-MCNC: 14 MG/DL — SIGNIFICANT CHANGE UP (ref 7–23)
BUN SERPL-MCNC: 17 MG/DL — SIGNIFICANT CHANGE UP (ref 7–23)
CALCIUM SERPL-MCNC: 9.5 MG/DL — SIGNIFICANT CHANGE UP (ref 8.4–10.5)
CALCIUM SERPL-MCNC: 9.7 MG/DL — SIGNIFICANT CHANGE UP (ref 8.4–10.5)
CHLORIDE SERPL-SCNC: 89 MMOL/L — LOW (ref 98–107)
CHLORIDE SERPL-SCNC: 96 MMOL/L — LOW (ref 98–107)
CHOLEST SERPL-MCNC: 182 MG/DL — SIGNIFICANT CHANGE UP
CO2 SERPL-SCNC: 20 MMOL/L — LOW (ref 22–31)
CO2 SERPL-SCNC: 24 MMOL/L — SIGNIFICANT CHANGE UP (ref 22–31)
CREAT SERPL-MCNC: 0.48 MG/DL — LOW (ref 0.5–1.3)
CREAT SERPL-MCNC: 0.51 MG/DL — SIGNIFICANT CHANGE UP (ref 0.5–1.3)
EGFR: 102 ML/MIN/1.73M2 — SIGNIFICANT CHANGE UP
EGFR: 102 ML/MIN/1.73M2 — SIGNIFICANT CHANGE UP
EGFR: 104 ML/MIN/1.73M2 — SIGNIFICANT CHANGE UP
EGFR: 104 ML/MIN/1.73M2 — SIGNIFICANT CHANGE UP
EOSINOPHIL # BLD AUTO: 0.07 K/UL — SIGNIFICANT CHANGE UP (ref 0–0.5)
EOSINOPHIL NFR BLD AUTO: 1 % — SIGNIFICANT CHANGE UP (ref 0–6)
GLUCOSE BLDC GLUCOMTR-MCNC: 131 MG/DL — HIGH (ref 70–99)
GLUCOSE BLDC GLUCOMTR-MCNC: 74 MG/DL — SIGNIFICANT CHANGE UP (ref 70–99)
GLUCOSE SERPL-MCNC: 72 MG/DL — SIGNIFICANT CHANGE UP (ref 70–99)
GLUCOSE SERPL-MCNC: 79 MG/DL — SIGNIFICANT CHANGE UP (ref 70–99)
GRAM STN FLD: ABNORMAL
HCT VFR BLD CALC: 31.4 % — LOW (ref 34.5–45)
HDLC SERPL-MCNC: 85 MG/DL — SIGNIFICANT CHANGE UP
HGB BLD-MCNC: 10.4 G/DL — LOW (ref 11.5–15.5)
IANC: 4.2 K/UL — SIGNIFICANT CHANGE UP (ref 1.8–7.4)
IMM GRANULOCYTES NFR BLD AUTO: 0.3 % — SIGNIFICANT CHANGE UP (ref 0–0.9)
LDLC SERPL-MCNC: 85 MG/DL — SIGNIFICANT CHANGE UP
LIPID PNL WITH DIRECT LDL SERPL: 85 MG/DL — SIGNIFICANT CHANGE UP
LYMPHOCYTES # BLD AUTO: 1.95 K/UL — SIGNIFICANT CHANGE UP (ref 1–3.3)
LYMPHOCYTES # BLD AUTO: 28.7 % — SIGNIFICANT CHANGE UP (ref 13–44)
MAGNESIUM SERPL-MCNC: 1.6 MG/DL — SIGNIFICANT CHANGE UP (ref 1.6–2.6)
MAGNESIUM SERPL-MCNC: 1.7 MG/DL — SIGNIFICANT CHANGE UP (ref 1.6–2.6)
MCHC RBC-ENTMCNC: 28.6 PG — SIGNIFICANT CHANGE UP (ref 27–34)
MCHC RBC-ENTMCNC: 33.1 G/DL — SIGNIFICANT CHANGE UP (ref 32–36)
MCV RBC AUTO: 86.3 FL — SIGNIFICANT CHANGE UP (ref 80–100)
METHOD TYPE: SIGNIFICANT CHANGE UP
MONOCYTES # BLD AUTO: 0.53 K/UL — SIGNIFICANT CHANGE UP (ref 0–0.9)
MONOCYTES NFR BLD AUTO: 7.8 % — SIGNIFICANT CHANGE UP (ref 2–14)
NEUTROPHILS # BLD AUTO: 4.2 K/UL — SIGNIFICANT CHANGE UP (ref 1.8–7.4)
NEUTROPHILS NFR BLD AUTO: 61.8 % — SIGNIFICANT CHANGE UP (ref 43–77)
NONHDLC SERPL-MCNC: 97 MG/DL — SIGNIFICANT CHANGE UP
NRBC # BLD AUTO: 0 K/UL — SIGNIFICANT CHANGE UP (ref 0–0)
NRBC # BLD: 0 /100 WBCS — SIGNIFICANT CHANGE UP (ref 0–0)
NRBC # FLD: 0 K/UL — SIGNIFICANT CHANGE UP (ref 0–0)
NRBC BLD-RTO: 0 /100 WBCS — SIGNIFICANT CHANGE UP (ref 0–0)
PHOSPHATE SERPL-MCNC: 2.8 MG/DL — SIGNIFICANT CHANGE UP (ref 2.5–4.5)
PHOSPHATE SERPL-MCNC: 3.2 MG/DL — SIGNIFICANT CHANGE UP (ref 2.5–4.5)
PLATELET # BLD AUTO: 257 K/UL — SIGNIFICANT CHANGE UP (ref 150–400)
POTASSIUM SERPL-MCNC: 4.3 MMOL/L — SIGNIFICANT CHANGE UP (ref 3.5–5.3)
POTASSIUM SERPL-MCNC: 4.5 MMOL/L — SIGNIFICANT CHANGE UP (ref 3.5–5.3)
POTASSIUM SERPL-SCNC: 4.3 MMOL/L — SIGNIFICANT CHANGE UP (ref 3.5–5.3)
POTASSIUM SERPL-SCNC: 4.5 MMOL/L — SIGNIFICANT CHANGE UP (ref 3.5–5.3)
RBC # BLD: 3.64 M/UL — LOW (ref 3.8–5.2)
RBC # FLD: 14.5 % — SIGNIFICANT CHANGE UP (ref 10.3–14.5)
SODIUM SERPL-SCNC: 123 MMOL/L — LOW (ref 135–145)
SODIUM SERPL-SCNC: 132 MMOL/L — LOW (ref 135–145)
SPECIMEN SOURCE: SIGNIFICANT CHANGE UP
TRIGL SERPL-MCNC: 60 MG/DL — SIGNIFICANT CHANGE UP
TSH SERPL-MCNC: 1.1 UIU/ML — SIGNIFICANT CHANGE UP (ref 0.27–4.2)
WBC # BLD: 6.8 K/UL — SIGNIFICANT CHANGE UP (ref 3.8–10.5)
WBC # FLD AUTO: 6.8 K/UL — SIGNIFICANT CHANGE UP (ref 3.8–10.5)

## 2025-01-03 PROCEDURE — 99233 SBSQ HOSP IP/OBS HIGH 50: CPT | Mod: GC

## 2025-01-03 RX ORDER — ACETAMINOPHEN 500 MG/5ML
650 LIQUID (ML) ORAL EVERY 6 HOURS
Refills: 0 | Status: DISCONTINUED | OUTPATIENT
Start: 2025-01-03 | End: 2025-01-11

## 2025-01-03 RX ORDER — MUPIROCIN CALCIUM 20 MG/G
1 CREAM TOPICAL
Refills: 0 | Status: COMPLETED | OUTPATIENT
Start: 2025-01-03 | End: 2025-01-08

## 2025-01-03 RX ORDER — INFLUENZA A VIRUS A/IDAHO/07/2018 (H1N1) ANTIGEN (MDCK CELL DERIVED, PROPIOLACTONE INACTIVATED, INFLUENZA A VIRUS A/INDIANA/08/2018 (H3N2) ANTIGEN (MDCK CELL DERIVED, PROPIOLACTONE INACTIVATED), INFLUENZA B VIRUS B/SINGAPORE/INFTT-16-0610/2016 ANTIGEN (MDCK CELL DERIVED, PROPIOLACTONE INACTIVATED), INFLUENZA B VIRUS B/IOWA/06/2017 ANTIGEN (MDCK CELL DERIVED, PROPIOLACTONE INACTIVATED) 15; 15; 15; 15 UG/.5ML; UG/.5ML; UG/.5ML; UG/.5ML
0.5 INJECTION, SUSPENSION INTRAMUSCULAR ONCE
Refills: 0 | Status: DISCONTINUED | OUTPATIENT
Start: 2025-01-03 | End: 2025-01-11

## 2025-01-03 RX ORDER — ACETAMINOPHEN 500 MG/5ML
650 LIQUID (ML) ORAL EVERY 6 HOURS
Refills: 0 | Status: DISCONTINUED | OUTPATIENT
Start: 2025-01-03 | End: 2025-01-03

## 2025-01-03 RX ORDER — VANCOMYCIN HCL IN 5 % DEXTROSE 1.5G/250ML
1250 PLASTIC BAG, INJECTION (ML) INTRAVENOUS ONCE
Refills: 0 | Status: COMPLETED | OUTPATIENT
Start: 2025-01-03 | End: 2025-01-03

## 2025-01-03 RX ORDER — ACETAMINOPHEN 500 MG/5ML
1000 LIQUID (ML) ORAL ONCE
Refills: 0 | Status: COMPLETED | OUTPATIENT
Start: 2025-01-03 | End: 2025-01-03

## 2025-01-03 RX ORDER — VANCOMYCIN HCL IN 5 % DEXTROSE 1.5G/250ML
PLASTIC BAG, INJECTION (ML) INTRAVENOUS
Refills: 0 | Status: DISCONTINUED | OUTPATIENT
Start: 2025-01-03 | End: 2025-01-05

## 2025-01-03 RX ORDER — VANCOMYCIN HCL IN 5 % DEXTROSE 1.5G/250ML
1250 PLASTIC BAG, INJECTION (ML) INTRAVENOUS EVERY 12 HOURS
Refills: 0 | Status: DISCONTINUED | OUTPATIENT
Start: 2025-01-04 | End: 2025-01-05

## 2025-01-03 RX ADMIN — CEFEPIME 100 MILLIGRAM(S): 2 INJECTION, POWDER, FOR SOLUTION INTRAVENOUS at 06:00

## 2025-01-03 RX ADMIN — CLONAZEPAM 0.25 MILLIGRAM(S): 0.5 TABLET ORAL at 05:59

## 2025-01-03 RX ADMIN — Medication 4 MILLIGRAM(S): at 02:30

## 2025-01-03 RX ADMIN — Medication 75 MICROGRAM(S): at 05:59

## 2025-01-03 RX ADMIN — Medication 100 MILLILITER(S): at 02:30

## 2025-01-03 RX ADMIN — Medication 166.67 MILLIGRAM(S): at 18:39

## 2025-01-03 RX ADMIN — CEFEPIME 100 MILLIGRAM(S): 2 INJECTION, POWDER, FOR SOLUTION INTRAVENOUS at 17:37

## 2025-01-03 RX ADMIN — LISINOPRIL 2.5 MILLIGRAM(S): 5 TABLET ORAL at 05:59

## 2025-01-03 RX ADMIN — Medication 1 APPLICATION(S): at 11:57

## 2025-01-04 LAB
ANION GAP SERPL CALC-SCNC: 13 MMOL/L — SIGNIFICANT CHANGE UP (ref 7–14)
BASOPHILS # BLD AUTO: 0.05 K/UL — SIGNIFICANT CHANGE UP (ref 0–0.2)
BASOPHILS NFR BLD AUTO: 0.8 % — SIGNIFICANT CHANGE UP (ref 0–2)
BUN SERPL-MCNC: 13 MG/DL — SIGNIFICANT CHANGE UP (ref 7–23)
CALCIUM SERPL-MCNC: 9.4 MG/DL — SIGNIFICANT CHANGE UP (ref 8.4–10.5)
CHLORIDE SERPL-SCNC: 97 MMOL/L — LOW (ref 98–107)
CO2 SERPL-SCNC: 24 MMOL/L — SIGNIFICANT CHANGE UP (ref 22–31)
CORTIS AM PEAK SERPL-MCNC: 11 UG/DL — SIGNIFICANT CHANGE UP (ref 6–18.4)
CREAT SERPL-MCNC: 0.51 MG/DL — SIGNIFICANT CHANGE UP (ref 0.5–1.3)
CULTURE RESULTS: ABNORMAL
EGFR: 102 ML/MIN/1.73M2 — SIGNIFICANT CHANGE UP
EGFR: 102 ML/MIN/1.73M2 — SIGNIFICANT CHANGE UP
EOSINOPHIL # BLD AUTO: 0.07 K/UL — SIGNIFICANT CHANGE UP (ref 0–0.5)
EOSINOPHIL NFR BLD AUTO: 1.1 % — SIGNIFICANT CHANGE UP (ref 0–6)
GLUCOSE SERPL-MCNC: 99 MG/DL — SIGNIFICANT CHANGE UP (ref 70–99)
GRAM STN FLD: ABNORMAL
GRAM STN FLD: ABNORMAL
HCT VFR BLD CALC: 30.6 % — LOW (ref 34.5–45)
HGB BLD-MCNC: 10.1 G/DL — LOW (ref 11.5–15.5)
IANC: 4.12 K/UL — SIGNIFICANT CHANGE UP (ref 1.8–7.4)
IMM GRANULOCYTES NFR BLD AUTO: 0.3 % — SIGNIFICANT CHANGE UP (ref 0–0.9)
LYMPHOCYTES # BLD AUTO: 1.61 K/UL — SIGNIFICANT CHANGE UP (ref 1–3.3)
LYMPHOCYTES # BLD AUTO: 25.1 % — SIGNIFICANT CHANGE UP (ref 13–44)
MAGNESIUM SERPL-MCNC: 1.6 MG/DL — SIGNIFICANT CHANGE UP (ref 1.6–2.6)
MCHC RBC-ENTMCNC: 28.4 PG — SIGNIFICANT CHANGE UP (ref 27–34)
MCHC RBC-ENTMCNC: 33 G/DL — SIGNIFICANT CHANGE UP (ref 32–36)
MCV RBC AUTO: 86 FL — SIGNIFICANT CHANGE UP (ref 80–100)
MONOCYTES # BLD AUTO: 0.55 K/UL — SIGNIFICANT CHANGE UP (ref 0–0.9)
MONOCYTES NFR BLD AUTO: 8.6 % — SIGNIFICANT CHANGE UP (ref 2–14)
MRSA PCR RESULT.: SIGNIFICANT CHANGE UP
NEUTROPHILS # BLD AUTO: 4.12 K/UL — SIGNIFICANT CHANGE UP (ref 1.8–7.4)
NEUTROPHILS NFR BLD AUTO: 64.1 % — SIGNIFICANT CHANGE UP (ref 43–77)
NRBC # BLD AUTO: 0 K/UL — SIGNIFICANT CHANGE UP (ref 0–0)
NRBC # BLD: 0 /100 WBCS — SIGNIFICANT CHANGE UP (ref 0–0)
NRBC # FLD: 0 K/UL — SIGNIFICANT CHANGE UP (ref 0–0)
NRBC BLD-RTO: 0 /100 WBCS — SIGNIFICANT CHANGE UP (ref 0–0)
ORGANISM # SPEC MICROSCOPIC CNT: ABNORMAL
ORGANISM # SPEC MICROSCOPIC CNT: ABNORMAL
PHOSPHATE SERPL-MCNC: 2.3 MG/DL — LOW (ref 2.5–4.5)
PLATELET # BLD AUTO: 249 K/UL — SIGNIFICANT CHANGE UP (ref 150–400)
POTASSIUM SERPL-MCNC: 4 MMOL/L — SIGNIFICANT CHANGE UP (ref 3.5–5.3)
POTASSIUM SERPL-SCNC: 4 MMOL/L — SIGNIFICANT CHANGE UP (ref 3.5–5.3)
RBC # BLD: 3.56 M/UL — LOW (ref 3.8–5.2)
RBC # FLD: 14.6 % — HIGH (ref 10.3–14.5)
S AUREUS DNA NOSE QL NAA+PROBE: SIGNIFICANT CHANGE UP
SODIUM SERPL-SCNC: 134 MMOL/L — LOW (ref 135–145)
SPECIMEN SOURCE: SIGNIFICANT CHANGE UP
WBC # BLD: 6.42 K/UL — SIGNIFICANT CHANGE UP (ref 3.8–10.5)
WBC # FLD AUTO: 6.42 K/UL — SIGNIFICANT CHANGE UP (ref 3.8–10.5)

## 2025-01-04 PROCEDURE — 74177 CT ABD & PELVIS W/CONTRAST: CPT | Mod: 26

## 2025-01-04 PROCEDURE — 99222 1ST HOSP IP/OBS MODERATE 55: CPT

## 2025-01-04 PROCEDURE — 99232 SBSQ HOSP IP/OBS MODERATE 35: CPT | Mod: GC

## 2025-01-04 PROCEDURE — G0545: CPT

## 2025-01-04 RX ORDER — OLANZAPINE 10 MG/1
5 TABLET ORAL ONCE
Refills: 0 | Status: COMPLETED | OUTPATIENT
Start: 2025-01-04 | End: 2025-01-04

## 2025-01-04 RX ORDER — SODIUM PHOSPHATE,DIBASIC DIHYD
15 POWDER (GRAM) MISCELLANEOUS ONCE
Refills: 0 | Status: COMPLETED | OUTPATIENT
Start: 2025-01-04 | End: 2025-01-04

## 2025-01-04 RX ORDER — MAGNESIUM OXIDE 400 MG
400 TABLET ORAL DAILY
Refills: 0 | Status: DISCONTINUED | OUTPATIENT
Start: 2025-01-04 | End: 2025-01-11

## 2025-01-04 RX ADMIN — MUPIROCIN CALCIUM 1 APPLICATION(S): 20 CREAM TOPICAL at 06:15

## 2025-01-04 RX ADMIN — ENOXAPARIN SODIUM 40 MILLIGRAM(S): 100 INJECTION SUBCUTANEOUS at 11:24

## 2025-01-04 RX ADMIN — Medication 63.75 MILLIMOLE(S): at 13:11

## 2025-01-04 RX ADMIN — Medication 166.67 MILLIGRAM(S): at 06:15

## 2025-01-04 RX ADMIN — Medication 400 MILLIGRAM(S): at 11:23

## 2025-01-04 RX ADMIN — CLONAZEPAM 0.25 MILLIGRAM(S): 0.5 TABLET ORAL at 23:28

## 2025-01-04 RX ADMIN — Medication 5 MILLIGRAM(S): at 00:38

## 2025-01-04 RX ADMIN — LISINOPRIL 2.5 MILLIGRAM(S): 5 TABLET ORAL at 06:15

## 2025-01-04 RX ADMIN — Medication 1 APPLICATION(S): at 11:24

## 2025-01-04 RX ADMIN — Medication 166.67 MILLIGRAM(S): at 18:32

## 2025-01-04 RX ADMIN — CEFEPIME 100 MILLIGRAM(S): 2 INJECTION, POWDER, FOR SOLUTION INTRAVENOUS at 17:46

## 2025-01-04 RX ADMIN — MUPIROCIN CALCIUM 1 APPLICATION(S): 20 CREAM TOPICAL at 19:02

## 2025-01-04 RX ADMIN — Medication 75 MICROGRAM(S): at 05:20

## 2025-01-04 RX ADMIN — OLANZAPINE 5 MILLIGRAM(S): 10 TABLET ORAL at 11:24

## 2025-01-04 RX ADMIN — CEFEPIME 100 MILLIGRAM(S): 2 INJECTION, POWDER, FOR SOLUTION INTRAVENOUS at 05:21

## 2025-01-05 LAB
-  AMPICILLIN/SULBACTAM: SIGNIFICANT CHANGE UP
-  AMPICILLIN: SIGNIFICANT CHANGE UP
-  AZTREONAM: SIGNIFICANT CHANGE UP
-  CEFAZOLIN: SIGNIFICANT CHANGE UP
-  CEFEPIME: SIGNIFICANT CHANGE UP
-  CEFTRIAXONE: SIGNIFICANT CHANGE UP
-  CEFUROXIME: SIGNIFICANT CHANGE UP
-  CIPROFLOXACIN: SIGNIFICANT CHANGE UP
-  ERTAPENEM: SIGNIFICANT CHANGE UP
-  GENTAMICIN: SIGNIFICANT CHANGE UP
-  IMIPENEM: SIGNIFICANT CHANGE UP
-  LEVOFLOXACIN: SIGNIFICANT CHANGE UP
-  MEROPENEM: SIGNIFICANT CHANGE UP
-  NITROFURANTOIN: SIGNIFICANT CHANGE UP
-  PIPERACILLIN/TAZOBACTAM: SIGNIFICANT CHANGE UP
-  TOBRAMYCIN: SIGNIFICANT CHANGE UP
-  TRIMETHOPRIM/SULFAMETHOXAZOLE: SIGNIFICANT CHANGE UP
ANION GAP SERPL CALC-SCNC: 14 MMOL/L — SIGNIFICANT CHANGE UP (ref 7–14)
BASOPHILS # BLD AUTO: 0.07 K/UL — SIGNIFICANT CHANGE UP (ref 0–0.2)
BASOPHILS NFR BLD AUTO: 1 % — SIGNIFICANT CHANGE UP (ref 0–2)
BUN SERPL-MCNC: 24 MG/DL — HIGH (ref 7–23)
CALCIUM SERPL-MCNC: 9.5 MG/DL — SIGNIFICANT CHANGE UP (ref 8.4–10.5)
CHLORIDE SERPL-SCNC: 99 MMOL/L — SIGNIFICANT CHANGE UP (ref 98–107)
CO2 SERPL-SCNC: 23 MMOL/L — SIGNIFICANT CHANGE UP (ref 22–31)
CREAT SERPL-MCNC: 0.72 MG/DL — SIGNIFICANT CHANGE UP (ref 0.5–1.3)
EGFR: 92 ML/MIN/1.73M2 — SIGNIFICANT CHANGE UP
EGFR: 92 ML/MIN/1.73M2 — SIGNIFICANT CHANGE UP
EOSINOPHIL # BLD AUTO: 0.07 K/UL — SIGNIFICANT CHANGE UP (ref 0–0.5)
EOSINOPHIL NFR BLD AUTO: 1 % — SIGNIFICANT CHANGE UP (ref 0–6)
GLUCOSE SERPL-MCNC: 205 MG/DL — HIGH (ref 70–99)
HCT VFR BLD CALC: 31.6 % — LOW (ref 34.5–45)
HGB BLD-MCNC: 10.5 G/DL — LOW (ref 11.5–15.5)
IANC: 3.91 K/UL — SIGNIFICANT CHANGE UP (ref 1.8–7.4)
IMM GRANULOCYTES NFR BLD AUTO: 0.3 % — SIGNIFICANT CHANGE UP (ref 0–0.9)
LYMPHOCYTES # BLD AUTO: 2.61 K/UL — SIGNIFICANT CHANGE UP (ref 1–3.3)
LYMPHOCYTES # BLD AUTO: 37.1 % — SIGNIFICANT CHANGE UP (ref 13–44)
MAGNESIUM SERPL-MCNC: 1.6 MG/DL — SIGNIFICANT CHANGE UP (ref 1.6–2.6)
MCHC RBC-ENTMCNC: 28.8 PG — SIGNIFICANT CHANGE UP (ref 27–34)
MCHC RBC-ENTMCNC: 33.2 G/DL — SIGNIFICANT CHANGE UP (ref 32–36)
MCV RBC AUTO: 86.8 FL — SIGNIFICANT CHANGE UP (ref 80–100)
METHOD TYPE: SIGNIFICANT CHANGE UP
MONOCYTES # BLD AUTO: 0.35 K/UL — SIGNIFICANT CHANGE UP (ref 0–0.9)
MONOCYTES NFR BLD AUTO: 5 % — SIGNIFICANT CHANGE UP (ref 2–14)
NEUTROPHILS # BLD AUTO: 3.91 K/UL — SIGNIFICANT CHANGE UP (ref 1.8–7.4)
NEUTROPHILS NFR BLD AUTO: 55.6 % — SIGNIFICANT CHANGE UP (ref 43–77)
NRBC # BLD AUTO: 0 K/UL — SIGNIFICANT CHANGE UP (ref 0–0)
NRBC # BLD: 0 /100 WBCS — SIGNIFICANT CHANGE UP (ref 0–0)
NRBC # FLD: 0 K/UL — SIGNIFICANT CHANGE UP (ref 0–0)
NRBC BLD-RTO: 0 /100 WBCS — SIGNIFICANT CHANGE UP (ref 0–0)
PHOSPHATE SERPL-MCNC: 3 MG/DL — SIGNIFICANT CHANGE UP (ref 2.5–4.5)
PLATELET # BLD AUTO: 267 K/UL — SIGNIFICANT CHANGE UP (ref 150–400)
POTASSIUM SERPL-MCNC: 3.6 MMOL/L — SIGNIFICANT CHANGE UP (ref 3.5–5.3)
POTASSIUM SERPL-SCNC: 3.6 MMOL/L — SIGNIFICANT CHANGE UP (ref 3.5–5.3)
RBC # BLD: 3.64 M/UL — LOW (ref 3.8–5.2)
RBC # FLD: 14.9 % — HIGH (ref 10.3–14.5)
SODIUM SERPL-SCNC: 136 MMOL/L — SIGNIFICANT CHANGE UP (ref 135–145)
VANCOMYCIN TROUGH SERPL-MCNC: 34.8 UG/ML — CRITICAL HIGH (ref 10–20)
WBC # BLD: 7.03 K/UL — SIGNIFICANT CHANGE UP (ref 3.8–10.5)
WBC # FLD AUTO: 7.03 K/UL — SIGNIFICANT CHANGE UP (ref 3.8–10.5)

## 2025-01-05 PROCEDURE — 99232 SBSQ HOSP IP/OBS MODERATE 35: CPT

## 2025-01-05 PROCEDURE — 99232 SBSQ HOSP IP/OBS MODERATE 35: CPT | Mod: GC

## 2025-01-05 PROCEDURE — G0545: CPT

## 2025-01-05 RX ORDER — MAGNESIUM SULFATE 500 MG/ML
2 SYRINGE (ML) INJECTION ONCE
Refills: 0 | Status: COMPLETED | OUTPATIENT
Start: 2025-01-05 | End: 2025-01-05

## 2025-01-05 RX ORDER — VANCOMYCIN HCL IN 5 % DEXTROSE 1.5G/250ML
1000 PLASTIC BAG, INJECTION (ML) INTRAVENOUS EVERY 12 HOURS
Refills: 0 | Status: DISCONTINUED | OUTPATIENT
Start: 2025-01-05 | End: 2025-01-05

## 2025-01-05 RX ORDER — OLANZAPINE 10 MG/1
5 TABLET ORAL ONCE
Refills: 0 | Status: COMPLETED | OUTPATIENT
Start: 2025-01-05 | End: 2025-01-05

## 2025-01-05 RX ORDER — ERTAPENEM SODIUM 1 G/1
1000 INJECTION, POWDER, LYOPHILIZED, FOR SOLUTION INTRAMUSCULAR; INTRAVENOUS EVERY 24 HOURS
Refills: 0 | Status: DISCONTINUED | OUTPATIENT
Start: 2025-01-05 | End: 2025-01-10

## 2025-01-05 RX ADMIN — MUPIROCIN CALCIUM 1 APPLICATION(S): 20 CREAM TOPICAL at 18:28

## 2025-01-05 RX ADMIN — OLANZAPINE 5 MILLIGRAM(S): 10 TABLET ORAL at 04:18

## 2025-01-05 RX ADMIN — LISINOPRIL 2.5 MILLIGRAM(S): 5 TABLET ORAL at 05:01

## 2025-01-05 RX ADMIN — Medication 20 MILLIEQUIVALENT(S): at 11:51

## 2025-01-05 RX ADMIN — CEFEPIME 100 MILLIGRAM(S): 2 INJECTION, POWDER, FOR SOLUTION INTRAVENOUS at 05:11

## 2025-01-05 RX ADMIN — Medication 25 GRAM(S): at 11:50

## 2025-01-05 RX ADMIN — Medication 75 MICROGRAM(S): at 05:01

## 2025-01-05 RX ADMIN — ENOXAPARIN SODIUM 40 MILLIGRAM(S): 100 INJECTION SUBCUTANEOUS at 12:30

## 2025-01-05 RX ADMIN — Medication 1 APPLICATION(S): at 11:50

## 2025-01-05 RX ADMIN — ERTAPENEM SODIUM 120 MILLIGRAM(S): 1 INJECTION, POWDER, LYOPHILIZED, FOR SOLUTION INTRAMUSCULAR; INTRAVENOUS at 13:24

## 2025-01-05 RX ADMIN — Medication 400 MILLIGRAM(S): at 11:51

## 2025-01-05 RX ADMIN — MUPIROCIN CALCIUM 1 APPLICATION(S): 20 CREAM TOPICAL at 05:09

## 2025-01-06 ENCOUNTER — TRANSCRIPTION ENCOUNTER (OUTPATIENT)
Age: 70
End: 2025-01-06

## 2025-01-06 ENCOUNTER — RESULT REVIEW (OUTPATIENT)
Age: 70
End: 2025-01-06

## 2025-01-06 DIAGNOSIS — R44.3 HALLUCINATIONS, UNSPECIFIED: ICD-10-CM

## 2025-01-06 DIAGNOSIS — R78.81 BACTEREMIA: ICD-10-CM

## 2025-01-06 LAB
-  AMPICILLIN: SIGNIFICANT CHANGE UP
-  CIPROFLOXACIN: SIGNIFICANT CHANGE UP
-  CLINDAMYCIN: SIGNIFICANT CHANGE UP
-  ERYTHROMYCIN: SIGNIFICANT CHANGE UP
-  GENTAMICIN: SIGNIFICANT CHANGE UP
-  LEVOFLOXACIN: SIGNIFICANT CHANGE UP
-  NITROFURANTOIN: SIGNIFICANT CHANGE UP
-  OXACILLIN: SIGNIFICANT CHANGE UP
-  RIFAMPIN: SIGNIFICANT CHANGE UP
-  TETRACYCLINE: SIGNIFICANT CHANGE UP
-  TETRACYCLINE: SIGNIFICANT CHANGE UP
-  VANCOMYCIN: SIGNIFICANT CHANGE UP
-  VANCOMYCIN: SIGNIFICANT CHANGE UP
ANION GAP SERPL CALC-SCNC: 15 MMOL/L — HIGH (ref 7–14)
BASOPHILS # BLD AUTO: 0.1 K/UL — SIGNIFICANT CHANGE UP (ref 0–0.2)
BASOPHILS NFR BLD AUTO: 1.1 % — SIGNIFICANT CHANGE UP (ref 0–2)
BUN SERPL-MCNC: 19 MG/DL — SIGNIFICANT CHANGE UP (ref 7–23)
CALCIUM SERPL-MCNC: 9.8 MG/DL — SIGNIFICANT CHANGE UP (ref 8.4–10.5)
CHLORIDE SERPL-SCNC: 99 MMOL/L — SIGNIFICANT CHANGE UP (ref 98–107)
CO2 SERPL-SCNC: 24 MMOL/L — SIGNIFICANT CHANGE UP (ref 22–31)
CREAT SERPL-MCNC: 0.58 MG/DL — SIGNIFICANT CHANGE UP (ref 0.5–1.3)
CULTURE RESULTS: ABNORMAL
CULTURE RESULTS: ABNORMAL
EGFR: 99 ML/MIN/1.73M2 — SIGNIFICANT CHANGE UP
EGFR: 99 ML/MIN/1.73M2 — SIGNIFICANT CHANGE UP
EOSINOPHIL # BLD AUTO: 0.03 K/UL — SIGNIFICANT CHANGE UP (ref 0–0.5)
EOSINOPHIL NFR BLD AUTO: 0.3 % — SIGNIFICANT CHANGE UP (ref 0–6)
GLUCOSE SERPL-MCNC: 104 MG/DL — HIGH (ref 70–99)
HCT VFR BLD CALC: 34.9 % — SIGNIFICANT CHANGE UP (ref 34.5–45)
HGB BLD-MCNC: 11.1 G/DL — LOW (ref 11.5–15.5)
IANC: 5.13 K/UL — SIGNIFICANT CHANGE UP (ref 1.8–7.4)
IMM GRANULOCYTES NFR BLD AUTO: 0.3 % — SIGNIFICANT CHANGE UP (ref 0–0.9)
LYMPHOCYTES # BLD AUTO: 2.57 K/UL — SIGNIFICANT CHANGE UP (ref 1–3.3)
LYMPHOCYTES # BLD AUTO: 28.7 % — SIGNIFICANT CHANGE UP (ref 13–44)
MAGNESIUM SERPL-MCNC: 2.3 MG/DL — SIGNIFICANT CHANGE UP (ref 1.6–2.6)
MCHC RBC-ENTMCNC: 28.2 PG — SIGNIFICANT CHANGE UP (ref 27–34)
MCHC RBC-ENTMCNC: 31.8 G/DL — LOW (ref 32–36)
MCV RBC AUTO: 88.6 FL — SIGNIFICANT CHANGE UP (ref 80–100)
METHOD TYPE: SIGNIFICANT CHANGE UP
METHOD TYPE: SIGNIFICANT CHANGE UP
MONOCYTES # BLD AUTO: 1.09 K/UL — HIGH (ref 0–0.9)
MONOCYTES NFR BLD AUTO: 12.2 % — SIGNIFICANT CHANGE UP (ref 2–14)
NEUTROPHILS # BLD AUTO: 5.13 K/UL — SIGNIFICANT CHANGE UP (ref 1.8–7.4)
NEUTROPHILS NFR BLD AUTO: 57.4 % — SIGNIFICANT CHANGE UP (ref 43–77)
NRBC # BLD AUTO: 0 K/UL — SIGNIFICANT CHANGE UP (ref 0–0)
NRBC # BLD: 0 /100 WBCS — SIGNIFICANT CHANGE UP (ref 0–0)
NRBC # FLD: 0 K/UL — SIGNIFICANT CHANGE UP (ref 0–0)
NRBC BLD-RTO: 0 /100 WBCS — SIGNIFICANT CHANGE UP (ref 0–0)
ORGANISM # SPEC MICROSCOPIC CNT: ABNORMAL
PHOSPHATE SERPL-MCNC: 2.7 MG/DL — SIGNIFICANT CHANGE UP (ref 2.5–4.5)
PLATELET # BLD AUTO: 315 K/UL — SIGNIFICANT CHANGE UP (ref 150–400)
POTASSIUM SERPL-MCNC: 4.7 MMOL/L — SIGNIFICANT CHANGE UP (ref 3.5–5.3)
POTASSIUM SERPL-SCNC: 4.7 MMOL/L — SIGNIFICANT CHANGE UP (ref 3.5–5.3)
RBC # BLD: 3.94 M/UL — SIGNIFICANT CHANGE UP (ref 3.8–5.2)
RBC # FLD: 15.7 % — HIGH (ref 10.3–14.5)
SODIUM SERPL-SCNC: 138 MMOL/L — SIGNIFICANT CHANGE UP (ref 135–145)
SPECIMEN SOURCE: SIGNIFICANT CHANGE UP
SPECIMEN SOURCE: SIGNIFICANT CHANGE UP
VANCOMYCIN TROUGH SERPL-MCNC: 14.1 UG/ML — SIGNIFICANT CHANGE UP (ref 10–20)
WBC # BLD: 8.95 K/UL — SIGNIFICANT CHANGE UP (ref 3.8–10.5)
WBC # FLD AUTO: 8.95 K/UL — SIGNIFICANT CHANGE UP (ref 3.8–10.5)

## 2025-01-06 PROCEDURE — 99233 SBSQ HOSP IP/OBS HIGH 50: CPT | Mod: GC

## 2025-01-06 PROCEDURE — 99232 SBSQ HOSP IP/OBS MODERATE 35: CPT

## 2025-01-06 PROCEDURE — G0545: CPT

## 2025-01-06 PROCEDURE — 93306 TTE W/DOPPLER COMPLETE: CPT | Mod: 26

## 2025-01-06 RX ORDER — CLONAZEPAM 0.5 MG/1
0.25 TABLET ORAL
Refills: 0 | Status: DISCONTINUED | OUTPATIENT
Start: 2025-01-06 | End: 2025-01-06

## 2025-01-06 RX ORDER — CLONAZEPAM 0.5 MG/1
0.25 TABLET ORAL DAILY
Refills: 0 | Status: DISCONTINUED | OUTPATIENT
Start: 2025-01-06 | End: 2025-01-11

## 2025-01-06 RX ORDER — VANCOMYCIN HCL IN 5 % DEXTROSE 1.5G/250ML
1000 PLASTIC BAG, INJECTION (ML) INTRAVENOUS EVERY 24 HOURS
Refills: 0 | Status: DISCONTINUED | OUTPATIENT
Start: 2025-01-06 | End: 2025-01-08

## 2025-01-06 RX ORDER — VANCOMYCIN HCL IN 5 % DEXTROSE 1.5G/250ML
PLASTIC BAG, INJECTION (ML) INTRAVENOUS
Refills: 0 | Status: DISCONTINUED | OUTPATIENT
Start: 2025-01-06 | End: 2025-01-06

## 2025-01-06 RX ORDER — HALOPERIDOL 10 MG/1
2.5 TABLET ORAL ONCE
Refills: 0 | Status: COMPLETED | OUTPATIENT
Start: 2025-01-06 | End: 2025-01-06

## 2025-01-06 RX ORDER — OLANZAPINE 10 MG/1
5 TABLET ORAL ONCE
Refills: 0 | Status: DISCONTINUED | OUTPATIENT
Start: 2025-01-06 | End: 2025-01-06

## 2025-01-06 RX ADMIN — ERTAPENEM SODIUM 120 MILLIGRAM(S): 1 INJECTION, POWDER, LYOPHILIZED, FOR SOLUTION INTRAMUSCULAR; INTRAVENOUS at 11:37

## 2025-01-06 RX ADMIN — MUPIROCIN CALCIUM 1 APPLICATION(S): 20 CREAM TOPICAL at 18:11

## 2025-01-06 RX ADMIN — ENOXAPARIN SODIUM 40 MILLIGRAM(S): 100 INJECTION SUBCUTANEOUS at 11:37

## 2025-01-06 RX ADMIN — HALOPERIDOL 2.5 MILLIGRAM(S): 10 TABLET ORAL at 09:44

## 2025-01-06 RX ADMIN — Medication 1 APPLICATION(S): at 11:37

## 2025-01-06 RX ADMIN — Medication 250 MILLIGRAM(S): at 12:14

## 2025-01-06 RX ADMIN — Medication 400 MILLIGRAM(S): at 11:37

## 2025-01-07 LAB
ANION GAP SERPL CALC-SCNC: 15 MMOL/L — HIGH (ref 7–14)
BASOPHILS # BLD AUTO: 0.1 K/UL — SIGNIFICANT CHANGE UP (ref 0–0.2)
BASOPHILS NFR BLD AUTO: 1.3 % — SIGNIFICANT CHANGE UP (ref 0–2)
BUN SERPL-MCNC: 27 MG/DL — HIGH (ref 7–23)
CALCIUM SERPL-MCNC: 9.6 MG/DL — SIGNIFICANT CHANGE UP (ref 8.4–10.5)
CHLORIDE SERPL-SCNC: 99 MMOL/L — SIGNIFICANT CHANGE UP (ref 98–107)
CO2 SERPL-SCNC: 21 MMOL/L — LOW (ref 22–31)
CREAT SERPL-MCNC: 0.74 MG/DL — SIGNIFICANT CHANGE UP (ref 0.5–1.3)
EGFR: 89 ML/MIN/1.73M2 — SIGNIFICANT CHANGE UP
EGFR: 89 ML/MIN/1.73M2 — SIGNIFICANT CHANGE UP
EOSINOPHIL # BLD AUTO: 0.13 K/UL — SIGNIFICANT CHANGE UP (ref 0–0.5)
EOSINOPHIL NFR BLD AUTO: 1.7 % — SIGNIFICANT CHANGE UP (ref 0–6)
GLUCOSE SERPL-MCNC: 89 MG/DL — SIGNIFICANT CHANGE UP (ref 70–99)
HCT VFR BLD CALC: 32.3 % — LOW (ref 34.5–45)
HGB BLD-MCNC: 10.4 G/DL — LOW (ref 11.5–15.5)
IANC: 4.33 K/UL — SIGNIFICANT CHANGE UP (ref 1.8–7.4)
IMM GRANULOCYTES NFR BLD AUTO: 0.3 % — SIGNIFICANT CHANGE UP (ref 0–0.9)
LYMPHOCYTES # BLD AUTO: 2.2 K/UL — SIGNIFICANT CHANGE UP (ref 1–3.3)
LYMPHOCYTES # BLD AUTO: 29.5 % — SIGNIFICANT CHANGE UP (ref 13–44)
MAGNESIUM SERPL-MCNC: 2.2 MG/DL — SIGNIFICANT CHANGE UP (ref 1.6–2.6)
MCHC RBC-ENTMCNC: 28.4 PG — SIGNIFICANT CHANGE UP (ref 27–34)
MCHC RBC-ENTMCNC: 32.2 G/DL — SIGNIFICANT CHANGE UP (ref 32–36)
MCV RBC AUTO: 88.3 FL — SIGNIFICANT CHANGE UP (ref 80–100)
MONOCYTES # BLD AUTO: 0.69 K/UL — SIGNIFICANT CHANGE UP (ref 0–0.9)
MONOCYTES NFR BLD AUTO: 9.2 % — SIGNIFICANT CHANGE UP (ref 2–14)
NEUTROPHILS # BLD AUTO: 4.33 K/UL — SIGNIFICANT CHANGE UP (ref 1.8–7.4)
NEUTROPHILS NFR BLD AUTO: 58 % — SIGNIFICANT CHANGE UP (ref 43–77)
NRBC # BLD AUTO: 0 K/UL — SIGNIFICANT CHANGE UP (ref 0–0)
NRBC # BLD: 0 /100 WBCS — SIGNIFICANT CHANGE UP (ref 0–0)
NRBC # FLD: 0 K/UL — SIGNIFICANT CHANGE UP (ref 0–0)
NRBC BLD-RTO: 0 /100 WBCS — SIGNIFICANT CHANGE UP (ref 0–0)
PHOSPHATE SERPL-MCNC: 3.1 MG/DL — SIGNIFICANT CHANGE UP (ref 2.5–4.5)
PLATELET # BLD AUTO: 290 K/UL — SIGNIFICANT CHANGE UP (ref 150–400)
POTASSIUM SERPL-MCNC: 4.5 MMOL/L — SIGNIFICANT CHANGE UP (ref 3.5–5.3)
POTASSIUM SERPL-SCNC: 4.5 MMOL/L — SIGNIFICANT CHANGE UP (ref 3.5–5.3)
RBC # BLD: 3.66 M/UL — LOW (ref 3.8–5.2)
RBC # FLD: 15.5 % — HIGH (ref 10.3–14.5)
SODIUM SERPL-SCNC: 135 MMOL/L — SIGNIFICANT CHANGE UP (ref 135–145)
WBC # BLD: 7.47 K/UL — SIGNIFICANT CHANGE UP (ref 3.8–10.5)
WBC # FLD AUTO: 7.47 K/UL — SIGNIFICANT CHANGE UP (ref 3.8–10.5)

## 2025-01-07 PROCEDURE — G0545: CPT

## 2025-01-07 PROCEDURE — 99232 SBSQ HOSP IP/OBS MODERATE 35: CPT | Mod: GC

## 2025-01-07 PROCEDURE — 99232 SBSQ HOSP IP/OBS MODERATE 35: CPT

## 2025-01-07 RX ORDER — HALOPERIDOL 10 MG/1
2.5 TABLET ORAL ONCE
Refills: 0 | Status: DISCONTINUED | OUTPATIENT
Start: 2025-01-07 | End: 2025-01-07

## 2025-01-07 RX ADMIN — Medication 75 MICROGRAM(S): at 05:02

## 2025-01-07 RX ADMIN — ENOXAPARIN SODIUM 40 MILLIGRAM(S): 100 INJECTION SUBCUTANEOUS at 11:46

## 2025-01-07 RX ADMIN — LISINOPRIL 2.5 MILLIGRAM(S): 5 TABLET ORAL at 06:32

## 2025-01-07 RX ADMIN — Medication 400 MILLIGRAM(S): at 11:44

## 2025-01-07 RX ADMIN — MUPIROCIN CALCIUM 1 APPLICATION(S): 20 CREAM TOPICAL at 06:33

## 2025-01-07 RX ADMIN — Medication 500 MILLILITER(S): at 14:21

## 2025-01-07 RX ADMIN — ERTAPENEM SODIUM 120 MILLIGRAM(S): 1 INJECTION, POWDER, LYOPHILIZED, FOR SOLUTION INTRAMUSCULAR; INTRAVENOUS at 13:41

## 2025-01-07 RX ADMIN — Medication 250 MILLIGRAM(S): at 11:48

## 2025-01-07 RX ADMIN — Medication 1 APPLICATION(S): at 11:51

## 2025-01-08 LAB
ADD ON TEST-SPECIMEN IN LAB: SIGNIFICANT CHANGE UP
ANION GAP SERPL CALC-SCNC: 13 MMOL/L — SIGNIFICANT CHANGE UP (ref 7–14)
BASOPHILS # BLD AUTO: 0.08 K/UL — SIGNIFICANT CHANGE UP (ref 0–0.2)
BASOPHILS NFR BLD AUTO: 1 % — SIGNIFICANT CHANGE UP (ref 0–2)
BUN SERPL-MCNC: 21 MG/DL — SIGNIFICANT CHANGE UP (ref 7–23)
CALCIUM SERPL-MCNC: 9.5 MG/DL — SIGNIFICANT CHANGE UP (ref 8.4–10.5)
CHLORIDE SERPL-SCNC: 100 MMOL/L — SIGNIFICANT CHANGE UP (ref 98–107)
CK SERPL-CCNC: 138 U/L — SIGNIFICANT CHANGE UP (ref 25–170)
CO2 SERPL-SCNC: 22 MMOL/L — SIGNIFICANT CHANGE UP (ref 22–31)
CREAT SERPL-MCNC: 0.58 MG/DL — SIGNIFICANT CHANGE UP (ref 0.5–1.3)
EGFR: 99 ML/MIN/1.73M2 — SIGNIFICANT CHANGE UP
EGFR: 99 ML/MIN/1.73M2 — SIGNIFICANT CHANGE UP
EOSINOPHIL # BLD AUTO: 0.11 K/UL — SIGNIFICANT CHANGE UP (ref 0–0.5)
EOSINOPHIL NFR BLD AUTO: 1.4 % — SIGNIFICANT CHANGE UP (ref 0–6)
GLUCOSE SERPL-MCNC: 102 MG/DL — HIGH (ref 70–99)
HCT VFR BLD CALC: 33.4 % — LOW (ref 34.5–45)
HGB BLD-MCNC: 10.8 G/DL — LOW (ref 11.5–15.5)
IANC: 4.44 K/UL — SIGNIFICANT CHANGE UP (ref 1.8–7.4)
IMM GRANULOCYTES NFR BLD AUTO: 0.3 % — SIGNIFICANT CHANGE UP (ref 0–0.9)
LYMPHOCYTES # BLD AUTO: 2.32 K/UL — SIGNIFICANT CHANGE UP (ref 1–3.3)
LYMPHOCYTES # BLD AUTO: 29.7 % — SIGNIFICANT CHANGE UP (ref 13–44)
MAGNESIUM SERPL-MCNC: 1.9 MG/DL — SIGNIFICANT CHANGE UP (ref 1.6–2.6)
MCHC RBC-ENTMCNC: 28.6 PG — SIGNIFICANT CHANGE UP (ref 27–34)
MCHC RBC-ENTMCNC: 32.3 G/DL — SIGNIFICANT CHANGE UP (ref 32–36)
MCV RBC AUTO: 88.6 FL — SIGNIFICANT CHANGE UP (ref 80–100)
MONOCYTES # BLD AUTO: 0.83 K/UL — SIGNIFICANT CHANGE UP (ref 0–0.9)
MONOCYTES NFR BLD AUTO: 10.6 % — SIGNIFICANT CHANGE UP (ref 2–14)
NEUTROPHILS # BLD AUTO: 4.44 K/UL — SIGNIFICANT CHANGE UP (ref 1.8–7.4)
NEUTROPHILS NFR BLD AUTO: 57 % — SIGNIFICANT CHANGE UP (ref 43–77)
NRBC # BLD AUTO: 0 K/UL — SIGNIFICANT CHANGE UP (ref 0–0)
NRBC # BLD: 0 /100 WBCS — SIGNIFICANT CHANGE UP (ref 0–0)
NRBC # FLD: 0 K/UL — SIGNIFICANT CHANGE UP (ref 0–0)
NRBC BLD-RTO: 0 /100 WBCS — SIGNIFICANT CHANGE UP (ref 0–0)
PHOSPHATE SERPL-MCNC: 2.4 MG/DL — LOW (ref 2.5–4.5)
PLATELET # BLD AUTO: 309 K/UL — SIGNIFICANT CHANGE UP (ref 150–400)
POTASSIUM SERPL-MCNC: 4.7 MMOL/L — SIGNIFICANT CHANGE UP (ref 3.5–5.3)
POTASSIUM SERPL-SCNC: 4.7 MMOL/L — SIGNIFICANT CHANGE UP (ref 3.5–5.3)
RBC # BLD: 3.77 M/UL — LOW (ref 3.8–5.2)
RBC # FLD: 15.4 % — HIGH (ref 10.3–14.5)
SODIUM SERPL-SCNC: 135 MMOL/L — SIGNIFICANT CHANGE UP (ref 135–145)
VANCOMYCIN TROUGH SERPL-MCNC: 19.5 UG/ML — SIGNIFICANT CHANGE UP (ref 10–20)
WBC # BLD: 7.8 K/UL — SIGNIFICANT CHANGE UP (ref 3.8–10.5)
WBC # FLD AUTO: 7.8 K/UL — SIGNIFICANT CHANGE UP (ref 3.8–10.5)

## 2025-01-08 PROCEDURE — 99233 SBSQ HOSP IP/OBS HIGH 50: CPT | Mod: GC

## 2025-01-08 RX ORDER — QUETIAPINE FUMARATE 25 MG/1
50 TABLET ORAL AT BEDTIME
Refills: 0 | Status: DISCONTINUED | OUTPATIENT
Start: 2025-01-08 | End: 2025-01-11

## 2025-01-08 RX ORDER — LABETALOL HYDROCHLORIDE 200 MG/1
5 TABLET, FILM COATED ORAL ONCE
Refills: 0 | Status: DISCONTINUED | OUTPATIENT
Start: 2025-01-08 | End: 2025-01-08

## 2025-01-08 RX ORDER — LORAZEPAM 4 MG/ML
2 VIAL (ML) INJECTION ONCE
Refills: 0 | Status: DISCONTINUED | OUTPATIENT
Start: 2025-01-08 | End: 2025-01-08

## 2025-01-08 RX ORDER — VANCOMYCIN HCL IN 5 % DEXTROSE 1.5G/250ML
750 PLASTIC BAG, INJECTION (ML) INTRAVENOUS EVERY 24 HOURS
Refills: 0 | Status: COMPLETED | OUTPATIENT
Start: 2025-01-08 | End: 2025-01-10

## 2025-01-08 RX ORDER — OLANZAPINE 10 MG/1
2.5 TABLET ORAL ONCE
Refills: 0 | Status: DISCONTINUED | OUTPATIENT
Start: 2025-01-08 | End: 2025-01-08

## 2025-01-08 RX ADMIN — CLONAZEPAM 0.25 MILLIGRAM(S): 0.5 TABLET ORAL at 01:39

## 2025-01-08 RX ADMIN — Medication 75 MICROGRAM(S): at 06:42

## 2025-01-08 RX ADMIN — MUPIROCIN CALCIUM 1 APPLICATION(S): 20 CREAM TOPICAL at 18:07

## 2025-01-08 RX ADMIN — LISINOPRIL 2.5 MILLIGRAM(S): 5 TABLET ORAL at 06:43

## 2025-01-08 RX ADMIN — Medication 1 APPLICATION(S): at 13:26

## 2025-01-08 RX ADMIN — Medication 2 MILLIGRAM(S): at 11:02

## 2025-01-08 RX ADMIN — Medication 250 MILLIGRAM(S): at 16:47

## 2025-01-08 RX ADMIN — ENOXAPARIN SODIUM 40 MILLIGRAM(S): 100 INJECTION SUBCUTANEOUS at 13:25

## 2025-01-08 RX ADMIN — MUPIROCIN CALCIUM 1 APPLICATION(S): 20 CREAM TOPICAL at 06:43

## 2025-01-08 RX ADMIN — Medication 400 MILLIGRAM(S): at 13:26

## 2025-01-08 RX ADMIN — ERTAPENEM SODIUM 120 MILLIGRAM(S): 1 INJECTION, POWDER, LYOPHILIZED, FOR SOLUTION INTRAMUSCULAR; INTRAVENOUS at 13:22

## 2025-01-09 LAB
ANION GAP SERPL CALC-SCNC: 11 MMOL/L — SIGNIFICANT CHANGE UP (ref 7–14)
BASOPHILS # BLD AUTO: 0.09 K/UL — SIGNIFICANT CHANGE UP (ref 0–0.2)
BASOPHILS NFR BLD AUTO: 1.3 % — SIGNIFICANT CHANGE UP (ref 0–2)
BUN SERPL-MCNC: 26 MG/DL — HIGH (ref 7–23)
CALCIUM SERPL-MCNC: 9.2 MG/DL — SIGNIFICANT CHANGE UP (ref 8.4–10.5)
CHLORIDE SERPL-SCNC: 104 MMOL/L — SIGNIFICANT CHANGE UP (ref 98–107)
CO2 SERPL-SCNC: 23 MMOL/L — SIGNIFICANT CHANGE UP (ref 22–31)
CREAT SERPL-MCNC: 0.63 MG/DL — SIGNIFICANT CHANGE UP (ref 0.5–1.3)
EGFR: 97 ML/MIN/1.73M2 — SIGNIFICANT CHANGE UP
EGFR: 97 ML/MIN/1.73M2 — SIGNIFICANT CHANGE UP
EOSINOPHIL # BLD AUTO: 0.13 K/UL — SIGNIFICANT CHANGE UP (ref 0–0.5)
EOSINOPHIL NFR BLD AUTO: 1.9 % — SIGNIFICANT CHANGE UP (ref 0–6)
GLUCOSE SERPL-MCNC: 82 MG/DL — SIGNIFICANT CHANGE UP (ref 70–99)
HCT VFR BLD CALC: 29.4 % — LOW (ref 34.5–45)
HGB BLD-MCNC: 9.3 G/DL — LOW (ref 11.5–15.5)
IANC: 2.87 K/UL — SIGNIFICANT CHANGE UP (ref 1.8–7.4)
IMM GRANULOCYTES NFR BLD AUTO: 0.1 % — SIGNIFICANT CHANGE UP (ref 0–0.9)
LYMPHOCYTES # BLD AUTO: 3.11 K/UL — SIGNIFICANT CHANGE UP (ref 1–3.3)
LYMPHOCYTES # BLD AUTO: 45.5 % — HIGH (ref 13–44)
MAGNESIUM SERPL-MCNC: 2 MG/DL — SIGNIFICANT CHANGE UP (ref 1.6–2.6)
MCHC RBC-ENTMCNC: 28.5 PG — SIGNIFICANT CHANGE UP (ref 27–34)
MCHC RBC-ENTMCNC: 31.6 G/DL — LOW (ref 32–36)
MCV RBC AUTO: 90.2 FL — SIGNIFICANT CHANGE UP (ref 80–100)
MONOCYTES # BLD AUTO: 0.62 K/UL — SIGNIFICANT CHANGE UP (ref 0–0.9)
MONOCYTES NFR BLD AUTO: 9.1 % — SIGNIFICANT CHANGE UP (ref 2–14)
NEUTROPHILS # BLD AUTO: 2.87 K/UL — SIGNIFICANT CHANGE UP (ref 1.8–7.4)
NEUTROPHILS NFR BLD AUTO: 42.1 % — LOW (ref 43–77)
NRBC # BLD AUTO: 0 K/UL — SIGNIFICANT CHANGE UP (ref 0–0)
NRBC # BLD: 0 /100 WBCS — SIGNIFICANT CHANGE UP (ref 0–0)
NRBC # FLD: 0 K/UL — SIGNIFICANT CHANGE UP (ref 0–0)
NRBC BLD-RTO: 0 /100 WBCS — SIGNIFICANT CHANGE UP (ref 0–0)
PHOSPHATE SERPL-MCNC: 3.4 MG/DL — SIGNIFICANT CHANGE UP (ref 2.5–4.5)
PLATELET # BLD AUTO: 256 K/UL — SIGNIFICANT CHANGE UP (ref 150–400)
POTASSIUM SERPL-MCNC: 4.2 MMOL/L — SIGNIFICANT CHANGE UP (ref 3.5–5.3)
POTASSIUM SERPL-SCNC: 4.2 MMOL/L — SIGNIFICANT CHANGE UP (ref 3.5–5.3)
RBC # BLD: 3.26 M/UL — LOW (ref 3.8–5.2)
RBC # FLD: 15.5 % — HIGH (ref 10.3–14.5)
SODIUM SERPL-SCNC: 138 MMOL/L — SIGNIFICANT CHANGE UP (ref 135–145)
WBC # BLD: 6.83 K/UL — SIGNIFICANT CHANGE UP (ref 3.8–10.5)
WBC # FLD AUTO: 6.83 K/UL — SIGNIFICANT CHANGE UP (ref 3.8–10.5)

## 2025-01-09 PROCEDURE — 99233 SBSQ HOSP IP/OBS HIGH 50: CPT | Mod: GC

## 2025-01-09 PROCEDURE — 99232 SBSQ HOSP IP/OBS MODERATE 35: CPT

## 2025-01-09 PROCEDURE — G0545: CPT

## 2025-01-09 RX ORDER — BISACODYL 5 MG
10 TABLET, DELAYED RELEASE (ENTERIC COATED) ORAL DAILY
Refills: 0 | Status: DISCONTINUED | OUTPATIENT
Start: 2025-01-09 | End: 2025-01-11

## 2025-01-09 RX ORDER — LACTOBACILLUS ACIDOPHILUS/PECT 75 MM-100
1 CAPSULE ORAL DAILY
Refills: 0 | Status: DISCONTINUED | OUTPATIENT
Start: 2025-01-09 | End: 2025-01-11

## 2025-01-09 RX ORDER — SODIUM CHLORIDE 9 G/1000ML
750 INJECTION, SOLUTION INTRAVENOUS
Refills: 0 | Status: DISCONTINUED | OUTPATIENT
Start: 2025-01-09 | End: 2025-01-11

## 2025-01-09 RX ADMIN — ENOXAPARIN SODIUM 40 MILLIGRAM(S): 100 INJECTION SUBCUTANEOUS at 12:33

## 2025-01-09 RX ADMIN — Medication 75 MICROGRAM(S): at 05:11

## 2025-01-09 RX ADMIN — Medication 1 APPLICATION(S): at 12:32

## 2025-01-09 RX ADMIN — SODIUM CHLORIDE 75 MILLILITER(S): 9 INJECTION, SOLUTION INTRAVENOUS at 18:41

## 2025-01-09 RX ADMIN — Medication 250 MILLIGRAM(S): at 18:39

## 2025-01-09 RX ADMIN — QUETIAPINE FUMARATE 50 MILLIGRAM(S): 25 TABLET ORAL at 22:35

## 2025-01-09 RX ADMIN — ERTAPENEM SODIUM 120 MILLIGRAM(S): 1 INJECTION, POWDER, LYOPHILIZED, FOR SOLUTION INTRAMUSCULAR; INTRAVENOUS at 12:32

## 2025-01-09 RX ADMIN — Medication 400 MILLIGRAM(S): at 12:32

## 2025-01-09 RX ADMIN — Medication 1 TABLET(S): at 18:39

## 2025-01-10 ENCOUNTER — TRANSCRIPTION ENCOUNTER (OUTPATIENT)
Age: 70
End: 2025-01-10

## 2025-01-10 LAB
ANION GAP SERPL CALC-SCNC: 11 MMOL/L — SIGNIFICANT CHANGE UP (ref 7–14)
BASOPHILS # BLD AUTO: 0.08 K/UL — SIGNIFICANT CHANGE UP (ref 0–0.2)
BASOPHILS NFR BLD AUTO: 1.2 % — SIGNIFICANT CHANGE UP (ref 0–2)
BUN SERPL-MCNC: 21 MG/DL — SIGNIFICANT CHANGE UP (ref 7–23)
CALCIUM SERPL-MCNC: 9.1 MG/DL — SIGNIFICANT CHANGE UP (ref 8.4–10.5)
CHLORIDE SERPL-SCNC: 107 MMOL/L — SIGNIFICANT CHANGE UP (ref 98–107)
CO2 SERPL-SCNC: 25 MMOL/L — SIGNIFICANT CHANGE UP (ref 22–31)
CREAT SERPL-MCNC: 0.6 MG/DL — SIGNIFICANT CHANGE UP (ref 0.5–1.3)
CULTURE RESULTS: SIGNIFICANT CHANGE UP
CULTURE RESULTS: SIGNIFICANT CHANGE UP
EGFR: 98 ML/MIN/1.73M2 — SIGNIFICANT CHANGE UP
EGFR: 98 ML/MIN/1.73M2 — SIGNIFICANT CHANGE UP
EOSINOPHIL # BLD AUTO: 0.14 K/UL — SIGNIFICANT CHANGE UP (ref 0–0.5)
EOSINOPHIL NFR BLD AUTO: 2.2 % — SIGNIFICANT CHANGE UP (ref 0–6)
GLUCOSE SERPL-MCNC: 82 MG/DL — SIGNIFICANT CHANGE UP (ref 70–99)
HCT VFR BLD CALC: 28.3 % — LOW (ref 34.5–45)
HGB BLD-MCNC: 9.1 G/DL — LOW (ref 11.5–15.5)
IANC: 2.88 K/UL — SIGNIFICANT CHANGE UP (ref 1.8–7.4)
IMM GRANULOCYTES NFR BLD AUTO: 0.3 % — SIGNIFICANT CHANGE UP (ref 0–0.9)
LYMPHOCYTES # BLD AUTO: 2.69 K/UL — SIGNIFICANT CHANGE UP (ref 1–3.3)
LYMPHOCYTES # BLD AUTO: 41.8 % — SIGNIFICANT CHANGE UP (ref 13–44)
MAGNESIUM SERPL-MCNC: 2 MG/DL — SIGNIFICANT CHANGE UP (ref 1.6–2.6)
MCHC RBC-ENTMCNC: 29 PG — SIGNIFICANT CHANGE UP (ref 27–34)
MCHC RBC-ENTMCNC: 32.2 G/DL — SIGNIFICANT CHANGE UP (ref 32–36)
MCV RBC AUTO: 90.1 FL — SIGNIFICANT CHANGE UP (ref 80–100)
MONOCYTES # BLD AUTO: 0.63 K/UL — SIGNIFICANT CHANGE UP (ref 0–0.9)
MONOCYTES NFR BLD AUTO: 9.8 % — SIGNIFICANT CHANGE UP (ref 2–14)
NEUTROPHILS # BLD AUTO: 2.88 K/UL — SIGNIFICANT CHANGE UP (ref 1.8–7.4)
NEUTROPHILS NFR BLD AUTO: 44.7 % — SIGNIFICANT CHANGE UP (ref 43–77)
NRBC # BLD AUTO: 0 K/UL — SIGNIFICANT CHANGE UP (ref 0–0)
NRBC # BLD: 0 /100 WBCS — SIGNIFICANT CHANGE UP (ref 0–0)
NRBC # FLD: 0 K/UL — SIGNIFICANT CHANGE UP (ref 0–0)
NRBC BLD-RTO: 0 /100 WBCS — SIGNIFICANT CHANGE UP (ref 0–0)
PHOSPHATE SERPL-MCNC: 2.5 MG/DL — SIGNIFICANT CHANGE UP (ref 2.5–4.5)
PLATELET # BLD AUTO: 293 K/UL — SIGNIFICANT CHANGE UP (ref 150–400)
POTASSIUM SERPL-MCNC: 4.3 MMOL/L — SIGNIFICANT CHANGE UP (ref 3.5–5.3)
POTASSIUM SERPL-SCNC: 4.3 MMOL/L — SIGNIFICANT CHANGE UP (ref 3.5–5.3)
RBC # BLD: 3.14 M/UL — LOW (ref 3.8–5.2)
RBC # FLD: 15.4 % — HIGH (ref 10.3–14.5)
SODIUM SERPL-SCNC: 143 MMOL/L — SIGNIFICANT CHANGE UP (ref 135–145)
SPECIMEN SOURCE: SIGNIFICANT CHANGE UP
SPECIMEN SOURCE: SIGNIFICANT CHANGE UP
VANCOMYCIN TROUGH SERPL-MCNC: 12.1 UG/ML — SIGNIFICANT CHANGE UP (ref 10–20)
WBC # BLD: 6.44 K/UL — SIGNIFICANT CHANGE UP (ref 3.8–10.5)
WBC # FLD AUTO: 6.44 K/UL — SIGNIFICANT CHANGE UP (ref 3.8–10.5)

## 2025-01-10 PROCEDURE — G0545: CPT

## 2025-01-10 PROCEDURE — 99233 SBSQ HOSP IP/OBS HIGH 50: CPT

## 2025-01-10 PROCEDURE — 99233 SBSQ HOSP IP/OBS HIGH 50: CPT | Mod: GC

## 2025-01-10 RX ORDER — DOXYCYCLINE HYCLATE 100 MG
1 TABLET ORAL
Qty: 11 | Refills: 0
Start: 2025-01-10

## 2025-01-10 RX ORDER — QUETIAPINE FUMARATE 25 MG/1
1 TABLET ORAL
Qty: 30 | Refills: 0
Start: 2025-01-10 | End: 2025-02-08

## 2025-01-10 RX ORDER — CLONAZEPAM 0.5 MG/1
1 TABLET ORAL
Qty: 2 | Refills: 0
Start: 2025-01-10 | End: 2025-01-24

## 2025-01-10 RX ORDER — CLONAZEPAM 0.5 MG/1
1 TABLET ORAL
Refills: 0 | DISCHARGE

## 2025-01-10 RX ORDER — DOXYCYCLINE HYCLATE 100 MG
100 TABLET ORAL EVERY 12 HOURS
Refills: 0 | Status: DISCONTINUED | OUTPATIENT
Start: 2025-01-11 | End: 2025-01-11

## 2025-01-10 RX ADMIN — Medication 75 MICROGRAM(S): at 06:06

## 2025-01-10 RX ADMIN — Medication 1 APPLICATION(S): at 11:11

## 2025-01-10 RX ADMIN — Medication 250 MILLIGRAM(S): at 18:43

## 2025-01-10 RX ADMIN — CLONAZEPAM 0.25 MILLIGRAM(S): 0.5 TABLET ORAL at 01:34

## 2025-01-10 RX ADMIN — QUETIAPINE FUMARATE 50 MILLIGRAM(S): 25 TABLET ORAL at 21:44

## 2025-01-10 RX ADMIN — ENOXAPARIN SODIUM 40 MILLIGRAM(S): 100 INJECTION SUBCUTANEOUS at 11:15

## 2025-01-10 RX ADMIN — Medication 1 TABLET(S): at 11:11

## 2025-01-11 VITALS
TEMPERATURE: 97 F | OXYGEN SATURATION: 100 % | SYSTOLIC BLOOD PRESSURE: 115 MMHG | DIASTOLIC BLOOD PRESSURE: 64 MMHG | RESPIRATION RATE: 17 BRPM | HEART RATE: 60 BPM

## 2025-01-11 LAB
ANION GAP SERPL CALC-SCNC: 11 MMOL/L — SIGNIFICANT CHANGE UP (ref 7–14)
BLD GP AB SCN SERPL QL: NEGATIVE — SIGNIFICANT CHANGE UP
BUN SERPL-MCNC: 17 MG/DL — SIGNIFICANT CHANGE UP (ref 7–23)
CALCIUM SERPL-MCNC: 9.3 MG/DL — SIGNIFICANT CHANGE UP (ref 8.4–10.5)
CHLORIDE SERPL-SCNC: 102 MMOL/L — SIGNIFICANT CHANGE UP (ref 98–107)
CO2 SERPL-SCNC: 23 MMOL/L — SIGNIFICANT CHANGE UP (ref 22–31)
CREAT SERPL-MCNC: 0.54 MG/DL — SIGNIFICANT CHANGE UP (ref 0.5–1.3)
EGFR: 101 ML/MIN/1.73M2 — SIGNIFICANT CHANGE UP
EGFR: 101 ML/MIN/1.73M2 — SIGNIFICANT CHANGE UP
GLUCOSE SERPL-MCNC: 82 MG/DL — SIGNIFICANT CHANGE UP (ref 70–99)
HCT VFR BLD CALC: 30.8 % — LOW (ref 34.5–45)
HGB BLD-MCNC: 9.7 G/DL — LOW (ref 11.5–15.5)
MAGNESIUM SERPL-MCNC: 1.9 MG/DL — SIGNIFICANT CHANGE UP (ref 1.6–2.6)
MCHC RBC-ENTMCNC: 29 PG — SIGNIFICANT CHANGE UP (ref 27–34)
MCHC RBC-ENTMCNC: 31.5 G/DL — LOW (ref 32–36)
MCV RBC AUTO: 92.2 FL — SIGNIFICANT CHANGE UP (ref 80–100)
NRBC # BLD AUTO: 0 K/UL — SIGNIFICANT CHANGE UP (ref 0–0)
NRBC # BLD: 0 /100 WBCS — SIGNIFICANT CHANGE UP (ref 0–0)
NRBC # FLD: 0 K/UL — SIGNIFICANT CHANGE UP (ref 0–0)
NRBC BLD-RTO: 0 /100 WBCS — SIGNIFICANT CHANGE UP (ref 0–0)
PHOSPHATE SERPL-MCNC: 2.7 MG/DL — SIGNIFICANT CHANGE UP (ref 2.5–4.5)
PLATELET # BLD AUTO: 291 K/UL — SIGNIFICANT CHANGE UP (ref 150–400)
POTASSIUM SERPL-MCNC: 4 MMOL/L — SIGNIFICANT CHANGE UP (ref 3.5–5.3)
POTASSIUM SERPL-SCNC: 4 MMOL/L — SIGNIFICANT CHANGE UP (ref 3.5–5.3)
RBC # BLD: 3.34 M/UL — LOW (ref 3.8–5.2)
RBC # FLD: 15.6 % — HIGH (ref 10.3–14.5)
RH IG SCN BLD-IMP: POSITIVE — SIGNIFICANT CHANGE UP
SODIUM SERPL-SCNC: 136 MMOL/L — SIGNIFICANT CHANGE UP (ref 135–145)
WBC # BLD: 7.8 K/UL — SIGNIFICANT CHANGE UP (ref 3.8–10.5)
WBC # FLD AUTO: 7.8 K/UL — SIGNIFICANT CHANGE UP (ref 3.8–10.5)

## 2025-01-11 PROCEDURE — G0545: CPT

## 2025-01-11 PROCEDURE — 99232 SBSQ HOSP IP/OBS MODERATE 35: CPT

## 2025-01-11 PROCEDURE — 99239 HOSP IP/OBS DSCHRG MGMT >30: CPT | Mod: GC

## 2025-01-11 RX ADMIN — ENOXAPARIN SODIUM 40 MILLIGRAM(S): 100 INJECTION SUBCUTANEOUS at 14:25

## 2025-01-11 RX ADMIN — Medication 100 MILLIGRAM(S): at 06:03

## 2025-01-11 RX ADMIN — Medication 75 MICROGRAM(S): at 05:02

## 2025-01-11 RX ADMIN — LISINOPRIL 2.5 MILLIGRAM(S): 5 TABLET ORAL at 06:03

## 2025-01-13 RX ORDER — CLONAZEPAM 0.5 MG/1
1 TABLET ORAL
Qty: 2 | Refills: 0
Start: 2025-01-13

## 2025-02-19 NOTE — PHYSICAL THERAPY INITIAL EVALUATION ADULT - TRANSFER SKILLS, REHAB EVAL
Post-Op Assessment Note    CV Status:  Stable    Pain management: adequate       Mental Status:  Awake and alert   Hydration Status:  Stable   PONV Controlled:  None   Airway Patency:  Patent     Post Op Vitals Reviewed: Yes    No anethesia notable event occurred.    Staff: Anesthesiologist       Last Filed PACU Vitals:  Vitals Value Taken Time   Temp 98 °F (36.7 °C) 02/19/25 1730   Pulse 66 02/19/25 1734   /88 02/19/25 1730   Resp 14 02/19/25 1734   SpO2 100 % 02/19/25 1732   Vitals shown include unfiled device data.    Modified Mateusz:     Vitals Value Taken Time   Activity 2 02/19/25 1730   Respiration 2 02/19/25 1730   Circulation 2 02/19/25 1730   Consciousness 2 02/19/25 1730   Oxygen Saturation 1 02/19/25 1730     Modified Mateusz Score: 9             needs device and assist

## 2025-02-25 ENCOUNTER — TRANSCRIPTION ENCOUNTER (OUTPATIENT)
Age: 70
End: 2025-02-25

## 2025-06-06 NOTE — PROGRESS NOTE ADULT - PROBLEM SELECTOR PROBLEM 4
Acute encephalopathy
Urinary retention
details…
regular rate and rhythm/S1 S2 present/no gallops/no rub

## 2025-07-25 NOTE — CONSULT NOTE ADULT - REASON FOR ADMISSION
Requested medication(s) are due for refill today: Yes  Patient has already received a courtesy refill: No  Other reason request has been forwarded to provider:   
UTI